# Patient Record
Sex: MALE | Race: WHITE | NOT HISPANIC OR LATINO | Employment: OTHER | URBAN - METROPOLITAN AREA
[De-identification: names, ages, dates, MRNs, and addresses within clinical notes are randomized per-mention and may not be internally consistent; named-entity substitution may affect disease eponyms.]

---

## 2019-01-01 ENCOUNTER — HOSPITAL ENCOUNTER (INPATIENT)
Facility: MEDICAL CENTER | Age: 79
LOS: 18 days | DRG: 640 | End: 2020-04-24
Attending: EMERGENCY MEDICINE | Admitting: HOSPITALIST
Payer: COMMERCIAL

## 2019-01-01 ENCOUNTER — APPOINTMENT (OUTPATIENT)
Dept: CARDIOLOGY | Facility: MEDICAL CENTER | Age: 79
DRG: 481 | End: 2019-01-01
Attending: NURSE PRACTITIONER
Payer: COMMERCIAL

## 2019-01-01 ENCOUNTER — APPOINTMENT (OUTPATIENT)
Dept: RADIOLOGY | Facility: MEDICAL CENTER | Age: 79
DRG: 481 | End: 2019-01-01
Attending: ORTHOPAEDIC SURGERY
Payer: COMMERCIAL

## 2019-01-01 ENCOUNTER — APPOINTMENT (OUTPATIENT)
Dept: RADIOLOGY | Facility: MEDICAL CENTER | Age: 79
DRG: 481 | End: 2019-01-01
Attending: STUDENT IN AN ORGANIZED HEALTH CARE EDUCATION/TRAINING PROGRAM
Payer: COMMERCIAL

## 2019-01-01 ENCOUNTER — HOSPITAL ENCOUNTER (OUTPATIENT)
Dept: RADIOLOGY | Facility: MEDICAL CENTER | Age: 79
End: 2019-09-28

## 2019-01-01 ENCOUNTER — HOSPITAL ENCOUNTER (INPATIENT)
Facility: MEDICAL CENTER | Age: 79
LOS: 6 days | DRG: 481 | End: 2019-10-04
Attending: EMERGENCY MEDICINE | Admitting: INTERNAL MEDICINE
Payer: COMMERCIAL

## 2019-01-01 ENCOUNTER — APPOINTMENT (OUTPATIENT)
Dept: RADIOLOGY | Facility: MEDICAL CENTER | Age: 79
DRG: 481 | End: 2019-01-01
Attending: INTERNAL MEDICINE
Payer: COMMERCIAL

## 2019-01-01 ENCOUNTER — ANESTHESIA EVENT (OUTPATIENT)
Dept: SURGERY | Facility: MEDICAL CENTER | Age: 79
DRG: 481 | End: 2019-01-01
Payer: COMMERCIAL

## 2019-01-01 ENCOUNTER — TELEPHONE (OUTPATIENT)
Dept: CARDIOLOGY | Facility: MEDICAL CENTER | Age: 79
End: 2019-01-01

## 2019-01-01 ENCOUNTER — ANESTHESIA (OUTPATIENT)
Dept: SURGERY | Facility: MEDICAL CENTER | Age: 79
DRG: 481 | End: 2019-01-01
Payer: COMMERCIAL

## 2019-01-01 VITALS
HEIGHT: 64 IN | OXYGEN SATURATION: 96 % | TEMPERATURE: 98.4 F | WEIGHT: 107.81 LBS | HEART RATE: 65 BPM | RESPIRATION RATE: 15 BRPM | DIASTOLIC BLOOD PRESSURE: 80 MMHG | BODY MASS INDEX: 18.4 KG/M2 | SYSTOLIC BLOOD PRESSURE: 149 MMHG

## 2019-01-01 DIAGNOSIS — Z91.89 REQUIRES SUPERVISION DUE TO DEFICIT IN SELF-CARE: ICD-10-CM

## 2019-01-01 DIAGNOSIS — I48.20 CHRONIC ATRIAL FIBRILLATION (HCC): ICD-10-CM

## 2019-01-01 DIAGNOSIS — R45.1 AGITATION: ICD-10-CM

## 2019-01-01 DIAGNOSIS — E43 SEVERE PROTEIN-CALORIE MALNUTRITION (HCC): ICD-10-CM

## 2019-01-01 DIAGNOSIS — S72.001A CLOSED FRACTURE OF RIGHT HIP, INITIAL ENCOUNTER (HCC): ICD-10-CM

## 2019-01-01 DIAGNOSIS — R62.7 FTT (FAILURE TO THRIVE) IN ADULT: ICD-10-CM

## 2019-01-01 DIAGNOSIS — S72.141A DISPLACED INTERTROCHANTERIC FRACTURE OF RIGHT FEMUR, INITIAL ENCOUNTER FOR CLOSED FRACTURE (HCC): Primary | ICD-10-CM

## 2019-01-01 LAB
25(OH)D3 SERPL-MCNC: 78 NG/ML (ref 30–100)
ALBUMIN SERPL BCP-MCNC: 3 G/DL (ref 3.2–4.9)
ALBUMIN/GLOB SERPL: 1.4 G/DL
ALP SERPL-CCNC: 58 U/L (ref 30–99)
ALT SERPL-CCNC: 6 U/L (ref 2–50)
ANION GAP SERPL CALC-SCNC: 14 MMOL/L (ref 0–11.9)
ANION GAP SERPL CALC-SCNC: 5 MMOL/L (ref 0–11.9)
ANION GAP SERPL CALC-SCNC: 7 MMOL/L (ref 0–11.9)
ANION GAP SERPL CALC-SCNC: 7 MMOL/L (ref 0–11.9)
ANION GAP SERPL CALC-SCNC: 8 MMOL/L (ref 0–11.9)
ANION GAP SERPL CALC-SCNC: 8 MMOL/L (ref 0–11.9)
APPEARANCE UR: ABNORMAL
APTT PPP: 29.7 SEC (ref 24.7–36)
APTT PPP: 40.4 SEC (ref 24.7–36)
AST SERPL-CCNC: 15 U/L (ref 12–45)
BACTERIA #/AREA URNS HPF: ABNORMAL /HPF
BASOPHILS # BLD AUTO: 0.1 % (ref 0–1.8)
BASOPHILS # BLD AUTO: 0.4 % (ref 0–1.8)
BASOPHILS # BLD AUTO: 0.6 % (ref 0–1.8)
BASOPHILS # BLD AUTO: 0.7 % (ref 0–1.8)
BASOPHILS # BLD: 0.01 K/UL (ref 0–0.12)
BASOPHILS # BLD: 0.04 K/UL (ref 0–0.12)
BASOPHILS # BLD: 0.04 K/UL (ref 0–0.12)
BASOPHILS # BLD: 0.05 K/UL (ref 0–0.12)
BILIRUB SERPL-MCNC: 0.6 MG/DL (ref 0.1–1.5)
BILIRUB UR QL STRIP.AUTO: NEGATIVE
BUN SERPL-MCNC: 38 MG/DL (ref 8–22)
BUN SERPL-MCNC: 42 MG/DL (ref 8–22)
BUN SERPL-MCNC: 54 MG/DL (ref 8–22)
BUN SERPL-MCNC: 55 MG/DL (ref 8–22)
BUN SERPL-MCNC: 55 MG/DL (ref 8–22)
BUN SERPL-MCNC: 62 MG/DL (ref 8–22)
CALCIUM SERPL-MCNC: 7.8 MG/DL (ref 8.5–10.5)
CALCIUM SERPL-MCNC: 7.9 MG/DL (ref 8.5–10.5)
CALCIUM SERPL-MCNC: 8 MG/DL (ref 8.5–10.5)
CALCIUM SERPL-MCNC: 8 MG/DL (ref 8.5–10.5)
CALCIUM SERPL-MCNC: 8.2 MG/DL (ref 8.4–10.2)
CALCIUM SERPL-MCNC: 8.2 MG/DL (ref 8.5–10.5)
CHLORIDE SERPL-SCNC: 107 MMOL/L (ref 96–112)
CHLORIDE SERPL-SCNC: 107 MMOL/L (ref 96–112)
CHLORIDE SERPL-SCNC: 108 MMOL/L (ref 96–112)
CHLORIDE SERPL-SCNC: 108 MMOL/L (ref 96–112)
CHLORIDE SERPL-SCNC: 109 MMOL/L (ref 96–112)
CHLORIDE SERPL-SCNC: 110 MMOL/L (ref 96–112)
CO2 SERPL-SCNC: 21 MMOL/L (ref 20–33)
CO2 SERPL-SCNC: 22 MMOL/L (ref 20–33)
CO2 SERPL-SCNC: 23 MMOL/L (ref 20–33)
CO2 SERPL-SCNC: 24 MMOL/L (ref 20–33)
CO2 SERPL-SCNC: 24 MMOL/L (ref 20–33)
CO2 SERPL-SCNC: 25 MMOL/L (ref 20–33)
COLOR UR: YELLOW
CREAT SERPL-MCNC: 1.87 MG/DL (ref 0.5–1.4)
CREAT SERPL-MCNC: 2.05 MG/DL (ref 0.5–1.4)
CREAT SERPL-MCNC: 2.35 MG/DL (ref 0.5–1.4)
CREAT SERPL-MCNC: 2.38 MG/DL (ref 0.5–1.4)
CREAT SERPL-MCNC: 2.47 MG/DL (ref 0.5–1.4)
CREAT SERPL-MCNC: 2.68 MG/DL (ref 0.5–1.4)
CREAT UR-MCNC: 94.3 MG/DL
EKG IMPRESSION: NORMAL
EOSINOPHIL # BLD AUTO: 0 K/UL (ref 0–0.51)
EOSINOPHIL # BLD AUTO: 0.04 K/UL (ref 0–0.51)
EOSINOPHIL # BLD AUTO: 0.05 K/UL (ref 0–0.51)
EOSINOPHIL # BLD AUTO: 0.12 K/UL (ref 0–0.51)
EOSINOPHIL NFR BLD: 0 % (ref 0–6.9)
EOSINOPHIL NFR BLD: 0.4 % (ref 0–6.9)
EOSINOPHIL NFR BLD: 0.7 % (ref 0–6.9)
EOSINOPHIL NFR BLD: 1.8 % (ref 0–6.9)
ERYTHROCYTE [DISTWIDTH] IN BLOOD BY AUTOMATED COUNT: 49.1 FL (ref 35.9–50)
ERYTHROCYTE [DISTWIDTH] IN BLOOD BY AUTOMATED COUNT: 49.6 FL (ref 35.9–50)
ERYTHROCYTE [DISTWIDTH] IN BLOOD BY AUTOMATED COUNT: 49.8 FL (ref 35.9–50)
ERYTHROCYTE [DISTWIDTH] IN BLOOD BY AUTOMATED COUNT: 49.8 FL (ref 35.9–50)
ERYTHROCYTE [DISTWIDTH] IN BLOOD BY AUTOMATED COUNT: 50.6 FL (ref 35.9–50)
GLOBULIN SER CALC-MCNC: 2.1 G/DL (ref 1.9–3.5)
GLUCOSE SERPL-MCNC: 105 MG/DL (ref 65–99)
GLUCOSE SERPL-MCNC: 115 MG/DL (ref 65–99)
GLUCOSE SERPL-MCNC: 119 MG/DL (ref 65–99)
GLUCOSE SERPL-MCNC: 159 MG/DL (ref 65–99)
GLUCOSE SERPL-MCNC: 87 MG/DL (ref 65–99)
GLUCOSE SERPL-MCNC: 92 MG/DL (ref 65–99)
GLUCOSE UR STRIP.AUTO-MCNC: NEGATIVE MG/DL
HCT VFR BLD AUTO: 26 % (ref 42–52)
HCT VFR BLD AUTO: 26.4 % (ref 42–52)
HCT VFR BLD AUTO: 26.9 % (ref 42–52)
HCT VFR BLD AUTO: 30.8 % (ref 42–52)
HCT VFR BLD AUTO: 36.7 % (ref 42–52)
HGB BLD-MCNC: 10.1 G/DL (ref 14–18)
HGB BLD-MCNC: 12.1 G/DL (ref 14–18)
HGB BLD-MCNC: 8.4 G/DL (ref 14–18)
HGB BLD-MCNC: 8.6 G/DL (ref 14–18)
HGB BLD-MCNC: 8.7 G/DL (ref 14–18)
IMM GRANULOCYTES # BLD AUTO: 0.02 K/UL (ref 0–0.11)
IMM GRANULOCYTES # BLD AUTO: 0.02 K/UL (ref 0–0.11)
IMM GRANULOCYTES # BLD AUTO: 0.03 K/UL (ref 0–0.11)
IMM GRANULOCYTES # BLD AUTO: 0.04 K/UL (ref 0–0.11)
IMM GRANULOCYTES NFR BLD AUTO: 0.3 % (ref 0–0.9)
IMM GRANULOCYTES NFR BLD AUTO: 0.3 % (ref 0–0.9)
IMM GRANULOCYTES NFR BLD AUTO: 0.4 % (ref 0–0.9)
IMM GRANULOCYTES NFR BLD AUTO: 0.4 % (ref 0–0.9)
INR PPP: 0.99 (ref 0.87–1.13)
INR PPP: 1 (ref 0.87–1.13)
KETONES UR STRIP.AUTO-MCNC: NEGATIVE MG/DL
LEUKOCYTE ESTERASE UR QL STRIP.AUTO: NEGATIVE
LYMPHOCYTES # BLD AUTO: 0.37 K/UL (ref 1–4.8)
LYMPHOCYTES # BLD AUTO: 1.07 K/UL (ref 1–4.8)
LYMPHOCYTES # BLD AUTO: 1.46 K/UL (ref 1–4.8)
LYMPHOCYTES # BLD AUTO: 1.62 K/UL (ref 1–4.8)
LYMPHOCYTES NFR BLD: 21.9 % (ref 22–41)
LYMPHOCYTES NFR BLD: 22.6 % (ref 22–41)
LYMPHOCYTES NFR BLD: 4.4 % (ref 22–41)
LYMPHOCYTES NFR BLD: 9.5 % (ref 22–41)
MAGNESIUM SERPL-MCNC: 1.5 MG/DL (ref 1.5–2.5)
MCH RBC QN AUTO: 33.1 PG (ref 27–33)
MCH RBC QN AUTO: 33.3 PG (ref 27–33)
MCH RBC QN AUTO: 33.6 PG (ref 27–33)
MCH RBC QN AUTO: 33.8 PG (ref 27–33)
MCH RBC QN AUTO: 33.8 PG (ref 27–33)
MCHC RBC AUTO-ENTMCNC: 31.8 G/DL (ref 33.7–35.3)
MCHC RBC AUTO-ENTMCNC: 32.3 G/DL (ref 33.7–35.3)
MCHC RBC AUTO-ENTMCNC: 32.8 G/DL (ref 33.7–35.3)
MCHC RBC AUTO-ENTMCNC: 33 G/DL (ref 33.7–35.3)
MCHC RBC AUTO-ENTMCNC: 33.1 G/DL (ref 33.7–35.3)
MCV RBC AUTO: 101.6 FL (ref 81.4–97.8)
MCV RBC AUTO: 102.5 FL (ref 81.4–97.8)
MCV RBC AUTO: 103 FL (ref 81.4–97.8)
MCV RBC AUTO: 103.1 FL (ref 81.4–97.8)
MCV RBC AUTO: 103.9 FL (ref 81.4–97.8)
MICRO URNS: ABNORMAL
MONOCYTES # BLD AUTO: 0.47 K/UL (ref 0–0.85)
MONOCYTES # BLD AUTO: 0.64 K/UL (ref 0–0.85)
MONOCYTES # BLD AUTO: 0.77 K/UL (ref 0–0.85)
MONOCYTES # BLD AUTO: 0.84 K/UL (ref 0–0.85)
MONOCYTES NFR BLD AUTO: 10.8 % (ref 0–13.4)
MONOCYTES NFR BLD AUTO: 5.5 % (ref 0–13.4)
MONOCYTES NFR BLD AUTO: 7.5 % (ref 0–13.4)
MONOCYTES NFR BLD AUTO: 9.6 % (ref 0–13.4)
MUCOUS THREADS #/AREA URNS HPF: ABNORMAL /HPF
NEUTROPHILS # BLD AUTO: 4.39 K/UL (ref 1.82–7.42)
NEUTROPHILS # BLD AUTO: 4.65 K/UL (ref 1.82–7.42)
NEUTROPHILS # BLD AUTO: 7.61 K/UL (ref 1.82–7.42)
NEUTROPHILS # BLD AUTO: 9.2 K/UL (ref 1.82–7.42)
NEUTROPHILS NFR BLD: 64.9 % (ref 44–72)
NEUTROPHILS NFR BLD: 65.8 % (ref 44–72)
NEUTROPHILS NFR BLD: 81.8 % (ref 44–72)
NEUTROPHILS NFR BLD: 89.6 % (ref 44–72)
NITRITE UR QL STRIP.AUTO: NEGATIVE
NRBC # BLD AUTO: 0 K/UL
NRBC BLD-RTO: 0 /100 WBC
PH UR STRIP.AUTO: 6 [PH] (ref 5–8)
PLATELET # BLD AUTO: 163 K/UL (ref 164–446)
PLATELET # BLD AUTO: 164 K/UL (ref 164–446)
PLATELET # BLD AUTO: 166 K/UL (ref 164–446)
PLATELET # BLD AUTO: 197 K/UL (ref 164–446)
PLATELET # BLD AUTO: 235 K/UL (ref 164–446)
PMV BLD AUTO: 10.3 FL (ref 9–12.9)
PMV BLD AUTO: 10.5 FL (ref 9–12.9)
PMV BLD AUTO: 10.5 FL (ref 9–12.9)
PMV BLD AUTO: 10.7 FL (ref 9–12.9)
PMV BLD AUTO: 11 FL (ref 9–12.9)
POTASSIUM SERPL-SCNC: 3.7 MMOL/L (ref 3.6–5.5)
POTASSIUM SERPL-SCNC: 3.7 MMOL/L (ref 3.6–5.5)
POTASSIUM SERPL-SCNC: 3.8 MMOL/L (ref 3.6–5.5)
POTASSIUM SERPL-SCNC: 4 MMOL/L (ref 3.6–5.5)
POTASSIUM SERPL-SCNC: 4.3 MMOL/L (ref 3.6–5.5)
POTASSIUM SERPL-SCNC: 4.5 MMOL/L (ref 3.6–5.5)
PROT SERPL-MCNC: 5.1 G/DL (ref 6–8.2)
PROT UR QL STRIP: 100 MG/DL
PROT UR-MCNC: 147.8 MG/DL (ref 0–15)
PROTHROMBIN TIME: 13.3 SEC (ref 12–14.6)
PROTHROMBIN TIME: 13.4 SEC (ref 12–14.6)
PTH-INTACT SERPL-MCNC: 139.6 PG/ML (ref 14–72)
RBC # BLD AUTO: 2.54 M/UL (ref 4.7–6.1)
RBC # BLD AUTO: 2.56 M/UL (ref 4.7–6.1)
RBC # BLD AUTO: 2.61 M/UL (ref 4.7–6.1)
RBC # BLD AUTO: 2.99 M/UL (ref 4.7–6.1)
RBC # BLD AUTO: 3.58 M/UL (ref 4.7–6.1)
RBC # URNS HPF: ABNORMAL /HPF
RBC UR QL AUTO: ABNORMAL
SODIUM SERPL-SCNC: 137 MMOL/L (ref 135–145)
SODIUM SERPL-SCNC: 138 MMOL/L (ref 135–145)
SODIUM SERPL-SCNC: 139 MMOL/L (ref 135–145)
SODIUM SERPL-SCNC: 140 MMOL/L (ref 135–145)
SODIUM SERPL-SCNC: 141 MMOL/L (ref 135–145)
SODIUM SERPL-SCNC: 142 MMOL/L (ref 135–145)
SODIUM UR-SCNC: 43 MMOL/L
SP GR UR STRIP.AUTO: 1.02
UNIDENT CRYS URNS QL MICRO: ABNORMAL /HPF
WBC # BLD AUTO: 11.2 K/UL (ref 4.8–10.8)
WBC # BLD AUTO: 6.5 K/UL (ref 4.8–10.8)
WBC # BLD AUTO: 6.7 K/UL (ref 4.8–10.8)
WBC # BLD AUTO: 7.2 K/UL (ref 4.8–10.8)
WBC # BLD AUTO: 8.5 K/UL (ref 4.8–10.8)
WBC #/AREA URNS HPF: ABNORMAL /HPF

## 2019-01-01 PROCEDURE — 700111 HCHG RX REV CODE 636 W/ 250 OVERRIDE (IP): Performed by: INTERNAL MEDICINE

## 2019-01-01 PROCEDURE — 700112 HCHG RX REV CODE 229: Performed by: ORTHOPAEDIC SURGERY

## 2019-01-01 PROCEDURE — 700102 HCHG RX REV CODE 250 W/ 637 OVERRIDE(OP): Performed by: INTERNAL MEDICINE

## 2019-01-01 PROCEDURE — 501445 HCHG STAPLER, SKIN DISP: Performed by: ORTHOPAEDIC SURGERY

## 2019-01-01 PROCEDURE — 85025 COMPLETE CBC W/AUTO DIFF WBC: CPT

## 2019-01-01 PROCEDURE — 80053 COMPREHEN METABOLIC PANEL: CPT

## 2019-01-01 PROCEDURE — 500891 HCHG PACK, ORTHO MAJOR: Performed by: ORTHOPAEDIC SURGERY

## 2019-01-01 PROCEDURE — 97165 OT EVAL LOW COMPLEX 30 MIN: CPT

## 2019-01-01 PROCEDURE — A9270 NON-COVERED ITEM OR SERVICE: HCPCS | Performed by: INTERNAL MEDICINE

## 2019-01-01 PROCEDURE — 160022 HCHG BLOCK: Performed by: ORTHOPAEDIC SURGERY

## 2019-01-01 PROCEDURE — A6402 STERILE GAUZE <= 16 SQ IN: HCPCS | Performed by: ORTHOPAEDIC SURGERY

## 2019-01-01 PROCEDURE — 33228 REMV&REPLC PM GEN DUAL LEAD: CPT | Performed by: INTERNAL MEDICINE

## 2019-01-01 PROCEDURE — 80048 BASIC METABOLIC PNL TOTAL CA: CPT

## 2019-01-01 PROCEDURE — 700102 HCHG RX REV CODE 250 W/ 637 OVERRIDE(OP): Performed by: HOSPITALIST

## 2019-01-01 PROCEDURE — 85027 COMPLETE CBC AUTOMATED: CPT

## 2019-01-01 PROCEDURE — A9270 NON-COVERED ITEM OR SERVICE: HCPCS | Performed by: ORTHOPAEDIC SURGERY

## 2019-01-01 PROCEDURE — 36415 COLL VENOUS BLD VENIPUNCTURE: CPT

## 2019-01-01 PROCEDURE — 90471 IMMUNIZATION ADMIN: CPT

## 2019-01-01 PROCEDURE — 97535 SELF CARE MNGMENT TRAINING: CPT

## 2019-01-01 PROCEDURE — 700105 HCHG RX REV CODE 258: Performed by: INTERNAL MEDICINE

## 2019-01-01 PROCEDURE — 700102 HCHG RX REV CODE 250 W/ 637 OVERRIDE(OP): Performed by: ORTHOPAEDIC SURGERY

## 2019-01-01 PROCEDURE — 82570 ASSAY OF URINE CREATININE: CPT

## 2019-01-01 PROCEDURE — 99024 POSTOP FOLLOW-UP VISIT: CPT | Performed by: NURSE PRACTITIONER

## 2019-01-01 PROCEDURE — 160009 HCHG ANES TIME/MIN: Performed by: ORTHOPAEDIC SURGERY

## 2019-01-01 PROCEDURE — 160035 HCHG PACU - 1ST 60 MINS PHASE I: Performed by: ORTHOPAEDIC SURGERY

## 2019-01-01 PROCEDURE — A9270 NON-COVERED ITEM OR SERVICE: HCPCS | Performed by: HOSPITALIST

## 2019-01-01 PROCEDURE — 99239 HOSP IP/OBS DSCHRG MGMT >30: CPT | Performed by: FAMILY MEDICINE

## 2019-01-01 PROCEDURE — 73552 X-RAY EXAM OF FEMUR 2/>: CPT | Mod: RT

## 2019-01-01 PROCEDURE — G0378 HOSPITAL OBSERVATION PER HR: HCPCS

## 2019-01-01 PROCEDURE — 700105 HCHG RX REV CODE 258: Performed by: ANESTHESIOLOGY

## 2019-01-01 PROCEDURE — 99221 1ST HOSP IP/OBS SF/LOW 40: CPT | Mod: GC | Performed by: INTERNAL MEDICINE

## 2019-01-01 PROCEDURE — 160002 HCHG RECOVERY MINUTES (STAT): Performed by: ORTHOPAEDIC SURGERY

## 2019-01-01 PROCEDURE — 99406 BEHAV CHNG SMOKING 3-10 MIN: CPT

## 2019-01-01 PROCEDURE — 84156 ASSAY OF PROTEIN URINE: CPT

## 2019-01-01 PROCEDURE — 93010 ELECTROCARDIOGRAM REPORT: CPT | Performed by: INTERNAL MEDICINE

## 2019-01-01 PROCEDURE — 51798 US URINE CAPACITY MEASURE: CPT

## 2019-01-01 PROCEDURE — 700101 HCHG RX REV CODE 250: Performed by: INTERNAL MEDICINE

## 2019-01-01 PROCEDURE — 770001 HCHG ROOM/CARE - MED/SURG/GYN PRIV*

## 2019-01-01 PROCEDURE — 99224 PR SUBSEQUENT OBSERVATION CARE,LEVEL I: CPT | Performed by: HOSPITALIST

## 2019-01-01 PROCEDURE — 501838 HCHG SUTURE GENERAL: Performed by: ORTHOPAEDIC SURGERY

## 2019-01-01 PROCEDURE — 700101 HCHG RX REV CODE 250: Performed by: ORTHOPAEDIC SURGERY

## 2019-01-01 PROCEDURE — 85610 PROTHROMBIN TIME: CPT

## 2019-01-01 PROCEDURE — 81001 URINALYSIS AUTO W/SCOPE: CPT

## 2019-01-01 PROCEDURE — 99218 PR INITIAL OBSERVATION CARE,LEVL I: CPT | Mod: 25 | Performed by: HOSPITALIST

## 2019-01-01 PROCEDURE — 160002 HCHG RECOVERY MINUTES (STAT)

## 2019-01-01 PROCEDURE — 93005 ELECTROCARDIOGRAM TRACING: CPT | Performed by: INTERNAL MEDICINE

## 2019-01-01 PROCEDURE — 3E02340 INTRODUCTION OF INFLUENZA VACCINE INTO MUSCLE, PERCUTANEOUS APPROACH: ICD-10-PCS | Performed by: INTERNAL MEDICINE

## 2019-01-01 PROCEDURE — 97161 PT EVAL LOW COMPLEX 20 MIN: CPT

## 2019-01-01 PROCEDURE — 700111 HCHG RX REV CODE 636 W/ 250 OVERRIDE (IP): Performed by: ANESTHESIOLOGY

## 2019-01-01 PROCEDURE — 99233 SBSQ HOSP IP/OBS HIGH 50: CPT | Performed by: INTERNAL MEDICINE

## 2019-01-01 PROCEDURE — 83735 ASSAY OF MAGNESIUM: CPT

## 2019-01-01 PROCEDURE — 97530 THERAPEUTIC ACTIVITIES: CPT

## 2019-01-01 PROCEDURE — 84300 ASSAY OF URINE SODIUM: CPT

## 2019-01-01 PROCEDURE — 99285 EMERGENCY DEPT VISIT HI MDM: CPT

## 2019-01-01 PROCEDURE — C1769 GUIDE WIRE: HCPCS | Performed by: ORTHOPAEDIC SURGERY

## 2019-01-01 PROCEDURE — 76775 US EXAM ABDO BACK WALL LIM: CPT

## 2019-01-01 PROCEDURE — 85730 THROMBOPLASTIN TIME PARTIAL: CPT

## 2019-01-01 PROCEDURE — 160041 HCHG SURGERY MINUTES - EA ADDL 1 MIN LEVEL 4: Performed by: ORTHOPAEDIC SURGERY

## 2019-01-01 PROCEDURE — 99231 SBSQ HOSP IP/OBS SF/LOW 25: CPT | Mod: 24,GC | Performed by: INTERNAL MEDICINE

## 2019-01-01 PROCEDURE — 90662 IIV NO PRSV INCREASED AG IM: CPT | Performed by: INTERNAL MEDICINE

## 2019-01-01 PROCEDURE — 700111 HCHG RX REV CODE 636 W/ 250 OVERRIDE (IP): Performed by: ORTHOPAEDIC SURGERY

## 2019-01-01 PROCEDURE — 99232 SBSQ HOSP IP/OBS MODERATE 35: CPT | Performed by: HOSPITALIST

## 2019-01-01 PROCEDURE — 160036 HCHG PACU - EA ADDL 30 MINS PHASE I: Performed by: ORTHOPAEDIC SURGERY

## 2019-01-01 PROCEDURE — 93005 ELECTROCARDIOGRAM TRACING: CPT | Performed by: HOSPITALIST

## 2019-01-01 PROCEDURE — 160048 HCHG OR STATISTICAL LEVEL 1-5: Performed by: ORTHOPAEDIC SURGERY

## 2019-01-01 PROCEDURE — 99497 ADVNCD CARE PLAN 30 MIN: CPT | Performed by: HOSPITALIST

## 2019-01-01 PROCEDURE — 99153 MOD SED SAME PHYS/QHP EA: CPT

## 2019-01-01 PROCEDURE — C1713 ANCHOR/SCREW BN/BN,TIS/BN: HCPCS | Performed by: ORTHOPAEDIC SURGERY

## 2019-01-01 PROCEDURE — 99223 1ST HOSP IP/OBS HIGH 75: CPT | Performed by: INTERNAL MEDICINE

## 2019-01-01 PROCEDURE — 700101 HCHG RX REV CODE 250

## 2019-01-01 PROCEDURE — 160029 HCHG SURGERY MINUTES - 1ST 30 MINS LEVEL 4: Performed by: ORTHOPAEDIC SURGERY

## 2019-01-01 PROCEDURE — 0QS636Z REPOSITION RIGHT UPPER FEMUR WITH INTRAMEDULLARY INTERNAL FIXATION DEVICE, PERCUTANEOUS APPROACH: ICD-10-PCS | Performed by: ORTHOPAEDIC SURGERY

## 2019-01-01 PROCEDURE — 97166 OT EVAL MOD COMPLEX 45 MIN: CPT

## 2019-01-01 PROCEDURE — 99225 PR SUBSEQUENT OBSERVATION CARE,LEVEL II: CPT | Performed by: HOSPITALIST

## 2019-01-01 PROCEDURE — A6222 GAUZE <=16 IN NO W/SAL W/O B: HCPCS | Performed by: ORTHOPAEDIC SURGERY

## 2019-01-01 PROCEDURE — 99225 PR SUBSEQUENT OBSERVATION CARE,LEVEL II: CPT | Performed by: INTERNAL MEDICINE

## 2019-01-01 PROCEDURE — 0JH606Z INSERTION OF PACEMAKER, DUAL CHAMBER INTO CHEST SUBCUTANEOUS TISSUE AND FASCIA, OPEN APPROACH: ICD-10-PCS | Performed by: INTERNAL MEDICINE

## 2019-01-01 PROCEDURE — 0JPT0PZ REMOVAL OF CARDIAC RHYTHM RELATED DEVICE FROM TRUNK SUBCUTANEOUS TISSUE AND FASCIA, OPEN APPROACH: ICD-10-PCS | Performed by: INTERNAL MEDICINE

## 2019-01-01 PROCEDURE — 93005 ELECTROCARDIOGRAM TRACING: CPT | Performed by: EMERGENCY MEDICINE

## 2019-01-01 PROCEDURE — 700101 HCHG RX REV CODE 250: Performed by: ANESTHESIOLOGY

## 2019-01-01 PROCEDURE — 700111 HCHG RX REV CODE 636 W/ 250 OVERRIDE (IP)

## 2019-01-01 PROCEDURE — 82306 VITAMIN D 25 HYDROXY: CPT

## 2019-01-01 PROCEDURE — 71045 X-RAY EXAM CHEST 1 VIEW: CPT

## 2019-01-01 PROCEDURE — 99152 MOD SED SAME PHYS/QHP 5/>YRS: CPT | Performed by: INTERNAL MEDICINE

## 2019-01-01 PROCEDURE — 83970 ASSAY OF PARATHORMONE: CPT

## 2019-01-01 PROCEDURE — 99232 SBSQ HOSP IP/OBS MODERATE 35: CPT | Performed by: INTERNAL MEDICINE

## 2019-01-01 DEVICE — IMPLANTABLE DEVICE: Type: IMPLANTABLE DEVICE | Site: HIP | Status: FUNCTIONAL

## 2019-01-01 DEVICE — SCREW TRIGEN LOW PROFILE 5.0MM X 30MM: Type: IMPLANTABLE DEVICE | Site: HIP | Status: FUNCTIONAL

## 2019-01-01 RX ORDER — LABETALOL 100 MG/1
150 TABLET, FILM COATED ORAL 2 TIMES DAILY
Status: ON HOLD | COMMUNITY
End: 2019-01-01

## 2019-01-01 RX ORDER — SODIUM CHLORIDE, SODIUM GLUCONATE, SODIUM ACETATE, POTASSIUM CHLORIDE AND MAGNESIUM CHLORIDE 526; 502; 368; 37; 30 MG/100ML; MG/100ML; MG/100ML; MG/100ML; MG/100ML
500 INJECTION, SOLUTION INTRAVENOUS CONTINUOUS
Status: DISCONTINUED | OUTPATIENT
Start: 2019-01-01 | End: 2019-01-01 | Stop reason: HOSPADM

## 2019-01-01 RX ORDER — MORPHINE SULFATE 0.5 MG/ML
INJECTION, SOLUTION EPIDURAL; INTRATHECAL; INTRAVENOUS PRN
Status: DISCONTINUED | OUTPATIENT
Start: 2019-01-01 | End: 2019-01-01 | Stop reason: SURG

## 2019-01-01 RX ORDER — ERGOCALCIFEROL 1.25 MG/1
50000 CAPSULE ORAL
Status: ON HOLD | COMMUNITY
End: 2020-01-01

## 2019-01-01 RX ORDER — ONDANSETRON 2 MG/ML
4 INJECTION INTRAMUSCULAR; INTRAVENOUS
Status: DISCONTINUED | OUTPATIENT
Start: 2019-01-01 | End: 2019-01-01 | Stop reason: HOSPADM

## 2019-01-01 RX ORDER — LOSARTAN POTASSIUM 100 MG/1
100 TABLET ORAL DAILY
COMMUNITY
End: 2019-01-01

## 2019-01-01 RX ORDER — TAMSULOSIN HYDROCHLORIDE 0.4 MG/1
0.4 CAPSULE ORAL EVERY EVENING
COMMUNITY
Start: 2020-01-01

## 2019-01-01 RX ORDER — HALOPERIDOL 5 MG/ML
1 INJECTION INTRAMUSCULAR
Status: DISCONTINUED | OUTPATIENT
Start: 2019-01-01 | End: 2019-01-01 | Stop reason: HOSPADM

## 2019-01-01 RX ORDER — ATORVASTATIN CALCIUM 10 MG/1
10 TABLET, FILM COATED ORAL EVERY MORNING
COMMUNITY
End: 2019-01-01

## 2019-01-01 RX ORDER — HYDROMORPHONE HYDROCHLORIDE 1 MG/ML
1 INJECTION, SOLUTION INTRAMUSCULAR; INTRAVENOUS; SUBCUTANEOUS
Status: DISCONTINUED | OUTPATIENT
Start: 2019-01-01 | End: 2019-01-01 | Stop reason: HOSPADM

## 2019-01-01 RX ORDER — OXYCODONE HYDROCHLORIDE 5 MG/1
5 TABLET ORAL EVERY 6 HOURS PRN
Qty: 12 TAB | Refills: 0 | Status: SHIPPED | OUTPATIENT
Start: 2019-01-01 | End: 2019-01-01

## 2019-01-01 RX ORDER — DEXAMETHASONE SODIUM PHOSPHATE 4 MG/ML
INJECTION, SOLUTION INTRA-ARTICULAR; INTRALESIONAL; INTRAMUSCULAR; INTRAVENOUS; SOFT TISSUE PRN
Status: DISCONTINUED | OUTPATIENT
Start: 2019-01-01 | End: 2019-01-01 | Stop reason: SURG

## 2019-01-01 RX ORDER — SCOLOPAMINE TRANSDERMAL SYSTEM 1 MG/1
1 PATCH, EXTENDED RELEASE TRANSDERMAL
Status: DISCONTINUED | OUTPATIENT
Start: 2019-01-01 | End: 2019-01-01 | Stop reason: HOSPADM

## 2019-01-01 RX ORDER — BUPIVACAINE HYDROCHLORIDE 2.5 MG/ML
INJECTION, SOLUTION EPIDURAL; INFILTRATION; INTRACAUDAL PRN
Status: DISCONTINUED | OUTPATIENT
Start: 2019-01-01 | End: 2019-01-01 | Stop reason: SURG

## 2019-01-01 RX ORDER — CLONIDINE HYDROCHLORIDE 0.1 MG/1
0.1 TABLET ORAL EVERY 6 HOURS PRN
COMMUNITY
End: 2020-01-01

## 2019-01-01 RX ORDER — AMOXICILLIN 250 MG
1 CAPSULE ORAL NIGHTLY
Status: DISCONTINUED | OUTPATIENT
Start: 2019-01-01 | End: 2019-01-01 | Stop reason: HOSPADM

## 2019-01-01 RX ORDER — DRONABINOL 2.5 MG/1
5 CAPSULE ORAL 2 TIMES DAILY
Status: DISCONTINUED | OUTPATIENT
Start: 2019-01-01 | End: 2020-01-01

## 2019-01-01 RX ORDER — HYDRALAZINE HYDROCHLORIDE 20 MG/ML
10 INJECTION INTRAMUSCULAR; INTRAVENOUS EVERY 4 HOURS PRN
Status: DISCONTINUED | OUTPATIENT
Start: 2019-01-01 | End: 2019-01-01 | Stop reason: HOSPADM

## 2019-01-01 RX ORDER — HEPARIN SODIUM 5000 [USP'U]/ML
5000 INJECTION, SOLUTION INTRAVENOUS; SUBCUTANEOUS EVERY 8 HOURS
Status: DISCONTINUED | OUTPATIENT
Start: 2019-01-01 | End: 2019-01-01

## 2019-01-01 RX ORDER — MEPERIDINE HYDROCHLORIDE 25 MG/ML
12.5 INJECTION INTRAMUSCULAR; INTRAVENOUS; SUBCUTANEOUS
Status: DISCONTINUED | OUTPATIENT
Start: 2019-01-01 | End: 2019-01-01 | Stop reason: HOSPADM

## 2019-01-01 RX ORDER — HEPARIN SODIUM 5000 [USP'U]/ML
5000 INJECTION, SOLUTION INTRAVENOUS; SUBCUTANEOUS EVERY 8 HOURS
Refills: 0
Start: 2019-01-01 | End: 2019-01-01

## 2019-01-01 RX ORDER — ONDANSETRON 4 MG/1
4 TABLET, ORALLY DISINTEGRATING ORAL EVERY 4 HOURS PRN
Status: DISCONTINUED | OUTPATIENT
Start: 2019-01-01 | End: 2019-01-01 | Stop reason: HOSPADM

## 2019-01-01 RX ORDER — ALENDRONATE SODIUM 70 MG/1
70 TABLET ORAL
COMMUNITY
End: 2019-01-01

## 2019-01-01 RX ORDER — IPRATROPIUM BROMIDE AND ALBUTEROL SULFATE 2.5; .5 MG/3ML; MG/3ML
3 SOLUTION RESPIRATORY (INHALATION)
Status: DISCONTINUED | OUTPATIENT
Start: 2019-01-01 | End: 2019-01-01 | Stop reason: HOSPADM

## 2019-01-01 RX ORDER — CEFAZOLIN SODIUM 1 G/3ML
INJECTION, POWDER, FOR SOLUTION INTRAMUSCULAR; INTRAVENOUS PRN
Status: DISCONTINUED | OUTPATIENT
Start: 2019-01-01 | End: 2019-01-01 | Stop reason: SURG

## 2019-01-01 RX ORDER — OXYCODONE HYDROCHLORIDE 5 MG/1
5 TABLET ORAL
Status: DISCONTINUED | OUTPATIENT
Start: 2019-01-01 | End: 2019-01-01 | Stop reason: HOSPADM

## 2019-01-01 RX ORDER — LOSARTAN POTASSIUM 25 MG/1
100 TABLET ORAL DAILY
Status: DISCONTINUED | OUTPATIENT
Start: 2019-01-01 | End: 2020-01-01

## 2019-01-01 RX ORDER — OXYCODONE HCL 5 MG/5 ML
5 SOLUTION, ORAL ORAL
Status: DISCONTINUED | OUTPATIENT
Start: 2019-01-01 | End: 2019-01-01 | Stop reason: HOSPADM

## 2019-01-01 RX ORDER — AMLODIPINE BESYLATE 5 MG/1
5 TABLET ORAL DAILY
Status: DISCONTINUED | OUTPATIENT
Start: 2019-01-01 | End: 2019-01-01

## 2019-01-01 RX ORDER — SCOLOPAMINE TRANSDERMAL SYSTEM 1 MG/1
1 PATCH, EXTENDED RELEASE TRANSDERMAL
Qty: 4 PATCH | Refills: 3 | Status: SHIPPED | OUTPATIENT
Start: 2019-01-01 | End: 2019-01-01

## 2019-01-01 RX ORDER — LIDOCAINE HYDROCHLORIDE 20 MG/ML
INJECTION, SOLUTION INFILTRATION; PERINEURAL
Status: COMPLETED
Start: 2019-01-01 | End: 2019-01-01

## 2019-01-01 RX ORDER — ERGOCALCIFEROL 1.25 MG/1
50000 CAPSULE ORAL
COMMUNITY
End: 2019-01-01

## 2019-01-01 RX ORDER — MIDAZOLAM HYDROCHLORIDE 1 MG/ML
1 INJECTION INTRAMUSCULAR; INTRAVENOUS
Status: DISCONTINUED | OUTPATIENT
Start: 2019-01-01 | End: 2019-01-01 | Stop reason: HOSPADM

## 2019-01-01 RX ORDER — AMOXICILLIN 250 MG
1 CAPSULE ORAL
Qty: 30 TAB | Refills: 0 | Status: SHIPPED | OUTPATIENT
Start: 2019-01-01 | End: 2019-01-01

## 2019-01-01 RX ORDER — ROCURONIUM BROMIDE 10 MG/ML
INJECTION, SOLUTION INTRAVENOUS PRN
Status: DISCONTINUED | OUTPATIENT
Start: 2019-01-01 | End: 2019-01-01 | Stop reason: SURG

## 2019-01-01 RX ORDER — AMOXICILLIN 250 MG
1 CAPSULE ORAL DAILY
Qty: 30 TAB | Refills: 0
Start: 2019-01-01 | End: 2019-01-01

## 2019-01-01 RX ORDER — LOSARTAN POTASSIUM 50 MG/1
100 TABLET ORAL DAILY
Status: DISCONTINUED | OUTPATIENT
Start: 2019-01-01 | End: 2019-01-01

## 2019-01-01 RX ORDER — DIPHENHYDRAMINE HYDROCHLORIDE 50 MG/ML
12.5 INJECTION INTRAMUSCULAR; INTRAVENOUS
Status: DISCONTINUED | OUTPATIENT
Start: 2019-01-01 | End: 2019-01-01 | Stop reason: HOSPADM

## 2019-01-01 RX ORDER — MAGNESIUM HYDROXIDE 1200 MG/15ML
LIQUID ORAL
Status: COMPLETED | OUTPATIENT
Start: 2019-01-01 | End: 2019-01-01

## 2019-01-01 RX ORDER — LOSARTAN POTASSIUM 100 MG/1
100 TABLET ORAL DAILY
Qty: 30 TAB | Refills: 0 | Status: SHIPPED | OUTPATIENT
Start: 2019-01-01 | End: 2019-01-01

## 2019-01-01 RX ORDER — AMOXICILLIN 250 MG
1 CAPSULE ORAL
Status: DISCONTINUED | OUTPATIENT
Start: 2019-01-01 | End: 2019-01-01 | Stop reason: HOSPADM

## 2019-01-01 RX ORDER — OXYCODONE HYDROCHLORIDE 5 MG/1
5 TABLET ORAL
Qty: 18 TAB | Refills: 0 | Status: SHIPPED | OUTPATIENT
Start: 2019-01-01 | End: 2019-01-01

## 2019-01-01 RX ORDER — OXYCODONE HYDROCHLORIDE 10 MG/1
10 TABLET ORAL
Status: DISCONTINUED | OUTPATIENT
Start: 2019-01-01 | End: 2019-01-01 | Stop reason: HOSPADM

## 2019-01-01 RX ORDER — HALOPERIDOL 5 MG/ML
1 INJECTION INTRAMUSCULAR EVERY 6 HOURS PRN
Status: DISCONTINUED | OUTPATIENT
Start: 2019-01-01 | End: 2019-01-01 | Stop reason: HOSPADM

## 2019-01-01 RX ORDER — ONDANSETRON 2 MG/ML
4 INJECTION INTRAMUSCULAR; INTRAVENOUS EVERY 4 HOURS PRN
Status: DISCONTINUED | OUTPATIENT
Start: 2019-01-01 | End: 2019-01-01 | Stop reason: HOSPADM

## 2019-01-01 RX ORDER — ENEMA 19; 7 G/133ML; G/133ML
1 ENEMA RECTAL
Status: DISCONTINUED | OUTPATIENT
Start: 2019-01-01 | End: 2019-01-01 | Stop reason: HOSPADM

## 2019-01-01 RX ORDER — ENEMA 19; 7 G/133ML; G/133ML
1 ENEMA RECTAL
Start: 2019-01-01 | End: 2019-01-01

## 2019-01-01 RX ORDER — MIDAZOLAM HYDROCHLORIDE 1 MG/ML
INJECTION INTRAMUSCULAR; INTRAVENOUS
Status: COMPLETED
Start: 2019-01-01 | End: 2019-01-01

## 2019-01-01 RX ORDER — TAMSULOSIN HYDROCHLORIDE 0.4 MG/1
0.4 CAPSULE ORAL EVERY EVENING
Status: DISCONTINUED | OUTPATIENT
Start: 2019-01-01 | End: 2020-01-01

## 2019-01-01 RX ORDER — POLYETHYLENE GLYCOL 3350 17 G/17G
17 POWDER, FOR SOLUTION ORAL 2 TIMES DAILY PRN
Refills: 3
Start: 2019-01-01 | End: 2019-01-01

## 2019-01-01 RX ORDER — ATORVASTATIN CALCIUM 10 MG/1
10 TABLET, FILM COATED ORAL NIGHTLY
COMMUNITY
End: 2019-01-01

## 2019-01-01 RX ORDER — AMLODIPINE BESYLATE 5 MG/1
5 TABLET ORAL DAILY
Status: ON HOLD | COMMUNITY
End: 2020-01-01

## 2019-01-01 RX ORDER — DOXYCYCLINE 100 MG/1
100 TABLET ORAL EVERY 12 HOURS
Status: DISCONTINUED | OUTPATIENT
Start: 2019-01-01 | End: 2019-01-01 | Stop reason: HOSPADM

## 2019-01-01 RX ORDER — ACETAMINOPHEN 325 MG/1
650 TABLET ORAL EVERY 6 HOURS PRN
Status: DISCONTINUED | OUTPATIENT
Start: 2019-01-01 | End: 2019-01-01 | Stop reason: HOSPADM

## 2019-01-01 RX ORDER — OXYCODONE HYDROCHLORIDE 5 MG/1
5 TABLET ORAL EVERY 4 HOURS PRN
Status: ON HOLD | COMMUNITY
End: 2020-01-01

## 2019-01-01 RX ORDER — LABETALOL 300 MG/1
150 TABLET, FILM COATED ORAL 2 TIMES DAILY
Status: DISCONTINUED | OUTPATIENT
Start: 2019-01-01 | End: 2019-01-01

## 2019-01-01 RX ORDER — LABETALOL 100 MG/1
75 TABLET, FILM COATED ORAL 2 TIMES DAILY
Status: DISCONTINUED | OUTPATIENT
Start: 2019-01-01 | End: 2019-01-01 | Stop reason: HOSPADM

## 2019-01-01 RX ORDER — HEPARIN SODIUM 5000 [USP'U]/ML
5000 INJECTION, SOLUTION INTRAVENOUS; SUBCUTANEOUS EVERY 12 HOURS
Status: DISCONTINUED | OUTPATIENT
Start: 2019-01-01 | End: 2019-01-01 | Stop reason: HOSPADM

## 2019-01-01 RX ORDER — OXYCODONE HYDROCHLORIDE 5 MG/1
5 TABLET ORAL EVERY 4 HOURS PRN
Status: DISCONTINUED | OUTPATIENT
Start: 2019-01-01 | End: 2020-01-01

## 2019-01-01 RX ORDER — ERGOCALCIFEROL 1.25 MG/1
50000 CAPSULE ORAL
Status: DISCONTINUED | OUTPATIENT
Start: 2019-01-01 | End: 2019-01-01 | Stop reason: HOSPADM

## 2019-01-01 RX ORDER — DOXYCYCLINE 100 MG/1
100 TABLET ORAL EVERY 12 HOURS
Qty: 7 TAB | Refills: 0 | Status: SHIPPED | OUTPATIENT
Start: 2019-01-01 | End: 2019-01-01

## 2019-01-01 RX ORDER — ERGOCALCIFEROL 1.25 MG/1
50000 CAPSULE ORAL
Status: DISCONTINUED | OUTPATIENT
Start: 2020-01-01 | End: 2020-01-01

## 2019-01-01 RX ORDER — OXYCODONE HCL 5 MG/5 ML
10 SOLUTION, ORAL ORAL
Status: DISCONTINUED | OUTPATIENT
Start: 2019-01-01 | End: 2019-01-01 | Stop reason: HOSPADM

## 2019-01-01 RX ORDER — OXYCODONE HYDROCHLORIDE 5 MG/1
5 TABLET ORAL
Status: DISCONTINUED | OUTPATIENT
Start: 2019-01-01 | End: 2019-01-01

## 2019-01-01 RX ORDER — ATORVASTATIN CALCIUM 10 MG/1
10 TABLET, FILM COATED ORAL NIGHTLY
Status: DISCONTINUED | OUTPATIENT
Start: 2019-01-01 | End: 2019-01-01 | Stop reason: HOSPADM

## 2019-01-01 RX ORDER — ALENDRONATE SODIUM 70 MG/1
70 TABLET ORAL
Status: ON HOLD | COMMUNITY
End: 2020-01-01

## 2019-01-01 RX ORDER — CEFAZOLIN SODIUM 1 G/3ML
INJECTION, POWDER, FOR SOLUTION INTRAMUSCULAR; INTRAVENOUS
Status: COMPLETED
Start: 2019-01-01 | End: 2019-01-01

## 2019-01-01 RX ORDER — ACETAMINOPHEN 325 MG/1
650 TABLET ORAL EVERY 6 HOURS PRN
Qty: 30 TAB | Refills: 0
Start: 2019-01-01 | End: 2019-01-01

## 2019-01-01 RX ORDER — OXYCODONE HYDROCHLORIDE 5 MG/1
2.5 TABLET ORAL
Status: DISCONTINUED | OUTPATIENT
Start: 2019-01-01 | End: 2019-01-01

## 2019-01-01 RX ORDER — ONDANSETRON 4 MG/1
4 TABLET, ORALLY DISINTEGRATING ORAL EVERY 4 HOURS PRN
Qty: 10 TAB | Refills: 0 | Status: SHIPPED | OUTPATIENT
Start: 2019-01-01 | End: 2019-01-01

## 2019-01-01 RX ORDER — TAMSULOSIN HYDROCHLORIDE 0.4 MG/1
0.4 CAPSULE ORAL EVERY EVENING
COMMUNITY
End: 2019-01-01

## 2019-01-01 RX ORDER — DIPHENHYDRAMINE HYDROCHLORIDE 50 MG/ML
25 INJECTION INTRAMUSCULAR; INTRAVENOUS EVERY 6 HOURS PRN
Status: DISCONTINUED | OUTPATIENT
Start: 2019-01-01 | End: 2019-01-01 | Stop reason: HOSPADM

## 2019-01-01 RX ORDER — SODIUM CHLORIDE, SODIUM LACTATE, POTASSIUM CHLORIDE, CALCIUM CHLORIDE 600; 310; 30; 20 MG/100ML; MG/100ML; MG/100ML; MG/100ML
INJECTION, SOLUTION INTRAVENOUS CONTINUOUS
Status: DISCONTINUED | OUTPATIENT
Start: 2019-01-01 | End: 2019-01-01 | Stop reason: HOSPADM

## 2019-01-01 RX ORDER — BISACODYL 10 MG
10 SUPPOSITORY, RECTAL RECTAL
Status: DISCONTINUED | OUTPATIENT
Start: 2019-01-01 | End: 2019-01-01

## 2019-01-01 RX ORDER — HYDROMORPHONE HYDROCHLORIDE 1 MG/ML
0.4 INJECTION, SOLUTION INTRAMUSCULAR; INTRAVENOUS; SUBCUTANEOUS
Status: DISCONTINUED | OUTPATIENT
Start: 2019-01-01 | End: 2019-01-01 | Stop reason: HOSPADM

## 2019-01-01 RX ORDER — ALENDRONATE SODIUM 70 MG/1
70 TABLET ORAL
Status: DISCONTINUED | OUTPATIENT
Start: 2019-01-01 | End: 2019-01-01 | Stop reason: HOSPADM

## 2019-01-01 RX ORDER — ONDANSETRON 2 MG/ML
4 INJECTION INTRAMUSCULAR; INTRAVENOUS EVERY 4 HOURS PRN
Status: DISCONTINUED | OUTPATIENT
Start: 2019-01-01 | End: 2019-01-01

## 2019-01-01 RX ORDER — LANOLIN ALCOHOL/MO/W.PET/CERES
1000 CREAM (GRAM) TOPICAL EVERY MORNING
Status: ON HOLD | COMMUNITY
End: 2019-01-01

## 2019-01-01 RX ORDER — PSEUDOEPHEDRINE HCL 30 MG
100 TABLET ORAL 2 TIMES DAILY
Qty: 60 CAP
Start: 2019-01-01 | End: 2019-01-01

## 2019-01-01 RX ORDER — DRONABINOL 5 MG/1
5 CAPSULE ORAL 2 TIMES DAILY
Status: ON HOLD | COMMUNITY
End: 2020-01-01

## 2019-01-01 RX ORDER — HYDROMORPHONE HYDROCHLORIDE 1 MG/ML
0.5 INJECTION, SOLUTION INTRAMUSCULAR; INTRAVENOUS; SUBCUTANEOUS
Status: DISCONTINUED | OUTPATIENT
Start: 2019-01-01 | End: 2019-01-01 | Stop reason: HOSPADM

## 2019-01-01 RX ORDER — DOCUSATE SODIUM 100 MG/1
100 CAPSULE, LIQUID FILLED ORAL 2 TIMES DAILY
Status: DISCONTINUED | OUTPATIENT
Start: 2019-01-01 | End: 2019-01-01 | Stop reason: HOSPADM

## 2019-01-01 RX ORDER — ALENDRONATE SODIUM 70 MG/1
70 TABLET ORAL
Qty: 4 TAB
Start: 2019-01-01 | End: 2019-01-01

## 2019-01-01 RX ORDER — BISACODYL 10 MG
10 SUPPOSITORY, RECTAL RECTAL
Refills: 0
Start: 2019-01-01 | End: 2019-01-01

## 2019-01-01 RX ORDER — BISACODYL 10 MG
10 SUPPOSITORY, RECTAL RECTAL
Status: DISCONTINUED | OUTPATIENT
Start: 2019-01-01 | End: 2019-01-01 | Stop reason: HOSPADM

## 2019-01-01 RX ORDER — KETOROLAC TROMETHAMINE 30 MG/ML
15 INJECTION, SOLUTION INTRAMUSCULAR; INTRAVENOUS EVERY 6 HOURS
Status: DISCONTINUED | OUTPATIENT
Start: 2019-01-01 | End: 2019-01-01

## 2019-01-01 RX ORDER — AMOXICILLIN 250 MG
2 CAPSULE ORAL 2 TIMES DAILY
Status: DISCONTINUED | OUTPATIENT
Start: 2019-01-01 | End: 2019-01-01

## 2019-01-01 RX ORDER — ATORVASTATIN CALCIUM 10 MG/1
10 TABLET, FILM COATED ORAL NIGHTLY
Status: DISCONTINUED | OUTPATIENT
Start: 2019-01-01 | End: 2020-01-01

## 2019-01-01 RX ORDER — LOSARTAN POTASSIUM 50 MG/1
100 TABLET ORAL
Status: DISCONTINUED | OUTPATIENT
Start: 2019-01-01 | End: 2019-01-01 | Stop reason: HOSPADM

## 2019-01-01 RX ORDER — LABETALOL HYDROCHLORIDE 5 MG/ML
10 INJECTION, SOLUTION INTRAVENOUS EVERY 4 HOURS PRN
Status: DISCONTINUED | OUTPATIENT
Start: 2019-01-01 | End: 2019-01-01 | Stop reason: HOSPADM

## 2019-01-01 RX ORDER — ENALAPRILAT 1.25 MG/ML
1.25 INJECTION INTRAVENOUS EVERY 6 HOURS PRN
Status: DISCONTINUED | OUTPATIENT
Start: 2019-01-01 | End: 2019-01-01 | Stop reason: HOSPADM

## 2019-01-01 RX ORDER — HYDROMORPHONE HYDROCHLORIDE 1 MG/ML
0.2 INJECTION, SOLUTION INTRAMUSCULAR; INTRAVENOUS; SUBCUTANEOUS
Status: DISCONTINUED | OUTPATIENT
Start: 2019-01-01 | End: 2019-01-01 | Stop reason: HOSPADM

## 2019-01-01 RX ORDER — DEXAMETHASONE SODIUM PHOSPHATE 4 MG/ML
4 INJECTION, SOLUTION INTRA-ARTICULAR; INTRALESIONAL; INTRAMUSCULAR; INTRAVENOUS; SOFT TISSUE
Status: DISCONTINUED | OUTPATIENT
Start: 2019-01-01 | End: 2019-01-01 | Stop reason: HOSPADM

## 2019-01-01 RX ORDER — CEFAZOLIN SODIUM 2 G/100ML
2 INJECTION, SOLUTION INTRAVENOUS EVERY 8 HOURS
Status: COMPLETED | OUTPATIENT
Start: 2019-01-01 | End: 2019-01-01

## 2019-01-01 RX ORDER — LIDOCAINE 50 MG/G
1 PATCH TOPICAL EVERY 24 HOURS
COMMUNITY
End: 2020-01-01

## 2019-01-01 RX ORDER — ATORVASTATIN CALCIUM 10 MG/1
10 TABLET, FILM COATED ORAL NIGHTLY
Status: ON HOLD | COMMUNITY
End: 2020-01-01

## 2019-01-01 RX ORDER — ASPIRIN 325 MG
325 TABLET ORAL EVERY 6 HOURS PRN
COMMUNITY
End: 2019-01-01

## 2019-01-01 RX ORDER — LOSARTAN POTASSIUM 100 MG/1
100 TABLET ORAL DAILY
COMMUNITY
Start: 2020-01-01

## 2019-01-01 RX ORDER — CHOLECALCIFEROL (VITAMIN D3) 125 MCG
1000 CAPSULE ORAL DAILY
Status: DISCONTINUED | OUTPATIENT
Start: 2019-01-01 | End: 2019-01-01 | Stop reason: HOSPADM

## 2019-01-01 RX ORDER — AMLODIPINE BESYLATE 5 MG/1
10 TABLET ORAL DAILY
Status: DISCONTINUED | OUTPATIENT
Start: 2020-01-01 | End: 2020-01-01

## 2019-01-01 RX ORDER — LIDOCAINE 50 MG/G
1 PATCH TOPICAL EVERY 24 HOURS
Status: DISCONTINUED | OUTPATIENT
Start: 2019-01-01 | End: 2020-01-01

## 2019-01-01 RX ORDER — LOSARTAN POTASSIUM 100 MG/1
100 TABLET ORAL EVERY MORNING
COMMUNITY
End: 2019-01-01

## 2019-01-01 RX ORDER — CLONIDINE HYDROCHLORIDE 0.1 MG/1
0.1 TABLET ORAL EVERY 6 HOURS PRN
Status: DISCONTINUED | OUTPATIENT
Start: 2019-01-01 | End: 2020-01-01

## 2019-01-01 RX ORDER — HETASTARCH 6 G/100ML
INJECTION, SOLUTION INTRAVENOUS PRN
Status: DISCONTINUED | OUTPATIENT
Start: 2019-01-01 | End: 2019-01-01 | Stop reason: SURG

## 2019-01-01 RX ORDER — POLYETHYLENE GLYCOL 3350 17 G/17G
1 POWDER, FOR SOLUTION ORAL
Status: DISCONTINUED | OUTPATIENT
Start: 2019-01-01 | End: 2019-01-01

## 2019-01-01 RX ORDER — ALENDRONATE SODIUM 70 MG/1
70 TABLET ORAL
Status: DISCONTINUED | OUTPATIENT
Start: 2019-01-01 | End: 2019-01-01

## 2019-01-01 RX ORDER — MORPHINE SULFATE 4 MG/ML
2 INJECTION, SOLUTION INTRAMUSCULAR; INTRAVENOUS
Status: DISCONTINUED | OUTPATIENT
Start: 2019-01-01 | End: 2019-01-01

## 2019-01-01 RX ORDER — POLYETHYLENE GLYCOL 3350 17 G/17G
1 POWDER, FOR SOLUTION ORAL 2 TIMES DAILY PRN
Status: DISCONTINUED | OUTPATIENT
Start: 2019-01-01 | End: 2019-01-01 | Stop reason: HOSPADM

## 2019-01-01 RX ORDER — LIDOCAINE HYDROCHLORIDE 20 MG/ML
INJECTION, SOLUTION EPIDURAL; INFILTRATION; INTRACAUDAL; PERINEURAL PRN
Status: DISCONTINUED | OUTPATIENT
Start: 2019-01-01 | End: 2019-01-01 | Stop reason: SURG

## 2019-01-01 RX ORDER — ACETAMINOPHEN 325 MG/1
650 TABLET ORAL EVERY 6 HOURS
Status: DISPENSED | OUTPATIENT
Start: 2019-01-01 | End: 2019-01-01

## 2019-01-01 RX ORDER — LABETALOL 100 MG/1
75 TABLET, FILM COATED ORAL 2 TIMES DAILY
Qty: 60 TAB
Start: 2019-01-01 | End: 2019-01-01

## 2019-01-01 RX ORDER — HALOPERIDOL 5 MG/1
5 TABLET ORAL EVERY 6 HOURS PRN
Status: DISCONTINUED | OUTPATIENT
Start: 2019-01-01 | End: 2020-01-01

## 2019-01-01 RX ORDER — HALOPERIDOL 5 MG/ML
2.5 INJECTION INTRAMUSCULAR EVERY 4 HOURS PRN
Status: DISCONTINUED | OUTPATIENT
Start: 2019-01-01 | End: 2019-01-01

## 2019-01-01 RX ADMIN — LABETALOL HYDROCHLORIDE 10 MG: 5 INJECTION INTRAVENOUS at 12:45

## 2019-01-01 RX ADMIN — TAMSULOSIN HYDROCHLORIDE 0.4 MG: 0.4 CAPSULE ORAL at 17:22

## 2019-01-01 RX ADMIN — HEPARIN SODIUM 5000 UNITS: 5000 INJECTION INTRAVENOUS; SUBCUTANEOUS at 17:57

## 2019-01-01 RX ADMIN — LABETALOL HYDROCHLORIDE 75 MG: 100 TABLET, FILM COATED ORAL at 05:39

## 2019-01-01 RX ADMIN — DRONABINOL 5 MG: 2.5 CAPSULE ORAL at 17:22

## 2019-01-01 RX ADMIN — ATORVASTATIN CALCIUM 10 MG: 10 TABLET, FILM COATED ORAL at 20:15

## 2019-01-01 RX ADMIN — ACETAMINOPHEN 650 MG: 325 TABLET, FILM COATED ORAL at 17:55

## 2019-01-01 RX ADMIN — HEPARIN SODIUM 5000 UNITS: 5000 INJECTION INTRAVENOUS; SUBCUTANEOUS at 13:47

## 2019-01-01 RX ADMIN — ATORVASTATIN CALCIUM 10 MG: 10 TABLET, FILM COATED ORAL at 21:52

## 2019-01-01 RX ADMIN — ATORVASTATIN CALCIUM 10 MG: 10 TABLET, FILM COATED ORAL at 20:14

## 2019-01-01 RX ADMIN — LABETALOL HYDROCHLORIDE 75 MG: 100 TABLET, FILM COATED ORAL at 06:15

## 2019-01-01 RX ADMIN — DOXYCYCLINE 100 MG: 100 TABLET, FILM COATED ORAL at 17:58

## 2019-01-01 RX ADMIN — LABETALOL HYDROCHLORIDE 75 MG: 100 TABLET, FILM COATED ORAL at 17:57

## 2019-01-01 RX ADMIN — CALCIUM CARBONATE-CHOLECALCIFEROL TAB 250 MG-125 UNIT 1 TABLET: 250-125 TAB at 05:29

## 2019-01-01 RX ADMIN — ATORVASTATIN CALCIUM 10 MG: 10 TABLET, FILM COATED ORAL at 20:04

## 2019-01-01 RX ADMIN — ACETAMINOPHEN 650 MG: 325 TABLET, FILM COATED ORAL at 05:41

## 2019-01-01 RX ADMIN — ATORVASTATIN CALCIUM 10 MG: 10 TABLET, FILM COATED ORAL at 20:48

## 2019-01-01 RX ADMIN — CYANOCOBALAMIN TAB 500 MCG 1000 MCG: 500 TAB at 05:20

## 2019-01-01 RX ADMIN — ACETAMINOPHEN 650 MG: 325 TABLET, FILM COATED ORAL at 12:37

## 2019-01-01 RX ADMIN — HEPARIN SODIUM 5000 UNITS: 5000 INJECTION INTRAVENOUS; SUBCUTANEOUS at 05:41

## 2019-01-01 RX ADMIN — ONDANSETRON 4 MG: 2 INJECTION INTRAMUSCULAR; INTRAVENOUS at 19:35

## 2019-01-01 RX ADMIN — HYDRALAZINE HYDROCHLORIDE 10 MG: 20 INJECTION INTRAMUSCULAR; INTRAVENOUS at 20:09

## 2019-01-01 RX ADMIN — CEFAZOLIN 2 G: 330 INJECTION, POWDER, FOR SOLUTION INTRAMUSCULAR; INTRAVENOUS at 18:35

## 2019-01-01 RX ADMIN — RIVAROXABAN 15 MG: 15 TABLET, FILM COATED ORAL at 17:22

## 2019-01-01 RX ADMIN — OXYCODONE HYDROCHLORIDE 5 MG: 5 TABLET ORAL at 20:44

## 2019-01-01 RX ADMIN — DOCUSATE SODIUM 100 MG: 100 CAPSULE, LIQUID FILLED ORAL at 05:42

## 2019-01-01 RX ADMIN — CLONIDINE HYDROCHLORIDE 0.1 MG: 0.1 TABLET ORAL at 20:04

## 2019-01-01 RX ADMIN — ACETAMINOPHEN 650 MG: 325 TABLET, FILM COATED ORAL at 12:19

## 2019-01-01 RX ADMIN — DEXAMETHASONE SODIUM PHOSPHATE 8 MG: 4 INJECTION, SOLUTION INTRA-ARTICULAR; INTRALESIONAL; INTRAMUSCULAR; INTRAVENOUS; SOFT TISSUE at 18:50

## 2019-01-01 RX ADMIN — DRONABINOL 5 MG: 2.5 CAPSULE ORAL at 18:39

## 2019-01-01 RX ADMIN — BUPIVACAINE HYDROCHLORIDE 2.5 ML: 2.5 INJECTION, SOLUTION EPIDURAL; INFILTRATION; INTRACAUDAL; PERINEURAL at 19:25

## 2019-01-01 RX ADMIN — ATORVASTATIN CALCIUM 10 MG: 10 TABLET, FILM COATED ORAL at 20:07

## 2019-01-01 RX ADMIN — HALOPERIDOL 5 MG: 5 TABLET ORAL at 14:49

## 2019-01-01 RX ADMIN — FENTANYL CITRATE 100 MCG: 50 INJECTION INTRAMUSCULAR; INTRAVENOUS at 12:35

## 2019-01-01 RX ADMIN — CYANOCOBALAMIN TAB 500 MCG 1000 MCG: 500 TAB at 05:29

## 2019-01-01 RX ADMIN — CYANOCOBALAMIN TAB 500 MCG 1000 MCG: 500 TAB at 06:16

## 2019-01-01 RX ADMIN — DRONABINOL 5 MG: 2.5 CAPSULE ORAL at 17:38

## 2019-01-01 RX ADMIN — HEPARIN SODIUM 5000 UNITS: 5000 INJECTION INTRAVENOUS; SUBCUTANEOUS at 05:22

## 2019-01-01 RX ADMIN — LIDOCAINE HYDROCHLORIDE: 20 INJECTION, SOLUTION INFILTRATION; PERINEURAL at 12:36

## 2019-01-01 RX ADMIN — CALCIUM CARBONATE-CHOLECALCIFEROL TAB 250 MG-125 UNIT 1 TABLET: 250-125 TAB at 05:42

## 2019-01-01 RX ADMIN — DOXYCYCLINE 100 MG: 100 TABLET, FILM COATED ORAL at 05:22

## 2019-01-01 RX ADMIN — ACETAMINOPHEN 650 MG: 325 TABLET, FILM COATED ORAL at 00:21

## 2019-01-01 RX ADMIN — LABETALOL HCL 150 MG: 300 TABLET, FILM COATED ORAL at 05:41

## 2019-01-01 RX ADMIN — HEPARIN SODIUM 5000 UNITS: 5000 INJECTION INTRAVENOUS; SUBCUTANEOUS at 20:46

## 2019-01-01 RX ADMIN — MIDAZOLAM HYDROCHLORIDE 2 MG: 1 INJECTION, SOLUTION INTRAMUSCULAR; INTRAVENOUS at 12:35

## 2019-01-01 RX ADMIN — LOSARTAN POTASSIUM 100 MG: 50 TABLET ORAL at 05:29

## 2019-01-01 RX ADMIN — MORPHINE SULFATE 0.15 MG: 0.5 INJECTION EPIDURAL; INTRATHECAL; INTRAVENOUS at 19:25

## 2019-01-01 RX ADMIN — RIVAROXABAN 15 MG: 15 TABLET, FILM COATED ORAL at 17:38

## 2019-01-01 RX ADMIN — DRONABINOL 5 MG: 2.5 CAPSULE ORAL at 05:30

## 2019-01-01 RX ADMIN — ACETAMINOPHEN 650 MG: 325 TABLET, FILM COATED ORAL at 17:30

## 2019-01-01 RX ADMIN — CYANOCOBALAMIN TAB 500 MCG 1000 MCG: 500 TAB at 05:42

## 2019-01-01 RX ADMIN — SODIUM CHLORIDE, POTASSIUM CHLORIDE, SODIUM LACTATE AND CALCIUM CHLORIDE: 600; 310; 30; 20 INJECTION, SOLUTION INTRAVENOUS at 00:21

## 2019-01-01 RX ADMIN — ACETAMINOPHEN 650 MG: 325 TABLET, FILM COATED ORAL at 13:46

## 2019-01-01 RX ADMIN — AMLODIPINE BESYLATE 5 MG: 5 TABLET ORAL at 05:12

## 2019-01-01 RX ADMIN — DRONABINOL 5 MG: 2.5 CAPSULE ORAL at 05:21

## 2019-01-01 RX ADMIN — LIDOCAINE HYDROCHLORIDE 50 MG: 20 INJECTION, SOLUTION EPIDURAL; INFILTRATION; INTRACAUDAL at 18:41

## 2019-01-01 RX ADMIN — MAGNESIUM HYDROXIDE 30 ML: 400 SUSPENSION ORAL at 08:00

## 2019-01-01 RX ADMIN — AMLODIPINE BESYLATE 5 MG: 5 TABLET ORAL at 05:08

## 2019-01-01 RX ADMIN — ROCURONIUM BROMIDE 50 MG: 10 INJECTION, SOLUTION INTRAVENOUS at 18:41

## 2019-01-01 RX ADMIN — HEPARIN SODIUM 5000 UNITS: 5000 INJECTION INTRAVENOUS; SUBCUTANEOUS at 21:52

## 2019-01-01 RX ADMIN — LOSARTAN POTASSIUM 100 MG: 25 TABLET ORAL at 05:30

## 2019-01-01 RX ADMIN — LOSARTAN POTASSIUM 100 MG: 50 TABLET ORAL at 05:22

## 2019-01-01 RX ADMIN — ACETAMINOPHEN 650 MG: 325 TABLET, FILM COATED ORAL at 06:14

## 2019-01-01 RX ADMIN — AMLODIPINE BESYLATE 5 MG: 5 TABLET ORAL at 05:30

## 2019-01-01 RX ADMIN — RIVAROXABAN 15 MG: 15 TABLET, FILM COATED ORAL at 17:32

## 2019-01-01 RX ADMIN — CYANOCOBALAMIN TAB 500 MCG 1000 MCG: 500 TAB at 05:22

## 2019-01-01 RX ADMIN — SODIUM CHLORIDE, SODIUM GLUCONATE, SODIUM ACETATE, POTASSIUM CHLORIDE AND MAGNESIUM CHLORIDE 500 ML: 526; 502; 368; 37; 30 INJECTION, SOLUTION INTRAVENOUS at 20:34

## 2019-01-01 RX ADMIN — HETASTARCH IN SODIUM CHLORIDE 500 ML: 6; .9 INJECTION, SOLUTION INTRAVENOUS at 19:10

## 2019-01-01 RX ADMIN — HEPARIN SODIUM 5000 UNITS: 5000 INJECTION INTRAVENOUS; SUBCUTANEOUS at 13:48

## 2019-01-01 RX ADMIN — DOCUSATE SODIUM 100 MG: 100 CAPSULE, LIQUID FILLED ORAL at 05:22

## 2019-01-01 RX ADMIN — AMLODIPINE BESYLATE 5 MG: 5 TABLET ORAL at 05:21

## 2019-01-01 RX ADMIN — LOSARTAN POTASSIUM 100 MG: 25 TABLET ORAL at 05:08

## 2019-01-01 RX ADMIN — HALOPERIDOL LACTATE 2.5 MG: 5 INJECTION, SOLUTION INTRAMUSCULAR at 14:39

## 2019-01-01 RX ADMIN — CALCIUM CARBONATE-CHOLECALCIFEROL TAB 250 MG-125 UNIT 1 TABLET: 250-125 TAB at 05:21

## 2019-01-01 RX ADMIN — LABETALOL HYDROCHLORIDE 75 MG: 100 TABLET, FILM COATED ORAL at 06:32

## 2019-01-01 RX ADMIN — TAMSULOSIN HYDROCHLORIDE 0.4 MG: 0.4 CAPSULE ORAL at 20:04

## 2019-01-01 RX ADMIN — LABETALOL HYDROCHLORIDE 75 MG: 100 TABLET, FILM COATED ORAL at 17:12

## 2019-01-01 RX ADMIN — HYDRALAZINE HYDROCHLORIDE 10 MG: 20 INJECTION INTRAMUSCULAR; INTRAVENOUS at 15:57

## 2019-01-01 RX ADMIN — LABETALOL HYDROCHLORIDE 75 MG: 100 TABLET, FILM COATED ORAL at 05:21

## 2019-01-01 RX ADMIN — HEPARIN SODIUM 5000 UNITS: 5000 INJECTION INTRAVENOUS; SUBCUTANEOUS at 21:53

## 2019-01-01 RX ADMIN — ACETAMINOPHEN 650 MG: 325 TABLET, FILM COATED ORAL at 05:42

## 2019-01-01 RX ADMIN — SENNOSIDES, DOCUSATE SODIUM 1 TABLET: 50; 8.6 TABLET, FILM COATED ORAL at 21:53

## 2019-01-01 RX ADMIN — ALENDRONATE SODIUM 70 MG: 70 TABLET ORAL at 05:27

## 2019-01-01 RX ADMIN — HEPARIN SODIUM 5000 UNITS: 5000 INJECTION INTRAVENOUS; SUBCUTANEOUS at 05:20

## 2019-01-01 RX ADMIN — RIVAROXABAN 20 MG: 20 TABLET, FILM COATED ORAL at 18:39

## 2019-01-01 RX ADMIN — LABETALOL HCL 150 MG: 300 TABLET, FILM COATED ORAL at 05:28

## 2019-01-01 RX ADMIN — ATORVASTATIN CALCIUM 10 MG: 10 TABLET, FILM COATED ORAL at 21:26

## 2019-01-01 RX ADMIN — LOSARTAN POTASSIUM 100 MG: 25 TABLET ORAL at 05:12

## 2019-01-01 RX ADMIN — HEPARIN SODIUM 5000 UNITS: 5000 INJECTION INTRAVENOUS; SUBCUTANEOUS at 14:10

## 2019-01-01 RX ADMIN — LABETALOL HYDROCHLORIDE 75 MG: 100 TABLET, FILM COATED ORAL at 17:31

## 2019-01-01 RX ADMIN — SODIUM CHLORIDE, POTASSIUM CHLORIDE, SODIUM LACTATE AND CALCIUM CHLORIDE: 600; 310; 30; 20 INJECTION, SOLUTION INTRAVENOUS at 13:51

## 2019-01-01 RX ADMIN — DRONABINOL 5 MG: 2.5 CAPSULE ORAL at 05:09

## 2019-01-01 RX ADMIN — ACETAMINOPHEN 650 MG: 325 TABLET, FILM COATED ORAL at 05:20

## 2019-01-01 RX ADMIN — CEFAZOLIN SODIUM 2 G: 2 INJECTION, SOLUTION INTRAVENOUS at 06:21

## 2019-01-01 RX ADMIN — CALCIUM CARBONATE-CHOLECALCIFEROL TAB 250 MG-125 UNIT 1 TABLET: 250-125 TAB at 05:22

## 2019-01-01 RX ADMIN — LOSARTAN POTASSIUM 100 MG: 25 TABLET ORAL at 05:21

## 2019-01-01 RX ADMIN — ACETAMINOPHEN 650 MG: 325 TABLET, FILM COATED ORAL at 13:47

## 2019-01-01 RX ADMIN — ATORVASTATIN CALCIUM 10 MG: 10 TABLET, FILM COATED ORAL at 21:18

## 2019-01-01 RX ADMIN — SODIUM CHLORIDE, POTASSIUM CHLORIDE, SODIUM LACTATE AND CALCIUM CHLORIDE: 600; 310; 30; 20 INJECTION, SOLUTION INTRAVENOUS at 12:45

## 2019-01-01 RX ADMIN — CYANOCOBALAMIN TAB 500 MCG 1000 MCG: 500 TAB at 05:41

## 2019-01-01 RX ADMIN — HEPARIN SODIUM 5000 UNITS: 5000 INJECTION INTRAVENOUS; SUBCUTANEOUS at 05:30

## 2019-01-01 RX ADMIN — LOSARTAN POTASSIUM 100 MG: 50 TABLET ORAL at 14:16

## 2019-01-01 RX ADMIN — DRONABINOL 5 MG: 2.5 CAPSULE ORAL at 05:12

## 2019-01-01 RX ADMIN — HYDRALAZINE HYDROCHLORIDE 10 MG: 20 INJECTION INTRAMUSCULAR; INTRAVENOUS at 01:24

## 2019-01-01 RX ADMIN — LABETALOL HYDROCHLORIDE 75 MG: 100 TABLET, FILM COATED ORAL at 17:56

## 2019-01-01 RX ADMIN — HEPARIN SODIUM 5000 UNITS: 5000 INJECTION INTRAVENOUS; SUBCUTANEOUS at 14:19

## 2019-01-01 RX ADMIN — LABETALOL HCL 150 MG: 300 TABLET, FILM COATED ORAL at 16:38

## 2019-01-01 RX ADMIN — DRONABINOL 5 MG: 2.5 CAPSULE ORAL at 20:52

## 2019-01-01 RX ADMIN — TAMSULOSIN HYDROCHLORIDE 0.4 MG: 0.4 CAPSULE ORAL at 17:38

## 2019-01-01 RX ADMIN — HEPARIN SODIUM 5000 UNITS: 5000 INJECTION INTRAVENOUS; SUBCUTANEOUS at 05:42

## 2019-01-01 RX ADMIN — CEFAZOLIN 1000 MG: 330 INJECTION, POWDER, FOR SOLUTION INTRAMUSCULAR; INTRAVENOUS at 12:31

## 2019-01-01 RX ADMIN — CALCIUM CARBONATE-CHOLECALCIFEROL TAB 250 MG-125 UNIT 1 TABLET: 250-125 TAB at 06:16

## 2019-01-01 RX ADMIN — DRONABINOL 5 MG: 2.5 CAPSULE ORAL at 17:32

## 2019-01-01 RX ADMIN — TAMSULOSIN HYDROCHLORIDE 0.4 MG: 0.4 CAPSULE ORAL at 18:39

## 2019-01-01 RX ADMIN — CEFAZOLIN SODIUM 2 G: 2 INJECTION, SOLUTION INTRAVENOUS at 13:47

## 2019-01-01 RX ADMIN — ATORVASTATIN CALCIUM 10 MG: 10 TABLET, FILM COATED ORAL at 21:14

## 2019-01-01 RX ADMIN — ENALAPRILAT 1.25 MG: 1.25 INJECTION INTRAVENOUS at 16:00

## 2019-01-01 RX ADMIN — PROPOFOL 150 MG: 10 INJECTION, EMULSION INTRAVENOUS at 18:41

## 2019-01-01 RX ADMIN — DOCUSATE SODIUM 100 MG: 100 CAPSULE, LIQUID FILLED ORAL at 17:31

## 2019-01-01 RX ADMIN — INFLUENZA A VIRUS A/MICHIGAN/45/2015 X-275 (H1N1) ANTIGEN (FORMALDEHYDE INACTIVATED), INFLUENZA A VIRUS A/SINGAPORE/INFIMH-16-0019/2016 IVR-186 (H3N2) ANTIGEN (FORMALDEHYDE INACTIVATED), AND INFLUENZA B VIRUS B/MARYLAND/15/2016 BX-69A (A B/COLORADO/6/2017-LIKE VIRUS) ANTIGEN (FORMALDEHYDE INACTIVATED) 0.5 ML: 60; 60; 60 INJECTION, SUSPENSION INTRAMUSCULAR at 08:00

## 2019-01-01 RX ADMIN — CLONIDINE HYDROCHLORIDE 0.1 MG: 0.1 TABLET ORAL at 20:35

## 2019-01-01 RX ADMIN — HEPARIN SODIUM 5000 UNITS: 5000 INJECTION INTRAVENOUS; SUBCUTANEOUS at 20:48

## 2019-01-01 RX ADMIN — TAMSULOSIN HYDROCHLORIDE 0.4 MG: 0.4 CAPSULE ORAL at 17:32

## 2019-01-01 RX ADMIN — ACETAMINOPHEN 650 MG: 325 TABLET, FILM COATED ORAL at 23:42

## 2019-01-01 RX ADMIN — ACETAMINOPHEN 650 MG: 325 TABLET, FILM COATED ORAL at 17:12

## 2019-01-01 SDOH — HEALTH STABILITY: MENTAL HEALTH: HOW OFTEN DO YOU HAVE A DRINK CONTAINING ALCOHOL?: 4 OR MORE TIMES A WEEK

## 2019-01-01 SDOH — HEALTH STABILITY: MENTAL HEALTH: HOW MANY STANDARD DRINKS CONTAINING ALCOHOL DO YOU HAVE ON A TYPICAL DAY?: 1 OR 2

## 2019-01-01 ASSESSMENT — ENCOUNTER SYMPTOMS
DIZZINESS: 0
EYE PAIN: 0
APPETITE CHANGE: 0
DOUBLE VISION: 0
ABDOMINAL PAIN: 0
FALLS: 0
CONSTITUTIONAL NEGATIVE: 1
DIZZINESS: 0
DIARRHEA: 0
MYALGIAS: 1
VOMITING: 0
SENSORY CHANGE: 0
WHEEZING: 0
NERVOUS/ANXIOUS: 0
MEMORY LOSS: 0
BLOOD IN STOOL: 0
WEIGHT LOSS: 0
DIAPHORESIS: 0
SORE THROAT: 0
HEADACHES: 0
SINUS PRESSURE: 0
BACK PAIN: 0
GASTROINTESTINAL NEGATIVE: 1
VOMITING: 0
PHOTOPHOBIA: 0
HEADACHES: 0
NEUROLOGICAL NEGATIVE: 1
GASTROINTESTINAL NEGATIVE: 1
SORE THROAT: 0
COUGH: 0
LOSS OF CONSCIOUSNESS: 0
SHORTNESS OF BREATH: 0
PALPITATIONS: 0
DEPRESSION: 0
SHORTNESS OF BREATH: 1
MYALGIAS: 0
FEVER: 0
WEIGHT LOSS: 0
MUSCULOSKELETAL NEGATIVE: 1
SHORTNESS OF BREATH: 0
EYE PAIN: 0
GASTROINTESTINAL NEGATIVE: 1
NAUSEA: 0
HEADACHES: 0
GASTROINTESTINAL NEGATIVE: 1
NAUSEA: 0
CHILLS: 0
ABDOMINAL PAIN: 0
PALPITATIONS: 0
FEVER: 0
CARDIOVASCULAR NEGATIVE: 1
WEAKNESS: 0
BLURRED VISION: 0
SHORTNESS OF BREATH: 0
SINUS PAIN: 0
DIZZINESS: 0
WEAKNESS: 0
ABDOMINAL PAIN: 0
LOSS OF CONSCIOUSNESS: 0
HEMOPTYSIS: 0
NERVOUS/ANXIOUS: 0
CLAUDICATION: 0
PHOTOPHOBIA: 0
DEPRESSION: 0
CHILLS: 0
WHEEZING: 0
CHILLS: 0
CARDIOVASCULAR NEGATIVE: 1
PSYCHIATRIC NEGATIVE: 1
MYALGIAS: 0
PALPITATIONS: 0
FOCAL WEAKNESS: 0
CHEST TIGHTNESS: 0
HEARTBURN: 0
WEAKNESS: 0
LIGHT-HEADEDNESS: 0
SPUTUM PRODUCTION: 0
COUGH: 0
ABDOMINAL PAIN: 0
PND: 0
NERVOUS/ANXIOUS: 0
EYES NEGATIVE: 1
TINGLING: 0
BRUISES/BLEEDS EASILY: 0
FLANK PAIN: 0
EYES NEGATIVE: 1
RESPIRATORY NEGATIVE: 1
COUGH: 0
SHORTNESS OF BREATH: 1
ARTHRALGIAS: 0
CONSTITUTIONAL NEGATIVE: 1
NAUSEA: 0
NEUROLOGICAL NEGATIVE: 1
CHILLS: 0
NEUROLOGICAL NEGATIVE: 1
MYALGIAS: 0
FLANK PAIN: 0
SINUS PAIN: 0
VOMITING: 0
WEIGHT LOSS: 0
CARDIOVASCULAR NEGATIVE: 1
AGITATION: 0
COUGH: 0
VOMITING: 0
CONSTIPATION: 0
MYALGIAS: 1
SHORTNESS OF BREATH: 0
SORE THROAT: 0
FALLS: 0
ABDOMINAL PAIN: 0
CARDIOVASCULAR NEGATIVE: 1
WEIGHT LOSS: 1
NAUSEA: 0
NAUSEA: 0
DIZZINESS: 0
FATIGUE: 0
CONSTITUTIONAL NEGATIVE: 1
ABDOMINAL PAIN: 0
CONSTITUTIONAL NEGATIVE: 1
SHORTNESS OF BREATH: 0
FEVER: 0
VOMITING: 0
HEARTBURN: 0
DIZZINESS: 0
DEPRESSION: 0
CHILLS: 0
PSYCHIATRIC NEGATIVE: 1
ABDOMINAL PAIN: 0
NERVOUS/ANXIOUS: 0
SPUTUM PRODUCTION: 0
CARDIOVASCULAR NEGATIVE: 1
CONSTIPATION: 0
BRUISES/BLEEDS EASILY: 0
FEVER: 0
NEUROLOGICAL NEGATIVE: 1
BACK PAIN: 0
FEVER: 0
TREMORS: 0
BLURRED VISION: 0
CHILLS: 0
HEADACHES: 0
FEVER: 0
PSYCHIATRIC NEGATIVE: 1
GASTROINTESTINAL NEGATIVE: 1
JOINT SWELLING: 1
SEIZURES: 0
FEVER: 0
VOMITING: 0
BLURRED VISION: 0
NAUSEA: 0
FLANK PAIN: 0
CARDIOVASCULAR NEGATIVE: 1
NECK PAIN: 0
DIARRHEA: 0
VOMITING: 0
WEIGHT LOSS: 1
CLAUDICATION: 0
PND: 0
INSOMNIA: 0
ACTIVITY CHANGE: 1
GASTROINTESTINAL NEGATIVE: 1
DEPRESSION: 0
DEPRESSION: 0
TINGLING: 0
PALPITATIONS: 0

## 2019-01-01 ASSESSMENT — COGNITIVE AND FUNCTIONAL STATUS - GENERAL
DAILY ACTIVITIY SCORE: 18
CLIMB 3 TO 5 STEPS WITH RAILING: TOTAL
DAILY ACTIVITIY SCORE: 19
DRESSING REGULAR LOWER BODY CLOTHING: A LITTLE
MOBILITY SCORE: 9
WALKING IN HOSPITAL ROOM: A LOT
CLIMB 3 TO 5 STEPS WITH RAILING: A LOT
DAILY ACTIVITIY SCORE: 14
HELP NEEDED FOR BATHING: A LITTLE
TOILETING: A LITTLE
TURNING FROM BACK TO SIDE WHILE IN FLAT BAD: A LOT
SUGGESTED CMS G CODE MODIFIER MOBILITY: CM
DRESSING REGULAR UPPER BODY CLOTHING: A LITTLE
DRESSING REGULAR LOWER BODY CLOTHING: A LOT
SUGGESTED CMS G CODE MODIFIER DAILY ACTIVITY: CK
MOVING TO AND FROM BED TO CHAIR: A LITTLE
TURNING FROM BACK TO SIDE WHILE IN FLAT BAD: UNABLE
MOVING FROM LYING ON BACK TO SITTING ON SIDE OF FLAT BED: A LOT
DRESSING REGULAR UPPER BODY CLOTHING: A LITTLE
SUGGESTED CMS G CODE MODIFIER MOBILITY: CM
STANDING UP FROM CHAIR USING ARMS: A LOT
PERSONAL GROOMING: A LITTLE
SUGGESTED CMS G CODE MODIFIER MOBILITY: CK
DRESSING REGULAR UPPER BODY CLOTHING: A LOT
HELP NEEDED FOR BATHING: A LOT
MOBILITY SCORE: 12
DAILY ACTIVITIY SCORE: 12
SUGGESTED CMS G CODE MODIFIER DAILY ACTIVITY: CK
MOBILITY SCORE: 9
CLIMB 3 TO 5 STEPS WITH RAILING: TOTAL
STANDING UP FROM CHAIR USING ARMS: A LITTLE
TOILETING: A LITTLE
TURNING FROM BACK TO SIDE WHILE IN FLAT BAD: A LOT
STANDING UP FROM CHAIR USING ARMS: A LITTLE
SUGGESTED CMS G CODE MODIFIER DAILY ACTIVITY: CL
EATING MEALS: A LITTLE
SUGGESTED CMS G CODE MODIFIER MOBILITY: CM
SUGGESTED CMS G CODE MODIFIER DAILY ACTIVITY: CK
MOVING FROM LYING ON BACK TO SITTING ON SIDE OF FLAT BED: UNABLE
TURNING FROM BACK TO SIDE WHILE IN FLAT BAD: UNABLE
DAILY ACTIVITIY SCORE: 19
PERSONAL GROOMING: A LOT
SUGGESTED CMS G CODE MODIFIER MOBILITY: CL
STANDING UP FROM CHAIR USING ARMS: TOTAL
SUGGESTED CMS G CODE MODIFIER MOBILITY: CJ
PERSONAL GROOMING: A LITTLE
WALKING IN HOSPITAL ROOM: A LOT
TURNING FROM BACK TO SIDE WHILE IN FLAT BAD: A LITTLE
CLIMB 3 TO 5 STEPS WITH RAILING: TOTAL
WALKING IN HOSPITAL ROOM: A LOT
TOILETING: A LITTLE
EATING MEALS: A LOT
TURNING FROM BACK TO SIDE WHILE IN FLAT BAD: A LOT
DRESSING REGULAR LOWER BODY CLOTHING: A LOT
CLIMB 3 TO 5 STEPS WITH RAILING: TOTAL
MOVING FROM LYING ON BACK TO SITTING ON SIDE OF FLAT BED: A LITTLE
WALKING IN HOSPITAL ROOM: TOTAL
HELP NEEDED FOR BATHING: A LITTLE
STANDING UP FROM CHAIR USING ARMS: A LOT
MOVING TO AND FROM BED TO CHAIR: A LOT
MOVING FROM LYING ON BACK TO SITTING ON SIDE OF FLAT BED: UNABLE
MOBILITY SCORE: 8
TOILETING: A LITTLE
WALKING IN HOSPITAL ROOM: TOTAL
SUGGESTED CMS G CODE MODIFIER DAILY ACTIVITY: CK
HELP NEEDED FOR BATHING: A LOT
MOBILITY SCORE: 17
PERSONAL GROOMING: A LITTLE
CLIMB 3 TO 5 STEPS WITH RAILING: A LITTLE
WALKING IN HOSPITAL ROOM: A LITTLE
MOVING TO AND FROM BED TO CHAIR: UNABLE
MOVING FROM LYING ON BACK TO SITTING ON SIDE OF FLAT BED: UNABLE
EATING MEALS: A LITTLE
DRESSING REGULAR LOWER BODY CLOTHING: A LOT
MOBILITY SCORE: 7
HELP NEEDED FOR BATHING: A LOT
DAILY ACTIVITIY SCORE: 16
DRESSING REGULAR LOWER BODY CLOTHING: A LOT
MOBILITY SCORE: 21
MOVING TO AND FROM BED TO CHAIR: UNABLE
DRESSING REGULAR UPPER BODY CLOTHING: A LITTLE
TOILETING: TOTAL
DRESSING REGULAR UPPER BODY CLOTHING: A LITTLE
MOVING FROM LYING ON BACK TO SITTING ON SIDE OF FLAT BED: A LOT
SUGGESTED CMS G CODE MODIFIER DAILY ACTIVITY: CK
WALKING IN HOSPITAL ROOM: A LITTLE
STANDING UP FROM CHAIR USING ARMS: A LOT
STANDING UP FROM CHAIR USING ARMS: A LOT
PERSONAL GROOMING: A LITTLE
MOVING TO AND FROM BED TO CHAIR: A LOT
DRESSING REGULAR LOWER BODY CLOTHING: A LOT
HELP NEEDED FOR BATHING: A LOT
TOILETING: A LOT
CLIMB 3 TO 5 STEPS WITH RAILING: A LOT
MOVING TO AND FROM BED TO CHAIR: UNABLE
SUGGESTED CMS G CODE MODIFIER MOBILITY: CM

## 2019-01-01 ASSESSMENT — PATIENT HEALTH QUESTIONNAIRE - PHQ9
1. LITTLE INTEREST OR PLEASURE IN DOING THINGS: NOT AT ALL
2. FEELING DOWN, DEPRESSED, IRRITABLE, OR HOPELESS: NOT AT ALL
SUM OF ALL RESPONSES TO PHQ9 QUESTIONS 1 AND 2: 0
1. LITTLE INTEREST OR PLEASURE IN DOING THINGS: NOT AT ALL
SUM OF ALL RESPONSES TO PHQ9 QUESTIONS 1 AND 2: 0
2. FEELING DOWN, DEPRESSED, IRRITABLE, OR HOPELESS: NOT AT ALL

## 2019-01-01 ASSESSMENT — CHA2DS2 SCORE
DIABETES: NO
PRIOR STROKE OR TIA OR THROMBOEMBOLISM: NO
VASCULAR DISEASE: NO
AGE 75 OR GREATER: YES
CHA2DS2 VASC SCORE: 3
AGE 65 TO 74: NO
SEX: MALE
CHF OR LEFT VENTRICULAR DYSFUNCTION: NO
HYPERTENSION: YES

## 2019-01-01 ASSESSMENT — LIFESTYLE VARIABLES
ON A TYPICAL DAY WHEN YOU DRINK ALCOHOL HOW MANY DRINKS DO YOU HAVE: 2
TOTAL SCORE: 0
EVER FELT BAD OR GUILTY ABOUT YOUR DRINKING: NO
EVER_SMOKED: YES
DOES PATIENT WANT TO STOP DRINKING: NO
HAVE PEOPLE ANNOYED YOU BY CRITICIZING YOUR DRINKING: NO
DO YOU DRINK ALCOHOL: YES
TOTAL SCORE: 0
HOW MANY TIMES IN THE PAST YEAR HAVE YOU HAD 5 OR MORE DRINKS IN A DAY: 0
TOTAL SCORE: 0
HAVE YOU EVER FELT YOU SHOULD CUT DOWN ON YOUR DRINKING: NO
EVER HAD A DRINK FIRST THING IN THE MORNING TO STEADY YOUR NERVES TO GET RID OF A HANGOVER: NO
SUBSTANCE_ABUSE: 0
HAVE PEOPLE ANNOYED YOU BY CRITICIZING YOUR DRINKING: NO
AVERAGE NUMBER OF DAYS PER WEEK YOU HAVE A DRINK CONTAINING ALCOHOL: 14
AVERAGE NUMBER OF DAYS PER WEEK YOU HAVE A DRINK CONTAINING ALCOHOL: 7
HAVE YOU EVER FELT YOU SHOULD CUT DOWN ON YOUR DRINKING: NO
CONSUMPTION TOTAL: POSITIVE
EVER HAD A DRINK FIRST THING IN THE MORNING TO STEADY YOUR NERVES TO GET RID OF A HANGOVER: NO
TOTAL SCORE: 0
CONSUMPTION TOTAL: NEGATIVE
ON A TYPICAL DAY WHEN YOU DRINK ALCOHOL HOW MANY DRINKS DO YOU HAVE: 2
DOES PATIENT WANT TO STOP DRINKING: NO
EVER FELT BAD OR GUILTY ABOUT YOUR DRINKING: NO
EVER_SMOKED: YES
HOW MANY TIMES IN THE PAST YEAR HAVE YOU HAD 5 OR MORE DRINKS IN A DAY: 5
TOTAL SCORE: 0
EVER_SMOKED: YES
TOTAL SCORE: 0
ALCOHOL_USE: YES

## 2019-01-01 ASSESSMENT — GAIT ASSESSMENTS
GAIT LEVEL OF ASSIST: UNABLE TO PARTICIPATE

## 2019-01-01 ASSESSMENT — COPD QUESTIONNAIRES
HAVE YOU SMOKED AT LEAST 100 CIGARETTES IN YOUR ENTIRE LIFE: YES
COPD SCREENING SCORE: 6
DO YOU EVER COUGH UP ANY MUCUS OR PHLEGM?: YES, A FEW DAYS A WEEK OR MONTH
DURING THE PAST 4 WEEKS HOW MUCH DID YOU FEEL SHORT OF BREATH: SOME OF THE TIME

## 2019-01-01 ASSESSMENT — ACTIVITIES OF DAILY LIVING (ADL)
TOILETING: UNABLE TO DETERMINE AT THIS TIME
TOILETING: INDEPENDENT

## 2019-01-01 ASSESSMENT — PAIN SCALES - GENERAL: PAIN_LEVEL: 0

## 2019-01-01 ASSESSMENT — PAIN SCALES - WONG BAKER: WONGBAKER_NUMERICALRESPONSE: HURTS A LITTLE MORE

## 2019-09-28 PROBLEM — N18.30 CKD (CHRONIC KIDNEY DISEASE) STAGE 3, GFR 30-59 ML/MIN: Status: ACTIVE | Noted: 2019-01-01

## 2019-09-28 PROBLEM — D72.829 LEUKOCYTOSIS: Status: ACTIVE | Noted: 2019-01-01

## 2019-09-28 PROBLEM — S72.001A CLOSED RIGHT HIP FRACTURE (HCC): Status: ACTIVE | Noted: 2019-01-01

## 2019-09-28 PROBLEM — J44.9 COPD (CHRONIC OBSTRUCTIVE PULMONARY DISEASE) (HCC): Status: ACTIVE | Noted: 2019-01-01

## 2019-09-28 PROBLEM — E78.5 HYPERLIPIDEMIA: Status: ACTIVE | Noted: 2019-01-01
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
## 2019-09-28 PROBLEM — I10 ESSENTIAL HYPERTENSION: Status: ACTIVE | Noted: 2019-01-01

## 2019-09-28 NOTE — CARE PLAN
Problem: Communication  Goal: The ability to communicate needs accurately and effectively will improve  Outcome: PROGRESSING AS EXPECTED  Intervention: Educate patient and significant other/support system about the plan of care, procedures, treatments, medications and allow for questions  Flowsheets (Taken 9/28/2019 1311)  Pt & Family Have Been Educated on Methods Available to Report Concerns Related to Care, Treatment, Services, and Patient Safety Issues: Yes  Note:   Educated the pt on plan for surgery today. Pt verbalized understanding     Problem: Safety  Goal: Will remain free from falls  Outcome: PROGRESSING AS EXPECTED  Intervention: Implement fall precautions  Flowsheets (Taken 9/28/2019 1230)  Environmental Precautions: Treaded Slipper Socks on Patient;Personal Belongings, Wastebasket, Call Bell etc. in Easy Reach;Transferred to Stronger Side;Report Given to Other Health Care Providers Regarding Fall Risk;Bed in Low Position;Communication Sign for Patients & Families;Mobility Assessed & Appropriate Sign Placed  Note:   Safety precautions in place. Call light within reach. Bed is low and in locked position. Hourly rounding in place.

## 2019-09-28 NOTE — ED PROVIDER NOTES
ED Provider Note    CHIEF COMPLAINT  Chief Complaint   Patient presents with   • T-5000 FALL   • T-5000 Extremity Pain   • Sent by MD RICH  Eulogio Jeronimo is a 79 y.o. male who presents to the emergency department by ambulance from [facility] for orthopedic evaluation.  Patient awoke this morning, reach for his walker to go to the bathroom when he had a mechanical trip and fall.  Right hip pain.  Denies head injury loss of consciousness.  Denies neck pain or back pain.    Also facility work-up noted right intertrochanteric femur fracture.  [facility]  Pain control prior to arrival.    REVIEW OF SYSTEMS  See HPI for further details. All other systems are negative.     PAST MEDICAL HISTORY   has a past medical history of Abdominal aortic aneurysm (AAA) (HCC), Alcohol dependence (HCC), Artificial lens present, Cardiac pacemaker, Complete atrioventricular block (HCC), Dermatochalasis, Erectile dysfunction, Hard of hearing, Hematuria, Hyperlipidemia, Hypertension, Myopia, Presbyopia, and Vitamin B12 deficiency.    SOCIAL HISTORY  Social History     Tobacco Use   • Smoking status: Current Every Day Smoker     Types: Cigarettes   Substance and Sexual Activity   • Alcohol use: Yes     Frequency: 4 or more times a week     Drinks per session: 1 or 2   • Drug use: Never   • Sexual activity: Not on file       SURGICAL HISTORY  patient denies any surgical history    CURRENT MEDICATIONS  Home Medications     Reviewed by Cristina Rashid R.N. (Registered Nurse) on 09/28/19 at 0935  Med List Status: Partial   Medication Last Dose Status   alendronate (FOSAMAX) 70 MG Tab  Active   Ascorbic Acid 500 MG Cap  Active   aspirin EC (ECOTRIN) 81 MG Tablet Delayed Response  Active   atorvastatin (LIPITOR) 10 MG Tab  Active   Cyanocobalamin 1000 MCG Cap  Active   ergocalciferol (DRISDOL) 16105 UNIT capsule  Active   LABETALOL HCL PO  Active   losartan (COZAAR) 100 MG Tab  Active     "            ALLERGIES  Allergies   Allergen Reactions   • Lisinopril        PHYSICAL EXAM  VITAL SIGNS: Pulse 65   Ht 1.626 m (5' 4\")   Wt 56.7 kg (125 lb)   SpO2 97%   BMI 21.46 kg/m²   Pulse ox interpretation: I interpret this pulse ox as normal.  Constitutional: Alert in no apparent distress.  Disheveled.  HENT: Normocephalic, atraumatic, no cephalohematoma.  Moist mucous membranes.    Eyes: Pupils are equal and reactive, Conjunctiva normal.   Neck: No midline tenderness palpation.  Full range of motion without pain or resistance.  Supple.  Lymphatic: No lymphadenopathy noted.   Cardiovascular: Regular rate and rhythm, no murmurs. Distal pulses intact.    Thorax & Lungs: Normal breath sounds.  No wheezing/rales/ronchi. No increased work of breathing  Abdomen: Soft, non-distended, non-tender to palpation. No palpable or pulsatile masses.  Skin: Warm, Dry  Musculoskeletal: Pelvis stable, no diastases.  Right lower extremity is shortened and externally rotated.  Tenderness palpation at the hip.  1+ DP.  Sensation intact light touch.  Moves toes without difficulty.  Neurologic: Alert and oriented x4.  Psychiatric: Affect normal, Judgment normal, Mood normal.       DIAGNOSTIC STUDIES / PROCEDURES    LABS  Results for orders placed or performed during the hospital encounter of 19   EKG (NOW)   Result Value Ref Range    Report       [facility] Emergency Dept.    Test Date:  2019  Pt Name:    DENNY ESTRADA               Department: ER  MRN:        [id number]                      Room:        02  Gender:     Male                         Technician: [id number]  :        [id number]                   Requested By:ADAM HAGAN  Order #:    [id number]                    Reading MD: ADAM HAGAN, DO    Measurements  Intervals                                Axis  Rate:       65                           P:          0  DE:         204                          QRS:        -83  QRSD:       " 142                          T:          99  QT:         476  QTc:        495    Interpretive Statements  VENTRICULAR-PACED RHYTHM  NO FURTHER ANALYSIS ATTEMPTED DUE TO PACED RHYTHM  Compared to ECG 11/24/2007 20:50:20  Atrial-sensed ventricular-paced complex(es) or rhythm no longer present    Electronically Signed On 9- 9:48:35 PDT by LILIANA HAGAN DO       RADIOLOGY  OUTSIDE IMAGES-DX LOWER EXTREMITY, RIGHT   Final Result      OUTSIDE IMAGES-DX CHEST   Final Result        COURSE & MEDICAL DECISION MAKING  Nursing notes and vital signs were reviewed. (See chart for details)  The patients records were reviewed, history was obtained from the patient and transferring records;    Evaluation with right minimally displaced intertrochanteric femur fracture after mechanical fall at home this morning.  CMS intact distally right lower extremity.  Patient denies other injury or complaints.  Neurologically intact and nonfocal.  Labs, EKG ordered per protocol.  X-ray, chest x-ray from outside hospital.  Patient remains n.p.o. from last night.  Meds reviewed, aspirin only, no other anticoagulation.    9:41 AM Dr. Hurd is where the patient agreeable to admission.  Orthopedics paged.    9:51 AM Dr. Oglesby is aware the patient agreeable to consultation.    FINAL IMPRESSION  (S72.141A) Displaced intertrochanteric fracture of right femur, initial encounter for closed fracture (HCC)  (primary encounter diagnosis)      Electronically signed by: Liliana Hagan, 9/28/2019 9:37 AM      This dictation was created using voice recognition software. The accuracy of the dictation is limited to the abilities of the software. I expect there may be some errors of grammar and possibly content. The nursing notes were reviewed and certain aspects of this information were incorporated into this note.

## 2019-09-28 NOTE — ASSESSMENT & PLAN NOTE
Postoperative day #5.  Pain is better controlled and improved ROM now  Continue pain management  Continue physical therapy occupational therapy  Refer to an extended care facility however will not transferred here until his pacemaker is addressed and his primary generator was replaced.

## 2019-09-28 NOTE — PROGRESS NOTES
Pt off the floor for surgery  Report given to preop nurse  Pt gold chain was put in a pink cup, put label on it, and placed in nurse

## 2019-09-28 NOTE — PROGRESS NOTES
Pt arrived to the unit with the transporter via gurney.   Pt is AO x 4  On RA  Surgery tonight  Strict NPO    2 RN skin check done with Rhina RN. Pt has bruising to bilateral upper and lower extremities. No other signs of skin breakdown noted.     Safety precautions in place. Call light within reach. Bed in low and locked position. Hourly rounding in place. Updated on plan of care. Questions answered.

## 2019-09-28 NOTE — ASSESSMENT & PLAN NOTE
Continue low-fat low-cholesterol diet.  Lab Results   Component Value Date/Time    CHOLSTRLTOT 170 01/12/2005 08:31 AM    LDL 88 01/12/2005 08:31 AM    HDL 53 01/12/2005 08:31 AM    TRIGLYCERIDE 147 01/12/2005 08:31 AM

## 2019-09-28 NOTE — PROGRESS NOTES
Dr. Hurd notified of pt high BP. Instructions received to give hydralazine before Vasotec. Will follow.   Pt is still on strict NPO diet

## 2019-09-28 NOTE — ASSESSMENT & PLAN NOTE
Optimize blood pressure management.  This morning he is high at 161/89 give PRN's.  May be related to pain.  Doing better overall today

## 2019-09-28 NOTE — H&P
Hospital Medicine History & Physical Note    Date of Service  9/28/2019    Primary Care Physician  Pcp Pt States None    Consultants  Orthopedics    Code Status  Full    Chief Complaint  Right hip pain following fall    History of Presenting Illness  79 y.o. male with hypertension, COPD, history of AV block status post pacemaker placement, and hyperlipidemia, who states he was doing well until this morning when he got up and tried to go to the bathroom, when he reached for his walker but unfortunately he slipped and fell, landing on the right hip.  He had immediate sharp pain from the right hip radiating to the right leg, causing him to unable to stand up and walk.  Otherwise he denies any other complaints such as chest pain, shortness of breath, nausea or vomiting, abdominal pain, cough, fevers or chills, or leg swelling.  [facility], with acute slight displaced intertrochanteric fracture of the right proximal femur with varus angulation at the apex.  Initial blood work showed WBC of 12,400, and creatinine of 2.0.  As there were no [facility], he was transferred here for further evaluation and management.      Review of Systems  ROS     Pertinent positives/negatives as mentioned above.     A complete review of systems was personally done by me. All other systems were negative.       Past Medical History   has a past medical history of Abdominal aortic aneurysm (AAA) (HCC), Alcohol dependence (HCC), Artificial lens present, Cardiac pacemaker, Complete atrioventricular block (HCC), Dermatochalasis, Erectile dysfunction, Hard of hearing, Hematuria, Hyperlipidemia, Hypertension, Myopia, Presbyopia, and Vitamin B12 deficiency.    Surgical History  Reviewed and not pertinent.       Family History  Reviewed and not pertinent.       Social History   reports that he has been smoking cigarettes. He does not have any smokeless tobacco history on file. He  reports that he drinks alcohol. He reports that he does not use drugs.    Allergies  Allergies   Allergen Reactions   • Lisinopril        Medications  Prior to Admission Medications   Prescriptions Last Dose Informant Patient Reported? Taking?   Ascorbic Acid 500 MG Cap   Yes Yes   Sig: Take  by mouth 2 Times a Day.   Cyanocobalamin 1000 MCG Cap   Yes Yes   Sig: Take  by mouth every day.   LABETALOL HCL PO   Yes Yes   Sig: Take 150 mg by mouth 2 Times a Day.   alendronate (FOSAMAX) 70 MG Tab   Yes Yes   Sig: Take 70 mg by mouth every 7 days.   aspirin EC (ECOTRIN) 81 MG Tablet Delayed Response   Yes Yes   Sig: Take 81 mg by mouth every day.   atorvastatin (LIPITOR) 10 MG Tab   Yes Yes   Sig: Take 10 mg by mouth every evening.   ergocalciferol (DRISDOL) 68262 UNIT capsule   Yes Yes   Sig: Take 50,000 Units by mouth every 7 days.   losartan (COZAAR) 100 MG Tab   Yes Yes   Sig: Take 100 mg by mouth every day.      Facility-Administered Medications: None       Physical Exam  Temp:  [36.9 °C (98.4 °F)] 36.9 °C (98.4 °F)  Pulse:  [65] 65  BP: (148)/(89) 148/89  SpO2:  [97 %-98 %] 98 %    Physical Exam   Constitutional: He is oriented to person, place, and time. He appears well-developed and well-nourished. No distress.   HENT:   Head: Normocephalic and atraumatic.   Mouth/Throat: Oropharynx is clear and moist. No oropharyngeal exudate.   Eyes: Pupils are equal, round, and reactive to light. EOM are normal. Right eye exhibits no discharge. Left eye exhibits no discharge. No scleral icterus.   Neck: Normal range of motion. Neck supple.   Cardiovascular: Normal rate and regular rhythm. Exam reveals no gallop and no friction rub.   No murmur heard.  Pulmonary/Chest: Effort normal. He has no wheezes. He has no rales. He exhibits no tenderness.   Diminished air entry B/L bases, otherwise clear to auscultation   Abdominal: Soft. Bowel sounds are normal. There is no tenderness. There is no rebound and no guarding.    Musculoskeletal: He exhibits tenderness ( right hip) and deformity. He exhibits no edema.   Right hip is internally rotated, with limited range of motion both active and passive due to pain.   Lymphadenopathy:     He has no cervical adenopathy.   Neurological: He is alert and oriented to person, place, and time. No cranial nerve deficit. Coordination normal.   Skin: Skin is warm and dry. No rash noted. He is not diaphoretic. No erythema.   Psychiatric: He has a normal mood and affect. His behavior is normal. Thought content normal.   Vitals reviewed.      Laboratory:  Recent Labs     09/28/19  1002   WBC 11.2*   RBC 3.58*   HEMOGLOBIN 12.1*   HEMATOCRIT 36.7*   .5*   MCH 33.8*   MCHC 33.0*   RDW 49.6   PLATELETCT 197   MPV 10.3         No results for input(s): ALTSGPT, ASTSGOT, ALKPHOSPHAT, TBILIRUBIN, DBILIRUBIN, GAMMAGT, AMYLASE, LIPASE, ALB, PREALBUMIN, GLUCOSE in the last 72 hours.      No results for input(s): NTPROBNP in the last 72 hours.      No results for input(s): TROPONINT in the last 72 hours.    Urinalysis:    No results found     Imaging:  OUTSIDE IMAGES-DX LOWER EXTREMITY, RIGHT   Final Result      OUTSIDE IMAGES-DX CHEST   Final Result            Assessment/Plan:  I anticipate this patient will require at least two midnights for appropriate medical management, necessitating inpatient admission.    * Closed right hip fracture (HCC)- (present on admission)  Assessment & Plan  -Due to mechanical fall.  -Will likely need surgical fixation.  Orthopedics has been consulted.  NPO for now.   - start calcium + vit D. Check Vit D level.  Given underlying osteopenia, start Fosamax.    - continue pain control with IV morphine, and oral oxycodone.  - Continue pharmacologic DVT prophylaxis while in the hospital. Patient will have high-risk above the knee procedure. Will need on-going DVT prophylaxis on discharge.  - PT/OT eval post-op      Mchanical fall from ground level- (present on  admission)  Assessment & Plan  -PT/OT postop.  Fall precautions.    Leukocytosis, stress-related- (present on admission)  Assessment & Plan  -Likely due to acute fracture.  Monitor and trend WBC count.  Hold off on antibiotics.  Watch for fevers.    Hyperlipidemia- (present on admission)  Assessment & Plan  -Resume home dose Lipitor therapy.    COPD (chronic obstructive pulmonary disease) ([facility])- (present on admission)  Assessment & Plan  -Not in acute exacerbation.  I will put him on RT protocol while in-house.    Possible CKD (chronic kidney disease) stage 3, GFR 30-59 ml/min ([facility])- (present on admission)  Assessment & Plan  -He has not had labs in our system in a while.  Possibly has CKD.  -I would gently hydrate him with LR at 50 cc/h.  Trend renal function closely. Avoid nephrotoxins, and continue to renally dose all medications.      Essential hypertension- (present on admission)  Assessment & Plan  -Resume home dose losartan, and labetalol.  Monitor blood pressure trend closely with as needed IV labetalol and Vasotec for significant hypertension.        VTE prophylaxis: heparin SQ

## 2019-09-28 NOTE — ED NOTES
Med rec completed per pt at [facility]  Allergies reviewed  No ABX in last 14 days

## 2019-09-29 NOTE — DIETARY
"Nutrition services: Day 1 of admit.  Eulogio Jeronimo is a 79 y.o. male with admitting DX of Closed right hip fracture.     Pt seen for 34+ wt loss over past six months per admit screen; malnutrition screening score of 4. Pt appeared confused at times, states UBW is ~ 120-160# but unsure when last time he weighed this or over how long he has had wt loss. Pt states he has terrible appetite since being admiited (~ 2 days), refused breakfast meal, and usually eats ~ one meal/day with some snacking throughout the day at home. Discussed importance of adequate intake and ways to help increase intake such as supplements and small, frequent meals, pt verbalized understanding and reports he dislikes boost but agreeable to Magic Cups as snacks.       Assessment:  Height: 162.6 cm (5' 4\")  Weight: 48.9 kg (107 lb 12.9 oz)- per bedscale on 9/28.   Body mass index is 18.5 kg/m²., BMI classification: Underweight.   Diet/Intake: Regular.     Evaluation:   1. Pertinent Labs: BUN 42, Creat 2.05.   2. Pertinent Meds: Dulcolax PRN, Decadron PRN, Benadryl, Colace twice daily (refused this am), Fleet enema PRN, Zofran PRN, Senokot PRN, Vit D.   3. GI: Last BM noted on 9/26 per pt.   4. Limited wt history, pt weighed 107# per previous admit in 11/24/2007; pt noted to have hx of ETOH abuse per previous admits.       Malnutrition Risk: Limited information due to pt being poor historian. Pt with suspected severe malnutrition as evidenced by minimal intake at home (~ 1 meal/day) as his usual intake per pt (unclear how accurate pt's history is as he seemed confused at time of interview) and pt noted to have severe muscle loss (squared shoulders/ prominent scapular bones) as well as severe fat loss (mostly skin only to upper arms and hollowness to orbital and temporal region). Unsure what pt's baseline for muscle/fat stores (natural aging vs malnutrition).    Recommendations/Plan:  1. Will add Magic Cups three times daily as snacks. "   2. Encourage PO and document all intake as % taken in ADL's to provide interdisciplinary communication across all shifts.   3. Monitor weight.  4. Nutrition rep will continue to see patient for ongoing meal and snack preferences.     RD following.

## 2019-09-29 NOTE — RESPIRATORY CARE
COPD EDUCATION by COPD CLINICAL EDUCATOR  9/29/2019 at 9:30 AM by Isatu Colmenares     Patient reviewed by COPD education team. Patient does not have a history or diagnosis of COPD. Patient is a smoker, pt refuses smoking cessation packet at this time.

## 2019-09-29 NOTE — PROGRESS NOTES
· 2 RN skin check complete with BRENTON Croft.  · Devices in place:  SCD LLE  · Skin assessed under devices: Yes.  · Confirmed pressure ulcers found on: N/A.  · Bilateral ears are intact. Bilateral heels are dry and flaky but otherwise intact. Bilateral elbows intact. 3 surgical dressings noted on RLE. Middle dressing saturated and reinforced with Tegaderm.    · The following interventions in place: pillows for support and positioning. Call light in place, call light usage reinforced.

## 2019-09-29 NOTE — ANESTHESIA PROCEDURE NOTES
Peripheral IV  Date/Time: 9/28/2019 7:28 PM  Performed by: Anselmo Hudson III, M.D.  Authorized by: Anselmo Hudson III, M.D.     Size:  18 G  Laterality:  Right (FA)  Local Anesthetic:  None  Site Prep:  Alcohol  Technique:  Direct puncture  Attempts:  1   Done under GA

## 2019-09-29 NOTE — ANESTHESIA PREPROCEDURE EVALUATION
Relevant Problems   PULMONARY   (+) COPD (chronic obstructive pulmonary disease) ([facility])      CARDIAC   (+) Essential hypertension         (+) Possible CKD (chronic kidney disease) stage 3, GFR 30-59 ml/min ([facility])       Physical Exam    Anesthesia Plan    ASA 3       Plan - general       Airway plan will be LMA  (Pacemaker, EtOH, COPD, PVD, s/p AAA repair, post op spinal under GA for post op pain control)      Induction: intravenous    Postoperative Plan: Postoperative administration of opioids is intended.    Pertinent diagnostic labs and testing reviewed    Informed Consent:    Anesthetic plan and risks discussed with patient.    Use of blood products discussed with: patient whom consented to blood products.

## 2019-09-29 NOTE — ANESTHESIA PROCEDURE NOTES
Spinal Block under GA for post op pain control  Date/Time: 9/28/2019 7:21 PM  Performed by: Anselmo Hudson III, M.D.  Authorized by: Anselmo Hudson III, M.D.     Patient Location:  OR  Start Time:  9/28/2019 7:21 PM  End Time:  9/28/2019 7:25 PM  Reason for Block: post-op pain management and primary anesthetic    patient identified, IV checked, site marked, risks and benefits discussed, surgical consent, monitors and equipment checked, pre-op evaluation and timeout performed    Patient Position:  Sitting  Prep: ChloraPrep, patient draped and sterile technique    Monitoring:  Blood pressure, continuous pulse oximetry and heart rate  Approach:  Midline  Location:  L3-4  Injection Technique:  Single-shot  Skin infiltration:  Lidocaine  Strength:  1%  Dose:  3ml  Needle Type:  Pencan  Needle Gauge:  25 G  CSF flowing pre/post injection:  Yes  Sensory Level:  T10   Done under GA, spinal for post op pain control

## 2019-09-29 NOTE — ANESTHESIA QCDR
2019 [facility] Clinical Data Registry (for Quality Improvement)     Postoperative nausea/vomiting risk protocol (Adult = 18 yrs and Pediatric 3-17 yrs)- (430 and 463)  General inhalation anesthetic (NOT TIVA) with PONV risk factors: Yes  Provision of anti-emetic therapy with at least 2 different classes of agents: Yes   Patient DID NOT receive anti-emetic therapy and reason is documented in Medical Record:  N/A    Multimodal Pain Management- (AQI59)  Patient undergoing Elective Surgery (i.e. Outpatient, or ASC, or Prescheduled Surgery prior to Hospital Admission): No  Use of Multimodal Pain Management, two or more drugs and/or interventions, NOT including systemic opioids: N/A  Exception: Documented allergy to multiple classes of analgesics: N/A    PACU assessment of acute postoperative pain prior to Anesthesia Care End- Applies to Patients Age = 18- (ABG7)  Initial PACU pain score is which of the following: < 7/10  Patient unable to report pain score: N/A    Post-anesthetic transfer of care checklist/protocol to PACU/ICU- (426 and 427)  Upon conclusion of case, patient transferred to which of the following locations: PACU/Non-ICU  Use of transfer checklist/protocol: Yes  Exclusion: Service Performed in Patient Hospital Room (and thus did not require transfer): N/A    PACU Reintubation- (AQI31)  General anesthesia requiring endotracheal intubation (ETT) along with subsequent extubation in OR or PACU: No  Required reintubation in the PACU: N/A  Extubation was a planned trial documented in the medical record prior to removal of the original airway device: N/A    Unplanned admission to ICU related to anesthesia service up through end of PACU care- (MD51)  Unplanned admission to ICU (not initially anticipated at anesthesia start time): No

## 2019-09-29 NOTE — OR NURSING
Pt is AOx4. No complaints of pain, only stomach ache which he states is resolving. Dressing on right hip/thigh is CDI; dressing has staples, xeroform, 4x4 gauze and tegaderm. Pt ha 1+ pulse on BL LE; no complaints of numbness tingling. Educated pt on POC and medications. Pt is on ERAS protocol. Report called to BRENTON Mariano. All questions answered, no other needs at this time.

## 2019-09-29 NOTE — ANESTHESIA POSTPROCEDURE EVALUATION
Patient: Eulogio Jeronimo    Procedure Summary     Date:  09/28/19 Room / Location:  [place] [address] / SURGERY [facility]    Anesthesia Start:  1835 Anesthesia Stop:  1935    Procedure:  INSERTION, INTRAMEDULLARY MEGGAN, FEMUR, PROXIMAL (Hip) Diagnosis:  (closed right hip fracture)    Surgeon:  Jaquan Evans M.D. Responsible Provider:  Anselmo Hudson III, M.D.    Anesthesia Type:  general ASA Status:  3          Final Anesthesia Type: general  Last vitals  BP   Blood Pressure : (!) 208/101    Temp   36.3 °C (97.3 °F)    Pulse   Pulse: 65   Resp   14    SpO2   100 %      Anesthesia Post Evaluation    Patient location during evaluation: PACU  Patient participation: complete - patient participated  Level of consciousness: awake and alert  Pain score: 0    Airway patency: patent  Anesthetic complications: no  Cardiovascular status: hemodynamically stable  Respiratory status: acceptable  Hydration status: euvolemic    PONV: none           Nurse Pain Score: 0 (NPRS)

## 2019-09-29 NOTE — ANESTHESIA PREPROCEDURE EVALUATION
Relevant Problems   PULMONARY   (+) COPD (chronic obstructive pulmonary disease) ([facility])      CARDIAC   (+) Essential hypertension         (+) Possible CKD (chronic kidney disease) stage 3, GFR 30-59 ml/min ([facility])       Physical Exam    Airway   Mallampati: II  TM distance: >3 FB  Neck ROM: limited       Cardiovascular - normal exam  Rhythm: regular  Rate: normal  (-) murmur     Dental - normal exam         Pulmonary - normal exam  Breath sounds clear to auscultation     Abdominal    Neurological - normal exam         Other findings: Cachexia, elderly, COPD, pacemaker, s/p AAA repair, Eyoh abuse            Anesthesia Plan

## 2019-09-29 NOTE — ANESTHESIA TIME REPORT
Anesthesia Start and Stop Event Times     Date Time Event    9/28/2019 1830 Ready for Procedure     1835 Anesthesia Start     1935 Anesthesia Stop        Responsible Staff  09/28/19    Name Role Begin End    Anselmo Hudson III, M.D. Anesth 1835 1935        Preop Diagnosis (Free Text):  Pre-op Diagnosis     closed right hip fracture        Preop Diagnosis (Codes):    Post op Diagnosis  Closed right hip fracture (HCC)  intractable right hip fracture pain, pacemaker, EtOH abuse, cachexia    Premium Reason  E. Weekend    Comments: emergency, post op spinal under GA for post op pain control

## 2019-09-29 NOTE — CARE PLAN
Problem: Safety  Goal: Will remain free from injury  Outcome: PROGRESSING AS EXPECTED  Bed locked and in lowest position.      Problem: Knowledge Deficit  Goal: Knowledge of disease process/condition, treatment plan, diagnostic tests, and medications will improve  Outcome: PROGRESSING AS EXPECTED   POC discussed with pt at bedside

## 2019-09-29 NOTE — CONSULTS
DATE OF SERVICE:  09/28/2019    CHIEF COMPLAINT:  Right hip injury.    HISTORY OF PRESENT ILLNESS:  The patient is an 79-year-old gentleman who was   transferred over from the [facility].  He woke up, was reaching for his   walker, tripped and fell, sustained a ground level fall and broke his right   hip.  He denies any other injuries.    REVIEW OF SYSTEMS:  Noncontributory.    PAST MEDICAL HISTORY:  Abdominal aortic aneurysm, alcohol dependence, he has a   pacemaker, hyperlipidemia, and hypertension.    SOCIAL HISTORY:  He smokes daily.  He drinks 4 or more times per week.  He   denies illicit drug use.    PAST SURGICAL HISTORY:  Denies any past surgical history.    CURRENT MEDICATIONS:  Please see the attached list.    ALLERGIES:  TO LISINOPRIL.    PHYSICAL EXAMINATION:  VITAL SIGNS:  On admission, pulse 65, SpO2 97%.  BMI is 21.5.  GENERAL:  He is an elderly appearing gentleman who appears slightly older than   his stated age.  HEART:  Regular heart rate.  LUNGS:  Regular respirations.  ABDOMEN:  Benign.  MUSCULOSKELETAL:  On examination of the hip shows the skin to be intact,   shortened and internally rotated right lower extremity.    IMAGING:  X-rays show an intertrochanteric hip fracture, which is mildly   displaced.    ASSESSMENT:  Right displaced intertrochanteric hip fracture.    PLAN:  The risks, benefits, alternatives, and limitations of intramedullary   nailing of the right intertrochanteric hip fracture were discussed in detail   with the patient.  He expressed understanding and desires to proceed.       ____________________________________     NOEL RODRIGUEZ MD RD / [place]    DD:  09/28/2019 16:55:55  DT:  09/28/2019 20:16:30    D#:  [id number]  Job#:  [id number]

## 2019-09-29 NOTE — THERAPY
"Occupational Therapy Evaluation completed.   Functional Status: Supine > EOB min A, LB dressing max A, pivot to bedside chair mod A using FWW (limited WB on RLE due to pain), supv seated grooming   Plan of Care: Will benefit from Occupational Therapy 4 times per week  Discharge Recommendations:  Equipment: Will Continue to Assess for Equipment Needs. Post-acute therapy: Recommend post-acute placement for additional occupational therapy services prior to discharge home. Patient can tolerate post-acute therapies at a 5x/week frequency.    See \"Rehab Therapy-Acute\" Patient Summary Report for complete documentation.    79 y.o. male s/p GLF with resultant R IT fx. Pt underwent IM nail, now WBAT. Seen for OT eval. Pt is limited by pain with WB to RLE & decreased BLE AROM negatively impacting weight shifts, functional transfers, standing activity and LB ADL. Pt appears motivated and is eager to return home. Pt would benefit from acute OT to train on compensatory ADL, progress safe transfers and standing activity, assist with DC planning and DME needs. Will follow with recommendation for post-acute transitional care.     "

## 2019-09-29 NOTE — THERAPY
"Physical Therapy Evaluation completed.   Bed Mobility:  Supine to Sit: Minimal Assist  Transfers: Sit to Stand: Minimal Assist  Gait: Level Of Assist: Unable to Participate      Plan of Care: Will benefit from Physical Therapy 4 times per week  Discharge Recommendations: Equipment: Will Continue to Assess for Equipment Needs. Recommend post-acute placement for continued physical therapy services prior to discharge home. Patient can tolerate post-acute therapies at a 5x/week frequency.       See \"Rehab Therapy-Acute\" Patient Summary Report for complete documentation.     Pt is an 78yo male s/p GLF sustaining R intertrochanteric femur fx with IMN on 9/29. RLE is WBAT. Pt presents to PT limited by pain impairing balance, gait, and functional mobility. Pt required min A for bed mobility and STS and mod A to perform SPT to chair with FWW. RLE slight knee buckling with standing due to pain, decreased weight shifting onto RLE, unable to attempt gait. Pt will benefit from continued acute PT and postacute placement to maximize safety and functional independence. Will continue to follow.   "

## 2019-09-29 NOTE — PROGRESS NOTES
[facility] Medicine Daily Progress Note    Date of Service  9/29/2019    Chief Complaint  79 y.o. male admitted 9/28/2019 with right hip pain    Hospital Course    see below      Interval Problem Update  Right hip pain-s/p IM nail to R hip. Pain is well controlled but a bit more severe this PM. Patient noted to be quite thin and RD requested supplements which I approved.    Consultants/Specialty  ortho    Code Status  fcfc    Disposition  ORTHO    Review of Systems  Review of Systems   Constitutional: Positive for malaise/fatigue and weight loss. Negative for chills and fever.   Gastrointestinal: Negative for abdominal pain, nausea and vomiting.   Musculoskeletal: Positive for joint pain and myalgias.   All other systems reviewed and are negative.       Physical Exam  Temp:  [35.9 °C (96.6 °F)-37.1 °C (98.7 °F)] 36.4 °C (97.6 °F)  Pulse:  [62-67] 63  Resp:  [14-20] 16  BP: (104-208)/() 122/68  SpO2:  [95 %-100 %] 95 %    Physical Exam   Constitutional: He is oriented to person, place, and time. He appears well-developed and well-nourished. No distress.   Thin and pleasant   HENT:   Head: Normocephalic and atraumatic.   Mouth/Throat: No oropharyngeal exudate.   Eyes: Pupils are equal, round, and reactive to light. No scleral icterus.   Neck: Normal range of motion. Neck supple. No thyromegaly present.   Cardiovascular: Normal rate, regular rhythm, normal heart sounds and intact distal pulses.   No murmur heard.  Pulmonary/Chest: Effort normal and breath sounds normal. No respiratory distress. He has no wheezes.   Abdominal: Soft. Bowel sounds are normal. He exhibits no distension. There is no tenderness.   Musculoskeletal: Normal range of motion. He exhibits no edema or tenderness.   Warm peripherally  Surgical incision site on the proximal RLE appears C/D/I   Neurological: He is alert and oriented to person, place, and time. No cranial nerve deficit.   Skin: Skin is warm and dry. No rash noted.   Psychiatric: He  has a normal mood and affect.   Nursing note and vitals reviewed.      Fluids    Intake/Output Summary (Last 24 hours) at 9/29/2019 1400  Last data filed at 9/28/2019 1954  Gross per 24 hour   Intake 1000 ml   Output 25 ml   Net 975 ml       Laboratory  Recent Labs     09/28/19  1002 09/29/19  0301   WBC 11.2* 8.5   RBC 3.58* 2.99*   HEMOGLOBIN 12.1* 10.1*   HEMATOCRIT 36.7* 30.8*   .5* 103.0*   MCH 33.8* 33.8*   MCHC 33.0* 32.8*   RDW 49.6 49.8   PLATELETCT 197 166   MPV 10.3 11.0     Recent Labs     09/28/19  1002 09/29/19  0301   SODIUM 141 139   POTASSIUM 3.7 4.5   CHLORIDE 110 109   CO2 24 22   GLUCOSE 92 115*   BUN 38* 42*   CREATININE 1.87* 2.05*   CALCIUM 8.2* 7.9*     Recent Labs     09/28/19  1002   APTT 29.7   INR 1.00               Imaging  DX-FEMUR-2+ RIGHT   Final Result      Portable fluoroscopic images obtained in the OR during internal fixation right femur fracture.      OUTSIDE IMAGES-DX LOWER EXTREMITY, RIGHT   Final Result      OUTSIDE IMAGES-DX CHEST   Final Result      DX-PORTABLE FLUORO > 1 HOUR    (Results Pending)   US-RENAL    (Results Pending)        Assessment/Plan  * Closed right hip fracture (HCC)- (present on admission)  Assessment & Plan  -Due to mechanical fall.  -s.p IM nailing POD#1, doing ok  - start calcium + vit D. Check Vit D level.  Given underlying osteopenia, start Fosamax.    - continue pain control with IV morphine, and oral oxycodone.  - Continue pharmacologic DVT prophylaxis while in the hospital but change to heparin given impaired kidney function  - PT/OT eval post-op      Mchanical fall from ground level- (present on admission)  Assessment & Plan  -PT/OT postop.  Fall precautions.    Leukocytosis, stress-related- (present on admission)  Assessment & Plan  -Likely due to acute fracture.  Monitor and trend WBC count.  Hold off on antibiotics.  Watch for fevers.    Hyperlipidemia- (present on admission)  Assessment & Plan  -Resume home dose Lipitor  therapy.    COPD (chronic obstructive pulmonary disease) ([facility])- (present on admission)  Assessment & Plan  -Not in acute exacerbation.  I will put him on RT protocol while in-house.    Possible CKD (chronic kidney disease) stage 3, GFR 30-59 ml/min ([facility])- (present on admission)  Assessment & Plan  -Cr is elevated at 2, slightly higher from yesterday, unclear if this is his new baseline or mild MARGE on CKD?  -check urine lytes, check renal US, check PSA, and bladder scan  -continue low dose LR, monitor volume status closely  -check daily Cr      Essential hypertension- (present on admission)  Assessment & Plan  -stop losartan given elevated Cr and recent surgery  -continue PO labetolol 150 mg BID       VTE prophylaxis: heparin

## 2019-09-29 NOTE — OP REPORT
DATE OF SERVICE:  09/28/2019    PREOPERATIVE DIAGNOSIS:  Right intertrochanteric femur fracture.    POSTOPERATIVE DIAGNOSIS:  Right intertrochanteric femur fracture.    PROCEDURE:  Intramedullary nail, right hip.    SURGEON:  Jaquan Elizabeth MD    ASSISTANT:  None.    ESTIMATED BLOOD LOSS:  None.    INDICATIONS:  This is a gentleman status post fall during which he sustained a   right displaced intertrochanteric femur fracture.  Risks and benefits of   intramedullary nailing were discussed, which include but not limited to   bleeding, infection, neurovascular damage, pain, stiffness, malunion,   nonunion, DVT, PE, MI, stroke, and death.  They understand all these risks and   wished to proceed.    DESCRIPTION OF PROCEDURE:  Patient was sedated with LMA anesthesia and   administered preoperative antibiotics.  He was placed on the fracture table   and the fracture was reduced with slight traction and internal rotation.    Right hip and lower extremity were prepped and draped in usual sterile fashion   and a guide pin for a Smith and Nephew hip nail was inserted at the tip of   the greater trochanter.  Entry reamer was utilized.  A [id number] Smith and   Nephew nail was inserted at the appropriate depth.  A guide pin was placed in   center-center position in the femoral head.  A 95 mm lag screw was placed.    Compression was obtained.  Set screw was tightened.  A single distal cross   lock screw was placed.  Wounds were irrigated, closed with 2-0 Vicryl suture   and staples.  Sterile dressings were applied.  Patient tolerated the procedure   well.    POSTOPERATIVE PLAN:  The patient to be weightbearing as tolerated, admitted   for perioperative antibiotics, DVT prophylaxis, and pain control.       ____________________________________     JAQUAN ELIZABETH MD    STEPHEN / NTS    DD:  09/28/2019 19:08:26  DT:  09/28/2019 19:30:31    D#:  [id number]  Job#:  [id number]

## 2019-09-29 NOTE — OR NURSING
Post op: patient moved to bed safely, positioned right side up for spinal block per anesthesiologist.

## 2019-09-29 NOTE — ANESTHESIA PROCEDURE NOTES
Airway  Date/Time: 9/28/2019 6:41 PM  Performed by: Anselmo Hudson III, M.D.  Authorized by: Anselmo Hudson III, M.D.     Location:  OR  Urgency:  Elective  Difficult Airway: No    Indications for Airway Management:  Anesthesia  Spontaneous Ventilation: absent    Sedation Level:  Deep  Preoxygenated: Yes    Final Airway Type:  Supraglottic airway  Final Supraglottic Airway:  Standard LMA  SGA Size:  4  Number of Attempts at Approach:  1

## 2019-09-29 NOTE — CARE PLAN
Problem: Safety  Goal: Will remain free from injury  Outcome: PROGRESSING AS EXPECTED  Falls education and call light usage was reinforced with patient upon return to room from surgery.     Problem: Pain Management  Goal: Pain level will decrease to patient's comfort goal  Outcome: PROGRESSING AS EXPECTED  Pt denied pain and appeared to be resting comfortably.

## 2019-09-30 NOTE — DISCHARGE PLANNING
Received Choice form at 1610  Agency/Facility Name: [facility] [place]  Referral sent per Choice form at 1621

## 2019-09-30 NOTE — CARE PLAN
Problem: Safety  Goal: Will remain free from injury  Outcome: PROGRESSING AS EXPECTED  Bed locked and in lowest position. Call light within reach      Problem: Venous Thromboembolism (VTW)/Deep Vein Thrombosis (DVT) Prevention:  Goal: Patient will participate in Venous Thrombosis (VTE)/Deep Vein Thrombosis (DVT)Prevention Measures  Outcome: PROGRESSING AS EXPECTED   Pt able to ambulate to chair today

## 2019-09-30 NOTE — DISCHARGE PLANNING
"LSW met w/ pt at bedside.  Pt verified the demographic information from the Facesheet.  Pt lives in a Motor Home that has 6 steps with his roommate who is a longtime friend.  Pt claims that the roommate has had a few strokes, pt states that \"he is worse off than I am\".  Pt reports that he is independent with ADL's but he uses a walker (that he was given by his roommate) and does not drive due to losing his 's license. Pt's emergency contact is his brother who lives in [place].  Pt says that he prefers to be independent and has no other outside support.  Pt says that he has no PCP, goes to the [facility] but does not remember the name of the MD. [facility]    LSW discussed a SNF placement and the SNF CHOICE Form.  LSW provided Medicare Nursing Home Rating info to aid in choice.  Pt selected: [facility], [facility]. [facility], 3. [place].      Care Transition Team Assessment    Information Source  Orientation : Oriented x 4  Information Given By: Patient  Informant's Name: Eulogio  Who is responsible for making decisions for patient? : Patient    Readmission Evaluation  Is this a readmission?: No    Elopement Risk  Legal Hold: No  Ambulatory or Self Mobile in Wheelchair: No-Not an Elopement Risk  Elopement Risk: Not at Risk for Elopement    Interdisciplinary Discharge Planning  Lives with - Patient's Self Care Capacity: Unrelated Adult  Patient or legal guardian wants to designate a caregiver (see row info): No  Housing / Facility: Motor Home  Prior Services: None    Discharge Preparedness  What is your plan after discharge?: Skilled nursing facility  What are your discharge supports?: Sibling, Other (comment)(Roommate)  Prior Functional Level: Uses Walker, Independent with Activities of Daily Living  Difficulity with ADLs: Walking  Difficulity with IADLs: Driving    Functional Assesment  Prior Functional Level: Uses Walker, Independent with Activities of Daily " Living    Finances  Financial Barriers to Discharge: No  Prescription Coverage: Yes    Vision / Hearing Impairment  Vision Impairment : Yes  Right Eye Vision: Impaired, Wears Glasses  Left Eye Vision: Impaired, Wears Glasses  Hearing Impairment: Both Ears              Domestic Abuse  Have you ever been the victim of abuse or violence?: No  Physical Abuse or Sexual Abuse: No  Verbal Abuse or Emotional Abuse: No  Possible Abuse Reported to:: Not Applicable    Psychological Assessment  History of Substance Abuse: None  History of Psychiatric Problems: No  Non-compliant with Treatment: No    Discharge Risks or Barriers  Discharge risks or barriers?: Lives alone, no community support  Patient risk factors: Lack of outside supports    Anticipated Discharge Information  Anticipated discharge disposition: SNF

## 2019-09-30 NOTE — PROGRESS NOTES
[facility] Medicine Daily Progress Note    Date of Service  9/30/2019    Chief Complaint  79 y.o. male admitted 9/28/2019 with right hip pain    Hospital Course    see below      Interval Problem Update  Right hip pain-s/p IM nail to R hip POD#2, pain quite severe, but slept all of last night. Appetite remains poor.     MARGE-Cr worse today. Bladder scan ok, Renal US negative for obstruction.     Consultants/Specialty  ortho    Code Status  fcfc    Disposition  ORTHO    Review of Systems  Review of Systems   Constitutional: Positive for malaise/fatigue (slow improvement) and weight loss. Negative for chills and fever.   Cardiovascular: Negative for chest pain and palpitations.   Gastrointestinal: Negative for diarrhea.   Genitourinary: Negative for dysuria and urgency.   Musculoskeletal: Positive for joint pain and myalgias.        10/10 pain of the leg today   All other systems reviewed and are negative.       Physical Exam  Temp:  [36.3 °C (97.4 °F)-36.9 °C (98.4 °F)] 36.4 °C (97.6 °F)  Pulse:  [65-66] 66  Resp:  [17-18] 17  BP: ()/(63-71) 94/63  SpO2:  [96 %-97 %] 96 %    Physical Exam   Constitutional: He is oriented to person, place, and time. He appears well-developed and well-nourished.   Thin and pleasant   HENT:   Head: Normocephalic and atraumatic.   Mouth/Throat: No oropharyngeal exudate.   Eyes: Pupils are equal, round, and reactive to light. No scleral icterus.   Neck: Normal range of motion. Neck supple.   Cardiovascular: Normal rate, regular rhythm, normal heart sounds and intact distal pulses.   No murmur heard.  Pulmonary/Chest: Effort normal and breath sounds normal. No stridor. No respiratory distress.   Abdominal: Soft. Bowel sounds are normal. There is no tenderness.   Musculoskeletal: Normal range of motion. He exhibits tenderness. He exhibits no edema.   Warm peripherally  Surgical incision site on the proximal RLE appears C/D/I  Unchanged today   Neurological: He is alert and oriented to  person, place, and time. No cranial nerve deficit.   Skin: Skin is warm and dry. No rash noted.   Psychiatric: He has a normal mood and affect.   Nursing note and vitals reviewed.      Fluids    Intake/Output Summary (Last 24 hours) at 9/30/2019 1241  Last data filed at 9/30/2019 0600  Gross per 24 hour   Intake --   Output 200 ml   Net -200 ml       Laboratory  Recent Labs     09/28/19  1002 09/29/19  0301 09/30/19  0436   WBC 11.2* 8.5 7.2   RBC 3.58* 2.99* 2.56*   HEMOGLOBIN 12.1* 10.1* 8.6*   HEMATOCRIT 36.7* 30.8* 26.0*   .5* 103.0* 101.6*   MCH 33.8* 33.8* 33.6*   MCHC 33.0* 32.8* 33.1*   RDW 49.6 49.8 49.8   PLATELETCT 197 166 164   MPV 10.3 11.0 10.5     Recent Labs     09/28/19  1002 09/29/19  0301 09/30/19  0436   SODIUM 141 139 140   POTASSIUM 3.7 4.5 3.8   CHLORIDE 110 109 107   CO2 24 22 25   GLUCOSE 92 115* 105*   BUN 38* 42* 55*   CREATININE 1.87* 2.05* 2.47*   CALCIUM 8.2* 7.9* 8.0*     Recent Labs     09/28/19  1002   APTT 29.7   INR 1.00               Imaging  US-RENAL   Final Result      1.  Negative renal ultrasound.      2.  No hydronephrosis or solid renal mass.      DX-FEMUR-2+ RIGHT   Final Result      Portable fluoroscopic images obtained in the OR during internal fixation right femur fracture.      DX-PORTABLE FLUORO > 1 HOUR   Final Result      Portable fluoroscopy utilized for 18 seconds.         INTERPRETING LOCATION: [address] [place] [address], [id number]      OUTSIDE IMAGES-DX LOWER EXTREMITY, RIGHT   Final Result      OUTSIDE IMAGES-DX CHEST   Final Result           Assessment/Plan  * Closed right hip fracture (HCC)- (present on admission)  Assessment & Plan  -Due to mechanical fall.  -s.p IM nailing POD#2, doing ok, patient claims he is in a lot pain, but only required one time tablet of oxy since the surgery  - start calcium + vit D. Check Vit D level which is ok at 78.  Given underlying osteopenia, start Fosamax.    - continue pain control with IV morphine, and oral oxycodone.  -  Continue pharmacologic DVT prophylaxis while in the hospital but change to heparin given impaired kidney function  - PT/OT eval post-op      Mchanical fall from ground level- (present on admission)  Assessment & Plan  -PT/OT postop.  Fall precautions.    Leukocytosis, stress-related- (present on admission)  Assessment & Plan  -Likely due to acute fracture.  Monitor and trend WBC count.  Hold off on antibiotics.  Watch for fevers.    Hyperlipidemia- (present on admission)  Assessment & Plan  -Resume home dose Lipitor therapy.    COPD (chronic obstructive pulmonary disease) ([facility])- (present on admission)  Assessment & Plan  -Not in acute exacerbation.  I will put him on RT protocol while in-house.    CKD (chronic kidney disease) stage 3, GFR 30-59 ml/min ([facility])- (present on admission)  Assessment & Plan  -Cr is now 2.5, unclear baseline, but worsening  -check urine lytes-pending, blood pressure is on the low side right now  -ddx includes acute on CKD III with mild ATN  -RENAL US negative obstruction, bladder scan normal  -increase LR to 100 mL/hour, monitor volume status closely  -check daily Cr      Essential hypertension- (present on admission)  Assessment & Plan  -stop losartan given elevated Cr and recent surgery  -decrease labetolol to  75 mg BID       VTE prophylaxis: heparin

## 2019-09-30 NOTE — PROGRESS NOTES
Bladder scan result above 400ml. Pt refused straight cath at this time. Pt stated he wanted to try to void

## 2019-09-30 NOTE — PROGRESS NOTES
Received report from BRENTON Salazar. Pt denied pain/other concerns at this time. Discussed pt's lack of voiding and potential interventions. Pt declined straight cath. Pt stated he would attempt to use urinal. Will monitor and follow up.

## 2019-09-30 NOTE — THERAPY
"Physical Therapy Treatment completed.   Bed Mobility:  Supine to Sit: (NT, up in chair)  Transfers: Sit to Stand: Maximal Assist  Gait: Level Of Assist: Unable to Participate   Plan of Care: Will benefit from Physical Therapy 4 times per week  Discharge Recommendations: Equipment: Will Continue to Assess for Equipment Needs. Recommend post-acute placement for continued physical therapy services prior to discharge home. Patient can tolerate post-acute therapies at a 5x/week frequency.       See \"Rehab Therapy-Acute\" Patient Summary Report for complete documentation.     Pt progressing slowly with therapy. Significantly limited by RLE pain and pt unable to tolerate weightbearing through RLE. Pt with multiple unsuccessful stands, then able to complete STS with max A. Required VCs and demo for sequencing. Pt required mod-max A to maintain standing balance. Attempted lateral weight shifting and stepping, but pt unable to move LLE at all due to RLE pain. Pt needing to sit back on therapist lap multiple times during stand due to fatigue. Therapist assisted pt back to bed with max A for SPT and sit > supine. Pt will require postacute placement prior to return home, will continue to follow.   "

## 2019-09-30 NOTE — PROGRESS NOTES
"   Orthopaedic Progress Note    Interval changes:  Patient doing well  Dressings to be changed by nursing  Cleared for DC to SNF by ortho pending medicine clearance    ROS - Patient denies any new issues.  Pain well controlled.    BP (!) 94/63   Pulse 66   Temp 36.4 °C (97.6 °F) (Temporal)   Resp 17   Ht 1.626 m (5' 4\")   Wt 48.9 kg (107 lb 12.9 oz)   SpO2 96%       Patient seen and examined  No acute distress  Breathing non labored  RRR  RLE surgical dressings intact with min serosanguinous exudate. Patient clearly fires tibialis anterior, EHL, and gastrocnemius/soleus. Sensation is intact to light touch throughout superficial peroneal, deep peroneal, tibial, saphenous, and sural nerve distributions. Strong and palpable 2+ dorsalis pedis and posterior tibial pulses with capillary refill less than 2 seconds. No lower leg tenderness or discomfort.       Recent Labs     09/28/19  1002 09/29/19  0301 09/30/19  0436   WBC 11.2* 8.5 7.2   RBC 3.58* 2.99* 2.56*   HEMOGLOBIN 12.1* 10.1* 8.6*   HEMATOCRIT 36.7* 30.8* 26.0*   .5* 103.0* 101.6*   MCH 33.8* 33.8* 33.6*   MCHC 33.0* 32.8* 33.1*   RDW 49.6 49.8 49.8   PLATELETCT 197 166 164   MPV 10.3 11.0 10.5       Active Hospital Problems    Diagnosis   • Closed right hip fracture ([facility]) [S72.001A]     Priority: High   • Mchanical fall from ground level [W18.30XA]     Priority: High   • Leukocytosis, stress-related [D72.829]     Priority: Medium   • Essential hypertension [I10]     Priority: Low   • CKD (chronic kidney disease) stage 3, GFR 30-59 ml/min ([facility]) [N18.3]     Priority: Low   • COPD (chronic obstructive pulmonary disease) ([facility]) [J44.9]     Priority: Low   • Hyperlipidemia [E78.5]     Priority: Low       Assessment/Plan:  Cleared for DC to SNF by ortho pending medicine clearance  POD#2 S/P Intramedullary nail, right hip  Wt bearing status - WBAT  Wound care/Drains - dressing changed every other day by nursing  Future Procedures - none planned "   Sutures/Staples out- 10-14 days post operatively  PT/OT-initiated  Antibiotics: completed  DVT Prophylaxis- TEDS/SCDs/Foot pumps/heparin  Lacy-none  Case Coordination for Discharge Planning - Disposition SNF

## 2019-09-30 NOTE — CARE PLAN
Problem: Infection  Goal: Will remain free from infection  Outcome: PROGRESSING AS EXPECTED  Pt has been receiving antibiotic treatment.     Problem: Pain Management  Goal: Pain level will decrease to patient's comfort goal  Outcome: PROGRESSING AS EXPECTED  Pt communicates pain management needs.

## 2019-10-01 NOTE — PROGRESS NOTES
[facility] Medicine Daily Progress Note    Date of Service  10/1/2019    Chief Complaint  79 y.o. male admitted 9/28/2019 with ground-level fall    Hospital Course    Patient had a ground-level fall and afterwards suffered a right hip fracture.  Patient is now postoperative day after her surgical repair.  Patient at this point is medically cleared for discharge from extended care facility.  So far we do not have a bed.      Interval Problem Update  Continue physical therapy occupational therapy  Continue pain management  Continue ambulation  Optimize blood pressure management  Monitor electrolytes and renal functions.    Consultants/Specialty  Orthopedics    Code Status  Full code    Disposition  Ready for discharge to extended care facility once we have a bed.    Review of Systems  Review of Systems   Constitutional: Positive for malaise/fatigue. Negative for chills, diaphoresis and fever.   HENT: Negative.    Eyes: Negative.  Negative for double vision.   Respiratory: Positive for shortness of breath. Negative for cough, hemoptysis and wheezing.    Cardiovascular: Negative.  Negative for chest pain, palpitations and leg swelling.   Gastrointestinal: Negative.  Negative for abdominal pain, blood in stool, constipation, diarrhea, heartburn, nausea and vomiting.   Genitourinary: Negative.  Negative for frequency, hematuria and urgency.   Musculoskeletal: Positive for joint pain.   Skin: Negative.  Negative for itching and rash.   Neurological: Negative.  Negative for dizziness, focal weakness, seizures, loss of consciousness and headaches.   Endo/Heme/Allergies: Negative.  Does not bruise/bleed easily.   Psychiatric/Behavioral: Negative.  Negative for suicidal ideas. The patient is not nervous/anxious.    All other systems reviewed and are negative.       Physical Exam  Temp:  [36.4 °C (97.5 °F)-36.7 °C (98 °F)] 36.4 °C (97.5 °F)  Pulse:  [64-67] 67  Resp:  [15-16] 16  BP: (118-144)/() 144/89  SpO2:  [92 %-95 %]  93 %    Physical Exam   Constitutional: He is oriented to person, place, and time. He appears well-developed and well-nourished.   HENT:   Head: Normocephalic and atraumatic.   Right Ear: External ear normal.   Left Ear: External ear normal.   Nose: Nose normal.   Mouth/Throat: Oropharynx is clear and moist.   Eyes: Pupils are equal, round, and reactive to light. Conjunctivae and EOM are normal.   Neck: Normal range of motion. Neck supple. No JVD present. No thyromegaly present.   Cardiovascular: Normal rate and regular rhythm.   No murmur heard.  Pulmonary/Chest: He has decreased breath sounds in the right lower field and the left lower field. He has no wheezes. He has no rales. He exhibits no tenderness.   Abdominal: He exhibits no distension and no mass. There is no tenderness. There is no rebound and no guarding.   Musculoskeletal: He exhibits no edema.        Right hip: He exhibits decreased range of motion and tenderness.   Lymphadenopathy:     He has no cervical adenopathy.   Neurological: He is alert and oriented to person, place, and time. He has normal reflexes. No cranial nerve deficit.   Skin: Skin is warm and dry. No rash noted. No erythema.   Psychiatric: He has a normal mood and affect.   Nursing note and vitals reviewed.      Fluids    Intake/Output Summary (Last 24 hours) at 10/1/2019 1345  Last data filed at 10/1/2019 0830  Gross per 24 hour   Intake 480 ml   Output 550 ml   Net -70 ml       Laboratory  Recent Labs     09/29/19  0301 09/30/19  0436 10/01/19  0450   WBC 8.5 7.2 6.7   RBC 2.99* 2.56* 2.61*   HEMOGLOBIN 10.1* 8.6* 8.7*   HEMATOCRIT 30.8* 26.0* 26.9*   .0* 101.6* 103.1*   MCH 33.8* 33.6* 33.3*   MCHC 32.8* 33.1* 32.3*   RDW 49.8 49.8 49.1   PLATELETCT 166 164 163*   MPV 11.0 10.5 10.7     Recent Labs     09/29/19  0301 09/30/19  0436 10/01/19  0450   SODIUM 139 140 137   POTASSIUM 4.5 3.8 4.0   CHLORIDE 109 107 108   CO2 22 25 24   GLUCOSE 115* 105* 87   BUN 42* 55* 54*    CREATININE 2.05* 2.47* 2.38*   CALCIUM 7.9* 8.0* 7.8*                   Imaging  US-RENAL   Final Result      1.  Negative renal ultrasound.      2.  No hydronephrosis or solid renal mass.      DX-FEMUR-2+ RIGHT   Final Result      Portable fluoroscopic images obtained in the OR during internal fixation right femur fracture.      DX-PORTABLE FLUORO > 1 HOUR   Final Result      Portable fluoroscopy utilized for 18 seconds.         INTERPRETING LOCATION: [address] [place] [address], [place], [address]      OUTSIDE IMAGES-DX LOWER EXTREMITY, RIGHT   Final Result      OUTSIDE IMAGES-DX CHEST   Final Result           Assessment/Plan  * Closed right hip fracture (HCC)- (present on admission)  Assessment & Plan  Postop day 3 after hip repair.  Patient at this point is ready for discharge.  Patient is medically cleared.  Continue pain management.  PT OT      Mchanical fall from ground level- (present on admission)  Assessment & Plan  That is post surgical repair.  Therapy    Leukocytosis, stress-related- (present on admission)  Assessment & Plan  Related to his ground-level fall with leukocytosis most likely reactionary.    Hyperlipidemia- (present on admission)  Assessment & Plan  Low-fat low-cholesterol diet.    COPD (chronic obstructive pulmonary disease) ([facility])- (present on admission)  Assessment & Plan  Currently does not have an acute exacerbation.  Continue at this point with RT protocol    CKD (chronic kidney disease) stage 3, GFR 30-59 ml/min ([facility])- (present on admission)  Assessment & Plan  Renal functions are monitored.  Avoid nephrotoxic medications.  Currently at baseline.  Current GFR is 26    Essential hypertension- (present on admission)  Assessment & Plan  Blood pressure management optimized.  Most recent blood pressure 144/89.       VTE prophylaxis: Heparin

## 2019-10-01 NOTE — DISCHARGE PLANNING
Anticipated Discharge Disposition: SNF via [facility]    Action: LSW met w/ pt at bedside and let him know that he was accepted at [facility] which was his first choice.  Pt confirmed the transfer and signed the COBRA.  He declined any family or friends to be notified.  LSW informed pt that he will be updated once transfer time is scheduled.      Transfer forms were faxed to [facility].    Barriers to Discharge: None.    Plan: Scheduling of transfer to [facility].

## 2019-10-01 NOTE — CARE PLAN
Pt uses call light as necessary to contact medical staff.Bed locked in lowest position with top two siderails up. Call light within reach. Treaded socks on. Educated patient to call for help before getting up. Patient in room closest to nurses station. Bed alarm and chair alarm in use

## 2019-10-01 NOTE — DISCHARGE SUMMARY
Discharge Summary    CHIEF COMPLAINT ON ADMISSION  Chief Complaint   Patient presents with   • T-5000 FALL   • T-5000 Extremity Pain   • Sent by MD       Reason for Admission  [facility]     CODE STATUS  Full Code    HPI & HOSPITAL COURSE  This is a 79 y.o. male here with a ground-level fall when he was trying to reach for his walker while going to the bathroom.  The patient immediately had right hip pain radiating to the leg.  He was unable to stand up and otherwise was at his baseline.  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  Pacemaker at this point is functioning appropriately.  The patient underwent surgical repair.  He is now postoperative day #3.  Patient's pain at this point is relatively well controlled.  Physical therapy and occupational therapy evaluate the patient.  At this point the patient is ready to go to extended care facility for ongoing physical therapy and Occupational Therapy.  Patient is at this point can be discharged.       Therefore, he is discharged in good and stable condition to skilled nursing facility.    The patient met 2-midnight criteria for an inpatient stay at the time of discharge.      FOLLOW UP ITEMS POST DISCHARGE  Follow-up with the primary care physician in 7 to 10 days after discharge  Follow-up with orthopedics.    DISCHARGE DIAGNOSES  Principal Problem:    Closed right hip fracture (HCC) POA: Yes  Active Problems:    Mchanical fall from ground level POA: Yes    Leukocytosis, stress-related POA: Yes    Essential hypertension POA: Yes    CKD (chronic kidney disease) stage 3, GFR 30-59 ml/min (HCC) POA: Yes    COPD (chronic obstructive pulmonary disease) ([facility]) POA: Yes    Hyperlipidemia POA: Yes  Resolved Problems:    * No resolved hospital problems. *      FOLLOW UP  No future appointments.  No follow-up provider specified.    MEDICATIONS ON DISCHARGE     Medication List      START taking these medications      Instructions    acetaminophen 325 MG Tabs  Commonly known as:  TYLENOL   Take 2 Tabs by mouth every 6 hours as needed (Mild Pain; (Pain scale 1-3); Temp greater than 100.5 F).  Dose:  650 mg     alendronate 70 MG Tabs  Start taking on:  10/6/2019  Commonly known as:  FOSAMAX   Take 1 Tab by mouth every 7 days.  Dose:  70 mg     bisacodyl 10 MG Supp  Commonly known as:  DULCOLAX   Insert 1 Suppository in rectum every 24 hours as needed (if sennosides, docusate, polyethylene glycol 3350, and/or magnesium hydroxide ineffective or not ordered).  Dose:  10 mg     cyanocobalamin 1000 MCG Tabs  Start taking on:  10/2/2019  Commonly known as:  VITAMIN B12  Replaces:  Cyanocobalamin 1000 MCG Caps   Take 1 Tab by mouth every day.  Dose:  1,000 mcg     docusate sodium 100 MG Caps   Take 100 mg by mouth 2 Times a Day.  Dose:  100 mg     fleet 7-19 GM/118ML Enem   Insert 133 mL in rectum Once PRN (if sennosides, docusate, polyethylene glycol 3350, magnesium hydroxide, and/or bisacodyl ineffective or not ordered).  Dose:  1 Each     heparin 5000 UNIT/ML Soln   Inject 1 mL as instructed every 8 hours.  Dose:  5,000 Units     magnesium hydroxide 400 MG/5ML Susp  Commonly known as:  MILK OF MAGNESIA   Take 30 mL by mouth 1 time daily as needed (if sennosides, docusate, and/or polyethylene glycol 3350 ineffective or not ordered).  Dose:  30 mL     ondansetron 4 MG Tbdp  Commonly known as:  ZOFRAN ODT   Take 1 Tab by mouth every four hours as needed (give PO if IV route is unavailable. May give per feeding tube.).  Dose:  4 mg     oxyCODONE immediate-release 5 MG Tabs  Commonly known as:  ROXICODONE   Take 1 Tab by mouth every 3 hours as needed for up to 3 days.  Dose:  5 mg     oyster shell calcium/vitamin D 250-125 MG-UNIT Tabs tablet  Start taking on:  10/2/2019   Take 1 Tab by mouth every day.  Dose:  1 Tab     polyethylene glycol/lytes Pack  Commonly known as:  MIRALAX   Take 1 Packet by mouth 2 times a day as needed (if sennosides and/or  docusate ineffective or not ordered).  Dose:  17 g     scopolamine 1 mg/72hr Pt72  Commonly known as:  TRANSDERM-SCOP   Apply 1 Patch to skin as directed every 72 hours as needed (Nausea/Vomiting if ondansetron, dexamethasone, diphenhydramine, and/or haloperidol ineffective or not ordered).  Dose:  1 Patch     * senna-docusate 8.6-50 MG Tabs  Commonly known as:  PERICOLACE or SENOKOT S   Take 1 Tab by mouth every day.  Dose:  1 Tab     * senna-docusate 8.6-50 MG Tabs  Commonly known as:  PERICOLACE or SENOKOT S   Take 1 Tab by mouth every 24 hours as needed for Constipation.  Dose:  1 Tab         * This list has 2 medication(s) that are the same as other medications prescribed for you. Read the directions carefully, and ask your doctor or other care provider to review them with you.            CHANGE how you take these medications      Instructions   labetalol 100 MG Tabs  What changed:    · medication strength  · how much to take  · Another medication with the same name was removed. Continue taking this medication, and follow the directions you see here.  Commonly known as:  NORMODYNE   Take 0.75 Tabs by mouth 2 Times a Day.  Dose:  75 mg        CONTINUE taking these medications      Instructions   aspirin 325 MG Tabs  Commonly known as:  ASA   Take 325 mg by mouth every 6 hours as needed for Mild Pain (headache).  Dose:  325 mg     atorvastatin 10 MG Tabs  Commonly known as:  LIPITOR   Take 10 mg by mouth every morning.  Dose:  10 mg     losartan 100 MG Tabs  Commonly known as:  COZAAR   Take 100 mg by mouth every morning.  Dose:  100 mg     tamsulosin 0.4 MG capsule  Commonly known as:  FLOMAX   Take 0.4 mg by mouth every evening.  Dose:  0.4 mg     vitamin D (Ergocalciferol) 42232 units Caps capsule  Commonly known as:  DRISDOL   Take 50,000 Units by mouth every Friday.  Dose:  50,000 Units        STOP taking these medications    Cyanocobalamin 1000 MCG Caps  Replaced by:  cyanocobalamin 1000 MCG Tabs      vitamin B-12 1000 MCG Tabs            Allergies  Allergies   Allergen Reactions   • Lisinopril Unspecified     Lethargy        DIET  Orders Placed This Encounter   Procedures   • Diet Order Regular     Standing Status:   Standing     Number of Occurrences:   1     Order Specific Question:   Diet:     Answer:   Regular [1]       ACTIVITY  As tolerated.  Weight bearing as tolerated    LINES, DRAINS, AND WOUNDS  This is an automated list. Peripheral IVs will be removed prior to discharge.  Peripheral IV 09/28/19 20 G Left Antecubital (Active)   Site Assessment Dry;Intact 9/30/2019 11:46 PM   Dressing Type Transparent 9/30/2019 11:46 PM   Line Status Scrubbed the hub prior to access;Flushed;Saline locked 9/30/2019 11:46 PM   Dressing Status Clean;Dry;Intact 9/30/2019 11:46 PM   Dressing Intervention N/A 9/30/2019  9:00 AM   Date Primary Tubing Changed 09/28/19 9/28/2019  1:00 PM   NEXT Primary Tubing Change  10/01/19 9/28/2019  1:00 PM   Infiltration Grading ([facility], [facility]) 0 9/30/2019 11:46 PM   Phlebitis Scale ([facility] Only) 0 9/30/2019 11:46 PM       Peripheral IV 09/28/19 18 G Right Forearm (Active)   Site Assessment Intact;Dry 9/30/2019 11:46 PM   Dressing Type Transparent 9/30/2019 11:46 PM   Line Status Scrubbed the hub prior to access;Flushed;Saline locked 9/30/2019 11:46 PM   Dressing Status Dry;Intact 9/30/2019 11:46 PM   Dressing Intervention N/A 9/30/2019  9:00 AM   Date Primary Tubing Changed 09/28/19 9/28/2019  9:00 PM   Infiltration Grading ([facility], [facility]) 0 9/30/2019 11:46 PM   Phlebitis Scale ([facility] Only) 0 9/30/2019 11:46 PM          Peripheral IV 09/28/19 20 G Left Antecubital (Active)   Site Assessment Dry;Intact 9/30/2019 11:46 PM   Dressing Type Transparent 9/30/2019 11:46 PM   Line Status Scrubbed the hub prior to access;Flushed;Saline locked 9/30/2019 11:46 PM   Dressing Status Clean;Dry;Intact 9/30/2019 11:46 PM   Dressing Intervention N/A 9/30/2019  9:00 AM   Date Primary Tubing Changed  09/28/19 9/28/2019  1:00 PM   NEXT Primary Tubing Change  10/01/19 9/28/2019  1:00 PM   Infiltration Grading ([facility], [facility]) 0 9/30/2019 11:46 PM   Phlebitis Scale ([facility] Only) 0 9/30/2019 11:46 PM       Peripheral IV 09/28/19 18 G Right Forearm (Active)   Site Assessment Intact;Dry 9/30/2019 11:46 PM   Dressing Type Transparent 9/30/2019 11:46 PM   Line Status Scrubbed the hub prior to access;Flushed;Saline locked 9/30/2019 11:46 PM   Dressing Status Dry;Intact 9/30/2019 11:46 PM   Dressing Intervention N/A 9/30/2019  9:00 AM   Date Primary Tubing Changed 09/28/19 9/28/2019  9:00 PM   Infiltration Grading ([facility], [facility]) 0 9/30/2019 11:46 PM   Phlebitis Scale ([facility] Only) 0 9/30/2019 11:46 PM               MENTAL STATUS ON TRANSFER  Level of Consciousness: Alert  Orientation : Disoriented to Place  Speech: Speech Clear    CONSULTATIONS  Orthopedics    PROCEDURES  Intramedullary nailing of the right hip done on 9/28/2019    LABORATORY  Lab Results   Component Value Date    SODIUM 137 10/01/2019    POTASSIUM 4.0 10/01/2019    CHLORIDE 108 10/01/2019    CO2 24 10/01/2019    GLUCOSE 87 10/01/2019    BUN 54 (H) 10/01/2019    CREATININE 2.38 (H) 10/01/2019    CREATININE 1.1 11/24/2007        Lab Results   Component Value Date    WBC 6.7 10/01/2019    HEMOGLOBIN 8.7 (L) 10/01/2019    HEMATOCRIT 26.9 (L) 10/01/2019    PLATELETCT 163 (L) 10/01/2019        Total time of the discharge process exceeds 35 minutes.

## 2019-10-01 NOTE — DISCHARGE PLANNING
Agency/Facility Name: [facility]  Spoke To: Regine  Outcome: Patient accepted, pending bed availability.     Agency[facility]Facility Name[facility]  Spoke To: Meli  Outcome: Attempted to follow up, however no answer. Voicemail was left.     Agency/Facility Name: [facility]  Spoke To: Mireya   Outcome: Referral pending, awaiting return call to this [facility].

## 2019-10-01 NOTE — DISCHARGE PLANNING
[facility] [address]. They have been trying to reach him to get his [employer] pacemaker checked. It was inserted in 2009 and was noted in November that the battery was low. The patient hasn’t responded to their phone calls or letters and they were hoping we could get the battery checked while he was here. D/W Dr. Stafford and notified [facility] that MD will plan to check PPM while at [facility].

## 2019-10-01 NOTE — DISCHARGE PLANNING
Agency/Facility Name: [facility]  Spoke To: Meli  Outcome: Patient accepted pending bed availability.

## 2019-10-01 NOTE — THERAPY
"Occupational Therapy Treatment completed with focus on ADLs, ADL transfers and patient education.  Functional Status: Pt seen today for OT treatment. Pleasant and cooperative. ModA bed mobility. MaxA LB dressing. ModA sit<>Stand. ModA stand pivot transfer to [facility], did better on the way back to bed but still requiring max cueing and assist with weight shifting. Pam toileting after BM. Setup grooming seated. He is making progress, remains limited by weakness, fatigue, impaired balance, and pain which impacts independence in self care and functional mobility.  Plan of Care: Will benefit from Occupational Therapy 4 times per week  Discharge Recommendations:  Equipment Will Continue to Assess for Equipment Needs. Recommend post-acute placement for additional occupational therapy services prior to discharge home. Patient can tolerate post-acute therapies at a 5x/week frequency.      See \"Rehab Therapy-Acute\" Patient Summary Report for complete documentation.   "

## 2019-10-02 PROBLEM — Z95.0 CARDIAC PACEMAKER IN SITU: Status: ACTIVE | Noted: 2019-01-01

## 2019-10-02 NOTE — PROGRESS NOTES
"Chart reviewed for AMI and HF quality measures. Neither diagnosis applicable at this time, patient is being followed by cardiology for generator change in PPM.     Please place a \"Consult Nurse Navigator\" order (can be found in the HF order set) should AMI or HF become an active diagnosis.    Yoly FINLEY RN, [facility], [place] ext. [phone] M-F        "

## 2019-10-02 NOTE — CARE PLAN
Problem: Safety  Goal: Will remain free from falls  Outcome: PROGRESSING AS EXPECTED     Fall precautions in place. Bed alarm in place with appropriate settings verified. Bed in lowest position and bed brakes applied. Patient encouraged to use call light to alert staff of needs and before getting out of bed. Patient verbalized understanding. Call light and personal belongings within reach. Hourly rounding in place.     Problem: Pain Management  Goal: Pain level will decrease to patient's comfort goal  Outcome: PROGRESSING AS EXPECTED   Discussed interventions available to manage pain. Patient verbalized understanding and rates pain using 0-10 pain rating scale.

## 2019-10-02 NOTE — PROGRESS NOTES
[facility] Medicine Daily Progress Note    Date of Service  10/2/2019    Chief Complaint  79 y.o. male admitted 9/28/2019 with ground-level fall    Hospital Course    10/1/2019-patient had a ground-level fall and afterwards suffered a right hip fracture.  Patient is now postoperative day 3 after her surgical repair.  Patient at this point is medically cleared for discharge from extended care facility.  So far we do not have a bed.  10/2/2019- patient is postoperative day #4 after right hip repair.  It turns out that the patient does have a pacemaker.  He has at this point had the pacemaker interrogated my request yesterday.  The patient's pacemaker at this point shows that the primary generator needs to be replaced urgently.  The patient is 100% paced thus I have consulted cardiology.  We are pending their decision on pacemaker generator replacement.  This morning I will obtain labs and EKG.  We are holding off on discharging the patient until the pacemaker generator is replaced.      Interval Problem Update  Obtain labs  Obtain EKG  Obtain cardiology consultation  Continue pain management  Mobilization as tolerated he is full weightbearing  Will need pacemaker generator replacement.    Consultants/Specialty  Orthopedics    Code Status  Full code    Disposition  We will hold off on discharge until the pacemaker generator is replaced.  After that patient can go to an extended care facility.    Review of Systems  Review of Systems   Constitutional: Positive for malaise/fatigue. Negative for weight loss.   HENT: Negative.  Negative for sinus pain and sore throat.    Eyes: Negative.  Negative for blurred vision and photophobia.   Respiratory: Positive for shortness of breath. Negative for sputum production.    Cardiovascular: Negative.  Negative for chest pain, claudication and PND.   Gastrointestinal: Negative.  Negative for abdominal pain, heartburn and vomiting.   Genitourinary: Negative.  Negative for dysuria and  flank pain.   Musculoskeletal: Positive for joint pain. Negative for falls and myalgias.   Skin: Negative.    Neurological: Negative.  Negative for tingling, tremors, sensory change and weakness.   Endo/Heme/Allergies: Negative.    Psychiatric/Behavioral: Negative.  Negative for depression, memory loss, substance abuse and suicidal ideas.        Physical Exam  Temp:  [36.4 °C (97.6 °F)-36.8 °C (98.3 °F)] 36.7 °C (98 °F)  Pulse:  [61-71] 65  Resp:  [17] 17  BP: (131-161)/(63-89) 161/89  SpO2:  [93 %-97 %] 97 %    Physical Exam   Constitutional: He is oriented to person, place, and time. He appears well-developed and well-nourished.   HENT:   Head: Normocephalic and atraumatic.   Right Ear: External ear normal.   Left Ear: External ear normal.   Eyes: Pupils are equal, round, and reactive to light. Conjunctivae and EOM are normal. Right eye exhibits no discharge. Left eye exhibits no discharge.   Neck: Normal range of motion. Neck supple. No thyromegaly present.   Cardiovascular: Normal rate and regular rhythm.   No murmur heard.  Pulmonary/Chest: Effort normal. He has decreased breath sounds in the right lower field and the left lower field. He has no wheezes. He has no rales. He exhibits no tenderness.       Abdominal: Soft. Bowel sounds are normal. He exhibits no distension. There is no tenderness.   Musculoskeletal: He exhibits no edema.        Right shoulder: He exhibits decreased range of motion, tenderness (9/10) and swelling.        Right hip: He exhibits decreased range of motion and tenderness.   Neurological: He is alert and oriented to person, place, and time. He has normal reflexes. No cranial nerve deficit. Coordination abnormal. GCS eye subscore is 4. GCS verbal subscore is 5. GCS motor subscore is 6.   Skin: Skin is warm and dry. No rash noted. No erythema.   Psychiatric: He has a normal mood and affect. His behavior is normal. Judgment and thought content normal.   Nursing note and vitals  reviewed.      Fluids    Intake/Output Summary (Last 24 hours) at 10/2/2019 0803  Last data filed at 10/2/2019 0245  Gross per 24 hour   Intake 720 ml   Output 760 ml   Net -40 ml       Laboratory  Recent Labs     09/30/19  0436 10/01/19  0450   WBC 7.2 6.7   RBC 2.56* 2.61*   HEMOGLOBIN 8.6* 8.7*   HEMATOCRIT 26.0* 26.9*   .6* 103.1*   MCH 33.6* 33.3*   MCHC 33.1* 32.3*   RDW 49.8 49.1   PLATELETCT 164 163*   MPV 10.5 10.7     Recent Labs     09/30/19  0436 10/01/19  0450   SODIUM 140 137   POTASSIUM 3.8 4.0   CHLORIDE 107 108   CO2 25 24   GLUCOSE 105* 87   BUN 55* 54*   CREATININE 2.47* 2.38*   CALCIUM 8.0* 7.8*                   Imaging  US-RENAL   Final Result      1.  Negative renal ultrasound.      2.  No hydronephrosis or solid renal mass.      DX-FEMUR-2+ RIGHT   Final Result      Portable fluoroscopic images obtained in the OR during internal fixation right femur fracture.      DX-PORTABLE FLUORO > 1 HOUR   Final Result      Portable fluoroscopy utilized for 18 seconds.         INTERPRETING LOCATION: [address] [place] [address], [id number]      OUTSIDE IMAGES-DX LOWER EXTREMITY, RIGHT   Final Result      OUTSIDE IMAGES-DX CHEST   Final Result           Assessment/Plan  * Closed right hip fracture (HCC)- (present on admission)  Assessment & Plan  Postoperative day #4.  The patient's right hip at this point is  and swollen.  Continue pain management  Continue physical therapy occupational therapy  Refer to an extended care facility however will not transferred here until his pacemaker is addressed and his primary generator was replaced.      Mchanical fall from ground level- (present on admission)  Assessment & Plan  Unknown why the patient actually fell.    Leukocytosis, stress-related- (present on admission)  Assessment & Plan  Follow white blood cell count.  Most likely stress related will repeat levels.    Cardiac pacemaker in situ  Assessment & Plan  [facility] and [facility] the [facility] has not followed up with them and multiple months.  The pacemaker interrogation shows that the patient's primary generator will need to be replaced urgently.  I have contacted cardiology.  We are pending their consult and evaluation.  The patient should have the pacemaker replaced urgently.  Patient will have coags done an EKG done and we will see what cardiology recommends.  Patient is 100% paced.  He has previous history of sick sinus syndrome.    Hyperlipidemia- (present on admission)  Assessment & Plan  Continue low-fat low-cholesterol diet.  Lab Results   Component Value Date/Time    CHOLSTRLTOT 170 01/12/2005 08:31 AM    LDL 88 01/12/2005 08:31 AM    HDL 53 01/12/2005 08:31 AM    TRIGLYCERIDE 147 01/12/2005 08:31 AM             COPD (chronic obstructive pulmonary disease) ([facility])- (present on admission)  Assessment & Plan  Patient COPD currently is under control.  Continue with RT protocol no current exacerbation noted    CKD (chronic kidney disease) stage 3, GFR 30-59 ml/min ([facility])- (present on admission)  Assessment & Plan  Patient's renal functions are close to his baseline.  Avoid nephrotoxic medications    Essential hypertension- (present on admission)  Assessment & Plan  Optimize blood pressure management.  This morning he is high at 161/89 give PRN's.  May be related to pain.       VTE prophylaxis: Heparin

## 2019-10-02 NOTE — ASSESSMENT & PLAN NOTE
Pacemaker was evaluated after [facility] and stated that the patient has not followed up with them and multiple months.  The pacemaker interrogation shows that the patient's primary generator will need to be replaced urgently.  I have contacted cardiology.  We are pending their consult and evaluation.  The patient should have the pacemaker replaced urgently.  Patient will have coags done an EKG done and we will see what cardiology recommends.  Patient is 100% paced.  He has previous history of sick sinus syndrome.  -going for PPM exchange today

## 2019-10-02 NOTE — CONSULTS
Cardiology Initial Consultation    Date of Service  10/2/2019    Referring Physician  Tamiko Stafford M.D.    Reason for Consultation  Pacemaker battery low    History of Presenting Illness  Eulogio Jeronimo is a 79 y.o. male with a past medical history of HTN, HLD, COPD and AV block s/p pacemaker placemcent who presented 9/28/2019 with ground level fall resulting in Rt hip fracture. Patient now s/p fixation found to have a pacemaker with low battery. Cardiology consulted for battery replacement     On evaluation, patient resting comfortably, recovering well from surgery. Denies chest pain, palpitations, dizziness or syncope. Pacemaker interrogated, he is is 100% paced and has low battery and is in need of a generator replacement, this will take place tomorrow.     Review of Systems  Review of Systems   Constitutional: Positive for activity change. Negative for appetite change and fatigue.   HENT: Negative for congestion and sinus pressure.    Eyes: Negative for pain and visual disturbance.   Respiratory: Negative for chest tightness, shortness of breath and wheezing.    Cardiovascular: Negative for chest pain, palpitations and leg swelling.   Gastrointestinal: Negative for abdominal pain and constipation.   Genitourinary: Negative for difficulty urinating and dysuria.   Musculoskeletal: Positive for joint swelling. Negative for arthralgias.        Right hip pain   Neurological: Negative for dizziness, syncope, light-headedness and headaches.   Psychiatric/Behavioral: Negative for agitation. The patient is not nervous/anxious.        Past Medical History   has a past medical history of Abdominal aortic aneurysm (AAA) (HCC), Alcohol dependence (HCC), Artificial lens present, Cardiac pacemaker, Complete atrioventricular block (HCC), Dermatochalasis, Erectile dysfunction, Hard of hearing, Hematuria, Hyperlipidemia, Hypertension, Myopia, Presbyopia, and Vitamin B12 deficiency.    Surgical History   has a past  surgical history that includes pr open fix inter/subtroch fx,implnt (9/28/2019).    Family History  family history is not on file.    Social History   reports that he has been smoking cigarettes. He does not have any smokeless tobacco history on file. He reports that he drinks alcohol. He reports that he does not use drugs.    Medications  Prior to Admission Medications   Prescriptions Last Dose Informant Patient Reported? Taking?   aspirin (ASA) 325 MG Tab 9/25/2019 at unknown Patient Yes Yes   Sig: Take 325 mg by mouth every 6 hours as needed for Mild Pain (headache).   atorvastatin (LIPITOR) 10 MG Tab 9/26/2019 at am Patient Yes Yes   Sig: Take 10 mg by mouth every morning.   losartan (COZAAR) 100 MG Tab 9/26/2019 at am Patient Yes Yes   Sig: Take 100 mg by mouth every morning.   tamsulosin (FLOMAX) 0.4 MG capsule 9/26/2019 at pm Patient Yes Yes   Sig: Take 0.4 mg by mouth every evening.   vitamin D, Ergocalciferol, (DRISDOL) 62806 units Cap capsule 9/20/2019 at am Patient Yes Yes   Sig: Take 50,000 Units by mouth every Friday.      Facility-Administered Medications: None       Allergies  Allergies   Allergen Reactions   • Lisinopril Unspecified     Lethargy        Vital signs in last 24 hours  Temp:  [36.4 °C (97.6 °F)-36.8 °C (98.3 °F)] 36.6 °C (97.9 °F)  Pulse:  [61-71] 64  Resp:  [17-18] 18  BP: (131-161)/(63-89) 143/74  SpO2:  [93 %-99 %] 99 %    Physical Exam  Physical Exam   Constitutional: He is oriented to person, place, and time.   Thin, frail appearing elderly male, resting comfortably in NAD   HENT:   Head: Normocephalic and atraumatic.   Mouth/Throat: Oropharynx is clear and moist.   Eyes: Pupils are equal, round, and reactive to light. Conjunctivae are normal. No scleral icterus.   Neck: Neck supple. No JVD present.   Cardiovascular: Normal rate, regular rhythm and normal heart sounds.   No murmur heard.  Pacemaker in Left chest wall   Pulmonary/Chest: Effort normal and breath sounds normal. He has  no wheezes. He has no rales.   Abdominal: Soft. Bowel sounds are normal. He exhibits no distension. There is no tenderness.   Musculoskeletal: He exhibits edema.   Limited ROM of right hip s/p surgery with minimal swelling/bruising    Neurological: He is alert and oriented to person, place, and time. No cranial nerve deficit.   Skin: Skin is warm and dry. No erythema.   Psychiatric: He has a normal mood and affect. His behavior is normal. Thought content normal.       Lab Review  Lab Results   Component Value Date/Time    WBC 6.5 10/02/2019 07:43 AM    RBC 2.54 (L) 10/02/2019 07:43 AM    HEMOGLOBIN 8.4 (L) 10/02/2019 07:43 AM    HEMATOCRIT 26.4 (L) 10/02/2019 07:43 AM    .9 (H) 10/02/2019 07:43 AM    MCH 33.1 (H) 10/02/2019 07:43 AM    MCHC 31.8 (L) 10/02/2019 07:43 AM    MPV 10.5 10/02/2019 07:43 AM      Lab Results   Component Value Date/Time    SODIUM 137 10/01/2019 04:50 AM    POTASSIUM 4.0 10/01/2019 04:50 AM    CHLORIDE 108 10/01/2019 04:50 AM    CO2 24 10/01/2019 04:50 AM    GLUCOSE 87 10/01/2019 04:50 AM    BUN 54 (H) 10/01/2019 04:50 AM    CREATININE 2.38 (H) 10/01/2019 04:50 AM    CREATININE 1.1 11/24/2007 09:02 PM      Lab Results   Component Value Date/Time    ASTSGOT 15 09/28/2019 10:02 AM    ALTSGPT 6 09/28/2019 10:02 AM     Lab Results   Component Value Date/Time    CHOLSTRLTOT 170 01/12/2005 08:31 AM    LDL 88 01/12/2005 08:31 AM    HDL 53 01/12/2005 08:31 AM    TRIGLYCERIDE 147 01/12/2005 08:31 AM       No results for input(s): NTPROBNP in the last 72 hours.    Cardiac Imaging and Procedures Review  EKG:  My personal interpretation of the EKG dated 10/2 is a ventricular paced rhythm with 100% capture    Echocardiogram:  NA    Cardiac Catheterization:  NA    Imaging  Chest X-Ray:  No xray done this admission     Stress Test:  NA    Assessment/Plan  * Cardiac pacemaker in situ  Assessment & Plan  Patient with cardiac pacemaker secondary to AV block, [facility], per chart is appears the  battery has been low for some time however patient wasn't responding to calls for evaluation   - interrogation completed, patient is 100% paced, is in need of new batter   - NPO at midnight, plan for generator replacement tomorrow     Closed right hip fracture (HCC)- (present on admission)  Assessment & Plan  S/p repair, progressing well   - PT/OT following   - management per primary/surgical teams     COPD (chronic obstructive pulmonary disease) (HCC)- (present on admission)  Assessment & Plan  At baseline, no acute respiratory issues   - continue home medications     Essential hypertension- (present on admission)  Assessment & Plan  Stable, continue home medications       Thank you for allowing me to participate in the care of this patient.    I will continue to follow this patient    Please contact me with any questions.    Heather Reynaga M.D.   PGY 3 [employer] [facility]   Cardiology

## 2019-10-02 NOTE — ASSESSMENT & PLAN NOTE
Patient with cardiac pacemaker secondary to AV block, is [facility], per chart is appears the battery has been low for some time however patient wasn't responding to calls for evaluation   - interrogation completed, patient is 100%, s/p generator replacement   - follow up in CXR pending   - will need device check prior to D/c, this should occur today

## 2019-10-02 NOTE — CARE PLAN
Problem: Nutritional:  Goal: Achieve adequate nutritional intake  Description  Patient will consume >50% of meals   Outcome: PROGRESSING AS EXPECTED  PO avg 50% of meals.  Supplements in place to optimize nutrition.

## 2019-10-02 NOTE — CARE PLAN
Problem: Infection  Goal: Will remain free from infection  Outcome: PROGRESSING AS EXPECTED   Pain well controlled with oral medications    Problem: Venous Thromboembolism (VTW)/Deep Vein Thrombosis (DVT) Prevention:  Goal: Patient will participate in Venous Thrombosis (VTE)/Deep Vein Thrombosis (DVT)Prevention Measures  Outcome: PROGRESSING AS EXPECTED  SCDs in use, heparin for dvt prophylaxis     Problem: Bowel/Gastric:  Goal: Normal bowel function is maintained or improved  Outcome: PROGRESSING AS EXPECTED   Patient with several large BMs today.

## 2019-10-03 NOTE — PROGRESS NOTES
HPI: Patient present to hospital with fall and right hip fracture.  He is S/P right hip fixiation by Dr. Bautista.    Past medical history significant for HTN, HLD, COPD and AV block s/p pacemaker placemcent     PPM interrogation this admission revealed that PPM generator has reached BRAYAN back in Sept.  Scheduled for generator change with Dr. Barrios today.  Consulted by [facility] cardiology service.   [facility] but states that he has missed his last few appts.     Patient has been NPO.  Labs have been reviewed; specifically WBC 6.5, Plt 235, INR 0.99.  VS stable, afebrile.   Anticoagulation: Prophylactic heparin has been held this AM.    I have reviewed pacemaker generator procedure with the patient to include risks/benefits and he verbalizes understanding and remains agreeable to proceed.

## 2019-10-03 NOTE — DISCHARGE PLANNING
Received Transport Form at 0945  Spoke to Guerda at [place]    Transport is scheduled for 10/3 at 1400  going to [facility].    AUGUST Ott notified.

## 2019-10-03 NOTE — PROGRESS NOTES
Patient admitted with right hip fracture secondary to fall.  Complete heart block status post permanent pacemaker Dr Fung 2005.  Pacemaker at EOL.  No constitutional symptoms.  Heparin held.  Denies chest pain or shortness of  The risks, benefits, and alternatives to permanent pacemaker placement were discussed in great detail.  Specific risks mentioned to the patient including bleeding, cardiac perforation with possible tamponade possibly requiring pericardiocentesis or open heart surgery.  In addition the possibility of lead dislodgment (2-3%), pneumothorax (3%), hemothorax, infection were discussed.  Also, mentioned were the risk of death, stroke, and myocardial infarction.  The patient verbalized understanding of the potential complications and wishes to proceed with the procedure.

## 2019-10-03 NOTE — PROGRESS NOTES
2 RN Skin Check    2 RN skin check complete with Heather FARRIS.  Devices in place: SCDs.  Skin assessed under devices: yes.  Confirmed pressure ulcers found on: None.  New potential pressure ulcers noted on None. Wound consult placed N/A.  The following interventions in place Pillows, turning Q2hrs, heels floated, out of bed as tolerated, mepilex to upper back for kyphosis. Heels red but blanching, mild blanchable redness to sacrum, bruising to left hip. Surgical incision to left hip.

## 2019-10-03 NOTE — OR NURSING
1314 Pt arrived to PPU with Cath lab RN. AAOx4. VSS. Denies pain and nausea. POC update given. Cath lab RN called Ortho RN for procedure update.   1330 Pt hypertensive, 's. Dr. Barrios called, orders put in for Losartan.   1400 Report called to BRENTON Buckner on Ortho. Pt will be transported back to [facility].  1416 Losartan delivered from pharmacy and given.    1426 Pt transported to room with [facility] RN and chart. Bedside updates given to BRENTON Buckner. Visualized dressing with RN.

## 2019-10-03 NOTE — PROGRESS NOTES
Cardiology Progress Note               Author: Valerie Donnelly Date & Time created: 10/3/2019  2:04 PM     Chief Complaint:  Pacemaker battery low    Eulogio Jeronimo is a 79 y.o. male with a past medical history of HTN, HLD, COPD and AV block s/p pacemaker placemcent who presented 9/28/2019 with ground level fall resulting in Rt hip fracture. Patient now s/p fixation found to have a pacemaker with low battery. Cardiology consulted for battery replacement      On evaluation, patient resting comfortably, recovering well from surgery. Denies chest pain, palpitations, dizziness or syncope. Pacemaker interrogated, he is is 100% paced and has low battery and is in need of a generator replacement, and for this reason cardiology was consulted.      Interval History:  - RN reports no events overnight, patient resting comfortably in no acute distress, denies chest pain/discomfort, or shortness of breath symptoms  - HR 60s-70s, -160s/60-90s  - NPO since midnight for pacemaker battery/genaerator replacement today  - Right closed hip fracture- continued management by surgery team  - Continue home medications for essential hypertension      ROS  Review of Systems   Constitutional: Positive for activity change. Negative for appetite change and fatigue.   HENT: Negative for congestion and sinus pressure.    Eyes: Negative for pain and visual disturbance.   Respiratory: Negative for chest tightness, shortness of breath and wheezing.    Cardiovascular: Negative for chest pain, palpitations and leg swelling.   Gastrointestinal: Negative for abdominal pain and constipation.   Genitourinary: Negative for difficulty urinating and dysuria.   Musculoskeletal: Positive for joint swelling. Negative for arthralgias.        Right hip pain   Neurological: Negative for dizziness, syncope, light-headedness and headaches.   Psychiatric/Behavioral: Negative for agitation. The patient is not nervous/anxious.        Physical  Exam  Physical Exam   Constitutional: He is oriented to person, place, and time.   Thin, frail appearing elderly male, resting comfortably in NAD   HENT:   Head: Normocephalic and atraumatic.   Mouth/Throat: Oropharynx is clear and moist.   Eyes: Pupils are equal, round, and reactive to light. Conjunctivae are normal. No scleral icterus.   Neck: Neck supple. No JVD present.   Cardiovascular: Normal rate, regular rhythm and normal heart sounds.   No murmur heard.  Pacemaker in Left chest wall   Pulmonary/Chest: Effort normal and breath sounds normal. He has no wheezes. He has no rales.   Abdominal: Soft. Bowel sounds are normal. He exhibits no distension. There is no tenderness.   Musculoskeletal: He exhibits edema.   Limited ROM of right hip s/p surgery with minimal swelling/bruising    Neurological: He is alert and oriented to person, place, and time. No cranial nerve deficit.   Skin: Skin is warm and dry. No erythema.   Psychiatric: He has a normal mood and affect. His behavior is normal. Thought content normal.     Hemodynamics:  Temp (24hrs), Av.6 °C (97.9 °F), Min:36.4 °C (97.6 °F), Max:36.8 °C (98.2 °F)  Temperature: 36.6 °C (97.8 °F)  Pulse  Av.4  Min: 61  Max: 82   Blood Pressure : (!) 180/93     Respiratory:    Respiration: 15, Pulse Oximetry: 94 %        RUL Breath Sounds: Clear, RML Breath Sounds: Clear, RLL Breath Sounds: Diminished, NITA Breath Sounds: Clear, LLL Breath Sounds: Diminished  Fluids:       GI/Nutrition:  Orders Placed This Encounter   Procedures   • Diet Order Cardiac     Standing Status:   Standing     Number of Occurrences:   1     Order Specific Question:   Diet:     Answer:   Cardiac [6]     Lab Results:  Recent Labs     10/01/19  0450 10/02/19  0743   WBC 6.7 6.5   RBC 2.61* 2.54*   HEMOGLOBIN 8.7* 8.4*   HEMATOCRIT 26.9* 26.4*   .1* 103.9*   MCH 33.3* 33.1*   MCHC 32.3* 31.8*   RDW 49.1 50.6*   PLATELETCT 163* 235   MPV 10.7 10.5     Recent Labs     10/01/19  0450  10/03/19  0040   SODIUM 137 138   POTASSIUM 4.0 4.3   CHLORIDE 108 108   CO2 24 23   GLUCOSE 87 119*   BUN 54* 62*   CREATININE 2.38* 2.68*   CALCIUM 7.8* 8.0*     Recent Labs     10/02/19  0743   APTT 40.4*   INR 0.99                     Plan:  * Cardiac pacemaker in situ  Assessment & Plan  Patient with cardiac pacemaker secondary to AV block, [facility], per chart is appears the battery has been low for some time however patient wasn't responding to calls for evaluation   - interrogation completed, patient is 100% paced, is in need of new batter   - NPO since midnight for pacemaker battery/genaerator replacement today      Closed right hip fracture (HCC)- (present on admission)  Assessment & Plan  S/p repair, progressing well   - PT/OT following    - management per primary/surgical teams     COPD (chronic obstructive pulmonary disease) (HCC)- (present on admission)  Assessment & Plan  At baseline, no acute respiratory issues   - continue home medications      Essential hypertension- (present on admission)  Assessment & Plan  Stable, continue home medications       Valerie Donnelly MD  PGY 2 [employer] [facility]  Cardiology

## 2019-10-03 NOTE — THERAPY
"Physical Therapy Treatment completed.   Bed Mobility:  Supine to Sit: (NT, up in chair)  Transfers: Sit to Stand: Moderate Assist  Gait: Level Of Assist: Unable to Participate   Plan of Care: Will benefit from Physical Therapy 4 times per week  Discharge Recommendations: Equipment: Will Continue to Assess for Equipment Needs. Recommend post-acute placement for continued physical therapy services prior to discharge home. Patient can tolerate post-acute therapies at a 5x/week frequency.       See \"Rehab Therapy-Acute\" Patient Summary Report for complete documentation.     Pt continues to be motivated to participate in therapy but limited by RLE pain. Pt performed STS x3 to FWW with mod A with VCs for hand placement and fwd lean. Pt was able to tolerate WB'ing throughout RLE this session and was able to step fwd with L foot twice to perform pivot from chair to bed. Pt attempting to sit prematurely, required assist for safe landing on bed. Pt fatigued and declining further activity. Pt will benefit from continued acute PT and OOB mobility with nursing. Continue to recommend postacute placement, will continue to follow.   "

## 2019-10-03 NOTE — PROGRESS NOTES
[facility] Medicine Daily Progress Note    Date of Service  10/3/2019    Chief Complaint  79 y.o. male admitted 9/28/2019 with ground-level fall    Hospital Course      Interval Problem Update  Patient doing well Going for PPM exchange today. Leg feels better with improved ROM and minimal pain.    Consultants/Specialty  Orthopedics    Code Status  Full code    Disposition  We will hold off on discharge until the pacemaker generator is replaced.  After that patient can go to an extended care facility.    Review of Systems  Review of Systems   Constitutional: Positive for malaise/fatigue. Negative for fever and weight loss.        Not much improvement   HENT: Negative for sinus pain and sore throat.    Eyes: Negative for blurred vision and photophobia.   Respiratory: Negative for sputum production and shortness of breath.    Cardiovascular: Negative for chest pain, claudication and PND.   Gastrointestinal: Negative for abdominal pain, nausea and vomiting.   Genitourinary: Negative for dysuria and flank pain.   Musculoskeletal: Positive for joint pain. Negative for falls and myalgias.        Pain improving   Neurological: Negative.    Endo/Heme/Allergies: Negative.    Psychiatric/Behavioral: Negative for depression.   All other systems reviewed and are negative.       Physical Exam  Temp:  [36.4 °C (97.6 °F)-37 °C (98.6 °F)] 36.6 °C (97.8 °F)  Pulse:  [63-65] 65  Resp:  [15-20] 15  BP: (123-166)/(78-93) 152/89  SpO2:  [90 %-98 %] 90 %    Physical Exam   Constitutional: He is oriented to person, place, and time. He appears well-developed and well-nourished.   Thin   HENT:   Head: Normocephalic and atraumatic.   Right Ear: External ear normal.   Left Ear: External ear normal.   Eyes: Pupils are equal, round, and reactive to light. Conjunctivae and EOM are normal. No scleral icterus.   Neck: Normal range of motion. Neck supple.   Cardiovascular: Normal rate and regular rhythm.   No murmur heard.  Pulmonary/Chest: Effort  normal. No stridor. He has decreased breath sounds in the right lower field and the left lower field.       Abdominal: Soft. Bowel sounds are normal. There is no tenderness.   Musculoskeletal: He exhibits no edema.        Right shoulder: He exhibits decreased range of motion, tenderness (9/10) and swelling.        Right hip: He exhibits decreased range of motion and tenderness.   Surgical incision site appears C/D/I   Neurological: He is alert and oriented to person, place, and time. He has normal reflexes. No cranial nerve deficit. Coordination abnormal. GCS eye subscore is 4. GCS verbal subscore is 5. GCS motor subscore is 6.   Skin: Skin is warm and dry. No rash noted. No erythema.   Psychiatric: He has a normal mood and affect. His behavior is normal. Judgment and thought content normal.   Nursing note and vitals reviewed.      Fluids    Intake/Output Summary (Last 24 hours) at 10/3/2019 1232  Last data filed at 10/3/2019 0530  Gross per 24 hour   Intake 120 ml   Output 450 ml   Net -330 ml       Laboratory  Recent Labs     10/01/19  0450 10/02/19  0743   WBC 6.7 6.5   RBC 2.61* 2.54*   HEMOGLOBIN 8.7* 8.4*   HEMATOCRIT 26.9* 26.4*   .1* 103.9*   MCH 33.3* 33.1*   MCHC 32.3* 31.8*   RDW 49.1 50.6*   PLATELETCT 163* 235   MPV 10.7 10.5     Recent Labs     10/01/19  0450 10/03/19  0040   SODIUM 137 138   POTASSIUM 4.0 4.3   CHLORIDE 108 108   CO2 24 23   GLUCOSE 87 119*   BUN 54* 62*   CREATININE 2.38* 2.68*   CALCIUM 7.8* 8.0*     Recent Labs     10/02/19  0743   APTT 40.4*   INR 0.99               Imaging  US-RENAL   Final Result      1.  Negative renal ultrasound.      2.  No hydronephrosis or solid renal mass.      DX-FEMUR-2+ RIGHT   Final Result      Portable fluoroscopic images obtained in the OR during internal fixation right femur fracture.      DX-PORTABLE FLUORO > 1 HOUR   Final Result      Portable fluoroscopy utilized for 18 seconds.         INTERPRETING LOCATION: [address] [place] [address], [facility]       OUTSIDE IMAGES-DX LOWER EXTREMITY, RIGHT   Final Result      OUTSIDE IMAGES-DX CHEST   Final Result      CL-PERMANENT PACEMAKER GEN CHANGE    (Results Pending)        Assessment/Plan  * Cardiac pacemaker in situ  Assessment & Plan  [facility] and [facility] that the [facility] has not followed up with them and multiple months.  The pacemaker interrogation shows that the patient's primary generator will need to be replaced urgently.  I have contacted cardiology.  We are pending their consult and evaluation.  The patient should have the pacemaker replaced urgently.  Patient will have coags done an EKG done and we will see what cardiology recommends.  Patient is 100% paced.  He has previous history of sick sinus syndrome.  -going for PPM exchange today    Mchanical fall from ground level- (present on admission)  Assessment & Plan  Unknown why the patient actually fell.    Closed right hip fracture (HCC)- (present on admission)  Assessment & Plan  Postoperative day #5.  Pain is better controlled and improved ROM now  Continue pain management  Continue physical therapy occupational therapy  Refer to an extended care facility however will not transferred here until his pacemaker is addressed and his primary generator was replaced.      Leukocytosis, stress-related- (present on admission)  Assessment & Plan  Follow white blood cell count.  Most likely stress related will repeat levels.    Hyperlipidemia- (present on admission)  Assessment & Plan  Continue low-fat low-cholesterol diet.  Lab Results   Component Value Date/Time    CHOLSTRLTOT 170 01/12/2005 08:31 AM    LDL 88 01/12/2005 08:31 AM    HDL 53 01/12/2005 08:31 AM    TRIGLYCERIDE 147 01/12/2005 08:31 AM             COPD (chronic obstructive pulmonary disease) ([facility])- (present on admission)  Assessment & Plan  Patient COPD currently is under control.  Continue with RT protocol no current exacerbation noted    CKD (chronic kidney  disease) stage 3, GFR 30-59 ml/min (HCC)- (present on admission)  Assessment & Plan  Patient's renal functions are close to his baseline.  Avoid nephrotoxic medications    Essential hypertension- (present on admission)  Assessment & Plan  Optimize blood pressure management.  This morning he is high at 161/89 give PRN's.  May be related to pain.  Doing better overall today       VTE prophylaxis: Heparin

## 2019-10-03 NOTE — CARE PLAN
Problem: Safety  Goal: Will remain free from falls  Outcome: PROGRESSING AS EXPECTED, BED ALARM IN USE     Problem: Venous Thromboembolism (VTW)/Deep Vein Thrombosis (DVT) Prevention:  Goal: Patient will participate in Venous Thrombosis (VTE)/Deep Vein Thrombosis (DVT)Prevention Measures  Outcome: PROGRESSING AS EXPECTED, SCDS in use.

## 2019-10-03 NOTE — CARE PLAN
Problem: Safety  Goal: Will remain free from falls  Outcome: PROGRESSING AS EXPECTED   Fall precautions in place. Bed alarm in place. Bed in lowest position and bed brakes applied. Patient encouraged to use call light to alert staff of needs and before getting out of bed. Patient verbalized understanding. Call light and personal belongings within reach. Hourly rounding in place.     Problem: Skin Integrity  Goal: Risk for impaired skin integrity will decrease  Outcome: PROGRESSING AS EXPECTED   Pillows in use for support and positioning with frequent checks for incontinence in place.

## 2019-10-03 NOTE — THERAPY
Occupational Therapy Contact Note    Attempted OT treatment, pt down for PPM battery replacement. Will attempt later as able.    Denisha Marie, OTR/L

## 2019-10-03 NOTE — OP REPORT
Electrophysiology Procedure Note  [facility]    PROCEDURE: Dual-chamber pacemaker generator change,   moderate sedation administered by RN and supervised by physician    : Ernesto Barrios M.D.    ANESTHESIA: Moderate sedation,  start time 1220, stop time 1240  the moderate sedation document has been reviewed, signed and scanned into media     ESTIMATED BLOOD LOSS: 20 cc.    SPECIMENS: None.    COMPLICATIONS: None    INDICATION: Pacemaker at [place]RAYAN    PRE-PROCEDURE ECG: Right ventricular pacing    POST-PROCEDURE ECG: Dual-chamber pacing    DESCRIPTION OF PROCEDURE: After informed written consent, the patient was brought to the electrophysiology lab in the fasting, unsedated state. The patient was prepped and draped in the usual sterile fashion. The procedure was performed under moderate sedation with local anesthetic. An incision was made with a scalpel along the old scar. Access to the device pocket was made using a combination of blunt dissection and electrocautery. The old generator and leads were freed from adhesions and the generator disconnected from the leads. Leads tested fine.  The pocket was irrigated with antibiotic solution, and the new generator was connected to the leads and inserted back in the pocket.  The header was sewn to the muscle to prevent slippage.  The wound was closed with three layers of absorbable sutures and covered with Steri-Strips.   I personally supervised the administration of moderate sedation by the RN and observed the level of consciousness and physiologic status throughout the procedure.  Following recovery from sedation, the patient was transferred to a monitored bed in good condition.    IMPLANTED DEVICE INFORMATION:    Pulse generator is a Medtronic model W3DR01  Serial number [id number]     LEAD INFORMATION:  1. Right atrial lead is a Medtronic model 5076-45, serial number [id number], P wave 0.6 millivolts, threshold 0.5 volts, pacing impedance 266  ohms, implant date 1/13/2005  2. Right ventricular lead is a Medtronic model 5076-52, serial number [id number], R wave paced millivolts, threshold 0.75 volts, pacing impedance 380 ohms, implant date 1/13/2005      DEVICE PROGRAMMING:    As previous programmed     IMPRESSIONS:  1. Successful dual-chamber pacemaker generator change.    RECOMMENDATIONS:  1. Transfer to PPU.  2. Follow-up in device clinic for wound check and device interrogation.

## 2019-10-04 NOTE — CARE PLAN
Problem: Infection  Goal: Will remain free from infection  Outcome: PROGRESSING AS EXPECTED  Afebrile, aware of signs of symptoms of infection.     Problem: Pain Management  Goal: Pain level will decrease to patient's comfort goal  Outcome: PROGRESSING AS EXPECTED   Pain relief with scheduled medication.

## 2019-10-04 NOTE — DISCHARGE PLANNING
Agency/Facility Name: [facility]  Spoke To: Meli  Outcome: Pending bed availability.     Agency/Facility Name: [facility]  Spoke To: Correspondence  Outcome: Patient declined due to open work comp cases.

## 2019-10-04 NOTE — PROGRESS NOTES
Cardiology Progress Note               Author: Heather Reynaga   Date & Time created: 10/4/2019  11:14 AM     Chief Complaint:  Pacemaker battery low    HPI:  Eulogio Jeronimo is a 79 y.o. male with a past medical history of HTN, HLD, COPD and AV block s/p pacemaker placemcent who presented 9/28/2019 with ground level fall resulting in Rt hip fracture. Patient now s/p fixation found to have a pacemaker with low battery. Cardiology consulted for battery replacement      Interval History:  - s/p pacemaker generator replacement, mild pain at surgical site   - no palpitations, no SOB  - CXR pending   - HR 60s-70s, elevated post procedure, now improved   - will need device check prior to d/c  - cardiology to sign off, please call for further questions or concerns       Review of Systems   Constitutional: Positive for malaise/fatigue. Negative for chills and fever.   Respiratory: Negative for cough and shortness of breath.    Cardiovascular: Positive for chest pain. Negative for leg swelling.        Chest pain around surgery site   Gastrointestinal: Negative for abdominal pain, nausea and vomiting.   Genitourinary: Negative for dysuria and urgency.   Musculoskeletal: Positive for joint pain.        Right hip pain   Neurological: Negative for dizziness, weakness and headaches.   Psychiatric/Behavioral: Negative for depression. The patient is not nervous/anxious.        Physical Exam  Constitutional: He is oriented to person, place, and time.   Thin, frail appearing elderly male, resting comfortably in NAD   HENT:   Head: Normocephalic and atraumatic.   Mouth/Throat: Oropharynx is clear and moist.   Eyes: Pupils are equal, round, and reactive to light. Conjunctivae are normal. No scleral icterus.   Neck: Neck supple. No JVD present.   Cardiovascular: Normal rate, regular rhythm and normal heart sounds.   No murmur heard.  Pacemaker in Left chest wall with surgical dressing, no hematoma   Pulmonary/Chest: Effort  normal and breath sounds normal. He has no wheezes. He has no rales.   Abdominal: Soft. Bowel sounds are normal. He exhibits no distension. There is no tenderness.   Musculoskeletal: He exhibits edema.   Limited ROM of right hip s/p surgery with minimal swelling/bruising    Neurological: He is alert and oriented to person, place, and time. No cranial nerve deficit.   Skin: Skin is warm and dry. No erythema.   Psychiatric: He has a normal mood and affect. His behavior is normal. Thought content normal.     Hemodynamics:  Temp (24hrs), Av.7 °C (98 °F), Min:36.4 °C (97.6 °F), Max:36.9 °C (98.4 °F)  Temperature: 36.5 °C (97.7 °F)  Pulse  Av.3  Min: 60  Max: 82   Blood Pressure : 141/78     Respiratory:    Respiration: 17, Pulse Oximetry: 95 %     Work Of Breathing / Effort: (P) Mild  RUL Breath Sounds: (P) Clear, RML Breath Sounds: (P) Clear, RLL Breath Sounds: (P) Diminished, NITA Breath Sounds: (P) Clear, LLL Breath Sounds: (P) Diminished  Fluids:       GI/Nutrition:  Orders Placed This Encounter   Procedures   • Diet Order Cardiac     Standing Status:   Standing     Number of Occurrences:   1     Order Specific Question:   Diet:     Answer:   Cardiac [6]     Lab Results:  Recent Labs     10/02/19  0743   WBC 6.5   RBC 2.54*   HEMOGLOBIN 8.4*   HEMATOCRIT 26.4*   .9*   MCH 33.1*   MCHC 31.8*   RDW 50.6*   PLATELETCT 235   MPV 10.5     Recent Labs     10/03/19  0040   SODIUM 138   POTASSIUM 4.3   CHLORIDE 108   CO2 23   GLUCOSE 119*   BUN 62*   CREATININE 2.68*   CALCIUM 8.0*     Recent Labs     10/02/19  0743   APTT 40.4*   INR 0.99       Plan:  * Cardiac pacemaker in situ  Assessment & Plan  Patient with cardiac pacemaker secondary to AV block, [facility], per chart is appears the battery has been low for some time however patient wasn't responding to calls for evaluation   - interrogation completed, patient is 100%, s/p generator replacement   - follow up in CXR pending   - will need device check  prior to D/c, this should occur today    Closed right hip fracture (HCC)- (present on admission)  Assessment & Plan  S/p repair, progressing well   - PT/OT following    - management per primary/surgical teams    COPD (chronic obstructive pulmonary disease) (HCC)- (present on admission)  Assessment & Plan  At baseline, no acute respiratory issues   - continue home medications    Essential hypertension- (present on admission)  Assessment & Plan  Stable, continue home medications     Heather Reynaga MD  PGY 3 [facility]  Cardiology

## 2019-10-04 NOTE — PROGRESS NOTES
Seen in EP rounds for wound check post MDT PPM generator change.   Left chest site is clean and dry.  No erythema or hematoma is appreciated.  No significant ecchymosis or edema.     Please keep site covered and dry for next 7 days.  Monitor for increasing erythema, edema, discolored drainage.     Continue Doxycycline 100 mg BID for total of 8 doses.     [facility] care/[facility]ments.     Patient is potentially discharging [facility] later today per RN.     EP will sign off.  Please call with any questions.

## 2019-10-04 NOTE — DISCHARGE PLANNING
Agency/Facility Name: [facility]  Spoke To: Lee Ann  Outcome: Pending bed availability.     Agency[facility] Name[facility]  Spoke To: Meli   Outcome: Attempted to follow up, however no answer. Voicemail was left.

## 2019-10-04 NOTE — DISCHARGE SUMMARY
Discharge Summary    CHIEF COMPLAINT ON ADMISSION  Chief Complaint   Patient presents with   • T-5000 FALL   • T-5000 Extremity Pain   • Sent by MD       Reason for Admission  EMS R-2 TRANSFER [facility]     Admission Date  9/28/2019    CODE STATUS  Full Code    HPI & HOSPITAL COURSE  This is a 79 y.o. male here with a ground-level fall when he was trying to reach for his walker while going to the bathroom.  The patient immediately had right hip pain radiating to the leg.  He was unable to stand up and otherwise was at his baseline.  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  Pacemaker at this point is functioning appropriately.  The patient underwent surgical repair.  He is now postoperative day #3.  Patient's pain at this point is relatively well controlled.  Physical therapy and occupational therapy evaluate the patient.  At this point the patient is ready to go to extended care facility for ongoing physical therapy and Occupational Therapy.  Patient is at this point can be discharged.     Therefore, he is discharged in good and stable condition to skilled nursing facility once he is cleared by cardiology and ortho.     The patient met 2-midnight criteria for an inpatient stay at the time of discharge.       Therefore, he is discharged in good and stable condition to skilled nursing facility.    The patient met 2-midnight criteria for an inpatient stay at the time of discharge.    Discharge Date  10/4/19    FOLLOW UP ITEMS POST DISCHARGE  Cardiology clinic  ortho    DISCHARGE DIAGNOSES  Principal Problem:    Cardiac pacemaker in situ POA: Unknown  Active Problems:    Closed right hip fracture (HCC) POA: Yes    Mchanical fall from ground level POA: Yes    Leukocytosis, stress-related POA: Yes    Essential hypertension POA: Yes    CKD (chronic kidney disease) stage 3, GFR 30-59 ml/min ([facility]) POA: Yes    COPD (chronic obstructive pulmonary disease) ([facility]) POA: Yes    Hyperlipidemia POA:  Yes  Resolved Problems:    * No resolved hospital problems. *      FOLLOW UP  No future appointments.  No follow-up provider specified.    MEDICATIONS ON DISCHARGE     Medication List      START taking these medications      Instructions   acetaminophen 325 MG Tabs  Commonly known as:  TYLENOL   Take 2 Tabs by mouth every 6 hours as needed (Mild Pain; (Pain scale 1-3); Temp greater than 100.5 F).  Dose:  650 mg     alendronate 70 MG Tabs  Start taking on:  10/6/2019  Commonly known as:  FOSAMAX   Take 1 Tab by mouth every 7 days.  Dose:  70 mg     bisacodyl 10 MG Supp  Commonly known as:  DULCOLAX   Insert 1 Suppository in rectum every 24 hours as needed (if sennosides, docusate, polyethylene glycol 3350, and/or magnesium hydroxide ineffective or not ordered).  Dose:  10 mg     cyanocobalamin 1000 MCG Tabs  Commonly known as:  VITAMIN B12  Replaces:  Cyanocobalamin 1000 MCG Caps   Take 1 Tab by mouth every day.  Dose:  1,000 mcg     docusate sodium 100 MG Caps   Take 100 mg by mouth 2 Times a Day.  Dose:  100 mg     doxycycline monohydrate 100 MG tablet  Commonly known as:  ADOXA   Take 1 Tab by mouth every 12 hours.  Dose:  100 mg     fleet 7-19 GM/118ML Enem   Insert 133 mL in rectum Once PRN (if sennosides, docusate, polyethylene glycol 3350, magnesium hydroxide, and/or bisacodyl ineffective or not ordered).  Dose:  1 Each     heparin 5000 UNIT/ML Soln   Inject 1 mL as instructed every 8 hours.  Dose:  5,000 Units     magnesium hydroxide 400 MG/5ML Susp  Commonly known as:  MILK OF MAGNESIA   Take 30 mL by mouth 1 time daily as needed (if sennosides, docusate, and/or polyethylene glycol 3350 ineffective or not ordered).  Dose:  30 mL     ondansetron 4 MG Tbdp  Commonly known as:  ZOFRAN ODT   Take 1 Tab by mouth every four hours as needed (give PO if IV route is unavailable. May give per feeding tube.).  Dose:  4 mg     * oxyCODONE immediate-release 5 MG Tabs  Commonly known as:  ROXICODONE   Take 1 Tab by mouth  every 3 hours as needed for up to 3 days.  Dose:  5 mg     * oxyCODONE immediate-release 5 MG Tabs  Commonly known as:  ROXICODONE   Take 1 Tab by mouth every 6 hours as needed for Severe Pain for up to 3 days.  Dose:  5 mg     oyster shell calcium/vitamin D 250-125 MG-UNIT Tabs tablet   Take 1 Tab by mouth every day.  Dose:  1 Tab     polyethylene glycol/lytes Pack  Commonly known as:  MIRALAX   Take 1 Packet by mouth 2 times a day as needed (if sennosides and/or docusate ineffective or not ordered).  Dose:  17 g     scopolamine 1 mg/72hr Pt72  Commonly known as:  TRANSDERM-SCOP   Apply 1 Patch to skin as directed every 72 hours as needed (Nausea/Vomiting if ondansetron, dexamethasone, diphenhydramine, and/or haloperidol ineffective or not ordered).  Dose:  1 Patch     * senna-docusate 8.6-50 MG Tabs  Commonly known as:  PERICOLACE or SENOKOT S   Take 1 Tab by mouth every day.  Dose:  1 Tab     * senna-docusate 8.6-50 MG Tabs  Commonly known as:  PERICOLACE or SENOKOT S   Take 1 Tab by mouth every 24 hours as needed for Constipation.  Dose:  1 Tab         * This list has 4 medication(s) that are the same as other medications prescribed for you. Read the directions carefully, and ask your doctor or other care provider to review them with you.            CHANGE how you take these medications      Instructions   labetalol 100 MG Tabs  What changed:    · medication strength  · how much to take  · Another medication with the same name was removed. Continue taking this medication, and follow the directions you see here.  Commonly known as:  NORMODYNE   Take 0.75 Tabs by mouth 2 Times a Day.  Dose:  75 mg     * losartan 100 MG Tabs  What changed:  Another medication with the same name was added. Make sure you understand how and when to take each.  Commonly known as:  COZAAR   Take 100 mg by mouth every morning.  Dose:  100 mg     * losartan 100 MG Tabs  Start taking on:  10/5/2019  What changed:  You were already taking a  medication with the same name, and this prescription was added. Make sure you understand how and when to take each.  Commonly known as:  COZAAR   Take 1 Tab by mouth every day.  Dose:  100 mg         * This list has 2 medication(s) that are the same as other medications prescribed for you. Read the directions carefully, and ask your doctor or other care provider to review them with you.            CONTINUE taking these medications      Instructions   aspirin 325 MG Tabs  Commonly known as:  ASA   Take 325 mg by mouth every 6 hours as needed for Mild Pain (headache).  Dose:  325 mg     atorvastatin 10 MG Tabs  Commonly known as:  LIPITOR   Take 10 mg by mouth every morning.  Dose:  10 mg     tamsulosin 0.4 MG capsule  Commonly known as:  FLOMAX   Take 0.4 mg by mouth every evening.  Dose:  0.4 mg     vitamin D (Ergocalciferol) 07552 units Caps capsule  Commonly known as:  DRISDOL   Take 50,000 Units by mouth every Friday.  Dose:  50,000 Units        STOP taking these medications    Cyanocobalamin 1000 MCG Caps  Replaced by:  cyanocobalamin 1000 MCG Tabs     vitamin B-12 1000 MCG Tabs            Allergies  Allergies   Allergen Reactions   • Lisinopril Unspecified     Lethargy        DIET  Orders Placed This Encounter   Procedures   • Diet Order Cardiac     Standing Status:   Standing     Number of Occurrences:   1     Order Specific Question:   Diet:     Answer:   Cardiac [6]       ACTIVIy      CONSULTATIONS  Ortho and cardiology    PROCEDURES   dual-chamber pacemaker generator change  Right hip fixation    LABORATORY  Lab Results   Component Value Date    SODIUM 138 10/03/2019    POTASSIUM 4.3 10/03/2019    CHLORIDE 108 10/03/2019    CO2 23 10/03/2019    GLUCOSE 119 (H) 10/03/2019    BUN 62 (H) 10/03/2019    CREATININE 2.68 (H) 10/03/2019    CREATININE 1.1 11/24/2007        Lab Results   Component Value Date    WBC 6.5 10/02/2019    HEMOGLOBIN 8.4 (L) 10/02/2019    HEMATOCRIT 26.4 (L) 10/02/2019    PLATELETCT 235  10/02/2019        Total time of the discharge process exceeds 35 minutes.

## 2019-10-04 NOTE — PROGRESS NOTES
"/80   Pulse 65   Temp 36.9 °C (98.4 °F) (Temporal)   Resp 15   Ht 1.626 m (5' 4\")   Wt 48.9 kg (107 lb 12.9 oz)   SpO2 96%   Transported with [facility].  PIV removed, flu vaccine given prior.  Discharge paperwork as well as informed consent for controlled substance reviewed and signed.  Rx with chart copy, in envelope with face sheet and discharge instructions.  Report was given prior.  Further care as per life care.   "

## 2019-10-04 NOTE — PROGRESS NOTES
Second call placed to [facility].    Spoke with Heather Reynaga [facility] aware EPS said [facility].    Patient can follow up as an outpatient in a month with cardiology.   Will notify Dr. Young for discharge planning.   Patient will need CXR prior to discharge as well.

## 2019-10-04 NOTE — PROGRESS NOTES
Attempted report at [facility] [phone], request call back.   CXR was completed XR results on chart.

## 2019-10-04 NOTE — PROGRESS NOTES
"    /78   Pulse 69   Temp 36.5 °C (97.7 °F) (Temporal)   Resp 17   Ht 1.626 m (5' 4\")   Wt 48.9 kg (107 lb 12.9 oz)   SpO2 95%     Recent Labs     10/02/19  0743   WBC 6.5   RBC 2.54*   HEMOGLOBIN 8.4*   HEMATOCRIT 26.4*   .9*   MCH 33.1*   MCHC 31.8*   RDW 50.6*   PLATELETCT 235   MPV 10.5           POD# 6     Plan:  DVT Prophylaxis- TEDS/SCDs  Weight Bearing Status- WBAT  PT/OT  Antibiotics: perioperative complete  Case Coordination          "

## 2019-10-04 NOTE — CARE PLAN
Problem: Safety  Goal: Will remain free from falls  Outcome: PROGRESSING AS EXPECTED   Fall precautions in place. Bed alarm in use. Call light and personal belongings within reach. Hourly rounding in place.     Problem: Venous Thromboembolism (VTW)/Deep Vein Thrombosis (DVT) Prevention:  Goal: Patient will participate in Venous Thrombosis (VTE)/Deep Vein Thrombosis (DVT)Prevention Measures  Outcome: PROGRESSING AS EXPECTED   Bilateral SCDs in use while patient is resting in bed.

## 2019-10-04 NOTE — PROGRESS NOTES
XR notified for study chest XR 1 view aware anticipate discharge today.   Call placed to cardiology for discharge instructions on next follow up.  Will d/w hospitalist when determined.   Spoke with EPS APN at bedside no need for further device check today.

## 2019-10-04 NOTE — DISCHARGE INSTRUCTIONS
Discharge Instructions    Discharged to [facility] by ambulance with escort. Discharged via ambulance, hospital escort: Yes.  Special equipment needed: Walker    Be sure to schedule a follow-up appointment with your primary care doctor or any specialists as instructed.     Discharge Plan:  [facility] for continued therapy.  [facility].  PPM to remain covered with a dry dressing for 1 week.  Please keep pacemaker site covered and dry for next 7 days.  Monitor for increasing erythema, edema, discolored drainage.   Continue Doxycycline 100 mg BID for total of 8 doses.   [facility] for appropriate follow up care/appointments in regards to PPM.     Diet Plan: Regular  Activity Level: Weight bearing as tolerated.   Smoking Cessation Offered: Patient Refused  Confirmed Follow up Appointment: Patient to Call and Schedule Appointment  Confirmed Symptoms Management: Discussed  Medication Reconciliation Updated: Yes  Influenza Vaccine Indication: Indicated: 65 years and older  Influenza Vaccine Given - only chart on this line when given: Influenza Vaccine Given (See MAR)    I understand that a diet low in cholesterol, fat, and sodium is recommended for good health. Unless I have been given specific instructions below for another diet, I accept this instruction as my diet prescription.   Other diet: Regular    Special Instructions: Discharge instructions for the Orthopedic Patient    Follow up with Primary Care Physician within 2 weeks of discharge to home, regarding:  Review of medications and diagnostic testing.  Surveillance for medical complications.  Workup and treatment of osteoporosis, if appropriate.     -Is this a Joint Replacement patient? No    -Is this patient being discharged with medication to prevent blood clots?  Yes, Heparin Heparin injection  What is this medicine?  HEPARIN (HEP a rin) is an anticoagulant. It is used to treat or prevent clots in the  veins, arteries, lungs, or heart. It stops clots from forming or getting bigger. This medicine prevents clotting during open-heart surgery, dialysis, or in patients who are confined to bed.  This medicine may be used for other purposes; ask your health care provider or pharmacist if you have questions.  COMMON BRAND NAME(S): Hep-Lock, Hep-Lock U/P, Hepflush-10, Monoject Prefill Advanced Heparin Lock Flush  What should I tell my health care provider before I take this medicine?  They need to know if you have any of these conditions:  -bleeding disorders, such as hemophilia or low blood platelets  -bowel disease or diverticulitis  -endocarditis  -high blood pressure  -liver disease  -recent surgery or delivery of a baby  -stomach ulcers  -an unusual or allergic reaction to heparin, benzyl alcohol, sulfites, other medicines, foods, dyes, or preservatives  -pregnant or trying to get pregnant  -breast-feeding  How should I use this medicine?  This medicine is given by injection or infusion into a vein. It can also be given by injection of small amounts under the skin. It is usually given by a health care professional in a hospital or clinic setting.  If you get this medicine at home, you will be taught how to prepare and give this medicine. Use exactly as directed. Take your medicine at regular intervals. Do not take it more often than directed. Do not stop taking except on your doctor's advice. Stopping this medicine may increase your risk of a blot clot. Be sure to refill your prescription before you run out of medicine.  It is important that you put your used needles and syringes in a special sharps container. Do not put them in a trash can. If you do not have a sharps container, call your pharmacist or healthcare provider to get one.  Talk to your pediatrician regarding the use of this medicine in children. While this medicine may be prescribed for children for selected conditions, precautions do apply.  Overdosage:  If you think you have taken too much of this medicine contact a poison control center or emergency room at once.  NOTE: This medicine is only for you. Do not share this medicine with others.  What if I miss a dose?  If you miss a dose, take it as soon as you can. If it is almost time for your next dose, take only that dose. Do not take double or extra doses.  What may interact with this medicine?  Do not take this medicine with any of the following medications:  -aspirin and aspirin-like drugs  -mifepristone  -medicines that treat or prevent blood clots like warfarin, enoxaparin, and dalteparin  -palifermin  -protamine  This medicine may also interact with the following medications:  -dextran  -digoxin  -hydroxychloroquine  -medicines for treating colds or allergies  -nicotine  -NSAIDs, medicines for pain and inflammation, like ibuprofen or naproxen  -phenylbutazone  -tetracycline antibiotics  This list may not describe all possible interactions. Give your health care provider a list of all the medicines, herbs, non-prescription drugs, or dietary supplements you use. Also tell them if you smoke, drink alcohol, or use illegal drugs. Some items may interact with your medicine.  What should I watch for while using this medicine?  While you are taking this medicine, carry an identification card with your name, the name and dose of medicine(s) being used, and the name and phone number of your doctor or health care professional or person to contact in an emergency.  Notify your doctor or health care professional and seek emergency treatment if you develop breathing problems; changes in vision; chest pain; severe, sudden headache; pain, swelling, warmth in the leg; trouble speaking; sudden numbness or weakness of the face, arm, or leg. These can be signs that your condition has gotten worse.  Notify your doctor or health care professional at once if you have cold, blue hands or feet.  If you are going to have surgery or  dental work, tell your doctor or health care professional that you have received this medicine. Be careful brushing and flossing your teeth or using a toothpick while receiving this medicine because you may bleed more easily.  Avoid sports and activities that might cause injury while you are using this medicine. Severe falls or injuries can cause unseen bleeding. Be careful when using sharp tools or knives. Consider using an electric razor.  What side effects may I notice from receiving this medicine?  Side effects that you should report to your doctor or health care professional as soon as possible:  -allergic reactions like skin rash, itching or hives, swelling of the face, lips, or tongue  -back pain  -burning or itching on the bottoms of the feet  -cold, blue, or painful hands and feet  -feeling faint or lightheaded, falls  -fever, chills  -nausea, vomiting  -signs and symptoms of bleeding such as bloody or black, tarry stools; red or dark-brown urine; spitting up blood or brown material that looks like coffee grounds; red spots on the skin; unusual bruising or bleeding from the eye, gums, or nose  -stomach pain  -unusually low blood pressure  Side effects that usually do not require medical attention (report to your doctor or health care professional if they continue or are bothersome):  -pain, redness, or irritation at site where injected  This list may not describe all possible side effects. Call your doctor for medical advice about side effects. You may report side effects to FDA at 2-660-FDA-0977.  Where should I keep my medicine?  Keep out of the reach of children.  Store unopened vials at room temperature between 15 and 30 degrees C (59 and 86 degrees F). Do not freeze. Do not use if solution is discolored or particulate matter is present. Throw away any unused medicine after the expiration date.  NOTE: This sheet is a summary. It may not cover all possible information. If you have questions about this  medicine, talk to your doctor, pharmacist, or health care provider.  © 2018 Elsevier/Gold Standard (2014-04-15 16:19:24)      · Is patient discharged on Warfarin / Coumadin?   No     Depression / Suicide Risk    As you are discharged from this [facility] facility, it is important to learn how to keep safe from harming yourself.    Recognize the warning signs:  · Abrupt changes in personality, positive or negative- including increase in energy   · Giving away possessions  · Change in eating patterns- significant weight changes-  positive or negative  · Change in sleeping patterns- unable to sleep or sleeping all the time   · Unwillingness or inability to communicate  · Depression  · Unusual sadness, discouragement and loneliness  · Talk of wanting to die  · Neglect of personal appearance   · Rebelliousness- reckless behavior  · Withdrawal from people/activities they love  · Confusion- inability to concentrate     If you or a loved one observes any of these behaviors or has concerns about self-harm, here's what you can do:  · Talk about it- your feelings and reasons for harming yourself  · Remove any means that you might use to hurt yourself (examples: pills, rope, extension cords, firearm)  · Get professional help from the community (Mental Health, Substance Abuse, psychological counseling)  · Do not be alone:Call your Safe Contact- someone whom you trust who will be there for you.  · Call your local CRISIS HOTLINE 170-0275 or 346-089-4665  · Call your local Children's Mobile Crisis Response Team [place] (041) 102-2810 or www.[employer]  · Call the toll free National Suicide Prevention Hotlines   · National Suicide Prevention Lifeline 731-645-AZIH (4451)  · National Hope Line Network 800-SUICIDE (521-7547)       Pacemaker Implantation, Adult  Pacemaker implantation is a procedure to place a pacemaker inside your chest. A pacemaker is a small computer that sends electrical signals to the heart and helps  your heart beat normally. A pacemaker also stores information about your heart rhythms. You may need pacemaker implantation if you:  · Have a slow heartbeat (bradycardia).  · Faint (syncope).  · Have shortness of breath (dyspnea) due to heart problems.  The pacemaker attaches to your heart through a wire, called a lead. Sometimes just one lead is needed. Other times, there will be two leads. There are two types of pacemakers:  · Transvenous pacemaker. This type is placed under the skin or muscle of your chest. The lead goes through a vein in the chest area to reach the inside of the heart.  · Epicardial pacemaker. This type is placed under the skin or muscle of your chest or belly. The lead goes through your chest to the outside of the heart.  Tell a health care provider about:  · Any allergies you have.  · All medicines you are taking, including vitamins, herbs, eye drops, creams, and over-the-counter medicines.  · Any problems you or family members have had with anesthetic medicines.  · Any blood or bone disorders you have.  · Any surgeries you have had.  · Any medical conditions you have.  · Whether you are pregnant or may be pregnant.  What are the risks?  Generally, this is a safe procedure. However, problems may occur, including:  · Infection.  · Bleeding.  · Failure of the pacemaker or the lead.  · Collapse of a lung or bleeding into a lung.  · Blood clot inside a blood vessel with a lead.  · Damage to the heart.  · Infection inside the heart (endocarditis).  · Allergic reactions to medicines.  What happens before the procedure?  Staying hydrated   Follow instructions from your health care provider about hydration, which may include:  · Up to 2 hours before the procedure - you may continue to drink clear liquids, such as water, clear fruit juice, black coffee, and plain tea.  Eating and drinking restrictions   Follow instructions from your health care provider about eating and drinking, which may  include:  · 8 hours before the procedure - stop eating heavy meals or foods such as meat, fried foods, or fatty foods.  · 6 hours before the procedure - stop eating light meals or foods, such as toast or cereal.  · 6 hours before the procedure - stop drinking milk or drinks that contain milk.  · 2 hours before the procedure - stop drinking clear liquids.  Medicines  · Ask your health care provider about:  ¨ Changing or stopping your regular medicines. This is especially important if you are taking diabetes medicines or blood thinners.  ¨ Taking medicines such as aspirin and ibuprofen. These medicines can thin your blood. Do not take these medicines before your procedure if your health care provider instructs you not to.  · You may be given antibiotic medicine to help prevent infection.  General instructions  · You will have a heart evaluation. This may include an electrocardiogram (ECG), chest X-ray, and heart imaging (echocardiogram,  or echo) tests.  · You will have blood tests.  · Do not use any products that contain nicotine or tobacco, such as cigarettes and e-cigarettes. If you need help quitting, ask your health care provider.  · Plan to have someone take you home from the hospital or clinic.  · If you will be going home right after the procedure, plan to have someone with you for 24 hours.  · Ask your health care provider how your surgical site will be marked or identified.  What happens during the procedure?  · To reduce your risk of infection:  ¨ Your health care team will wash or sanitize their hands.  ¨ Your skin will be washed with soap.  ¨ Hair may be removed from the surgical area.  · An IV tube will be inserted into one of your veins.  · You will be given one or more of the following:  ¨ A medicine to help you relax (sedative).  ¨ A medicine to numb the area (local anesthetic).  ¨ A medicine to make you fall asleep (general anesthetic).  · If you are getting a transvenous pacemaker:  ¨ An incision  will be made in your upper chest.  ¨ A pocket will be made for the pacemaker. It may be placed under the skin or between layers of muscle.  ¨ The lead will be inserted into a blood vessel that returns to the heart.  ¨ While X-rays are taken by an imaging machine (fluoroscopy), the lead will be advanced through the vein to the inside of your heart.  ¨ The other end of the lead will be tunneled under the skin and attached to the pacemaker.  · If you are getting an epicardial pacemaker:  ¨ An incision will be made near your ribs or breastbone (sternum) for the lead.  ¨ The lead will be attached to the outside of your heart.  ¨ Another incision will be made in your chest or upper belly to create a pocket for the pacemaker.  ¨ The free end of the lead will be tunneled under the skin and attached to the pacemaker.  · The transvenous or epicardial pacemaker will be tested. Imaging studies may be done to check the lead position.  · The incisions will be closed with stitches (sutures), adhesive strips, or skin glue.  · Bandages (dressing) will be placed over the incisions.  The procedure may vary among health care providers and hospitals.  What happens after the procedure?  · Your blood pressure, heart rate, breathing rate, and blood oxygen level will be monitored until the medicines you were given have worn off.  · You will be given antibiotics and pain medicine.  · ECG and chest x-rays will be done.  · You will wear a continuous type of ECG (Holter monitor) to check your heart rhythm.  · Your health care provider will program the pacemaker.  · Do not drive for 24 hours if you received a sedative.  This information is not intended to replace advice given to you by your health care provider. Make sure you discuss any questions you have with your health care provider.  Document Released: 12/08/2003 Document Revised: 07/07/2017 Document Reviewed: 05/31/2017  Elsevier Interactive Patient Education © 2017 Elsevier  Inc.    Doxycycline delayed-release capsules  What is this medicine?  DOXYCYCLINE (dox jim wong) is a tetracycline antibiotic. It is used to treat certain kinds of bacterial infections, Lyme disease, and malaria. It will not work for colds, flu, or other viral infections.  This medicine may be used for other purposes; ask your health care provider or pharmacist if you have questions.  COMMON BRAND NAME(S): Oracea  What should I tell my health care provider before I take this medicine?  They need to know if you have any of these conditions:  -bowel disease like colitis  -liver disease  -long exposure to sunlight like working outdoors  -an unusual or allergic reaction to doxycycline, tetracycline antibiotics, other medicines, foods, dyes, or preservatives  -pregnant or trying to get pregnant  -breast-feeding  How should I use this medicine?  Take this medicine by mouth with a full glass of water. Follow the directions on the prescription label. Do not crush or chew. The capsules may be opened and the pellets sprinkled on applesauce. Swallow the pellets whole without chewing. Follow with an 8 ounce glass of water to help you swallow all the pellets. Do not prepare a dose and store for later use. The applesauce mixture should be taken immediately after you prepare it. It is best to take this medicine without other food, but if it upsets your stomach take it with food. Take your medicine at regular intervals. Do not take your medicine more often than directed. Take all of your medicine as directed even if you think your are better. Do not skip doses or stop your medicine early.  Talk to your pediatrician regarding the use of this medicine in children. While this drug may be prescribed for selected conditions, precautions do apply.  Overdosage: If you think you have taken too much of this medicine contact a poison control center or emergency room at once.  NOTE: This medicine is only for you. Do not share this medicine  with others.  What if I miss a dose?  If you miss a dose, take it as soon as you can. If it is almost time for your next dose, take only that dose. Do not take double or extra doses.  What may interact with this medicine?  -antacids  -barbiturates  -birth control pills  -bismuth subsalicylate  -carbamazepine  -methoxyflurane  -other antibiotics  -phenytoin  -vitamins that contain iron  -warfarin  This list may not describe all possible interactions. Give your health care provider a list of all the medicines, herbs, non-prescription drugs, or dietary supplements you use. Also tell them if you smoke, drink alcohol, or use illegal drugs. Some items may interact with your medicine.  What should I watch for while using this medicine?  Tell your doctor or health care professional if your symptoms do not improve.  Do not treat diarrhea with over the counter products. Contact your doctor if you have diarrhea that lasts more than 2 days or if it is severe and watery.  Do not take this medicine just before going to bed. It may not dissolve properly when you lay down and can cause pain in your throat. Drink plenty of fluids while taking this medicine to also help reduce irritation in your throat.  This medicine can make you more sensitive to the sun. Keep out of the sun. If you cannot avoid being in the sun, wear protective clothing and use sunscreen. Do not use sun lamps or tanning beds/booths.  If you are being treated for a sexually transmitted infection, avoid sexual contact until you have finished your treatment. Your sexual partner may also need treatment.  Avoid antacids, aluminum, calcium, magnesium, and iron products for 4 hours before and 2 hours after taking a dose of this medicine.  Birth control pills may not work properly while you are taking this medicine. Talk to your doctor about using an extra method of birth control.  If you are using this medicine to prevent malaria, you should still protect yourself from  contact with mosquitos. Stay in screened-in areas, use mosquito nets, keep your body covered, and use an insect repellent.  What side effects may I notice from receiving this medicine?  Side effects that you should report to your doctor or health care professional as soon as possible:  -allergic reactions like skin rash, itching or hives, swelling of the face, lips, or tongue  -difficulty breathing  -fever  -itching in the rectal or genital area  -pain on swallowing  -redness, blistering, peeling or loosening of the skin, including inside the mouth  -severe stomach pain or cramps  -unusual bleeding or bruising  -unusually weak or tired  -yellowing of the eyes or skin  Side effects that usually do not require medical attention (report to your doctor or health care professional if they continue or are bothersome):  -diarrhea  -loss of appetite  -nausea, vomiting  This list may not describe all possible side effects. Call your doctor for medical advice about side effects. You may report side effects to FDA at 2-929-FDA-8329.  Where should I keep my medicine?  Keep out of the reach of children.  Store at room temperature, below 25 degrees C (77 degrees F). Protect from light. Keep container tightly closed. Throw away any unused medicine after the expiration date. Taking this medicine after the expiration date can make you seriously ill.  NOTE: This sheet is a summary. It may not cover all possible information. If you have questions about this medicine, talk to your doctor, pharmacist, or health care provider.  © 2018 Elsevier/Gold Standard (2017-01-19 10:41:39)      Oxycodone tablets or capsules  What is this medicine?  OXYCODONE (ox i KOE done) is a pain reliever. It is used to treat moderate to severe pain.  This medicine may be used for other purposes; ask your health care provider or pharmacist if you have questions.  COMMON BRAND NAME(S): Dazidox, Endocodone, Oxaydo, OXECTA, OxyIR, Percolone, Roxicodone,  SURENDRA  What should I tell my health care provider before I take this medicine?  They need to know if you have any of these conditions:  -Morales's disease  -brain tumor  -head injury  -heart disease  -history of drug or alcohol abuse problem  -if you often drink alcohol  -kidney disease  -liver disease  -lung or breathing disease, like asthma  -mental illness  -pancreatic disease  -seizures  -thyroid disease  -an unusual or allergic reaction to oxycodone, codeine, hydrocodone, morphine, other medicines, foods, dyes, or preservatives  -pregnant or trying to get pregnant  -breast-feeding  How should I use this medicine?  Take this medicine by mouth with a glass of water. Follow the directions on the prescription label. You can take it with or without food. If it upsets your stomach, take it with food. Take your medicine at regular intervals. Do not take it more often than directed. Do not stop taking except on your doctor's advice.  Some brands of this medicine, like Oxecta, have special instructions. Ask your doctor or pharmacist if these directions are for you: Do not cut, crush or chew this medicine. Swallow only one tablet at a time. Do not wet, soak, or lick the tablet before you take it.  A special MedGuide will be given to you by the pharmacist with each prescription and refill. Be sure to read this information carefully each time.  Talk to your pediatrician regarding the use of this medicine in children. Special care may be needed.  Overdosage: If you think you have taken too much of this medicine contact a poison control center or emergency room at once.  NOTE: This medicine is only for you. Do not share this medicine with others.  What if I miss a dose?  If you miss a dose, take it as soon as you can. If it is almost time for your next dose, take only that dose. Do not take double or extra doses.  What may interact with this medicine?  This medicine may interact with the following  medications:  -alcohol  -antihistamines for allergy, cough and cold  -antiviral medicines for HIV or AIDS  -atropine  -certain antibiotics like clarithromycin, erythromycin, linezolid, rifampin  -certain medicines for anxiety or sleep  -certain medicines for bladder problems like oxybutynin, tolterodine  -certain medicines for depression like amitriptyline, fluoxetine, sertraline  -certain medicines for fungal infections like ketoconazole, itraconazole, voriconazole  -certain medicines for migraine headache like almotriptan, eletriptan, frovatriptan, naratriptan, rizatriptan, sumatriptan, zolmitriptan  -certain medicines for nausea or vomiting like dolasetron, ondansetron, palonosetron  -certain medicines for Parkinson's disease like benztropine, trihexyphenidyl  -certain medicines for seizures like phenobarbital, phenytoin, primidone  -certain medicines for stomach problems like dicyclomine, hyoscyamine  -certain medicines for travel sickness like scopolamine  -diuretics  -general anesthetics like halothane, isoflurane, methoxyflurane, propofol  -ipratropium  -local anesthetics like lidocaine, pramoxine, tetracaine  -MAOIs like Carbex, Eldepryl, Marplan, Nardil, and Parnate  -medicines that relax muscles for surgery  -methylene blue  -nilotinib  -other narcotic medicines for pain or cough  -phenothiazines like chlorpromazine, mesoridazine, prochlorperazine, thioridazine  This list may not describe all possible interactions. Give your health care provider a list of all the medicines, herbs, non-prescription drugs, or dietary supplements you use. Also tell them if you smoke, drink alcohol, or use illegal drugs. Some items may interact with your medicine.  What should I watch for while using this medicine?  Tell your doctor or health care professional if your pain does not go away, if it gets worse, or if you have new or a different type of pain. You may develop tolerance to the medicine. Tolerance means that you will  need a higher dose of the medicine for pain relief. Tolerance is normal and is expected if you take this medicine for a long time.  Do not suddenly stop taking your medicine because you may develop a severe reaction. Your body becomes used to the medicine. This does NOT mean you are addicted. Addiction is a behavior related to getting and using a drug for a non-medical reason. If you have pain, you have a medical reason to take pain medicine. Your doctor will tell you how much medicine to take. If your doctor wants you to stop the medicine, the dose will be slowly lowered over time to avoid any side effects.  There are different types of narcotic medicines (opiates). If you take more than one type at the same time or if you are taking another medicine that also causes drowsiness, you may have more side effects. Give your health care provider a list of all medicines you use. Your doctor will tell you how much medicine to take. Do not take more medicine than directed. Call emergency for help if you have problems breathing or unusual sleepiness.  You may get drowsy or dizzy. Do not drive, use machinery, or do anything that needs mental alertness until you know how the medicine affects you. Do not stand or sit up quickly, especially if you are an older patient. This reduces the risk of dizzy or fainting spells. Alcohol may interfere with the effect of this medicine. Avoid alcoholic drinks.  This medicine will cause constipation. Try to have a bowel movement at least every 2 to 3 days. If you do not have a bowel movement for 3 days, call your doctor or health care professional.  Your mouth may get dry. Chewing sugarless gum or sucking hard candy, and drinking plenty of water may help. Contact your doctor if the problem does not go away or is severe.  What side effects may I notice from receiving this medicine?  Side effects that you should report to your doctor or health care professional as soon as possible:  -allergic  reactions like skin rash, itching or hives, swelling of the face, lips, or tongue  -breathing problems  -confusion  -signs and symptoms of low blood pressure like dizziness; feeling faint or lightheaded, falls; unusually weak or tired  -trouble passing urine or change in the amount of urine  -trouble swallowing  Side effects that usually do not require medical attention (report to your doctor or health care professional if they continue or are bothersome):  -constipation  -dry mouth  -nausea, vomiting  -tiredness  This list may not describe all possible side effects. Call your doctor for medical advice about side effects. You may report side effects to FDA at 9-289-FDA-4281.  Where should I keep my medicine?  Keep out of the reach of children. This medicine can be abused. Keep your medicine in a safe place to protect it from theft. Do not share this medicine with anyone. Selling or giving away this medicine is dangerous and against the law.  Store at room temperature between 15 and 30 degrees C (59 and 86 degrees F). Protect from light. Keep container tightly closed.  This medicine may cause accidental overdose and death if it is taken by other adults, children, or pets. Flush any unused medicine down the toilet to reduce the chance of harm. Do not use the medicine after the expiration date.  NOTE: This sheet is a summary. It may not cover all possible information. If you have questions about this medicine, talk to your doctor, pharmacist, or health care provider.  © 2018 [employer]/Gold Standard (2016-11-01 16:55:57)

## 2019-10-04 NOTE — TELEPHONE ENCOUNTER
JESUS ALBERTO on [facility] - informing her of the gen change. [facility] for [facility]. [facility]

## 2019-10-04 NOTE — PROGRESS NOTES
Anticipated discharge for 1430 as per  Gabriela.    XR called for CXR.   D/w Dr. Young agrees and will complete discharge med rec.

## 2019-12-27 PROBLEM — Z91.89 REQUIRES SUPERVISION DUE TO DEFICIT IN SELF-CARE: Status: ACTIVE | Noted: 2019-01-01

## 2019-12-27 PROBLEM — E43 SEVERE PROTEIN-CALORIE MALNUTRITION (HCC): Status: ACTIVE | Noted: 2019-01-01

## 2019-12-27 PROBLEM — I48.20 CHRONIC ATRIAL FIBRILLATION (HCC): Status: ACTIVE | Noted: 2019-01-01

## 2019-12-28 NOTE — H&P
Hospital Medicine History & Physical Note    Date of Service  2019    Primary Care Physician  Pcp Pt States None    Consultants  none    Code Status  full    Chief Complaint  none    History of Presenting Illness  79 y.o. male who presented 2019 with the inability to care for himself. He had a full time caregiver at home who unfortunately  in his home today. The home health RN referred him to the ED when she noted he was unable to get up on his own. He has no complaints and has not been ill recently.       I discussed him with the ED physician.     Review of Systems  Review of Systems   Constitutional: Negative for chills and fever.   HENT: Negative for sore throat.    Eyes: Negative for blurred vision and pain.   Respiratory: Negative for cough and shortness of breath.    Cardiovascular: Negative for chest pain and palpitations.   Gastrointestinal: Negative for abdominal pain, nausea and vomiting.   Genitourinary: Negative for dysuria and urgency.   Musculoskeletal: Negative for back pain and neck pain.   Skin: Negative for itching and rash.   Neurological: Negative for dizziness, tingling and headaches.   Psychiatric/Behavioral: Negative for depression. The patient does not have insomnia.    All other systems reviewed and are negative.      Past Medical History   has a past medical history of Abdominal aortic aneurysm (AAA) (HCC), Alcohol dependence (HCC), Artificial lens present, Cardiac pacemaker, Complete atrioventricular block (HCC), Dermatochalasis, Erectile dysfunction, Hard of hearing, Hematuria, Hyperlipidemia, Hypertension, Myopia, Presbyopia, and Vitamin B12 deficiency.    Surgical History   has a past surgical history that includes pr open fix inter/subtroch fx,implnt (2019).     Family History  family history is not on file.     Social History   reports that he has been smoking cigarettes. He has been smoking about 0.00 packs per day. He has never used smokeless tobacco. He reports  current alcohol use. He reports that he does not use drugs.    Allergies  Allergies   Allergen Reactions   • Lisinopril Unspecified     Lethargy        Medications  Prior to Admission Medications   Prescriptions Last Dose Informant Patient Reported? Taking?   alendronate (FOSAMAX) 70 MG Tab UNK at [facility] Yes Yes   Sig: Take 70 mg by mouth every 7 days. FRIDAY   amLODIPine (NORVASC) 5 MG Tab UNK at [facility] Yes Yes   Sig: Take 5 mg by mouth every day.   atorvastatin (LIPITOR) 10 MG Tab UNK at [facility] Yes Yes   Sig: Take 10 mg by mouth every evening.   cloNIDine (CATAPRES) 0.1 MG Tab UNK at [facility] Yes Yes   Sig: Take 0.1 mg by mouth every 6 hours as needed.   dronabinol (MARINOL) 5 MG Cap UNK at [facility] Yes Yes   Sig: Take 5 mg by mouth 2 times a day.   lidocaine (LIDODERM) 5 % Patch UNK at [facility] Yes Yes   Sig: Apply 1 Patch to skin as directed every 24 hours.   losartan (COZAAR) 100 MG Tab UNK at [facility] Yes Yes   Sig: Take 100 mg by mouth every day.   oxyCODONE immediate-release (ROXICODONE) 5 MG Tab UNK at [facility] Yes Yes   Sig: Take 5 mg by mouth every four hours as needed for Severe Pain.   rivaroxaban (XARELTO) 20 MG Tab tablet UNK at [facility] Yes Yes   Sig: Take 20 mg by mouth with dinner.   tamsulosin (FLOMAX) 0.4 MG capsule UNK at [facility] Yes Yes   Sig: Take 0.4 mg by mouth every evening.   vitamin D, Ergocalciferol, (DRISDOL) 78921 units Cap capsule UNK at [facility] Yes No   Sig: Take 50,000 Units by mouth every Friday.      Facility-Administered Medications: None       Physical Exam  Temp:  [36.9 °C (98.5 °F)] 36.9 °C (98.5 °F)  Pulse:  [66] 66  Resp:  [16] 16  BP: (191)/(111) 191/111  SpO2:  [99 %] 99 %    Physical Exam  Vitals signs and nursing note reviewed.   Constitutional:       General: He is not in acute distress.     Appearance: He is well-developed. He is not diaphoretic.       Comments: Patient seen and examined at the bedside. Plan discussed at bedside with the RN.    HENT:      Right Ear: External ear normal.      Left Ear: External ear normal.      Nose: Nose normal.   Eyes:      General: No scleral icterus.        Right eye: No discharge.         Left eye: No discharge.   Neck:      Vascular: No JVD.      Trachea: No tracheal deviation.   Cardiovascular:      Rate and Rhythm: Normal rate.      Heart sounds: Normal heart sounds. No murmur.   Pulmonary:      Effort: Pulmonary effort is normal. No respiratory distress.      Breath sounds: Normal breath sounds. No wheezing or rales.   Abdominal:      General: Bowel sounds are normal. There is no distension.      Palpations: Abdomen is soft.      Tenderness: There is no tenderness. There is no guarding.   Musculoskeletal:         General: No tenderness.   Skin:     General: Skin is warm and dry.      Findings: No erythema.   Neurological:      Mental Status: He is alert and oriented to person, place, and time.   Psychiatric:         Behavior: Behavior normal.         Laboratory:          No results for input(s): ALTSGPT, ASTSGOT, ALKPHOSPHAT, TBILIRUBIN, DBILIRUBIN, GAMMAGT, AMYLASE, LIPASE, ALB, PREALBUMIN, GLUCOSE in the last 72 hours.      No results for input(s): NTPROBNP in the last 72 hours.      No results for input(s): TROPONINT in the last 72 hours.    Urinalysis:    No results found     Imaging:  No orders to display         Assessment/Plan:  I anticipate this patient is appropriate for observation status at this time.    * Requires supervision due to deficit in self-care  Assessment & Plan  He will need a  consultation for a new caregiver. He is not safe to dc home. Pt/ot consultations while here.     Chronic atrial fibrillation  Assessment & Plan  Rate controlled. Continue meds including anticoagulation.     Severe protein-calorie malnutrition (HCC)  Assessment & Plan  Continue meds. Encourage oral  intake    Hyperlipidemia- (present on admission)  Assessment & Plan  Continue meds    COPD (chronic obstructive pulmonary disease) ([facility])- (present on admission)  Assessment & Plan  No flare. Prn rt protocol.     CKD (chronic kidney disease) stage 3, GFR 30-59 ml/min ([facility])- (present on admission)  Assessment & Plan  Stable     Essential hypertension- (present on admission)  Assessment & Plan  Continue meds.       VTE prophylaxis: scd

## 2019-12-28 NOTE — CARE PLAN
Problem: Safety  Goal: Will remain free from injury  Outcome: PROGRESSING AS EXPECTED     Problem: Discharge Barriers/Planning  Goal: Patient's continuum of care needs will be met  Outcome: PROGRESSING SLOWER THAN EXPECTED

## 2019-12-28 NOTE — ACP (ADVANCE CARE PLANNING)
Advance Care Planning Note    Discussion date:  12/27/2019  Discussion participants:  patient    The patient wishes to discuss Advanced Care Planning today and the following is a brief summary of our discussion.    Patient has capacity to make their own medical decisions.  Health Care Agent/Surrogate Decision Maker documented in chart.    Documents of Advance Directives:  POLST    Communication of relevant diagnoses: y    Communication of prognosis: fair    Communication of treatment goals/options: change of code status    Treatment decisions:  dnr    Code status:  do not attempt resuscitation (DNAR)    The patient would like:   Life-sustaining antibiotics: antibiotics by IV as necessary   Artificially administered fluids: long-term IV fluids   Artificially administered nutrition: defined trial period of feeding tube, 4 weeks days   Other limitations of medical interventions: none     Time statement:  I discussed advance care planning with the patient for at least 18 minutes, including diagnosis, prognosis, plan of care, risks and benefits of any therapies that could be offered, as well as alternatives including palliation and hospice, as appropriate, exclusive of evaluation and management or other separately billable procedures.    Ernesto Harvey M.D.

## 2019-12-28 NOTE — DIETARY
"Nutrition services: Day 0 of admit.  Eulogio Jeronimo is a 79 y.o. male with admitting DX of Requires supervision due to deficit in self-care  Consult received for MST 4 (weight loss 34+ lb over 6 months)    Visit with pt at bedside. Pt eating lunch, reports normal appetite PTA and since admission. Pt unsure about UBW, does not have a scale at home. Pt feels his clothes may be looser than 6 months ago. Pt agreeable to Boost Plus BID.    Spoke with CNA requesting new measured weight.    Assessment:  Height: 167.6 cm (5' 5.98\")  Weight: 64.9 kg (143 lb)  Body mass index is 23.09 kg/m²., BMI classification: Normal  Diet/Intake: Regular, % x2 meals    Evaluation:   1. Per chart review, (9/23/19) 107 lb previous admission - weight appears to be trending up. Question accuracy of current weight.   2. Pt is a poor historian per OT note  3. PMH: CKD 3, COPD, HLD, HTN  4. Severe protein-calorie malnutrition on Hospital problems  5. Per Nutrition Based Physical Exam: Thin limbs, severe muscle loss (squared shoulders/ prominent scapular bones) as well as severe fat loss (mostly skin only to upper arms and hollowness to orbital and temporal region).  6. Labs: No Chem panel  7. Meds: Lipitor, Marinol, Vit D    Malnutrition Risk: Pt with severe malnutrition related to multiple comorbidities evident by severe fat and severe muscle loss noted on nutrition based physical exam.    Recommendations/Plan:  1. Obtain new measured weight  2. Boost Plus BID    3. Encourage intake of meals  4. Document intake of all meals as % taken in ADL's to provide interdisciplinary communication across all shifts.   5. Monitor weight.  6. Nutrition rep will continue to see patient for ongoing meal and snack preferences.     RD following weekly        "

## 2019-12-28 NOTE — PROGRESS NOTES
[facility] Medicine Daily Progress Note    Date of Service  2019    Chief Complaint  79 y.o. male admitted 2019 with inability to care for self.    Hospital Course    History of Presenting Illness per Dr. Ernesto Harvey's H&P:  79 y.o. male who presented 2019 with the inability to care for himself. He had a full time caregiver at home who unfortunately  in his home today. The home health RN referred him to the ED when she noted he was unable to get up on his own. He has no complaints and has not been ill recently.       Interval Problem Update  : He is awake and alert, he is not eating much.  Patient does not seem to know why he was brought to the hospital and could not remember that his home health nurse had him brought in because he could not ambulate on his own or get out of his chair. Staff who know him from prior admissions stated that his caregiver was not much healthier than the patient.    Consultants/Specialty  NONE    Code Status  DNAR/DNI    Disposition  TBD. Most likely needs placement.    Review of Systems  Review of Systems   Constitutional: Negative.  Negative for chills, fever, malaise/fatigue and weight loss.   HENT: Negative.    Respiratory: Negative.  Negative for cough and shortness of breath.    Cardiovascular: Negative.  Negative for chest pain and leg swelling.   Gastrointestinal: Negative.  Negative for abdominal pain, nausea and vomiting.   Genitourinary: Negative.  Negative for dysuria and flank pain.   Musculoskeletal: Negative.  Negative for back pain and myalgias.   Neurological: Negative.  Negative for dizziness, loss of consciousness and weakness.   Endo/Heme/Allergies: Negative.  Does not bruise/bleed easily.   Psychiatric/Behavioral: Negative.  Negative for depression. The patient is not nervous/anxious.    All other systems reviewed and are negative.       Physical Exam  Temp:  [36.2 °C (97.2 °F)-36.9 °C (98.5 °F)] 36.8 °C (98.3 °F)  Pulse:  []  70  Resp:  [16-20] 18  BP: (103-206)/() 103/63  SpO2:  [90 %-99 %] 97 %    Physical Exam  Vitals signs and nursing note reviewed.   Constitutional:       General: He is not in acute distress.     Appearance: He is well-developed and underweight. He is not diaphoretic.   HENT:      Right Ear: External ear normal.      Left Ear: External ear normal.      Nose: Nose normal.      Mouth/Throat:      Pharynx: No oropharyngeal exudate.   Eyes:      General: No scleral icterus.        Right eye: No discharge.         Left eye: No discharge.      Conjunctiva/sclera: Conjunctivae normal.   Neck:      Vascular: No JVD.      Trachea: No tracheal deviation.   Cardiovascular:      Rate and Rhythm: Normal rate and regular rhythm.      Heart sounds: Normal heart sounds.   Pulmonary:      Effort: Pulmonary effort is normal. No respiratory distress.      Breath sounds: Normal breath sounds. No stridor. No wheezing or rales.   Chest:      Chest wall: No tenderness.   Abdominal:      General: Bowel sounds are normal. There is no distension.      Palpations: Abdomen is soft.      Tenderness: There is no tenderness.   Musculoskeletal:         General: No tenderness.   Skin:     General: Skin is warm and dry.      Coloration: Skin is pale.   Neurological:      General: No focal deficit present.      Mental Status: He is alert and oriented to person, place, and time.      Cranial Nerves: No cranial nerve deficit.      Motor: No abnormal muscle tone.   Psychiatric:         Mood and Affect: Mood normal.         Behavior: Behavior normal. Behavior is cooperative.         Thought Content: Thought content normal.         Judgment: Judgment normal.         Fluids    Intake/Output Summary (Last 24 hours) at 12/28/2019 1516  Last data filed at 12/28/2019 1200  Gross per 24 hour   Intake 340 ml   Output 100 ml   Net 240 ml       Laboratory                        Imaging  No orders to display        Assessment/Plan  * Requires supervision due  "to deficit in self-care  Assessment & Plan  Unclear what his social situation is, apparently his \"caregiver\" was more of a roommate who was also ill.  PT and OT will assess.  RN and  will assess further what his living situation is.    Chronic atrial fibrillation  Assessment & Plan  Rate controlled. Continue meds including anticoagulation.     Severe protein-calorie malnutrition (HCC)  Assessment & Plan  Continue meds. Encourage oral intake    Hyperlipidemia- (present on admission)  Assessment & Plan  Continue meds    COPD (chronic obstructive pulmonary disease) ([facility])- (present on admission)  Assessment & Plan  No flare. Prn rt protocol.     CKD (chronic kidney disease) stage 3, GFR 30-59 ml/min ([facility])- (present on admission)  Assessment & Plan  Stable     Essential hypertension- (present on admission)  Assessment & Plan  Continue meds.          VTE prophylaxis: SCD      "

## 2019-12-28 NOTE — PROGRESS NOTES
"Admit profile complete. Pt denies pain. When asked about his caregiver that  he states \"I was caring for him, we both cared for each other\". BP on arrival to unit was elevated 191/92. PRN medication given. Re-checked and BP is now 172/94. Will continue to monitor BP. Unable to complete med rec at this time. Pt does not know when he last took most of his home meds. All other vital signs stable. Snack provided per pt request. Passed dysphagia screen but liquids were thickened as a precaution. Safety measures in place and bed alarm is on. Will continue to monitor.   "

## 2019-12-28 NOTE — ASSESSMENT & PLAN NOTE
Rate controlled with no need for rate control medication  Holding xarelto due to gi bleed and risk outweighs the benefits

## 2019-12-28 NOTE — ASSESSMENT & PLAN NOTE
Nutrition following  Patient is eating magic cup supplements but has variable intake  Despite this he continues to lose weight. He has lost 60# since admission- high concern for an occult malignancy. Workup not likely needed given his functional status and FTT. Hospice consult.

## 2019-12-28 NOTE — ED PROVIDER NOTES
ED Provider Note    ER Provider Note         CHIEF COMPLAINT  Chief Complaint   Patient presents with   • Failure to Thrive     pt had home caregiver who lived with him full-time. home health RN came and states he is unable to care for himself after he found his caregiver  in the home today.       HPI  Eulogio Jeronimo is a 79 y.o. male who presents to the Emergency Department who is unable to care for self.  The patient's caregiver diet today.  The patient tried to get up and help himself and he fell.  The home health RN felt that he was unsafe at home.  The patient not heard himself the fall.  Did not hit his head.  He denies any fevers or chills.  He says he is weak  and needs help at home and now does not have any help.    REVIEW OF SYSTEMS  See HPI for further details. All other systems are negative.     PAST MEDICAL HISTORY   has a past medical history of Abdominal aortic aneurysm (AAA) (HCC), Alcohol dependence (HCC), Artificial lens present, Cardiac pacemaker, Complete atrioventricular block (HCC), Dermatochalasis, Erectile dysfunction, Hard of hearing, Hematuria, Hyperlipidemia, Hypertension, Myopia, Presbyopia, and Vitamin B12 deficiency.    SURGICAL HISTORY   has a past surgical history that includes open fix inter/subtroch fx,implnt (2019).    SOCIAL HISTORY  Social History     Tobacco Use   • Smoking status: Current Every Day Smoker     Packs/day: 0.00     Types: Cigarettes   • Smokeless tobacco: Never Used   Substance Use Topics   • Alcohol use: Yes     Frequency: 4 or more times a week     Drinks per session: 1 or 2     Comment: 2-3 cocktails of vodka/daily   • Drug use: Never      Social History     Substance and Sexual Activity   Drug Use Never       FAMILY HISTORY  No family history on file.    CURRENT MEDICATIONS  Home Medications     Reviewed by Maritza Hendrickson (Pharmacy Tech) on 19 at 1705  Med List Status: Complete   Medication Last Dose Status   alendronate  "(FOSAMAX) 70 MG Tab UNK Active   amLODIPine (NORVASC) 5 MG Tab UNK Active   atorvastatin (LIPITOR) 10 MG Tab UNK Active   cloNIDine (CATAPRES) 0.1 MG Tab UNK Active   dronabinol (MARINOL) 5 MG Cap UNK Active   lidocaine (LIDODERM) 5 % Patch UNK Active   losartan (COZAAR) 100 MG Tab UNK Active   oxyCODONE immediate-release (ROXICODONE) 5 MG Tab UNK Active   rivaroxaban (XARELTO) 20 MG Tab tablet UNK Active   tamsulosin (FLOMAX) 0.4 MG capsule UNK Active   vitamin D, Ergocalciferol, (DRISDOL) 90747 units Cap capsule UNK Active                ALLERGIES  Allergies   Allergen Reactions   • Lisinopril Unspecified     Lethargy        PHYSICAL EXAM  VITAL SIGNS: BP (!) 191/111   Pulse 66   Temp 36.9 °C (98.5 °F)   Resp 16   Ht 1.676 m (5' 6\")   Wt 65 kg (143 lb 4.8 oz)   SpO2 99%   BMI 23.13 kg/m²      Constitutional: Alert in no apparent distress.  HENT: No signs of trauma, Bilateral external ears normal, Nose normal.   Eyes: Pupils are equal and reactive, Conjunctiva normal, Non-icteric.   Neck: Normal range of motion, No tenderness, Supple, No stridor.   Lymphatic: No lymphadenopathy noted.   Cardiovascular: Regular rate and rhythm, no murmurs.   Thorax & Lungs: Normal breath sounds, No respiratory distress, No wheezing, No chest tenderness.   Abdomen: Bowel sounds normal, Soft, No tenderness, No masses, No pulsatile masses. No peritoneal signs.  Skin: Warm, Dry, No erythema, No rash.   Back: No bony tenderness, No CVA tenderness.   Extremities: Intact distal pulses, No edema, No tenderness, No cyanosis.  Musculoskeletal: Good range of motion in all major joints. No tenderness to palpation or major deformities noted.   Neurologic: Alert , able to move all extremities sensation intact throughout extremities.  Psychiatric: Affect normal, Judgment normal, Mood normal.     DIAGNOSTIC STUDIES / PROCEDURES    COURSE & MEDICAL DECISION MAKING  Pertinent Labs & Imaging studies reviewed. (See chart for details)    This is " a 79 y.o. male that presents with inability to care for self at home.  He took a fall there is no evidence of trauma.  At this time his home health RN feels that he is unsafe at home.  He is not altered.  He is hypertensive however he has no other vital sign perturbations.  At this time we will admit him for failure to thrive.    5:42 PM - Patient seen and examined at bedside.         FINAL IMPRESSION  1. FTT (failure to thrive) in adult              Electronically signed by: Ernesto Canchola, 12/27/2019

## 2019-12-28 NOTE — THERAPY
"Pt is a 79 y.o male who was admitted via home home care due to death of patient's friend/caregiver. Pt presents with confusion, significant debility, and poor safety awareness.  Pt required Max A for all mobility tasks . Pt is unsafe to return to his current living environment.  Physical Therapy Evaluation completed.   Bed Mobility:  Supine to Sit: Max A  Transfers: Sit to Stand: Max Assist  Gait: Level Of Assist: Unable to Participate. Stood next to EOB only, demonstrates retropulsion.    Plan of Care: Will benefit from Physical Therapy 3 times per week  Discharge Recommendations: Equipment: Will Continue to Assess for Equipment Needs. Post-acute therapy Discharge to a transitional care facility for continued skilled therapy services.    See \"Rehab Therapy-Acute\" Patient Summary Report for complete documentation.     "

## 2019-12-28 NOTE — ASSESSMENT & PLAN NOTE
continue losartan   Systolic pressure is consistently in 150 to 160 range now  Will increase norvasc to 10mg daily and monitor

## 2019-12-28 NOTE — ED NOTES
Pt has no medical complaints. Pt unable to administer his own medications per ems and did not take his own BP meds this am.

## 2019-12-28 NOTE — PROGRESS NOTES
Late entry 12/27 pt to floor:    2 RN skin check.    Slight redness left hip on bone, bruise to right lower forearm. Dry feet and long toe nails.

## 2019-12-28 NOTE — THERAPY
"Occupational Therapy Evaluation completed.   Functional Status:  Pt is an 80 y/o male, admit via home health NSG after 24 hr caregiver passed away in his home. Pt with ID from a SNF -? recent resident of [facility]. Pt was a poor historian and was unable to provide much background history and PLOF- except to say he lives in a mobile home and that his caregiver and him were \"taking care of each other\". Pt presents with confusion, has poor activity tolerance and generalized weakness, poor safety awareness, decreased I with ADLS and functional mobility. Pt is unsafe to return home, will need 24 hr care. Will benefit from Acute OT services while in house.   Plan of Care: Will benefit from Occupational Therapy 3 times per week  Discharge Recommendations:  Equipment: Will Continue to Assess for Equipment Needs. Post-acute therapy Discharge to a transitional care facility for continued skilled therapy services.    See \"Rehab Therapy-Acute\" Patient Summary Report for complete documentation.    "

## 2019-12-29 NOTE — PROGRESS NOTES
Received patient, alert and oriented but confused at times.   Patient had multiple bowel movements today. No other complaints at this time.  Pt resting comfortably. Safety precautions in place, will continue to monitor

## 2019-12-29 NOTE — PROGRESS NOTES
"[facility] Medicine Daily Progress Note    Date of Service  2019    Chief Complaint  79 y.o. male admitted 2019 with inability to care for self.    Hospital Course    History of Presenting Illness per Dr. Ernesto Harvey's H&P:  79 y.o. male who presented 2019 with the inability to care for himself. He had a full time caregiver at home who unfortunately  in his home today. The home health RN referred him to the ED when she noted he was unable to get up on his own. He has no complaints and has not been ill recently.       Interval Problem Update  : He is awake and alert, he is not eating much.  Patient does not seem to know why he was brought to the hospital and could not remember that his home health nurse had him brought in because he could not ambulate on his own or get out of his chair. Staff who know him from prior admissions stated that his caregiver was not much healthier than the patient.  : stable, weak can't get out of bed on his own per RN. Won't answer orientation questions: \"why does it matter!\"    Consultants/Specialty  NONE    Code Status  DNAR/DNI    Disposition  TBD. Most likely needs placement.    Review of Systems  Review of Systems   Constitutional: Negative.    Cardiovascular: Negative.    Gastrointestinal: Negative.         Physical Exam  Temp:  [36.4 °C (97.5 °F)-36.8 °C (98.2 °F)] 36.4 °C (97.6 °F)  Pulse:  [60-65] 64  Resp:  [18] 18  BP: (142-156)/(73-97) 142/97  SpO2:  [94 %-98 %] 98 %    Physical Exam  Vitals signs and nursing note reviewed.   Constitutional:       General: He is not in acute distress.     Appearance: Normal appearance.   HENT:      Head: Normocephalic and atraumatic.      Right Ear: External ear normal.      Left Ear: External ear normal.      Nose: Nose normal.   Eyes:      General:         Right eye: No discharge.         Left eye: No discharge.      Conjunctiva/sclera: Conjunctivae normal.   Cardiovascular:      Rate and Rhythm: Normal rate " "and regular rhythm.   Pulmonary:      Effort: Pulmonary effort is normal. No respiratory distress.   Abdominal:      General: Abdomen is flat. There is no distension.   Musculoskeletal:      Right lower leg: No edema.      Left lower leg: No edema.   Skin:     General: Skin is warm and dry.      Coloration: Skin is not jaundiced or pale.   Neurological:      General: No focal deficit present.      Mental Status: He is alert.      Motor: Weakness (general) present.      Comments: Unable to assess orientation   Psychiatric:         Mood and Affect: Mood is anxious. Affect is angry.         Behavior: Behavior normal.         Thought Content: Thought content normal.         Judgment: Judgment normal.         Fluids    Intake/Output Summary (Last 24 hours) at 12/29/2019 1429  Last data filed at 12/29/2019 1300  Gross per 24 hour   Intake 240 ml   Output 50 ml   Net 190 ml       Laboratory      Recent Labs     12/28/19  1503   SODIUM 142   POTASSIUM 3.7   CHLORIDE 107   CO2 21   GLUCOSE 159*   BUN 55*   CREATININE 2.35*   CALCIUM 8.2*                   Imaging  No orders to display        Assessment/Plan  * Requires supervision due to deficit in self-care  Assessment & Plan  Unclear what his social situation is, apparently his \"caregiver\" was more of a roommate who was also ill.  PT and OT will assess.  RN and  will assess further what his living situation is.    Chronic atrial fibrillation  Assessment & Plan  Rate controlled. Continue meds including anticoagulation.     Severe protein-calorie malnutrition (HCC)  Assessment & Plan  Continue meds. Encourage oral intake    Hyperlipidemia- (present on admission)  Assessment & Plan  Continue meds    COPD (chronic obstructive pulmonary disease) ([facility])- (present on admission)  Assessment & Plan  No flare. Prn rt protocol.     CKD (chronic kidney disease) stage 3, GFR 30-59 ml/min ([facility])- (present on admission)  Assessment & Plan  Stable     Essential hypertension- " (present on admission)  Assessment & Plan  Continue meds.        VTE prophylaxis: SCD

## 2019-12-29 NOTE — CARE PLAN
Problem: Safety  Goal: Will remain free from injury  Outcome: PROGRESSING AS EXPECTED     Problem: Knowledge Deficit  Goal: Knowledge of disease process/condition, treatment plan, diagnostic tests, and medications will improve  Outcome: PROGRESSING SLOWER THAN EXPECTED  Goal: Knowledge of the prescribed therapeutic regimen will improve  Outcome: PROGRESSING SLOWER THAN EXPECTED     Problem: Discharge Barriers/Planning  Goal: Patient's continuum of care needs will be met  Outcome: PROGRESSING AS EXPECTED   Patient is confused at times and refusing to get OOB even to the chair for meals.  Home situation not appropriate for him to return to so alternative places are being considered.  He does not always remember what was told him about his hospital stay and home circumstances.

## 2019-12-29 NOTE — CARE PLAN
Problem: Communication  Goal: The ability to communicate needs accurately and effectively will improve  Outcome: PROGRESSING AS EXPECTED     Problem: Safety  Goal: Will remain free from injury  Outcome: PROGRESSING AS EXPECTED     Problem: Knowledge Deficit  Goal: Knowledge of disease process/condition, treatment plan, diagnostic tests, and medications will improve  Outcome: PROGRESSING AS EXPECTED     Problem: Skin Integrity  Goal: Risk for impaired skin integrity will decrease  Outcome: PROGRESSING AS EXPECTED

## 2019-12-30 NOTE — DISCHARGE PLANNING
"Hospital Care Management Discharge Planning       Anticipated Discharge Disposition:   · TBD     Action:   · Met with patient at bedside to discuss prior living arrangements. When asked where patient was at, patients replied with, \"WHAT DOES IT MATTER?!\". Patient then asked if he knew who his home health nurse was or what company she worked for. Patient is unable to answer questions but states she used to \"bring him chili with cheese.\" Patient is unable to provide any other information on prior living situation.   · Called patients brother, Memo. Memo is currently living in [place] and states he has not seen or really talked to his brother much over the last 4 years. Memo states there are other living siblings but no one keeps in contact with him. Memo describes patient as an \"odd character\" and states the only person that was close with him was his roommate Derrick, who is now .   · Called local home health agencies to see who patient was on service with. Patient was on service with [facility]. Home Health nurse states she doesn't really know too much about the patient. She is able to tell me that he was living with his friend Derrick at the [facility] and that he has a caretaker, Estefania, that works with [facility].  nurse states patient is unable to live by himself and will need placement.      Barriers to Discharge:   · Placement     Plan:    · Continue to provide support services and assistance with discharge planning as needed.          "

## 2019-12-30 NOTE — CARE PLAN
Problem: Communication  Goal: The ability to communicate needs accurately and effectively will improve  Outcome: PROGRESSING AS EXPECTED   Pt was update for the care for the day. White board updated, All question answered. Pt has call light within reach,  bed is in the lowest position. Pt has no other needs at this time.       Problem: Safety  Goal: Will remain free from injury  Outcome: PROGRESSING AS EXPECTED  Fall precautions in place: treaded slipper socks on, mobility signs posted, hourly rounding, bed in lowest position, belongings and call light are within reach, bed alarm on, and near nurses station.

## 2019-12-30 NOTE — PROGRESS NOTES
Pt set off bed alarm attempting to stand to use urinal.  This RN helped pt stand at edge of bed to urinate and then pt sat in a chair while his bedding was changed due to urinary incontinence.  Pt was able to stand up from the chair by himself but was very unsteady ambulating back to bed and required hand-held assist.  Bed locked and low with alarm set.

## 2019-12-30 NOTE — DISCHARGE PLANNING
Care Transition Team Assessment    Information Source  Orientation : Disoriented to Event, Disoriented to Place, Disoriented to Time  Information Given By: Relative, Other (Comments)( RN)    Readmission Evaluation  Is this a readmission?: No    Elopement Risk  Legal Hold: No  Ambulatory or Self Mobile in Wheelchair: No-Not an Elopement Risk  Elopement Risk: Not at Risk for Elopement    Interdisciplinary Discharge Planning  Does Admitting Nurse Feel This Could be a Complex Discharge?: Yes  Lives with - Patient's Self Care Capacity: Unrelated Adult, Attendant / Paid Care Giver  Patient or legal guardian wants to designate a caregiver (see row info): No  Support Systems: Home Care Staff  Housing / Facility: Mobile Home  Do You Take your Prescribed Medications Regularly: No  Reasons Why Not Taking Medications : Memory Issues  Able to Return to Previous ADL's: Future Time w/Therapy  Mobility Issues: Yes  Prior Services: Continuous (24 Hour) Care Giving Per Service, Skilled Home Health Services  Patient Expects to be Discharged to:: TBD  Assistance Needed: Yes  Durable Medical Equipment: Unknown    Vision / Hearing Impairment  Vision Impairment : Yes  Right Eye Vision: Impaired, Wears Glasses  Left Eye Vision: Impaired, Wears Glasses  Hearing Impairment : No    Domestic Abuse  Have you ever been the victim of abuse or violence?: No  Physical Abuse or Sexual Abuse: No  Verbal Abuse or Emotional Abuse: No  Possible Abuse Reported to:: Not Applicable    Discharge Risks or Barriers  Discharge risks or barriers?: Lives alone, no community support, Complex medical needs, Other (comment)(Unable to care for self)  Patient risk factors: Cognitive / sensory / physical deficit, Lack of outside supports, Lives alone and no community support, Recent loss, Vulnerable adult    Anticipated Discharge Information  Anticipated discharge disposition: Other (comment)(TBD)

## 2019-12-30 NOTE — PROGRESS NOTES
No change from morning assessment except has refused to get OOB to chair despite encouragement from staff and offers for assistance.  Will attempt to collect urine specimen as patient has been able to use the urinal earlier.

## 2019-12-30 NOTE — CARE PLAN
Problem: Safety  Goal: Will remain free from falls  Outcome: PROGRESSING AS EXPECTED  Intervention: Implement fall precautions  Flowsheets  Taken 12/29/2019 2232  Bed Alarm: Yes - Alarm On  Taken 12/29/2019 2000  Environmental Precautions: Treaded Slipper Socks on Patient;Personal Belongings, Wastebasket, Call Bell etc. in Easy Reach;Transferred to Stronger Side;Report Given to Other Health Care Providers Regarding Fall Risk;Bed in Low Position;Communication Sign for Patients & Families;Mobility Assessed & Appropriate Sign Placed     Problem: Infection  Goal: Will remain free from infection  Outcome: PROGRESSING AS EXPECTED  Note:   Standard precautions used in pt care.  Discussed hand and oral hygiene and their roll in infection prevention.

## 2019-12-30 NOTE — PROGRESS NOTES
Report received from BRENTON Beckman. Plan of care discussed. Patient resting comfortably in bed, declines any further needs at this time. Safety precautions in place.

## 2019-12-30 NOTE — RESPIRATORY CARE
COPD EDUCATION by COPD CLINICAL EDUCATOR  12/30/2019 at 10:45 AM by Luz Collado     Patient interviewed by COPD education team. Patient refused COPD/Smoking cessation program at this time.

## 2019-12-30 NOTE — PROGRESS NOTES
"[facility] Medicine Daily Progress Note    Date of Service  2019    Chief Complaint  79 y.o. male admitted 2019 with inability to care for self.    Hospital Course    History of Presenting Illness per Dr. Ernesto Harvey's H&P:  79 y.o. male who presented 2019 with the inability to care for himself. He had a full time caregiver at home who unfortunately  in his home today. The home health RN referred him to the ED when she noted he was unable to get up on his own. He has no complaints and has not been ill recently.       Interval Problem Update  : He is awake and alert, he is not eating much.  Patient does not seem to know why he was brought to the hospital and could not remember that his home health nurse had him brought in because he could not ambulate on his own or get out of his chair. Staff who know him from prior admissions stated that his caregiver was not much healthier than the patient.  : stable, weak can't get out of bed on his own per RN. Won't answer orientation questions: \"why does it matter!\"  : still won't answer orientation questions. Urinalysis is normal.    Consultants/Specialty  NONE    Code Status  DNAR/DNI    Disposition  TBD. Most likely needs placement.    Review of Systems  Review of Systems   Constitutional: Negative.    Cardiovascular: Negative.    Gastrointestinal: Negative.         Physical Exam  Temp:  [36.4 °C (97.6 °F)-36.7 °C (98 °F)] 36.4 °C (97.6 °F)  Pulse:  [62-68] 68  Resp:  [18] 18  BP: (115-151)/(66-84) 115/66  SpO2:  [93 %-97 %] 95 %    Physical Exam  Vitals signs and nursing note reviewed.   Constitutional:       General: He is not in acute distress.     Appearance: Normal appearance.   HENT:      Head: Normocephalic and atraumatic.      Right Ear: External ear normal.      Left Ear: External ear normal.      Nose: Nose normal.   Eyes:      General:         Right eye: No discharge.         Left eye: No discharge.      Conjunctiva/sclera: " "Conjunctivae normal.   Cardiovascular:      Rate and Rhythm: Normal rate and regular rhythm.   Pulmonary:      Effort: Pulmonary effort is normal. No respiratory distress.   Abdominal:      General: Abdomen is flat. There is no distension.   Musculoskeletal:      Right lower leg: No edema.      Left lower leg: No edema.   Skin:     General: Skin is warm and dry.      Coloration: Skin is not jaundiced or pale.   Neurological:      General: No focal deficit present.      Mental Status: He is alert.      Motor: Weakness (general) present.      Comments: Unable to assess orientation   Psychiatric:         Mood and Affect: Mood is anxious. Affect is angry.         Behavior: Behavior normal.         Thought Content: Thought content normal.         Judgment: Judgment normal.         Fluids    Intake/Output Summary (Last 24 hours) at 12/30/2019 1553  Last data filed at 12/30/2019 1300  Gross per 24 hour   Intake 570 ml   Output --   Net 570 ml       Laboratory      Recent Labs     12/28/19  1503   SODIUM 142   POTASSIUM 3.7   CHLORIDE 107   CO2 21   GLUCOSE 159*   BUN 55*   CREATININE 2.35*   CALCIUM 8.2*                   Imaging  No orders to display        Assessment/Plan  * Requires supervision due to deficit in self-care  Assessment & Plan  Unclear what his social situation is, apparently his \"caregiver\" was more of a roommate who was also ill.  Discussed with , difficult placement anticipated    Chronic atrial fibrillation  Assessment & Plan  Rate controlled. Continue meds including anticoagulation.     Severe protein-calorie malnutrition (HCC)  Assessment & Plan  Continue meds. Encourage oral intake    Hyperlipidemia- (present on admission)  Assessment & Plan  Continue meds    COPD (chronic obstructive pulmonary disease) ([facility])- (present on admission)  Assessment & Plan  No flare. Prn rt protocol.     CKD (chronic kidney disease) stage 3, GFR 30-59 ml/min ([facility])- (present on admission)  Assessment & " Plan  Stable     Essential hypertension- (present on admission)  Assessment & Plan  Continue meds.        VTE prophylaxis: SCD

## 2019-12-31 PROBLEM — R45.1 AGITATION: Status: ACTIVE | Noted: 2019-01-01

## 2019-12-31 NOTE — THERAPY
"Physical Therapy treatment completed.   Bed Mobility:  Supine to Sit: Minimal Assist  Transfers: Sit to Stand: Minimal Assist  Gait: Level Of Assist: Unable to Participate     Plan of Care: Will benefit from Physical Therapy 3 times per week  Discharge Recommendations: Equipment: Will Continue to Assess for Equipment Needs. Post-acute therapy Discharge to a transitional care facility for continued skilled therapy services.    Pt with overall debility and weakness as well as confusion. Pt will need placement ultimately but SNF is approp for further therapy and strengthening, cannot DC home alone.     See \"Rehab Therapy-Acute\" Patient Summary Report for complete documentation.     "

## 2019-12-31 NOTE — PROGRESS NOTES
Report received from night shift RN. Assume care. Pt. AAOx4 pt is bed,  Assessment completed. VSS. Pt was update for the care for the day. White board updated, All question answered. Pt has call light within reach,  bed is in the lowest position. Pt has no other needs at this time. Pt went to back to asleep.    1420 Pt became agitated and states wants to leave hospital, trying to get OOB frequently. Bed alarm on. Dr Rutledge aware and order Haldol PRN- Haldol PO given.  1736 Pt more calmer and sitting at edged of bed eating dinner. Compliant with schedule  med

## 2019-12-31 NOTE — PROGRESS NOTES
"[facility] Medicine Daily Progress Note    Date of Service  2019    Chief Complaint  79 y.o. male admitted 2019 with inability to care for self.    Hospital Course    History of Presenting Illness per Dr. Ernesto Harvey's H&P:  79 y.o. male who presented 2019 with the inability to care for himself. He had a full time caregiver at home who unfortunately  in his home today. The home health RN referred him to the ED when she noted he was unable to get up on his own. He has no complaints and has not been ill recently.       Interval Problem Update  : He is awake and alert, he is not eating much.  Patient does not seem to know why he was brought to the hospital and could not remember that his home health nurse had him brought in because he could not ambulate on his own or get out of his chair. Staff who know him from prior admissions stated that his caregiver was not much healthier than the patient.  : stable, weak can't get out of bed on his own per RN. Won't answer orientation questions: \"why does it matter!\"  : still won't answer orientation questions. Urinalysis is normal.  : Agitated, trying to get out of bed and leave.  Haldol added    Consultants/Specialty  NONE    Code Status  DNAR/DNI    Disposition  TBD-needs placement, possible group home as he is in obvious status difficult    Review of Systems  Review of Systems   Constitutional: Negative.    Cardiovascular: Negative.    Gastrointestinal: Negative.         Physical Exam  Temp:  [36.4 °C (97.5 °F)-36.7 °C (98 °F)] 36.7 °C (98 °F)  Pulse:  [67-71] 67  Resp:  [18] 18  BP: (154-171)/(73-91) 158/77  SpO2:  [96 %-98 %] 97 %    Physical Exam  Vitals signs and nursing note reviewed.   Constitutional:       General: He is not in acute distress.     Appearance: Normal appearance.      Comments: Thin, elderly   HENT:      Head: Normocephalic and atraumatic.      Right Ear: External ear normal.      Left Ear: External ear normal. " "     Nose: Nose normal.   Eyes:      General:         Right eye: No discharge.         Left eye: No discharge.      Conjunctiva/sclera: Conjunctivae normal.   Cardiovascular:      Rate and Rhythm: Normal rate and regular rhythm.   Pulmonary:      Effort: Pulmonary effort is normal. No respiratory distress.   Abdominal:      General: Abdomen is flat. There is no distension.   Musculoskeletal:      Right lower leg: No edema.      Left lower leg: No edema.   Skin:     General: Skin is warm and dry.      Coloration: Skin is not jaundiced or pale.   Neurological:      General: No focal deficit present.      Mental Status: He is alert.      Motor: Weakness (general) present.      Comments: Unable to assess orientation   Psychiatric:         Mood and Affect: Mood is anxious. Affect is angry.         Behavior: Behavior is agitated.         Cognition and Memory: Cognition is impaired. Memory is impaired.         Judgment: Judgment is impulsive and inappropriate.         Fluids    Intake/Output Summary (Last 24 hours) at 12/31/2019 1447  Last data filed at 12/30/2019 1800  Gross per 24 hour   Intake 120 ml   Output --   Net 120 ml       Laboratory      Recent Labs     12/28/19  1503   SODIUM 142   POTASSIUM 3.7   CHLORIDE 107   CO2 21   GLUCOSE 159*   BUN 55*   CREATININE 2.35*   CALCIUM 8.2*                   Imaging  No orders to display        Assessment/Plan  * Requires supervision due to deficit in self-care  Assessment & Plan  Unclear what his social situation is, apparently his \"caregiver\" was more of a roommate who was also ill.  Discussed with , difficult placement anticipated    Agitation  Assessment & Plan  Intermittent, he is not safe to care for himself at home  Check EKG to eval QT  Haldol as needed    Chronic atrial fibrillation  Assessment & Plan  Rate controlled. Continue meds including anticoagulation.     Severe protein-calorie malnutrition (HCC)  Assessment & Plan  Continue meds. Encourage " oral intake    Hyperlipidemia- (present on admission)  Assessment & Plan  Continue meds    COPD (chronic obstructive pulmonary disease) ([facility])- (present on admission)  Assessment & Plan  No flare. Prn rt protocol.     CKD (chronic kidney disease) stage 3, GFR 30-59 ml/min ([facility])- (present on admission)  Assessment & Plan  Stable     Essential hypertension- (present on admission)  Assessment & Plan  Continue meds.        VTE prophylaxis: SCD

## 2020-01-01 ENCOUNTER — HOME CARE VISIT (OUTPATIENT)
Dept: HOSPICE | Facility: HOSPICE | Age: 80
End: 2020-01-01
Payer: MEDICARE

## 2020-01-01 ENCOUNTER — APPOINTMENT (OUTPATIENT)
Dept: RADIOLOGY | Facility: MEDICAL CENTER | Age: 80
DRG: 640 | End: 2020-01-01
Attending: INTERNAL MEDICINE
Payer: COMMERCIAL

## 2020-01-01 ENCOUNTER — PATIENT OUTREACH (OUTPATIENT)
Dept: HEALTH INFORMATION MANAGEMENT | Facility: OTHER | Age: 80
End: 2020-01-01

## 2020-01-01 ENCOUNTER — HOSPICE ADMISSION (OUTPATIENT)
Dept: HOSPICE | Facility: HOSPICE | Age: 80
End: 2020-01-01
Payer: MEDICARE

## 2020-01-01 VITALS
HEART RATE: 70 BPM | SYSTOLIC BLOOD PRESSURE: 134 MMHG | RESPIRATION RATE: 20 BRPM | TEMPERATURE: 98.4 F | DIASTOLIC BLOOD PRESSURE: 71 MMHG | OXYGEN SATURATION: 94 %

## 2020-01-01 VITALS
SYSTOLIC BLOOD PRESSURE: 127 MMHG | OXYGEN SATURATION: 94 % | DIASTOLIC BLOOD PRESSURE: 99 MMHG | RESPIRATION RATE: 18 BRPM | HEART RATE: 84 BPM | TEMPERATURE: 97.7 F

## 2020-01-01 VITALS
SYSTOLIC BLOOD PRESSURE: 150 MMHG | OXYGEN SATURATION: 96 % | RESPIRATION RATE: 18 BRPM | HEART RATE: 74 BPM | TEMPERATURE: 97.2 F | DIASTOLIC BLOOD PRESSURE: 76 MMHG

## 2020-01-01 VITALS
OXYGEN SATURATION: 91 % | HEART RATE: 80 BPM | TEMPERATURE: 97.3 F | SYSTOLIC BLOOD PRESSURE: 120 MMHG | DIASTOLIC BLOOD PRESSURE: 70 MMHG | RESPIRATION RATE: 18 BRPM

## 2020-01-01 VITALS
RESPIRATION RATE: 18 BRPM | OXYGEN SATURATION: 98 % | DIASTOLIC BLOOD PRESSURE: 83 MMHG | SYSTOLIC BLOOD PRESSURE: 155 MMHG | HEART RATE: 66 BPM | TEMPERATURE: 97.7 F

## 2020-01-01 VITALS
RESPIRATION RATE: 18 BRPM | OXYGEN SATURATION: 97 % | HEART RATE: 67 BPM | TEMPERATURE: 96.1 F | SYSTOLIC BLOOD PRESSURE: 166 MMHG | DIASTOLIC BLOOD PRESSURE: 92 MMHG

## 2020-01-01 VITALS
RESPIRATION RATE: 18 BRPM | HEART RATE: 74 BPM | DIASTOLIC BLOOD PRESSURE: 78 MMHG | OXYGEN SATURATION: 96 % | TEMPERATURE: 97.6 F | SYSTOLIC BLOOD PRESSURE: 155 MMHG

## 2020-01-01 VITALS
SYSTOLIC BLOOD PRESSURE: 107 MMHG | HEART RATE: 76 BPM | TEMPERATURE: 97.3 F | RESPIRATION RATE: 18 BRPM | DIASTOLIC BLOOD PRESSURE: 75 MMHG | OXYGEN SATURATION: 95 %

## 2020-01-01 VITALS
DIASTOLIC BLOOD PRESSURE: 76 MMHG | SYSTOLIC BLOOD PRESSURE: 154 MMHG | TEMPERATURE: 97.7 F | OXYGEN SATURATION: 93 % | HEART RATE: 76 BPM | RESPIRATION RATE: 16 BRPM

## 2020-01-01 VITALS
OXYGEN SATURATION: 95 % | DIASTOLIC BLOOD PRESSURE: 76 MMHG | TEMPERATURE: 97.5 F | SYSTOLIC BLOOD PRESSURE: 132 MMHG | HEART RATE: 67 BPM | RESPIRATION RATE: 18 BRPM

## 2020-01-01 VITALS
HEART RATE: 80 BPM | TEMPERATURE: 97.3 F | SYSTOLIC BLOOD PRESSURE: 136 MMHG | RESPIRATION RATE: 18 BRPM | OXYGEN SATURATION: 95 % | DIASTOLIC BLOOD PRESSURE: 80 MMHG

## 2020-01-01 VITALS
TEMPERATURE: 97.4 F | OXYGEN SATURATION: 96 % | RESPIRATION RATE: 20 BRPM | DIASTOLIC BLOOD PRESSURE: 73 MMHG | HEART RATE: 67 BPM | SYSTOLIC BLOOD PRESSURE: 138 MMHG

## 2020-01-01 VITALS
HEART RATE: 88 BPM | TEMPERATURE: 97.1 F | SYSTOLIC BLOOD PRESSURE: 168 MMHG | DIASTOLIC BLOOD PRESSURE: 80 MMHG | OXYGEN SATURATION: 97 % | RESPIRATION RATE: 18 BRPM

## 2020-01-01 VITALS
HEART RATE: 80 BPM | OXYGEN SATURATION: 92 % | RESPIRATION RATE: 20 BRPM | SYSTOLIC BLOOD PRESSURE: 140 MMHG | DIASTOLIC BLOOD PRESSURE: 80 MMHG | TEMPERATURE: 97.3 F

## 2020-01-01 VITALS
SYSTOLIC BLOOD PRESSURE: 143 MMHG | TEMPERATURE: 97.2 F | HEART RATE: 78 BPM | OXYGEN SATURATION: 94 % | RESPIRATION RATE: 18 BRPM | DIASTOLIC BLOOD PRESSURE: 72 MMHG

## 2020-01-01 VITALS
DIASTOLIC BLOOD PRESSURE: 80 MMHG | HEART RATE: 72 BPM | DIASTOLIC BLOOD PRESSURE: 80 MMHG | SYSTOLIC BLOOD PRESSURE: 130 MMHG | OXYGEN SATURATION: 95 % | TEMPERATURE: 97.3 F | SYSTOLIC BLOOD PRESSURE: 136 MMHG | RESPIRATION RATE: 16 BRPM | HEART RATE: 78 BPM | TEMPERATURE: 97.2 F | RESPIRATION RATE: 18 BRPM

## 2020-01-01 VITALS
SYSTOLIC BLOOD PRESSURE: 138 MMHG | HEART RATE: 73 BPM | OXYGEN SATURATION: 96 % | DIASTOLIC BLOOD PRESSURE: 76 MMHG | HEIGHT: 66 IN | BODY MASS INDEX: 14.56 KG/M2 | TEMPERATURE: 98.2 F | RESPIRATION RATE: 18 BRPM | WEIGHT: 90.61 LBS

## 2020-01-01 VITALS
DIASTOLIC BLOOD PRESSURE: 70 MMHG | OXYGEN SATURATION: 95 % | TEMPERATURE: 97.3 F | SYSTOLIC BLOOD PRESSURE: 136 MMHG | RESPIRATION RATE: 18 BRPM | HEART RATE: 80 BPM

## 2020-01-01 VITALS
HEART RATE: 78 BPM | SYSTOLIC BLOOD PRESSURE: 134 MMHG | DIASTOLIC BLOOD PRESSURE: 80 MMHG | OXYGEN SATURATION: 96 % | TEMPERATURE: 97.2 F | RESPIRATION RATE: 18 BRPM

## 2020-01-01 VITALS
DIASTOLIC BLOOD PRESSURE: 78 MMHG | RESPIRATION RATE: 18 BRPM | SYSTOLIC BLOOD PRESSURE: 135 MMHG | OXYGEN SATURATION: 93 % | TEMPERATURE: 97.6 F | HEART RATE: 72 BPM

## 2020-01-01 VITALS
SYSTOLIC BLOOD PRESSURE: 120 MMHG | RESPIRATION RATE: 18 BRPM | HEART RATE: 72 BPM | TEMPERATURE: 98.2 F | DIASTOLIC BLOOD PRESSURE: 60 MMHG | OXYGEN SATURATION: 93 %

## 2020-01-01 VITALS
HEART RATE: 78 BPM | TEMPERATURE: 97.2 F | SYSTOLIC BLOOD PRESSURE: 120 MMHG | DIASTOLIC BLOOD PRESSURE: 60 MMHG | RESPIRATION RATE: 18 BRPM

## 2020-01-01 LAB
ABO GROUP BLD: NORMAL
ALBUMIN SERPL BCP-MCNC: 2.8 G/DL (ref 3.2–4.9)
ANION GAP SERPL CALC-SCNC: 11 MMOL/L (ref 0–11.9)
ANION GAP SERPL CALC-SCNC: 12 MMOL/L (ref 0–11.9)
ANION GAP SERPL CALC-SCNC: 12 MMOL/L (ref 7–16)
ANION GAP SERPL CALC-SCNC: 13 MMOL/L (ref 7–16)
ANION GAP SERPL CALC-SCNC: 14 MMOL/L (ref 7–16)
ANION GAP SERPL CALC-SCNC: 19 MMOL/L (ref 7–16)
APPEARANCE UR: CLEAR
BACTERIA #/AREA URNS HPF: ABNORMAL /HPF
BARCODED ABORH UBTYP: 600
BARCODED PRD CODE UBPRD: NORMAL
BARCODED UNIT NUM UBUNT: NORMAL
BASOPHILS # BLD AUTO: 0.7 % (ref 0–1.8)
BASOPHILS # BLD AUTO: 0.7 % (ref 0–1.8)
BASOPHILS # BLD AUTO: 0.9 % (ref 0–1.8)
BASOPHILS # BLD: 0.05 K/UL (ref 0–0.12)
BASOPHILS # BLD: 0.05 K/UL (ref 0–0.12)
BASOPHILS # BLD: 0.07 K/UL (ref 0–0.12)
BILIRUB UR QL STRIP.AUTO: NEGATIVE
BLD GP AB SCN SERPL QL: NORMAL
BUN SERPL-MCNC: 52 MG/DL (ref 8–22)
BUN SERPL-MCNC: 55 MG/DL (ref 8–22)
BUN SERPL-MCNC: 58 MG/DL (ref 8–22)
BUN SERPL-MCNC: 63 MG/DL (ref 8–22)
BUN SERPL-MCNC: 65 MG/DL (ref 8–22)
BUN SERPL-MCNC: 66 MG/DL (ref 8–22)
BUN SERPL-MCNC: 73 MG/DL (ref 8–22)
BUN SERPL-MCNC: 84 MG/DL (ref 8–22)
BUN SERPL-MCNC: 87 MG/DL (ref 8–22)
CALCIUM SERPL-MCNC: 10.1 MG/DL (ref 8.4–10.2)
CALCIUM SERPL-MCNC: 11.2 MG/DL (ref 8.4–10.2)
CALCIUM SERPL-MCNC: 8.8 MG/DL (ref 8.4–10.2)
CALCIUM SERPL-MCNC: 8.8 MG/DL (ref 8.4–10.2)
CALCIUM SERPL-MCNC: 8.9 MG/DL (ref 8.4–10.2)
CALCIUM SERPL-MCNC: 9 MG/DL (ref 8.4–10.2)
CALCIUM SERPL-MCNC: 9.1 MG/DL (ref 8.4–10.2)
CALCIUM SERPL-MCNC: 9.3 MG/DL (ref 8.4–10.2)
CALCIUM SERPL-MCNC: 9.7 MG/DL (ref 8.4–10.2)
CHLORIDE SERPL-SCNC: 105 MMOL/L (ref 96–112)
CHLORIDE SERPL-SCNC: 106 MMOL/L (ref 96–112)
CHLORIDE SERPL-SCNC: 106 MMOL/L (ref 96–112)
CHLORIDE SERPL-SCNC: 107 MMOL/L (ref 96–112)
CHLORIDE SERPL-SCNC: 109 MMOL/L (ref 96–112)
CO2 SERPL-SCNC: 16 MMOL/L (ref 20–33)
CO2 SERPL-SCNC: 17 MMOL/L (ref 20–33)
CO2 SERPL-SCNC: 18 MMOL/L (ref 20–33)
CO2 SERPL-SCNC: 19 MMOL/L (ref 20–33)
CO2 SERPL-SCNC: 21 MMOL/L (ref 20–33)
CO2 SERPL-SCNC: 22 MMOL/L (ref 20–33)
CO2 SERPL-SCNC: 22 MMOL/L (ref 20–33)
COLOR UR: YELLOW
COMPONENT R 8504R: NORMAL
CREAT SERPL-MCNC: 1.88 MG/DL (ref 0.5–1.4)
CREAT SERPL-MCNC: 1.92 MG/DL (ref 0.5–1.4)
CREAT SERPL-MCNC: 2.11 MG/DL (ref 0.5–1.4)
CREAT SERPL-MCNC: 2.17 MG/DL (ref 0.5–1.4)
CREAT SERPL-MCNC: 2.2 MG/DL (ref 0.5–1.4)
CREAT SERPL-MCNC: 2.26 MG/DL (ref 0.5–1.4)
CREAT SERPL-MCNC: 2.38 MG/DL (ref 0.5–1.4)
CREAT SERPL-MCNC: 2.4 MG/DL (ref 0.5–1.4)
CREAT SERPL-MCNC: 2.94 MG/DL (ref 0.5–1.4)
EOSINOPHIL # BLD AUTO: 0.16 K/UL (ref 0–0.51)
EOSINOPHIL # BLD AUTO: 0.21 K/UL (ref 0–0.51)
EOSINOPHIL # BLD AUTO: 0.36 K/UL (ref 0–0.51)
EOSINOPHIL NFR BLD: 2.2 % (ref 0–6.9)
EOSINOPHIL NFR BLD: 2.7 % (ref 0–6.9)
EOSINOPHIL NFR BLD: 5 % (ref 0–6.9)
EPI CELLS #/AREA URNS HPF: ABNORMAL /HPF
ERYTHROCYTE [DISTWIDTH] IN BLOOD BY AUTOMATED COUNT: 43.8 FL (ref 35.9–50)
ERYTHROCYTE [DISTWIDTH] IN BLOOD BY AUTOMATED COUNT: 45.7 FL (ref 35.9–50)
ERYTHROCYTE [DISTWIDTH] IN BLOOD BY AUTOMATED COUNT: 45.9 FL (ref 35.9–50)
ERYTHROCYTE [DISTWIDTH] IN BLOOD BY AUTOMATED COUNT: 46 FL (ref 35.9–50)
ERYTHROCYTE [DISTWIDTH] IN BLOOD BY AUTOMATED COUNT: 50 FL (ref 35.9–50)
ERYTHROCYTE [DISTWIDTH] IN BLOOD BY AUTOMATED COUNT: 50.5 FL (ref 35.9–50)
ERYTHROCYTE [DISTWIDTH] IN BLOOD BY AUTOMATED COUNT: 50.8 FL (ref 35.9–50)
ERYTHROCYTE [DISTWIDTH] IN BLOOD BY AUTOMATED COUNT: 51 FL (ref 35.9–50)
ERYTHROCYTE [DISTWIDTH] IN BLOOD BY AUTOMATED COUNT: 51.1 FL (ref 35.9–50)
ERYTHROCYTE [DISTWIDTH] IN BLOOD BY AUTOMATED COUNT: 51.8 FL (ref 35.9–50)
ERYTHROCYTE [DISTWIDTH] IN BLOOD BY AUTOMATED COUNT: 52 FL (ref 35.9–50)
GLUCOSE SERPL-MCNC: 104 MG/DL (ref 65–99)
GLUCOSE SERPL-MCNC: 57 MG/DL (ref 65–99)
GLUCOSE SERPL-MCNC: 81 MG/DL (ref 65–99)
GLUCOSE SERPL-MCNC: 82 MG/DL (ref 65–99)
GLUCOSE SERPL-MCNC: 83 MG/DL (ref 65–99)
GLUCOSE SERPL-MCNC: 88 MG/DL (ref 65–99)
GLUCOSE SERPL-MCNC: 89 MG/DL (ref 65–99)
GLUCOSE SERPL-MCNC: 89 MG/DL (ref 65–99)
GLUCOSE SERPL-MCNC: 93 MG/DL (ref 65–99)
GLUCOSE UR STRIP.AUTO-MCNC: NEGATIVE MG/DL
HCT VFR BLD AUTO: 23.2 % (ref 42–52)
HCT VFR BLD AUTO: 23.6 % (ref 42–52)
HCT VFR BLD AUTO: 24.6 % (ref 42–52)
HCT VFR BLD AUTO: 25.9 % (ref 42–52)
HCT VFR BLD AUTO: 29.1 % (ref 42–52)
HCT VFR BLD AUTO: 29.3 % (ref 42–52)
HCT VFR BLD AUTO: 29.5 % (ref 42–52)
HCT VFR BLD AUTO: 30.7 % (ref 42–52)
HCT VFR BLD AUTO: 31 % (ref 42–52)
HCT VFR BLD AUTO: 32.4 % (ref 42–52)
HCT VFR BLD AUTO: 32.7 % (ref 42–52)
HEMOCCULT STL QL: POSITIVE
HGB BLD-MCNC: 10.2 G/DL (ref 14–18)
HGB BLD-MCNC: 10.4 G/DL (ref 14–18)
HGB BLD-MCNC: 7.1 G/DL (ref 14–18)
HGB BLD-MCNC: 7.3 G/DL (ref 14–18)
HGB BLD-MCNC: 7.6 G/DL (ref 14–18)
HGB BLD-MCNC: 8.1 G/DL (ref 14–18)
HGB BLD-MCNC: 9.2 G/DL (ref 14–18)
HGB BLD-MCNC: 9.3 G/DL (ref 14–18)
HGB BLD-MCNC: 9.4 G/DL (ref 14–18)
HGB BLD-MCNC: 9.8 G/DL (ref 14–18)
HGB BLD-MCNC: 9.8 G/DL (ref 14–18)
IMM GRANULOCYTES # BLD AUTO: 0.02 K/UL (ref 0–0.11)
IMM GRANULOCYTES # BLD AUTO: 0.02 K/UL (ref 0–0.11)
IMM GRANULOCYTES # BLD AUTO: 0.03 K/UL (ref 0–0.11)
IMM GRANULOCYTES NFR BLD AUTO: 0.3 % (ref 0–0.9)
IMM GRANULOCYTES NFR BLD AUTO: 0.3 % (ref 0–0.9)
IMM GRANULOCYTES NFR BLD AUTO: 0.4 % (ref 0–0.9)
IRON SATN MFR SERPL: 44 % (ref 15–55)
IRON SERPL-MCNC: 75 UG/DL (ref 50–180)
KETONES UR STRIP.AUTO-MCNC: NEGATIVE MG/DL
LEUKOCYTE ESTERASE UR QL STRIP.AUTO: NEGATIVE
LYMPHOCYTES # BLD AUTO: 1.34 K/UL (ref 1–4.8)
LYMPHOCYTES # BLD AUTO: 1.64 K/UL (ref 1–4.8)
LYMPHOCYTES # BLD AUTO: 1.82 K/UL (ref 1–4.8)
LYMPHOCYTES NFR BLD: 18.6 % (ref 22–41)
LYMPHOCYTES NFR BLD: 22.4 % (ref 22–41)
LYMPHOCYTES NFR BLD: 23.7 % (ref 22–41)
MCH RBC QN AUTO: 28.9 PG (ref 27–33)
MCH RBC QN AUTO: 29.1 PG (ref 27–33)
MCH RBC QN AUTO: 29.2 PG (ref 27–33)
MCH RBC QN AUTO: 29.4 PG (ref 27–33)
MCH RBC QN AUTO: 29.5 PG (ref 27–33)
MCH RBC QN AUTO: 29.7 PG (ref 27–33)
MCH RBC QN AUTO: 30.1 PG (ref 27–33)
MCH RBC QN AUTO: 30.8 PG (ref 27–33)
MCH RBC QN AUTO: 31.2 PG (ref 27–33)
MCHC RBC AUTO-ENTMCNC: 30.6 G/DL (ref 33.7–35.3)
MCHC RBC AUTO-ENTMCNC: 30.9 G/DL (ref 33.7–35.3)
MCHC RBC AUTO-ENTMCNC: 30.9 G/DL (ref 33.7–35.3)
MCHC RBC AUTO-ENTMCNC: 31.2 G/DL (ref 33.7–35.3)
MCHC RBC AUTO-ENTMCNC: 31.3 G/DL (ref 33.7–35.3)
MCHC RBC AUTO-ENTMCNC: 31.4 G/DL (ref 33.7–35.3)
MCHC RBC AUTO-ENTMCNC: 31.6 G/DL (ref 33.7–35.3)
MCHC RBC AUTO-ENTMCNC: 31.9 G/DL (ref 33.7–35.3)
MCHC RBC AUTO-ENTMCNC: 31.9 G/DL (ref 33.7–35.3)
MCHC RBC AUTO-ENTMCNC: 32 G/DL (ref 33.7–35.3)
MCHC RBC AUTO-ENTMCNC: 32.1 G/DL (ref 33.7–35.3)
MCV RBC AUTO: 91.8 FL (ref 81.4–97.8)
MCV RBC AUTO: 92.5 FL (ref 81.4–97.8)
MCV RBC AUTO: 93 FL (ref 81.4–97.8)
MCV RBC AUTO: 93 FL (ref 81.4–97.8)
MCV RBC AUTO: 93.4 FL (ref 81.4–97.8)
MCV RBC AUTO: 94.2 FL (ref 81.4–97.8)
MCV RBC AUTO: 94.3 FL (ref 81.4–97.8)
MCV RBC AUTO: 95.1 FL (ref 81.4–97.8)
MCV RBC AUTO: 95.2 FL (ref 81.4–97.8)
MCV RBC AUTO: 97.5 FL (ref 81.4–97.8)
MCV RBC AUTO: 98 FL (ref 81.4–97.8)
MICRO URNS: ABNORMAL
MONOCYTES # BLD AUTO: 0.58 K/UL (ref 0–0.85)
MONOCYTES # BLD AUTO: 0.62 K/UL (ref 0–0.85)
MONOCYTES # BLD AUTO: 0.81 K/UL (ref 0–0.85)
MONOCYTES NFR BLD AUTO: 11.2 % (ref 0–13.4)
MONOCYTES NFR BLD AUTO: 7.9 % (ref 0–13.4)
MONOCYTES NFR BLD AUTO: 8.1 % (ref 0–13.4)
NEUTROPHILS # BLD AUTO: 4.64 K/UL (ref 1.82–7.42)
NEUTROPHILS # BLD AUTO: 4.86 K/UL (ref 1.82–7.42)
NEUTROPHILS # BLD AUTO: 4.95 K/UL (ref 1.82–7.42)
NEUTROPHILS NFR BLD: 64.2 % (ref 44–72)
NEUTROPHILS NFR BLD: 64.3 % (ref 44–72)
NEUTROPHILS NFR BLD: 66.4 % (ref 44–72)
NITRITE UR QL STRIP.AUTO: NEGATIVE
NRBC # BLD AUTO: 0 K/UL
NRBC BLD-RTO: 0 /100 WBC
PH UR STRIP.AUTO: 6 [PH] (ref 5–8)
PHOSPHATE SERPL-MCNC: 4.3 MG/DL (ref 2.5–4.5)
PLATELET # BLD AUTO: 255 K/UL (ref 164–446)
PLATELET # BLD AUTO: 293 K/UL (ref 164–446)
PLATELET # BLD AUTO: 299 K/UL (ref 164–446)
PLATELET # BLD AUTO: 312 K/UL (ref 164–446)
PLATELET # BLD AUTO: 313 K/UL (ref 164–446)
PLATELET # BLD AUTO: 315 K/UL (ref 164–446)
PLATELET # BLD AUTO: 321 K/UL (ref 164–446)
PLATELET # BLD AUTO: 330 K/UL (ref 164–446)
PLATELET # BLD AUTO: 331 K/UL (ref 164–446)
PLATELET # BLD AUTO: 342 K/UL (ref 164–446)
PLATELET # BLD AUTO: 444 K/UL (ref 164–446)
PMV BLD AUTO: 10.2 FL (ref 9–12.9)
PMV BLD AUTO: 8.4 FL (ref 9–12.9)
PMV BLD AUTO: 8.6 FL (ref 9–12.9)
PMV BLD AUTO: 9 FL (ref 9–12.9)
PMV BLD AUTO: 9 FL (ref 9–12.9)
PMV BLD AUTO: 9.1 FL (ref 9–12.9)
PMV BLD AUTO: 9.1 FL (ref 9–12.9)
PMV BLD AUTO: 9.2 FL (ref 9–12.9)
PMV BLD AUTO: 9.5 FL (ref 9–12.9)
PMV BLD AUTO: 9.5 FL (ref 9–12.9)
PMV BLD AUTO: 9.7 FL (ref 9–12.9)
POTASSIUM SERPL-SCNC: 4.4 MMOL/L (ref 3.6–5.5)
POTASSIUM SERPL-SCNC: 4.5 MMOL/L (ref 3.6–5.5)
POTASSIUM SERPL-SCNC: 4.6 MMOL/L (ref 3.6–5.5)
POTASSIUM SERPL-SCNC: 4.9 MMOL/L (ref 3.6–5.5)
POTASSIUM SERPL-SCNC: 4.9 MMOL/L (ref 3.6–5.5)
POTASSIUM SERPL-SCNC: 5 MMOL/L (ref 3.6–5.5)
POTASSIUM SERPL-SCNC: 5 MMOL/L (ref 3.6–5.5)
POTASSIUM SERPL-SCNC: 5.3 MMOL/L (ref 3.6–5.5)
POTASSIUM SERPL-SCNC: 5.4 MMOL/L (ref 3.6–5.5)
PRODUCT TYPE UPROD: NORMAL
PROT UR QL STRIP: 100 MG/DL
RBC # BLD AUTO: 2.44 M/UL (ref 4.7–6.1)
RBC # BLD AUTO: 2.48 M/UL (ref 4.7–6.1)
RBC # BLD AUTO: 2.61 M/UL (ref 4.7–6.1)
RBC # BLD AUTO: 2.8 M/UL (ref 4.7–6.1)
RBC # BLD AUTO: 3.01 M/UL (ref 4.7–6.1)
RBC # BLD AUTO: 3.09 M/UL (ref 4.7–6.1)
RBC # BLD AUTO: 3.15 M/UL (ref 4.7–6.1)
RBC # BLD AUTO: 3.18 M/UL (ref 4.7–6.1)
RBC # BLD AUTO: 3.3 M/UL (ref 4.7–6.1)
RBC # BLD AUTO: 3.5 M/UL (ref 4.7–6.1)
RBC # BLD AUTO: 3.53 M/UL (ref 4.7–6.1)
RBC # URNS HPF: ABNORMAL /HPF
RBC UR QL AUTO: ABNORMAL
RH BLD: NORMAL
SODIUM SERPL-SCNC: 136 MMOL/L (ref 135–145)
SODIUM SERPL-SCNC: 137 MMOL/L (ref 135–145)
SODIUM SERPL-SCNC: 138 MMOL/L (ref 135–145)
SODIUM SERPL-SCNC: 138 MMOL/L (ref 135–145)
SODIUM SERPL-SCNC: 139 MMOL/L (ref 135–145)
SODIUM SERPL-SCNC: 140 MMOL/L (ref 135–145)
SODIUM SERPL-SCNC: 141 MMOL/L (ref 135–145)
SP GR UR STRIP.AUTO: 1.01
TIBC SERPL-MCNC: 172 UG/DL (ref 250–450)
UIBC SERPL-MCNC: 97 UG/DL (ref 110–370)
UNIT STATUS USTAT: NORMAL
WBC # BLD AUTO: 7.2 K/UL (ref 4.8–10.8)
WBC # BLD AUTO: 7.3 K/UL (ref 4.8–10.8)
WBC # BLD AUTO: 7.4 K/UL (ref 4.8–10.8)
WBC # BLD AUTO: 7.6 K/UL (ref 4.8–10.8)
WBC # BLD AUTO: 7.7 K/UL (ref 4.8–10.8)
WBC # BLD AUTO: 7.8 K/UL (ref 4.8–10.8)
WBC # BLD AUTO: 8 K/UL (ref 4.8–10.8)
WBC # BLD AUTO: 8.1 K/UL (ref 4.8–10.8)
WBC # BLD AUTO: 8.3 K/UL (ref 4.8–10.8)
WBC # BLD AUTO: 8.6 K/UL (ref 4.8–10.8)
WBC # BLD AUTO: 8.9 K/UL (ref 4.8–10.8)
WBC #/AREA URNS HPF: ABNORMAL /HPF

## 2020-01-01 PROCEDURE — A9270 NON-COVERED ITEM OR SERVICE: HCPCS | Performed by: INTERNAL MEDICINE

## 2020-01-01 PROCEDURE — 700102 HCHG RX REV CODE 250 W/ 637 OVERRIDE(OP): Performed by: HOSPITALIST

## 2020-01-01 PROCEDURE — 700102 HCHG RX REV CODE 250 W/ 637 OVERRIDE(OP): Performed by: FAMILY MEDICINE

## 2020-01-01 PROCEDURE — 700102 HCHG RX REV CODE 250 W/ 637 OVERRIDE(OP): Performed by: INTERNAL MEDICINE

## 2020-01-01 PROCEDURE — G0378 HOSPITAL OBSERVATION PER HR: HCPCS

## 2020-01-01 PROCEDURE — 99224 PR SUBSEQUENT OBSERVATION CARE,LEVEL I: CPT | Performed by: HOSPITALIST

## 2020-01-01 PROCEDURE — 85027 COMPLETE CBC AUTOMATED: CPT

## 2020-01-01 PROCEDURE — S9126 HOSPICE CARE, IN THE HOME, P: HCPCS

## 2020-01-01 PROCEDURE — 97535 SELF CARE MNGMENT TRAINING: CPT | Mod: XE

## 2020-01-01 PROCEDURE — 99225 PR SUBSEQUENT OBSERVATION CARE,LEVEL II: CPT | Performed by: INTERNAL MEDICINE

## 2020-01-01 PROCEDURE — A9270 NON-COVERED ITEM OR SERVICE: HCPCS | Performed by: HOSPITALIST

## 2020-01-01 PROCEDURE — 6650990 HC HOSPICE AND HOME CARE RX REV CODE 0250

## 2020-01-01 PROCEDURE — 770006 HCHG ROOM/CARE - MED/SURG/GYN SEMI*

## 2020-01-01 PROCEDURE — 97530 THERAPEUTIC ACTIVITIES: CPT

## 2020-01-01 PROCEDURE — 74230 X-RAY XM SWLNG FUNCJ C+: CPT

## 2020-01-01 PROCEDURE — A9270 NON-COVERED ITEM OR SERVICE: HCPCS | Performed by: FAMILY MEDICINE

## 2020-01-01 PROCEDURE — 99224 PR SUBSEQUENT OBSERVATION CARE,LEVEL I: CPT | Performed by: INTERNAL MEDICINE

## 2020-01-01 PROCEDURE — 99232 SBSQ HOSP IP/OBS MODERATE 35: CPT | Performed by: HOSPITALIST

## 2020-01-01 PROCEDURE — 99232 SBSQ HOSP IP/OBS MODERATE 35: CPT | Performed by: INTERNAL MEDICINE

## 2020-01-01 PROCEDURE — 99225 PR SUBSEQUENT OBSERVATION CARE,LEVEL II: CPT | Performed by: HOSPITALIST

## 2020-01-01 PROCEDURE — G0299 HHS/HOSPICE OF RN EA 15 MIN: HCPCS

## 2020-01-01 PROCEDURE — 700101 HCHG RX REV CODE 250: Performed by: INTERNAL MEDICINE

## 2020-01-01 PROCEDURE — 36415 COLL VENOUS BLD VENIPUNCTURE: CPT

## 2020-01-01 PROCEDURE — 99233 SBSQ HOSP IP/OBS HIGH 50: CPT | Performed by: INTERNAL MEDICINE

## 2020-01-01 PROCEDURE — 85025 COMPLETE CBC W/AUTO DIFF WBC: CPT

## 2020-01-01 PROCEDURE — 97164 PT RE-EVAL EST PLAN CARE: CPT

## 2020-01-01 PROCEDURE — 11721 DEBRIDE NAIL 6 OR MORE: CPT

## 2020-01-01 PROCEDURE — 92523 SPEECH SOUND LANG COMPREHEN: CPT

## 2020-01-01 PROCEDURE — 97168 OT RE-EVAL EST PLAN CARE: CPT

## 2020-01-01 PROCEDURE — 86900 BLOOD TYPING SEROLOGIC ABO: CPT

## 2020-01-01 PROCEDURE — 92526 ORAL FUNCTION THERAPY: CPT

## 2020-01-01 PROCEDURE — 80048 BASIC METABOLIC PNL TOTAL CA: CPT

## 2020-01-01 PROCEDURE — P9016 RBC LEUKOCYTES REDUCED: HCPCS

## 2020-01-01 PROCEDURE — 700102 HCHG RX REV CODE 250 W/ 637 OVERRIDE(OP)

## 2020-01-01 PROCEDURE — 83540 ASSAY OF IRON: CPT

## 2020-01-01 PROCEDURE — 665036 HSPC NOTICE OF ELECTION NOE

## 2020-01-01 PROCEDURE — 36430 TRANSFUSION BLD/BLD COMPNT: CPT

## 2020-01-01 PROCEDURE — 82272 OCCULT BLD FECES 1-3 TESTS: CPT

## 2020-01-01 PROCEDURE — 360976 HOYER LARGE SLING UP TO 300LBS: Performed by: INTERNAL MEDICINE

## 2020-01-01 PROCEDURE — 86923 COMPATIBILITY TEST ELECTRIC: CPT

## 2020-01-01 PROCEDURE — 90792 PSYCH DIAG EVAL W/MED SRVCS: CPT | Performed by: PSYCHIATRY & NEUROLOGY

## 2020-01-01 PROCEDURE — 83550 IRON BINDING TEST: CPT

## 2020-01-01 PROCEDURE — 99239 HOSP IP/OBS DSCHRG MGMT >30: CPT | Performed by: HOSPITALIST

## 2020-01-01 PROCEDURE — 97535 SELF CARE MNGMENT TRAINING: CPT

## 2020-01-01 PROCEDURE — 97116 GAIT TRAINING THERAPY: CPT | Mod: XU

## 2020-01-01 PROCEDURE — 306397 CUSHION, WAFFLE ADULT 19X19: Performed by: INTERNAL MEDICINE

## 2020-01-01 PROCEDURE — 92610 EVALUATE SWALLOWING FUNCTION: CPT

## 2020-01-01 PROCEDURE — 80069 RENAL FUNCTION PANEL: CPT

## 2020-01-01 PROCEDURE — 86901 BLOOD TYPING SEROLOGIC RH(D): CPT

## 2020-01-01 PROCEDURE — 99226 PR SUBSEQUENT OBSERVATION CARE,LEVEL III: CPT | Performed by: INTERNAL MEDICINE

## 2020-01-01 PROCEDURE — 700101 HCHG RX REV CODE 250: Performed by: HOSPITALIST

## 2020-01-01 PROCEDURE — 97535 SELF CARE MNGMENT TRAINING: CPT | Mod: XU

## 2020-01-01 PROCEDURE — 86850 RBC ANTIBODY SCREEN: CPT

## 2020-01-01 PROCEDURE — 81001 URINALYSIS AUTO W/SCOPE: CPT

## 2020-01-01 PROCEDURE — 700105 HCHG RX REV CODE 258: Performed by: INTERNAL MEDICINE

## 2020-01-01 PROCEDURE — 92611 MOTION FLUOROSCOPY/SWALLOW: CPT

## 2020-01-01 RX ORDER — AMOXICILLIN 250 MG
2 CAPSULE ORAL 2 TIMES DAILY
Status: DISCONTINUED | OUTPATIENT
Start: 2020-01-01 | End: 2020-01-01

## 2020-01-01 RX ORDER — SUCRALFATE ORAL 1 G/10ML
1 SUSPENSION ORAL
Status: DISCONTINUED | OUTPATIENT
Start: 2020-01-01 | End: 2020-01-01

## 2020-01-01 RX ORDER — CLONIDINE HYDROCHLORIDE 0.1 MG/1
0.1 TABLET ORAL EVERY 6 HOURS PRN
Status: DISCONTINUED | OUTPATIENT
Start: 2020-01-01 | End: 2020-01-01

## 2020-01-01 RX ORDER — POLYETHYLENE GLYCOL 3350 17 G/17G
1 POWDER, FOR SOLUTION ORAL
Status: DISCONTINUED | OUTPATIENT
Start: 2020-01-01 | End: 2020-01-01 | Stop reason: HOSPADM

## 2020-01-01 RX ORDER — DRONABINOL 2.5 MG/1
5 CAPSULE ORAL 2 TIMES DAILY
Status: DISCONTINUED | OUTPATIENT
Start: 2020-01-01 | End: 2020-01-01

## 2020-01-01 RX ORDER — HALOPERIDOL 5 MG/1
5 TABLET ORAL EVERY 6 HOURS PRN
Status: DISCONTINUED | OUTPATIENT
Start: 2020-01-01 | End: 2020-01-01

## 2020-01-01 RX ORDER — ERGOCALCIFEROL 1.25 MG/1
50000 CAPSULE ORAL
Status: DISCONTINUED | OUTPATIENT
Start: 2020-01-01 | End: 2020-01-01 | Stop reason: HOSPADM

## 2020-01-01 RX ORDER — TAMSULOSIN HYDROCHLORIDE 0.4 MG/1
0.4 CAPSULE ORAL EVERY EVENING
Status: DISCONTINUED | OUTPATIENT
Start: 2020-01-01 | End: 2020-01-01

## 2020-01-01 RX ORDER — OXYCODONE HYDROCHLORIDE 5 MG/1
5 TABLET ORAL EVERY 4 HOURS PRN
Status: DISCONTINUED | OUTPATIENT
Start: 2020-01-01 | End: 2020-01-01

## 2020-01-01 RX ORDER — BISACODYL 10 MG
10 SUPPOSITORY, RECTAL RECTAL
Status: DISCONTINUED | OUTPATIENT
Start: 2020-01-01 | End: 2020-01-01

## 2020-01-01 RX ORDER — LIDOCAINE 50 MG/G
1 PATCH TOPICAL EVERY 24 HOURS
Status: DISCONTINUED | OUTPATIENT
Start: 2020-01-01 | End: 2020-01-01

## 2020-01-01 RX ORDER — QUETIAPINE FUMARATE 25 MG/1
TABLET, FILM COATED ORAL
Status: ACTIVE
Start: 2020-01-01 | End: 2020-01-01

## 2020-01-01 RX ORDER — AMLODIPINE BESYLATE 10 MG/1
10 TABLET ORAL DAILY
Qty: 30 TAB | Refills: 1 | Status: SHIPPED | OUTPATIENT
Start: 2020-01-01

## 2020-01-01 RX ORDER — M-VIT,TX,IRON,MINS/CALC/FOLIC 27MG-0.4MG
1 TABLET ORAL DAILY
Status: DISCONTINUED | OUTPATIENT
Start: 2020-01-01 | End: 2020-01-01 | Stop reason: HOSPADM

## 2020-01-01 RX ORDER — OMEPRAZOLE 20 MG/1
20 CAPSULE, DELAYED RELEASE ORAL DAILY
Qty: 30 CAP | Refills: 1 | Status: SHIPPED | OUTPATIENT
Start: 2020-01-01

## 2020-01-01 RX ORDER — POLYETHYLENE GLYCOL 3350 17 G/17G
1 POWDER, FOR SOLUTION ORAL
Status: DISCONTINUED | OUTPATIENT
Start: 2020-01-01 | End: 2020-01-01

## 2020-01-01 RX ORDER — AMLODIPINE BESYLATE 10 MG/1
10 TABLET ORAL DAILY
Qty: 30 TAB
Start: 2020-01-01 | End: 2020-01-01 | Stop reason: SDUPTHER

## 2020-01-01 RX ORDER — QUETIAPINE FUMARATE 50 MG/1
50 TABLET, FILM COATED ORAL EVERY EVENING
Qty: 60 TAB | Refills: 3 | Status: SHIPPED | OUTPATIENT
Start: 2020-01-01 | End: 2020-01-01

## 2020-01-01 RX ORDER — ACETAMINOPHEN 500 MG
500 TABLET ORAL EVERY 6 HOURS PRN
Status: DISCONTINUED | OUTPATIENT
Start: 2020-01-01 | End: 2020-01-01 | Stop reason: HOSPADM

## 2020-01-01 RX ORDER — LOSARTAN POTASSIUM 25 MG/1
50 TABLET ORAL DAILY
Status: DISCONTINUED | OUTPATIENT
Start: 2020-01-01 | End: 2020-01-01 | Stop reason: HOSPADM

## 2020-01-01 RX ORDER — FINASTERIDE 5 MG/1
5 TABLET, FILM COATED ORAL DAILY
Qty: 30 TAB
Start: 2020-01-01 | End: 2020-01-01 | Stop reason: SDUPTHER

## 2020-01-01 RX ORDER — ATORVASTATIN CALCIUM 10 MG/1
10 TABLET, FILM COATED ORAL NIGHTLY
Status: DISCONTINUED | OUTPATIENT
Start: 2020-01-01 | End: 2020-01-01

## 2020-01-01 RX ORDER — M-VIT,TX,IRON,MINS/CALC/FOLIC 27MG-0.4MG
1 TABLET ORAL DAILY
Qty: 30 TAB | Refills: 11
Start: 2020-01-01 | End: 2020-01-01

## 2020-01-01 RX ORDER — AMLODIPINE BESYLATE 5 MG/1
10 TABLET ORAL DAILY
Status: DISCONTINUED | OUTPATIENT
Start: 2020-01-01 | End: 2020-01-01 | Stop reason: HOSPADM

## 2020-01-01 RX ORDER — AMLODIPINE BESYLATE 5 MG/1
10 TABLET ORAL DAILY
Status: DISCONTINUED | OUTPATIENT
Start: 2020-01-01 | End: 2020-01-01

## 2020-01-01 RX ORDER — QUETIAPINE FUMARATE 25 MG/1
50 TABLET, FILM COATED ORAL EVERY EVENING
Status: DISCONTINUED | OUTPATIENT
Start: 2020-01-01 | End: 2020-01-01

## 2020-01-01 RX ORDER — SUCRALFATE 1 G/1
1 TABLET ORAL 4 TIMES DAILY
Status: DISCONTINUED | OUTPATIENT
Start: 2020-01-01 | End: 2020-01-01

## 2020-01-01 RX ORDER — OMEPRAZOLE 20 MG/1
20 CAPSULE, DELAYED RELEASE ORAL 2 TIMES DAILY
Status: DISCONTINUED | OUTPATIENT
Start: 2020-01-01 | End: 2020-01-01

## 2020-01-01 RX ORDER — ATORVASTATIN CALCIUM 10 MG/1
10 TABLET, FILM COATED ORAL NIGHTLY
Status: DISCONTINUED | OUTPATIENT
Start: 2020-01-01 | End: 2020-01-01 | Stop reason: HOSPADM

## 2020-01-01 RX ORDER — QUETIAPINE FUMARATE 25 MG/1
50 TABLET, FILM COATED ORAL EVERY EVENING
Status: DISCONTINUED | OUTPATIENT
Start: 2020-01-01 | End: 2020-01-01 | Stop reason: HOSPADM

## 2020-01-01 RX ORDER — QUETIAPINE FUMARATE 50 MG/1
50 TABLET, FILM COATED ORAL EVERY EVENING
Qty: 60 TAB | Refills: 3
Start: 2020-01-01 | End: 2020-01-01 | Stop reason: SDUPTHER

## 2020-01-01 RX ORDER — DRONABINOL 2.5 MG/1
CAPSULE ORAL
Status: COMPLETED
Start: 2020-01-01 | End: 2020-01-01

## 2020-01-01 RX ORDER — FINASTERIDE 5 MG/1
5 TABLET, FILM COATED ORAL DAILY
Qty: 30 TAB | Refills: 1 | Status: SHIPPED | OUTPATIENT
Start: 2020-01-01

## 2020-01-01 RX ORDER — BISACODYL 10 MG
10 SUPPOSITORY, RECTAL RECTAL DAILY
Status: DISCONTINUED | OUTPATIENT
Start: 2020-01-01 | End: 2020-01-01

## 2020-01-01 RX ORDER — LOSARTAN POTASSIUM 25 MG/1
100 TABLET ORAL DAILY
Status: DISCONTINUED | OUTPATIENT
Start: 2020-01-01 | End: 2020-01-01

## 2020-01-01 RX ORDER — ACETAMINOPHEN 500 MG
500 TABLET ORAL EVERY 6 HOURS PRN
Qty: 30 TAB | Refills: 0 | Status: SHIPPED | OUTPATIENT
Start: 2020-01-01 | End: 2020-01-01

## 2020-01-01 RX ORDER — MIRTAZAPINE 15 MG/1
15 TABLET, ORALLY DISINTEGRATING ORAL
Status: DISCONTINUED | OUTPATIENT
Start: 2020-01-01 | End: 2020-01-01

## 2020-01-01 RX ORDER — AMLODIPINE BESYLATE 5 MG/1
5 TABLET ORAL DAILY
Status: DISCONTINUED | OUTPATIENT
Start: 2020-01-01 | End: 2020-01-01

## 2020-01-01 RX ORDER — OMEPRAZOLE 20 MG/1
20 CAPSULE, DELAYED RELEASE ORAL DAILY
Status: DISCONTINUED | OUTPATIENT
Start: 2020-01-01 | End: 2020-01-01 | Stop reason: HOSPADM

## 2020-01-01 RX ORDER — FINASTERIDE 5 MG/1
5 TABLET, FILM COATED ORAL DAILY
Status: DISCONTINUED | OUTPATIENT
Start: 2020-01-01 | End: 2020-01-01 | Stop reason: HOSPADM

## 2020-01-01 RX ORDER — BISACODYL 10 MG
10 SUPPOSITORY, RECTAL RECTAL
Status: DISCONTINUED | OUTPATIENT
Start: 2020-01-01 | End: 2020-01-01 | Stop reason: HOSPADM

## 2020-01-01 RX ORDER — TAMSULOSIN HYDROCHLORIDE 0.4 MG/1
0.4 CAPSULE ORAL EVERY EVENING
Status: DISCONTINUED | OUTPATIENT
Start: 2020-01-01 | End: 2020-01-01 | Stop reason: HOSPADM

## 2020-01-01 RX ORDER — OMEPRAZOLE 20 MG/1
20 CAPSULE, DELAYED RELEASE ORAL DAILY
Qty: 30 CAP
Start: 2020-01-01 | End: 2020-01-01 | Stop reason: SDUPTHER

## 2020-01-01 RX ORDER — SODIUM CHLORIDE 9 MG/ML
1000 INJECTION, SOLUTION INTRAVENOUS CONTINUOUS
Status: DISCONTINUED | OUTPATIENT
Start: 2020-01-01 | End: 2020-01-01

## 2020-01-01 RX ADMIN — QUETIAPINE FUMARATE 50 MG: 25 TABLET ORAL at 16:55

## 2020-01-01 RX ADMIN — MULTIPLE VITAMINS W/ MINERALS TAB 1 TABLET: TAB at 05:42

## 2020-01-01 RX ADMIN — AMLODIPINE BESYLATE 10 MG: 5 TABLET ORAL at 07:33

## 2020-01-01 RX ADMIN — LOSARTAN POTASSIUM 50 MG: 25 TABLET ORAL at 05:43

## 2020-01-01 RX ADMIN — QUETIAPINE FUMARATE 50 MG: 25 TABLET ORAL at 16:29

## 2020-01-01 RX ADMIN — LOSARTAN POTASSIUM 100 MG: 25 TABLET ORAL at 05:09

## 2020-01-01 RX ADMIN — SUCRALFATE 1 G: 1 TABLET ORAL at 20:57

## 2020-01-01 RX ADMIN — OMEPRAZOLE 20 MG: 20 CAPSULE, DELAYED RELEASE ORAL at 17:34

## 2020-01-01 RX ADMIN — ATORVASTATIN CALCIUM 10 MG: 10 TABLET, FILM COATED ORAL at 21:09

## 2020-01-01 RX ADMIN — DRONABINOL 5 MG: 2.5 CAPSULE ORAL at 07:09

## 2020-01-01 RX ADMIN — DRONABINOL 5 MG: 2.5 CAPSULE ORAL at 05:28

## 2020-01-01 RX ADMIN — LOSARTAN POTASSIUM 50 MG: 25 TABLET ORAL at 05:05

## 2020-01-01 RX ADMIN — DRONABINOL 5 MG: 2.5 CAPSULE ORAL at 06:17

## 2020-01-01 RX ADMIN — RIVAROXABAN 15 MG: 15 TABLET, FILM COATED ORAL at 17:18

## 2020-01-01 RX ADMIN — OMEPRAZOLE 20 MG: 20 CAPSULE, DELAYED RELEASE ORAL at 05:55

## 2020-01-01 RX ADMIN — QUETIAPINE FUMARATE 50 MG: 25 TABLET ORAL at 18:40

## 2020-01-01 RX ADMIN — DRONABINOL 5 MG: 2.5 CAPSULE ORAL at 17:04

## 2020-01-01 RX ADMIN — TAMSULOSIN HYDROCHLORIDE 0.4 MG: 0.4 CAPSULE ORAL at 17:42

## 2020-01-01 RX ADMIN — DRONABINOL 5 MG: 2.5 CAPSULE ORAL at 16:41

## 2020-01-01 RX ADMIN — ACETAMINOPHEN 500 MG: 500 TABLET, FILM COATED ORAL at 17:23

## 2020-01-01 RX ADMIN — QUETIAPINE FUMARATE 50 MG: 25 TABLET ORAL at 17:15

## 2020-01-01 RX ADMIN — ATORVASTATIN CALCIUM 10 MG: 10 TABLET, FILM COATED ORAL at 21:22

## 2020-01-01 RX ADMIN — DRONABINOL 5 MG: 2.5 CAPSULE ORAL at 04:58

## 2020-01-01 RX ADMIN — TAMSULOSIN HYDROCHLORIDE 0.4 MG: 0.4 CAPSULE ORAL at 17:44

## 2020-01-01 RX ADMIN — LOSARTAN POTASSIUM 100 MG: 25 TABLET ORAL at 04:58

## 2020-01-01 RX ADMIN — DRONABINOL 5 MG: 2.5 CAPSULE ORAL at 06:07

## 2020-01-01 RX ADMIN — ATORVASTATIN CALCIUM 10 MG: 10 TABLET, FILM COATED ORAL at 21:54

## 2020-01-01 RX ADMIN — HALOPERIDOL 5 MG: 5 TABLET ORAL at 15:17

## 2020-01-01 RX ADMIN — AMLODIPINE BESYLATE 10 MG: 5 TABLET ORAL at 06:03

## 2020-01-01 RX ADMIN — DRONABINOL 5 MG: 2.5 CAPSULE ORAL at 18:08

## 2020-01-01 RX ADMIN — RIVAROXABAN 15 MG: 15 TABLET, FILM COATED ORAL at 18:05

## 2020-01-01 RX ADMIN — TAMSULOSIN HYDROCHLORIDE 0.4 MG: 0.4 CAPSULE ORAL at 17:04

## 2020-01-01 RX ADMIN — RIVAROXABAN 15 MG: 15 TABLET, FILM COATED ORAL at 17:36

## 2020-01-01 RX ADMIN — LOSARTAN POTASSIUM 100 MG: 25 TABLET ORAL at 05:54

## 2020-01-01 RX ADMIN — RIVAROXABAN 15 MG: 15 TABLET, FILM COATED ORAL at 17:44

## 2020-01-01 RX ADMIN — LOSARTAN POTASSIUM 100 MG: 25 TABLET ORAL at 05:44

## 2020-01-01 RX ADMIN — LOSARTAN POTASSIUM 100 MG: 25 TABLET ORAL at 06:13

## 2020-01-01 RX ADMIN — MIRTAZAPINE 15 MG: 15 TABLET, ORALLY DISINTEGRATING ORAL at 20:34

## 2020-01-01 RX ADMIN — ATORVASTATIN CALCIUM 10 MG: 10 TABLET, FILM COATED ORAL at 22:33

## 2020-01-01 RX ADMIN — RIVAROXABAN 15 MG: 15 TABLET, FILM COATED ORAL at 18:47

## 2020-01-01 RX ADMIN — DRONABINOL 5 MG: 2.5 CAPSULE ORAL at 17:11

## 2020-01-01 RX ADMIN — LOSARTAN POTASSIUM 100 MG: 25 TABLET ORAL at 05:48

## 2020-01-01 RX ADMIN — LOSARTAN POTASSIUM 50 MG: 25 TABLET ORAL at 05:40

## 2020-01-01 RX ADMIN — MINERAL OIL AND PETROLATUM 1 APPLICATION: 150; 830 OINTMENT OPHTHALMIC at 06:00

## 2020-01-01 RX ADMIN — DRONABINOL 5 MG: 2.5 CAPSULE ORAL at 05:37

## 2020-01-01 RX ADMIN — DRONABINOL 5 MG: 2.5 CAPSULE ORAL at 17:22

## 2020-01-01 RX ADMIN — SUCRALFATE 1 G: 1 TABLET ORAL at 13:02

## 2020-01-01 RX ADMIN — OMEPRAZOLE 20 MG: 20 CAPSULE, DELAYED RELEASE ORAL at 06:01

## 2020-01-01 RX ADMIN — MIRTAZAPINE 15 MG: 15 TABLET, ORALLY DISINTEGRATING ORAL at 20:16

## 2020-01-01 RX ADMIN — LOSARTAN POTASSIUM 50 MG: 25 TABLET ORAL at 05:17

## 2020-01-01 RX ADMIN — ERGOCALCIFEROL 50000 UNITS: 1.25 CAPSULE ORAL at 10:34

## 2020-01-01 RX ADMIN — MULTIPLE VITAMINS W/ MINERALS TAB 1 TABLET: TAB at 06:03

## 2020-01-01 RX ADMIN — QUETIAPINE FUMARATE 50 MG: 25 TABLET ORAL at 17:04

## 2020-01-01 RX ADMIN — LOSARTAN POTASSIUM 100 MG: 25 TABLET ORAL at 05:31

## 2020-01-01 RX ADMIN — DRONABINOL 5 MG: 2.5 CAPSULE ORAL at 05:58

## 2020-01-01 RX ADMIN — MULTIPLE VITAMINS W/ MINERALS TAB 1 TABLET: TAB at 06:04

## 2020-01-01 RX ADMIN — TAMSULOSIN HYDROCHLORIDE 0.4 MG: 0.4 CAPSULE ORAL at 17:48

## 2020-01-01 RX ADMIN — LOSARTAN POTASSIUM 100 MG: 25 TABLET ORAL at 05:46

## 2020-01-01 RX ADMIN — TAMSULOSIN HYDROCHLORIDE 0.4 MG: 0.4 CAPSULE ORAL at 17:35

## 2020-01-01 RX ADMIN — MINERAL OIL AND PETROLATUM 1 APPLICATION: 150; 830 OINTMENT OPHTHALMIC at 22:00

## 2020-01-01 RX ADMIN — DRONABINOL 5 MG: 2.5 CAPSULE ORAL at 17:31

## 2020-01-01 RX ADMIN — QUETIAPINE FUMARATE 50 MG: 25 TABLET ORAL at 17:20

## 2020-01-01 RX ADMIN — DRONABINOL 5 MG: 2.5 CAPSULE ORAL at 17:20

## 2020-01-01 RX ADMIN — QUETIAPINE FUMARATE 50 MG: 25 TABLET ORAL at 19:20

## 2020-01-01 RX ADMIN — LOSARTAN POTASSIUM 100 MG: 25 TABLET ORAL at 08:55

## 2020-01-01 RX ADMIN — ERGOCALCIFEROL 50000 UNITS: 1.25 CAPSULE ORAL at 09:33

## 2020-01-01 RX ADMIN — MULTIPLE VITAMINS W/ MINERALS TAB 1 TABLET: TAB at 05:08

## 2020-01-01 RX ADMIN — QUETIAPINE FUMARATE 50 MG: 25 TABLET ORAL at 17:10

## 2020-01-01 RX ADMIN — TAMSULOSIN HYDROCHLORIDE 0.4 MG: 0.4 CAPSULE ORAL at 17:21

## 2020-01-01 RX ADMIN — ATORVASTATIN CALCIUM 10 MG: 10 TABLET, FILM COATED ORAL at 20:41

## 2020-01-01 RX ADMIN — RIVAROXABAN 15 MG: 15 TABLET, FILM COATED ORAL at 17:34

## 2020-01-01 RX ADMIN — OMEPRAZOLE 20 MG: 20 CAPSULE, DELAYED RELEASE ORAL at 05:40

## 2020-01-01 RX ADMIN — DRONABINOL 5 MG: 2.5 CAPSULE ORAL at 17:05

## 2020-01-01 RX ADMIN — ATORVASTATIN CALCIUM 10 MG: 10 TABLET, FILM COATED ORAL at 20:12

## 2020-01-01 RX ADMIN — AMLODIPINE BESYLATE 10 MG: 5 TABLET ORAL at 06:34

## 2020-01-01 RX ADMIN — AMLODIPINE BESYLATE 10 MG: 5 TABLET ORAL at 06:43

## 2020-01-01 RX ADMIN — SUCRALFATE 1 G: 1 TABLET ORAL at 17:34

## 2020-01-01 RX ADMIN — QUETIAPINE FUMARATE 50 MG: 25 TABLET ORAL at 17:26

## 2020-01-01 RX ADMIN — ATORVASTATIN CALCIUM 10 MG: 10 TABLET, FILM COATED ORAL at 22:50

## 2020-01-01 RX ADMIN — ATORVASTATIN CALCIUM 10 MG: 10 TABLET, FILM COATED ORAL at 20:25

## 2020-01-01 RX ADMIN — DRONABINOL 5 MG: 2.5 CAPSULE ORAL at 06:05

## 2020-01-01 RX ADMIN — AMLODIPINE BESYLATE 10 MG: 5 TABLET ORAL at 06:16

## 2020-01-01 RX ADMIN — TAMSULOSIN HYDROCHLORIDE 0.4 MG: 0.4 CAPSULE ORAL at 17:27

## 2020-01-01 RX ADMIN — LOSARTAN POTASSIUM 50 MG: 25 TABLET ORAL at 06:07

## 2020-01-01 RX ADMIN — QUETIAPINE FUMARATE 50 MG: 25 TABLET ORAL at 18:21

## 2020-01-01 RX ADMIN — DRONABINOL 5 MG: 2.5 CAPSULE ORAL at 05:40

## 2020-01-01 RX ADMIN — AMLODIPINE BESYLATE 10 MG: 5 TABLET ORAL at 05:11

## 2020-01-01 RX ADMIN — LOSARTAN POTASSIUM 50 MG: 25 TABLET ORAL at 07:38

## 2020-01-01 RX ADMIN — ACETAMINOPHEN 500 MG: 500 TABLET, FILM COATED ORAL at 23:21

## 2020-01-01 RX ADMIN — QUETIAPINE FUMARATE 50 MG: 25 TABLET ORAL at 17:54

## 2020-01-01 RX ADMIN — ATORVASTATIN CALCIUM 10 MG: 10 TABLET, FILM COATED ORAL at 21:18

## 2020-01-01 RX ADMIN — DRONABINOL 5 MG: 2.5 CAPSULE ORAL at 05:48

## 2020-01-01 RX ADMIN — AMLODIPINE BESYLATE 10 MG: 5 TABLET ORAL at 05:43

## 2020-01-01 RX ADMIN — TAMSULOSIN HYDROCHLORIDE 0.4 MG: 0.4 CAPSULE ORAL at 17:45

## 2020-01-01 RX ADMIN — AMLODIPINE BESYLATE 10 MG: 5 TABLET ORAL at 05:48

## 2020-01-01 RX ADMIN — AMLODIPINE BESYLATE 10 MG: 5 TABLET ORAL at 05:41

## 2020-01-01 RX ADMIN — AMLODIPINE BESYLATE 10 MG: 5 TABLET ORAL at 05:20

## 2020-01-01 RX ADMIN — SUCRALFATE 1 G: 1 SUSPENSION ORAL at 13:34

## 2020-01-01 RX ADMIN — RIVAROXABAN 15 MG: 15 TABLET, FILM COATED ORAL at 17:37

## 2020-01-01 RX ADMIN — TAMSULOSIN HYDROCHLORIDE 0.4 MG: 0.4 CAPSULE ORAL at 16:55

## 2020-01-01 RX ADMIN — SUCRALFATE 1 G: 1 TABLET ORAL at 21:08

## 2020-01-01 RX ADMIN — MIRTAZAPINE 15 MG: 15 TABLET, ORALLY DISINTEGRATING ORAL at 19:20

## 2020-01-01 RX ADMIN — TAMSULOSIN HYDROCHLORIDE 0.4 MG: 0.4 CAPSULE ORAL at 17:36

## 2020-01-01 RX ADMIN — RIVAROXABAN 15 MG: 15 TABLET, FILM COATED ORAL at 17:43

## 2020-01-01 RX ADMIN — TAMSULOSIN HYDROCHLORIDE 0.4 MG: 0.4 CAPSULE ORAL at 17:29

## 2020-01-01 RX ADMIN — ERGOCALCIFEROL 50000 UNITS: 1.25 CAPSULE ORAL at 12:00

## 2020-01-01 RX ADMIN — RIVAROXABAN 15 MG: 15 TABLET, FILM COATED ORAL at 17:52

## 2020-01-01 RX ADMIN — AMLODIPINE BESYLATE 10 MG: 5 TABLET ORAL at 06:18

## 2020-01-01 RX ADMIN — QUETIAPINE FUMARATE 50 MG: 25 TABLET ORAL at 17:08

## 2020-01-01 RX ADMIN — AMLODIPINE BESYLATE 10 MG: 5 TABLET ORAL at 05:58

## 2020-01-01 RX ADMIN — DRONABINOL 5 MG: 2.5 CAPSULE ORAL at 17:27

## 2020-01-01 RX ADMIN — QUETIAPINE FUMARATE 50 MG: 25 TABLET ORAL at 17:37

## 2020-01-01 RX ADMIN — QUETIAPINE FUMARATE 50 MG: 25 TABLET ORAL at 17:39

## 2020-01-01 RX ADMIN — DRONABINOL 5 MG: 2.5 CAPSULE ORAL at 18:24

## 2020-01-01 RX ADMIN — TAMSULOSIN HYDROCHLORIDE 0.4 MG: 0.4 CAPSULE ORAL at 18:03

## 2020-01-01 RX ADMIN — AMLODIPINE BESYLATE 10 MG: 5 TABLET ORAL at 06:05

## 2020-01-01 RX ADMIN — MULTIPLE VITAMINS W/ MINERALS TAB 1 TABLET: TAB at 05:37

## 2020-01-01 RX ADMIN — ATORVASTATIN CALCIUM 10 MG: 10 TABLET, FILM COATED ORAL at 21:45

## 2020-01-01 RX ADMIN — AMLODIPINE BESYLATE 10 MG: 5 TABLET ORAL at 07:23

## 2020-01-01 RX ADMIN — TAMSULOSIN HYDROCHLORIDE 0.4 MG: 0.4 CAPSULE ORAL at 17:09

## 2020-01-01 RX ADMIN — AMLODIPINE BESYLATE 10 MG: 5 TABLET ORAL at 05:52

## 2020-01-01 RX ADMIN — OMEPRAZOLE 20 MG: 20 CAPSULE, DELAYED RELEASE ORAL at 05:49

## 2020-01-01 RX ADMIN — MULTIPLE VITAMINS W/ MINERALS TAB 1 TABLET: TAB at 05:15

## 2020-01-01 RX ADMIN — RIVAROXABAN 15 MG: 15 TABLET, FILM COATED ORAL at 17:45

## 2020-01-01 RX ADMIN — RIVAROXABAN 15 MG: 15 TABLET, FILM COATED ORAL at 19:20

## 2020-01-01 RX ADMIN — TAMSULOSIN HYDROCHLORIDE 0.4 MG: 0.4 CAPSULE ORAL at 18:22

## 2020-01-01 RX ADMIN — DRONABINOL 5 MG: 2.5 CAPSULE ORAL at 06:43

## 2020-01-01 RX ADMIN — ATORVASTATIN CALCIUM 10 MG: 10 TABLET, FILM COATED ORAL at 19:14

## 2020-01-01 RX ADMIN — RIVAROXABAN 15 MG: 15 TABLET, FILM COATED ORAL at 17:31

## 2020-01-01 RX ADMIN — TAMSULOSIN HYDROCHLORIDE 0.4 MG: 0.4 CAPSULE ORAL at 17:49

## 2020-01-01 RX ADMIN — RIVAROXABAN 15 MG: 15 TABLET, FILM COATED ORAL at 20:17

## 2020-01-01 RX ADMIN — LOSARTAN POTASSIUM 100 MG: 25 TABLET ORAL at 06:07

## 2020-01-01 RX ADMIN — AMLODIPINE BESYLATE 10 MG: 5 TABLET ORAL at 06:06

## 2020-01-01 RX ADMIN — DRONABINOL 5 MG: 2.5 CAPSULE ORAL at 18:47

## 2020-01-01 RX ADMIN — QUETIAPINE FUMARATE 50 MG: 25 TABLET ORAL at 17:22

## 2020-01-01 RX ADMIN — LOSARTAN POTASSIUM 100 MG: 25 TABLET ORAL at 06:06

## 2020-01-01 RX ADMIN — POLYETHYLENE GLYCOL 3350 1 PACKET: 17 POWDER, FOR SOLUTION ORAL at 17:52

## 2020-01-01 RX ADMIN — MULTIPLE VITAMINS W/ MINERALS TAB 1 TABLET: TAB at 06:07

## 2020-01-01 RX ADMIN — TAMSULOSIN HYDROCHLORIDE 0.4 MG: 0.4 CAPSULE ORAL at 17:23

## 2020-01-01 RX ADMIN — QUETIAPINE FUMARATE 50 MG: 25 TABLET ORAL at 18:07

## 2020-01-01 RX ADMIN — RIVAROXABAN 15 MG: 15 TABLET, FILM COATED ORAL at 17:09

## 2020-01-01 RX ADMIN — RIVAROXABAN 15 MG: 15 TABLET, FILM COATED ORAL at 17:22

## 2020-01-01 RX ADMIN — LOSARTAN POTASSIUM 100 MG: 25 TABLET ORAL at 05:19

## 2020-01-01 RX ADMIN — ATORVASTATIN CALCIUM 10 MG: 10 TABLET, FILM COATED ORAL at 21:28

## 2020-01-01 RX ADMIN — SENNOSIDES AND DOCUSATE SODIUM 2 TABLET: 8.6; 5 TABLET ORAL at 17:11

## 2020-01-01 RX ADMIN — SENNOSIDES AND DOCUSATE SODIUM 2 TABLET: 8.6; 5 TABLET ORAL at 17:55

## 2020-01-01 RX ADMIN — SUCRALFATE 1 G: 1 TABLET ORAL at 21:54

## 2020-01-01 RX ADMIN — RIVAROXABAN 15 MG: 15 TABLET, FILM COATED ORAL at 18:15

## 2020-01-01 RX ADMIN — RIVAROXABAN 15 MG: 15 TABLET, FILM COATED ORAL at 16:52

## 2020-01-01 RX ADMIN — ERGOCALCIFEROL 50000 UNITS: 1.25 CAPSULE ORAL at 08:45

## 2020-01-01 RX ADMIN — QUETIAPINE FUMARATE 50 MG: 25 TABLET ORAL at 18:02

## 2020-01-01 RX ADMIN — QUETIAPINE FUMARATE 50 MG: 25 TABLET ORAL at 17:43

## 2020-01-01 RX ADMIN — ATORVASTATIN CALCIUM 10 MG: 10 TABLET, FILM COATED ORAL at 20:43

## 2020-01-01 RX ADMIN — DRONABINOL 5 MG: 2.5 CAPSULE ORAL at 05:45

## 2020-01-01 RX ADMIN — LOSARTAN POTASSIUM 100 MG: 25 TABLET ORAL at 06:28

## 2020-01-01 RX ADMIN — LOSARTAN POTASSIUM 100 MG: 25 TABLET ORAL at 06:44

## 2020-01-01 RX ADMIN — TAMSULOSIN HYDROCHLORIDE 0.4 MG: 0.4 CAPSULE ORAL at 17:55

## 2020-01-01 RX ADMIN — SUCRALFATE 1 G: 1 TABLET ORAL at 14:36

## 2020-01-01 RX ADMIN — RIVAROXABAN 15 MG: 15 TABLET, FILM COATED ORAL at 17:00

## 2020-01-01 RX ADMIN — RIVAROXABAN 15 MG: 15 TABLET, FILM COATED ORAL at 17:20

## 2020-01-01 RX ADMIN — RIVAROXABAN 15 MG: 15 TABLET, FILM COATED ORAL at 17:15

## 2020-01-01 RX ADMIN — FINASTERIDE 5 MG: 5 TABLET, FILM COATED ORAL at 05:44

## 2020-01-01 RX ADMIN — MIRTAZAPINE 15 MG: 15 TABLET, ORALLY DISINTEGRATING ORAL at 20:26

## 2020-01-01 RX ADMIN — DRONABINOL 5 MG: 2.5 CAPSULE ORAL at 17:52

## 2020-01-01 RX ADMIN — ATORVASTATIN CALCIUM 10 MG: 10 TABLET, FILM COATED ORAL at 21:17

## 2020-01-01 RX ADMIN — AMLODIPINE BESYLATE 10 MG: 5 TABLET ORAL at 05:53

## 2020-01-01 RX ADMIN — ERGOCALCIFEROL 50000 UNITS: 1.25 CAPSULE ORAL at 09:58

## 2020-01-01 RX ADMIN — MULTIPLE VITAMINS W/ MINERALS TAB 1 TABLET: TAB at 05:17

## 2020-01-01 RX ADMIN — QUETIAPINE FUMARATE 50 MG: 25 TABLET ORAL at 17:05

## 2020-01-01 RX ADMIN — OMEPRAZOLE 20 MG: 20 CAPSULE, DELAYED RELEASE ORAL at 17:09

## 2020-01-01 RX ADMIN — TAMSULOSIN HYDROCHLORIDE 0.4 MG: 0.4 CAPSULE ORAL at 18:08

## 2020-01-01 RX ADMIN — SUCRALFATE 1 G: 1 TABLET ORAL at 17:42

## 2020-01-01 RX ADMIN — LOSARTAN POTASSIUM 50 MG: 25 TABLET ORAL at 06:06

## 2020-01-01 RX ADMIN — OMEPRAZOLE 20 MG: 20 CAPSULE, DELAYED RELEASE ORAL at 06:08

## 2020-01-01 RX ADMIN — TAMSULOSIN HYDROCHLORIDE 0.4 MG: 0.4 CAPSULE ORAL at 16:53

## 2020-01-01 RX ADMIN — ATORVASTATIN CALCIUM 10 MG: 10 TABLET, FILM COATED ORAL at 23:44

## 2020-01-01 RX ADMIN — LOSARTAN POTASSIUM 100 MG: 25 TABLET ORAL at 05:50

## 2020-01-01 RX ADMIN — TAMSULOSIN HYDROCHLORIDE 0.4 MG: 0.4 CAPSULE ORAL at 17:15

## 2020-01-01 RX ADMIN — DRONABINOL 5 MG: 2.5 CAPSULE ORAL at 05:29

## 2020-01-01 RX ADMIN — RIVAROXABAN 15 MG: 15 TABLET, FILM COATED ORAL at 17:21

## 2020-01-01 RX ADMIN — MIRTAZAPINE 15 MG: 15 TABLET, ORALLY DISINTEGRATING ORAL at 22:43

## 2020-01-01 RX ADMIN — QUETIAPINE FUMARATE 50 MG: 25 TABLET ORAL at 17:00

## 2020-01-01 RX ADMIN — LOSARTAN POTASSIUM 100 MG: 25 TABLET ORAL at 06:46

## 2020-01-01 RX ADMIN — ATORVASTATIN CALCIUM 10 MG: 10 TABLET, FILM COATED ORAL at 20:38

## 2020-01-01 RX ADMIN — TAMSULOSIN HYDROCHLORIDE 0.4 MG: 0.4 CAPSULE ORAL at 17:22

## 2020-01-01 RX ADMIN — ATORVASTATIN CALCIUM 10 MG: 10 TABLET, FILM COATED ORAL at 23:24

## 2020-01-01 RX ADMIN — QUETIAPINE FUMARATE 50 MG: 25 TABLET ORAL at 17:18

## 2020-01-01 RX ADMIN — DRONABINOL 5 MG: 2.5 CAPSULE ORAL at 17:34

## 2020-01-01 RX ADMIN — TAMSULOSIN HYDROCHLORIDE 0.4 MG: 0.4 CAPSULE ORAL at 17:10

## 2020-01-01 RX ADMIN — DRONABINOL 5 MG: 2.5 CAPSULE ORAL at 17:21

## 2020-01-01 RX ADMIN — LOSARTAN POTASSIUM 50 MG: 25 TABLET ORAL at 05:42

## 2020-01-01 RX ADMIN — QUETIAPINE FUMARATE 50 MG: 25 TABLET ORAL at 18:05

## 2020-01-01 RX ADMIN — LOSARTAN POTASSIUM 100 MG: 25 TABLET ORAL at 06:03

## 2020-01-01 RX ADMIN — LOSARTAN POTASSIUM 100 MG: 25 TABLET ORAL at 05:40

## 2020-01-01 RX ADMIN — ATORVASTATIN CALCIUM 10 MG: 10 TABLET, FILM COATED ORAL at 23:21

## 2020-01-01 RX ADMIN — TAMSULOSIN HYDROCHLORIDE 0.4 MG: 0.4 CAPSULE ORAL at 18:13

## 2020-01-01 RX ADMIN — AMLODIPINE BESYLATE 10 MG: 5 TABLET ORAL at 05:19

## 2020-01-01 RX ADMIN — SENNOSIDES AND DOCUSATE SODIUM 2 TABLET: 8.6; 5 TABLET ORAL at 05:10

## 2020-01-01 RX ADMIN — QUETIAPINE FUMARATE 50 MG: 25 TABLET ORAL at 17:36

## 2020-01-01 RX ADMIN — AMLODIPINE BESYLATE 10 MG: 5 TABLET ORAL at 08:56

## 2020-01-01 RX ADMIN — QUETIAPINE FUMARATE 50 MG: 25 TABLET ORAL at 17:34

## 2020-01-01 RX ADMIN — SUCRALFATE 1 G: 1 TABLET ORAL at 09:58

## 2020-01-01 RX ADMIN — ERGOCALCIFEROL 50000 UNITS: 1.25 CAPSULE ORAL at 09:17

## 2020-01-01 RX ADMIN — AMLODIPINE BESYLATE 10 MG: 5 TABLET ORAL at 06:38

## 2020-01-01 RX ADMIN — QUETIAPINE FUMARATE 50 MG: 25 TABLET ORAL at 18:55

## 2020-01-01 RX ADMIN — OMEPRAZOLE 20 MG: 20 CAPSULE, DELAYED RELEASE ORAL at 05:52

## 2020-01-01 RX ADMIN — DRONABINOL 5 MG: 2.5 CAPSULE ORAL at 17:02

## 2020-01-01 RX ADMIN — DRONABINOL 5 MG: 2.5 CAPSULE ORAL at 05:44

## 2020-01-01 RX ADMIN — DRONABINOL 5 MG: 2.5 CAPSULE ORAL at 06:32

## 2020-01-01 RX ADMIN — RIVAROXABAN 15 MG: 15 TABLET, FILM COATED ORAL at 17:55

## 2020-01-01 RX ADMIN — DRONABINOL 5 MG: 2.5 CAPSULE ORAL at 17:18

## 2020-01-01 RX ADMIN — SUCRALFATE 1 G: 1 TABLET ORAL at 08:31

## 2020-01-01 RX ADMIN — AMLODIPINE BESYLATE 10 MG: 5 TABLET ORAL at 05:21

## 2020-01-01 RX ADMIN — FINASTERIDE 5 MG: 5 TABLET, FILM COATED ORAL at 07:38

## 2020-01-01 RX ADMIN — SENNOSIDES AND DOCUSATE SODIUM 2 TABLET: 8.6; 5 TABLET ORAL at 08:12

## 2020-01-01 RX ADMIN — QUETIAPINE FUMARATE 50 MG: 25 TABLET ORAL at 18:37

## 2020-01-01 RX ADMIN — QUETIAPINE FUMARATE 50 MG: 25 TABLET ORAL at 17:31

## 2020-01-01 RX ADMIN — TAMSULOSIN HYDROCHLORIDE 0.4 MG: 0.4 CAPSULE ORAL at 18:15

## 2020-01-01 RX ADMIN — QUETIAPINE FUMARATE 50 MG: 25 TABLET ORAL at 17:24

## 2020-01-01 RX ADMIN — MINERAL OIL AND PETROLATUM 1 APPLICATION: 150; 830 OINTMENT OPHTHALMIC at 13:07

## 2020-01-01 RX ADMIN — SENNOSIDES AND DOCUSATE SODIUM 2 TABLET: 8.6; 5 TABLET ORAL at 05:47

## 2020-01-01 RX ADMIN — FINASTERIDE 5 MG: 5 TABLET, FILM COATED ORAL at 07:23

## 2020-01-01 RX ADMIN — SUCRALFATE 1 G: 1 TABLET ORAL at 18:04

## 2020-01-01 RX ADMIN — QUETIAPINE FUMARATE 50 MG: 25 TABLET ORAL at 17:28

## 2020-01-01 RX ADMIN — MINERAL OIL AND PETROLATUM 1 APPLICATION: 150; 830 OINTMENT OPHTHALMIC at 15:03

## 2020-01-01 RX ADMIN — ATORVASTATIN CALCIUM 10 MG: 10 TABLET, FILM COATED ORAL at 20:51

## 2020-01-01 RX ADMIN — QUETIAPINE FUMARATE 50 MG: 25 TABLET ORAL at 18:47

## 2020-01-01 RX ADMIN — TAMSULOSIN HYDROCHLORIDE 0.4 MG: 0.4 CAPSULE ORAL at 18:55

## 2020-01-01 RX ADMIN — DRONABINOL 5 MG: 2.5 CAPSULE ORAL at 08:56

## 2020-01-01 RX ADMIN — OXYCODONE HYDROCHLORIDE 5 MG: 5 TABLET ORAL at 05:12

## 2020-01-01 RX ADMIN — OMEPRAZOLE 20 MG: 20 CAPSULE, DELAYED RELEASE ORAL at 18:24

## 2020-01-01 RX ADMIN — SUCRALFATE 1 G: 1 TABLET ORAL at 20:43

## 2020-01-01 RX ADMIN — MULTIPLE VITAMINS W/ MINERALS TAB 1 TABLET: TAB at 07:38

## 2020-01-01 RX ADMIN — TAMSULOSIN HYDROCHLORIDE 0.4 MG: 0.4 CAPSULE ORAL at 18:39

## 2020-01-01 RX ADMIN — DRONABINOL 5 MG: 2.5 CAPSULE ORAL at 07:14

## 2020-01-01 RX ADMIN — OMEPRAZOLE 20 MG: 20 CAPSULE, DELAYED RELEASE ORAL at 05:15

## 2020-01-01 RX ADMIN — LOSARTAN POTASSIUM 100 MG: 25 TABLET ORAL at 07:41

## 2020-01-01 RX ADMIN — QUETIAPINE FUMARATE 50 MG: 25 TABLET ORAL at 17:44

## 2020-01-01 RX ADMIN — MULTIPLE VITAMINS W/ MINERALS TAB 1 TABLET: TAB at 05:46

## 2020-01-01 RX ADMIN — AMLODIPINE BESYLATE 5 MG: 5 TABLET ORAL at 06:02

## 2020-01-01 RX ADMIN — QUETIAPINE FUMARATE 50 MG: 25 TABLET ORAL at 18:10

## 2020-01-01 RX ADMIN — AMLODIPINE BESYLATE 10 MG: 5 TABLET ORAL at 05:39

## 2020-01-01 RX ADMIN — RIVAROXABAN 15 MG: 15 TABLET, FILM COATED ORAL at 18:23

## 2020-01-01 RX ADMIN — MINERAL OIL AND PETROLATUM 1 APPLICATION: 150; 830 OINTMENT OPHTHALMIC at 19:42

## 2020-01-01 RX ADMIN — ATORVASTATIN CALCIUM 10 MG: 10 TABLET, FILM COATED ORAL at 22:11

## 2020-01-01 RX ADMIN — LOSARTAN POTASSIUM 100 MG: 25 TABLET ORAL at 06:01

## 2020-01-01 RX ADMIN — LOSARTAN POTASSIUM 100 MG: 25 TABLET ORAL at 08:11

## 2020-01-01 RX ADMIN — AMLODIPINE BESYLATE 10 MG: 5 TABLET ORAL at 05:40

## 2020-01-01 RX ADMIN — LOSARTAN POTASSIUM 50 MG: 25 TABLET ORAL at 06:03

## 2020-01-01 RX ADMIN — DRONABINOL 5 MG: 2.5 CAPSULE ORAL at 05:09

## 2020-01-01 RX ADMIN — QUETIAPINE FUMARATE 50 MG: 25 TABLET ORAL at 18:24

## 2020-01-01 RX ADMIN — QUETIAPINE FUMARATE 50 MG: 25 TABLET ORAL at 17:23

## 2020-01-01 RX ADMIN — DRONABINOL 5 MG: 2.5 CAPSULE ORAL at 06:01

## 2020-01-01 RX ADMIN — OMEPRAZOLE 20 MG: 20 CAPSULE, DELAYED RELEASE ORAL at 05:42

## 2020-01-01 RX ADMIN — ATORVASTATIN CALCIUM 10 MG: 10 TABLET, FILM COATED ORAL at 19:28

## 2020-01-01 RX ADMIN — AMLODIPINE BESYLATE 10 MG: 5 TABLET ORAL at 05:44

## 2020-01-01 RX ADMIN — MIRTAZAPINE 15 MG: 15 TABLET, ORALLY DISINTEGRATING ORAL at 19:45

## 2020-01-01 RX ADMIN — QUETIAPINE FUMARATE 50 MG: 25 TABLET ORAL at 16:51

## 2020-01-01 RX ADMIN — RIVAROXABAN 15 MG: 15 TABLET, FILM COATED ORAL at 17:26

## 2020-01-01 RX ADMIN — SENNOSIDES AND DOCUSATE SODIUM 2 TABLET: 8.6; 5 TABLET ORAL at 06:34

## 2020-01-01 RX ADMIN — AMLODIPINE BESYLATE 5 MG: 5 TABLET ORAL at 05:40

## 2020-01-01 RX ADMIN — DRONABINOL 5 MG: 2.5 CAPSULE ORAL at 17:33

## 2020-01-01 RX ADMIN — SUCRALFATE 1 G: 1 TABLET ORAL at 13:36

## 2020-01-01 RX ADMIN — DRONABINOL 5 MG: 2.5 CAPSULE ORAL at 05:10

## 2020-01-01 RX ADMIN — OMEPRAZOLE 20 MG: 20 CAPSULE, DELAYED RELEASE ORAL at 17:10

## 2020-01-01 RX ADMIN — RIVAROXABAN 15 MG: 15 TABLET, FILM COATED ORAL at 17:28

## 2020-01-01 RX ADMIN — RIVAROXABAN 15 MG: 15 TABLET, FILM COATED ORAL at 20:41

## 2020-01-01 RX ADMIN — QUETIAPINE FUMARATE 50 MG: 25 TABLET ORAL at 17:21

## 2020-01-01 RX ADMIN — QUETIAPINE FUMARATE 50 MG: 25 TABLET ORAL at 17:12

## 2020-01-01 RX ADMIN — LOSARTAN POTASSIUM 50 MG: 25 TABLET ORAL at 05:51

## 2020-01-01 RX ADMIN — QUETIAPINE FUMARATE 50 MG: 25 TABLET ORAL at 17:25

## 2020-01-01 RX ADMIN — AMLODIPINE BESYLATE 5 MG: 5 TABLET ORAL at 05:46

## 2020-01-01 RX ADMIN — MIRTAZAPINE 15 MG: 15 TABLET, ORALLY DISINTEGRATING ORAL at 22:33

## 2020-01-01 RX ADMIN — RIVAROXABAN 15 MG: 15 TABLET, FILM COATED ORAL at 17:23

## 2020-01-01 RX ADMIN — ACETAMINOPHEN 500 MG: 500 TABLET, FILM COATED ORAL at 18:07

## 2020-01-01 RX ADMIN — ERGOCALCIFEROL 50000 UNITS: 1.25 CAPSULE ORAL at 09:22

## 2020-01-01 RX ADMIN — AMLODIPINE BESYLATE 10 MG: 5 TABLET ORAL at 06:07

## 2020-01-01 RX ADMIN — DRONABINOL 5 MG: 2.5 CAPSULE ORAL at 17:15

## 2020-01-01 RX ADMIN — MULTIPLE VITAMINS W/ MINERALS TAB 1 TABLET: TAB at 05:57

## 2020-01-01 RX ADMIN — LOSARTAN POTASSIUM 100 MG: 25 TABLET ORAL at 06:02

## 2020-01-01 RX ADMIN — DRONABINOL 5 MG: 2.5 CAPSULE ORAL at 18:06

## 2020-01-01 RX ADMIN — QUETIAPINE FUMARATE 50 MG: 25 TABLET ORAL at 17:02

## 2020-01-01 RX ADMIN — SENNOSIDES AND DOCUSATE SODIUM 2 TABLET: 8.6; 5 TABLET ORAL at 19:21

## 2020-01-01 RX ADMIN — LOSARTAN POTASSIUM 100 MG: 25 TABLET ORAL at 05:53

## 2020-01-01 RX ADMIN — DRONABINOL 5 MG: 2.5 CAPSULE ORAL at 18:35

## 2020-01-01 RX ADMIN — AMLODIPINE BESYLATE 10 MG: 5 TABLET ORAL at 05:47

## 2020-01-01 RX ADMIN — DRONABINOL 5 MG: 2.5 CAPSULE ORAL at 17:26

## 2020-01-01 RX ADMIN — AMLODIPINE BESYLATE 10 MG: 5 TABLET ORAL at 08:34

## 2020-01-01 RX ADMIN — ATORVASTATIN CALCIUM 10 MG: 10 TABLET, FILM COATED ORAL at 20:34

## 2020-01-01 RX ADMIN — OXYCODONE HYDROCHLORIDE 5 MG: 5 TABLET ORAL at 02:00

## 2020-01-01 RX ADMIN — DRONABINOL 5 MG: 2.5 CAPSULE ORAL at 06:12

## 2020-01-01 RX ADMIN — SENNOSIDES AND DOCUSATE SODIUM 2 TABLET: 8.6; 5 TABLET ORAL at 17:02

## 2020-01-01 RX ADMIN — RIVAROXABAN 15 MG: 15 TABLET, FILM COATED ORAL at 18:39

## 2020-01-01 RX ADMIN — ACETAMINOPHEN 500 MG: 500 TABLET, FILM COATED ORAL at 17:35

## 2020-01-01 RX ADMIN — ERGOCALCIFEROL 50000 UNITS: 1.25 CAPSULE ORAL at 08:10

## 2020-01-01 RX ADMIN — OMEPRAZOLE 20 MG: 20 CAPSULE, DELAYED RELEASE ORAL at 17:11

## 2020-01-01 RX ADMIN — RIVAROXABAN 15 MG: 15 TABLET, FILM COATED ORAL at 18:02

## 2020-01-01 RX ADMIN — TAMSULOSIN HYDROCHLORIDE 0.4 MG: 0.4 CAPSULE ORAL at 18:45

## 2020-01-01 RX ADMIN — DRONABINOL 5 MG: 2.5 CAPSULE ORAL at 17:43

## 2020-01-01 RX ADMIN — AMLODIPINE BESYLATE 5 MG: 5 TABLET ORAL at 05:06

## 2020-01-01 RX ADMIN — OMEPRAZOLE 20 MG: 20 CAPSULE, DELAYED RELEASE ORAL at 17:42

## 2020-01-01 RX ADMIN — QUETIAPINE FUMARATE 50 MG: 25 TABLET ORAL at 18:04

## 2020-01-01 RX ADMIN — ATORVASTATIN CALCIUM 10 MG: 10 TABLET, FILM COATED ORAL at 20:29

## 2020-01-01 RX ADMIN — FINASTERIDE 5 MG: 5 TABLET, FILM COATED ORAL at 05:16

## 2020-01-01 RX ADMIN — LOSARTAN POTASSIUM 100 MG: 25 TABLET ORAL at 05:43

## 2020-01-01 RX ADMIN — AMLODIPINE BESYLATE 5 MG: 5 TABLET ORAL at 05:43

## 2020-01-01 RX ADMIN — OMEPRAZOLE 20 MG: 20 CAPSULE, DELAYED RELEASE ORAL at 06:06

## 2020-01-01 RX ADMIN — LOSARTAN POTASSIUM 100 MG: 25 TABLET ORAL at 06:08

## 2020-01-01 RX ADMIN — ERGOCALCIFEROL 50000 UNITS: 1.25 CAPSULE ORAL at 08:39

## 2020-01-01 RX ADMIN — QUETIAPINE FUMARATE 50 MG: 25 TABLET ORAL at 18:12

## 2020-01-01 RX ADMIN — LOSARTAN POTASSIUM 50 MG: 25 TABLET ORAL at 06:05

## 2020-01-01 RX ADMIN — ERGOCALCIFEROL 50000 UNITS: 1.25 CAPSULE ORAL at 09:16

## 2020-01-01 RX ADMIN — MIRTAZAPINE 15 MG: 15 TABLET, ORALLY DISINTEGRATING ORAL at 21:40

## 2020-01-01 RX ADMIN — DRONABINOL 5 MG: 2.5 CAPSULE ORAL at 06:46

## 2020-01-01 RX ADMIN — TAMSULOSIN HYDROCHLORIDE 0.4 MG: 0.4 CAPSULE ORAL at 17:24

## 2020-01-01 RX ADMIN — ATORVASTATIN CALCIUM 10 MG: 10 TABLET, FILM COATED ORAL at 21:37

## 2020-01-01 RX ADMIN — QUETIAPINE FUMARATE 50 MG: 25 TABLET ORAL at 18:15

## 2020-01-01 RX ADMIN — SUCRALFATE 1 G: 1 SUSPENSION ORAL at 21:28

## 2020-01-01 RX ADMIN — MAGNESIUM HYDROXIDE 30 ML: 400 SUSPENSION ORAL at 17:34

## 2020-01-01 RX ADMIN — DRONABINOL 5 MG: 2.5 CAPSULE ORAL at 18:13

## 2020-01-01 RX ADMIN — DRONABINOL 5 MG: 2.5 CAPSULE ORAL at 07:33

## 2020-01-01 RX ADMIN — LOSARTAN POTASSIUM 100 MG: 25 TABLET ORAL at 06:15

## 2020-01-01 RX ADMIN — AMLODIPINE BESYLATE 5 MG: 5 TABLET ORAL at 05:51

## 2020-01-01 RX ADMIN — ATORVASTATIN CALCIUM 10 MG: 10 TABLET, FILM COATED ORAL at 20:28

## 2020-01-01 RX ADMIN — QUETIAPINE FUMARATE 50 MG: 25 TABLET ORAL at 17:59

## 2020-01-01 RX ADMIN — RIVAROXABAN 15 MG: 15 TABLET, FILM COATED ORAL at 17:12

## 2020-01-01 RX ADMIN — LOSARTAN POTASSIUM 50 MG: 25 TABLET ORAL at 05:11

## 2020-01-01 RX ADMIN — ATORVASTATIN CALCIUM 10 MG: 10 TABLET, FILM COATED ORAL at 22:00

## 2020-01-01 RX ADMIN — ERGOCALCIFEROL 50000 UNITS: 1.25 CAPSULE ORAL at 12:29

## 2020-01-01 RX ADMIN — SUCRALFATE 1 G: 1 TABLET ORAL at 20:42

## 2020-01-01 RX ADMIN — POLYETHYLENE GLYCOL 3350 1 PACKET: 17 POWDER, FOR SOLUTION ORAL at 05:40

## 2020-01-01 RX ADMIN — TAMSULOSIN HYDROCHLORIDE 0.4 MG: 0.4 CAPSULE ORAL at 18:47

## 2020-01-01 RX ADMIN — ATORVASTATIN CALCIUM 10 MG: 10 TABLET, FILM COATED ORAL at 22:26

## 2020-01-01 RX ADMIN — SENNOSIDES AND DOCUSATE SODIUM 2 TABLET: 8.6; 5 TABLET ORAL at 17:27

## 2020-01-01 RX ADMIN — RIVAROXABAN 15 MG: 15 TABLET, FILM COATED ORAL at 18:07

## 2020-01-01 RX ADMIN — ATORVASTATIN CALCIUM 10 MG: 10 TABLET, FILM COATED ORAL at 22:09

## 2020-01-01 RX ADMIN — DRONABINOL 5 MG: 2.5 CAPSULE ORAL at 06:29

## 2020-01-01 RX ADMIN — TAMSULOSIN HYDROCHLORIDE 0.4 MG: 0.4 CAPSULE ORAL at 18:14

## 2020-01-01 RX ADMIN — QUETIAPINE FUMARATE 50 MG: 25 TABLET ORAL at 17:42

## 2020-01-01 RX ADMIN — TAMSULOSIN HYDROCHLORIDE 0.4 MG: 0.4 CAPSULE ORAL at 18:00

## 2020-01-01 RX ADMIN — LOSARTAN POTASSIUM 50 MG: 25 TABLET ORAL at 05:32

## 2020-01-01 RX ADMIN — DRONABINOL 5 MG: 2.5 CAPSULE ORAL at 18:01

## 2020-01-01 RX ADMIN — LOSARTAN POTASSIUM 100 MG: 25 TABLET ORAL at 05:49

## 2020-01-01 RX ADMIN — AMLODIPINE BESYLATE 10 MG: 5 TABLET ORAL at 08:55

## 2020-01-01 RX ADMIN — RIVAROXABAN 15 MG: 15 TABLET, FILM COATED ORAL at 17:27

## 2020-01-01 RX ADMIN — QUETIAPINE FUMARATE 50 MG: 25 TABLET ORAL at 17:09

## 2020-01-01 RX ADMIN — AMLODIPINE BESYLATE 10 MG: 5 TABLET ORAL at 05:16

## 2020-01-01 RX ADMIN — LOSARTAN POTASSIUM 100 MG: 25 TABLET ORAL at 05:20

## 2020-01-01 RX ADMIN — MIRTAZAPINE 15 MG: 15 TABLET, ORALLY DISINTEGRATING ORAL at 20:52

## 2020-01-01 RX ADMIN — RIVAROXABAN 15 MG: 15 TABLET, FILM COATED ORAL at 16:41

## 2020-01-01 RX ADMIN — LOSARTAN POTASSIUM 100 MG: 25 TABLET ORAL at 06:17

## 2020-01-01 RX ADMIN — TAMSULOSIN HYDROCHLORIDE 0.4 MG: 0.4 CAPSULE ORAL at 17:17

## 2020-01-01 RX ADMIN — MIRTAZAPINE 15 MG: 15 TABLET, ORALLY DISINTEGRATING ORAL at 20:38

## 2020-01-01 RX ADMIN — ATORVASTATIN CALCIUM 10 MG: 10 TABLET, FILM COATED ORAL at 21:40

## 2020-01-01 RX ADMIN — SENNOSIDES AND DOCUSATE SODIUM 2 TABLET: 8.6; 5 TABLET ORAL at 08:16

## 2020-01-01 RX ADMIN — DRONABINOL 5 MG: 2.5 CAPSULE ORAL at 08:35

## 2020-01-01 RX ADMIN — QUETIAPINE FUMARATE 50 MG: 25 TABLET ORAL at 18:14

## 2020-01-01 RX ADMIN — TAMSULOSIN HYDROCHLORIDE 0.4 MG: 0.4 CAPSULE ORAL at 17:59

## 2020-01-01 RX ADMIN — ATORVASTATIN CALCIUM 10 MG: 10 TABLET, FILM COATED ORAL at 00:12

## 2020-01-01 RX ADMIN — SENNOSIDES AND DOCUSATE SODIUM 2 TABLET: 8.6; 5 TABLET ORAL at 17:31

## 2020-01-01 RX ADMIN — OMEPRAZOLE 20 MG: 20 CAPSULE, DELAYED RELEASE ORAL at 05:58

## 2020-01-01 RX ADMIN — SUCRALFATE 1 G: 1 TABLET ORAL at 08:26

## 2020-01-01 RX ADMIN — QUETIAPINE FUMARATE 50 MG: 25 TABLET ORAL at 18:45

## 2020-01-01 RX ADMIN — OMEPRAZOLE 20 MG: 20 CAPSULE, DELAYED RELEASE ORAL at 05:37

## 2020-01-01 RX ADMIN — SUCRALFATE 1 G: 1 TABLET ORAL at 17:03

## 2020-01-01 RX ADMIN — DRONABINOL 5 MG: 2.5 CAPSULE ORAL at 16:29

## 2020-01-01 RX ADMIN — SUCRALFATE 1 G: 1 SUSPENSION ORAL at 07:38

## 2020-01-01 RX ADMIN — POLYETHYLENE GLYCOL 3350 1 PACKET: 17 POWDER, FOR SOLUTION ORAL at 17:31

## 2020-01-01 RX ADMIN — MULTIPLE VITAMINS W/ MINERALS TAB 1 TABLET: TAB at 05:11

## 2020-01-01 RX ADMIN — AMLODIPINE BESYLATE 10 MG: 5 TABLET ORAL at 05:59

## 2020-01-01 RX ADMIN — RIVAROXABAN 15 MG: 15 TABLET, FILM COATED ORAL at 18:13

## 2020-01-01 RX ADMIN — MULTIPLE VITAMINS W/ MINERALS TAB 1 TABLET: TAB at 05:51

## 2020-01-01 RX ADMIN — QUETIAPINE FUMARATE 50 MG: 25 TABLET ORAL at 20:40

## 2020-01-01 RX ADMIN — QUETIAPINE FUMARATE 50 MG: 25 TABLET ORAL at 17:27

## 2020-01-01 RX ADMIN — SENNOSIDES AND DOCUSATE SODIUM 2 TABLET: 8.6; 5 TABLET ORAL at 17:33

## 2020-01-01 RX ADMIN — MULTIPLE VITAMINS W/ MINERALS TAB 1 TABLET: TAB at 06:06

## 2020-01-01 RX ADMIN — ATORVASTATIN CALCIUM 10 MG: 10 TABLET, FILM COATED ORAL at 20:26

## 2020-01-01 RX ADMIN — LOSARTAN POTASSIUM 100 MG: 25 TABLET ORAL at 06:49

## 2020-01-01 RX ADMIN — LOSARTAN POTASSIUM 50 MG: 25 TABLET ORAL at 05:34

## 2020-01-01 RX ADMIN — RIVAROXABAN 15 MG: 15 TABLET, FILM COATED ORAL at 18:08

## 2020-01-01 RX ADMIN — RIVAROXABAN 15 MG: 15 TABLET, FILM COATED ORAL at 18:10

## 2020-01-01 RX ADMIN — SENNOSIDES AND DOCUSATE SODIUM 2 TABLET: 8.6; 5 TABLET ORAL at 17:14

## 2020-01-01 RX ADMIN — DRONABINOL 5 MG: 2.5 CAPSULE ORAL at 05:52

## 2020-01-01 RX ADMIN — DRONABINOL 5 MG: 2.5 CAPSULE ORAL at 05:20

## 2020-01-01 RX ADMIN — AMLODIPINE BESYLATE 10 MG: 5 TABLET ORAL at 05:17

## 2020-01-01 RX ADMIN — MIRTAZAPINE 15 MG: 15 TABLET, ORALLY DISINTEGRATING ORAL at 19:38

## 2020-01-01 RX ADMIN — DRONABINOL 5 MG: 2.5 CAPSULE ORAL at 06:06

## 2020-01-01 RX ADMIN — DRONABINOL 5 MG: 2.5 CAPSULE ORAL at 08:54

## 2020-01-01 RX ADMIN — QUETIAPINE FUMARATE 50 MG: 25 TABLET ORAL at 18:20

## 2020-01-01 RX ADMIN — TAMSULOSIN HYDROCHLORIDE 0.4 MG: 0.4 CAPSULE ORAL at 18:10

## 2020-01-01 RX ADMIN — AMLODIPINE BESYLATE 10 MG: 5 TABLET ORAL at 05:05

## 2020-01-01 RX ADMIN — POLYETHYLENE GLYCOL 3350 1 PACKET: 17 POWDER, FOR SOLUTION ORAL at 06:03

## 2020-01-01 RX ADMIN — DRONABINOL 5 MG: 2.5 CAPSULE ORAL at 05:16

## 2020-01-01 RX ADMIN — RIVAROXABAN 15 MG: 15 TABLET, FILM COATED ORAL at 18:03

## 2020-01-01 RX ADMIN — AMLODIPINE BESYLATE 10 MG: 5 TABLET ORAL at 08:14

## 2020-01-01 RX ADMIN — FINASTERIDE 5 MG: 5 TABLET, FILM COATED ORAL at 05:40

## 2020-01-01 RX ADMIN — ACETAMINOPHEN 500 MG: 500 TABLET, FILM COATED ORAL at 18:50

## 2020-01-01 RX ADMIN — AMLODIPINE BESYLATE 5 MG: 5 TABLET ORAL at 06:06

## 2020-01-01 RX ADMIN — AMLODIPINE BESYLATE 5 MG: 5 TABLET ORAL at 05:34

## 2020-01-01 RX ADMIN — TAMSULOSIN HYDROCHLORIDE 0.4 MG: 0.4 CAPSULE ORAL at 18:02

## 2020-01-01 RX ADMIN — RIVAROXABAN 15 MG: 15 TABLET, FILM COATED ORAL at 18:55

## 2020-01-01 RX ADMIN — QUETIAPINE FUMARATE 50 MG: 25 TABLET ORAL at 20:16

## 2020-01-01 RX ADMIN — ATORVASTATIN CALCIUM 10 MG: 10 TABLET, FILM COATED ORAL at 19:18

## 2020-01-01 RX ADMIN — MULTIPLE VITAMINS W/ MINERALS TAB 1 TABLET: TAB at 06:11

## 2020-01-01 RX ADMIN — ACETAMINOPHEN 500 MG: 500 TABLET, FILM COATED ORAL at 18:12

## 2020-01-01 RX ADMIN — TAMSULOSIN HYDROCHLORIDE 0.4 MG: 0.4 CAPSULE ORAL at 17:02

## 2020-01-01 RX ADMIN — ATORVASTATIN CALCIUM 10 MG: 10 TABLET, FILM COATED ORAL at 23:01

## 2020-01-01 RX ADMIN — DRONABINOL 5 MG: 2.5 CAPSULE ORAL at 18:05

## 2020-01-01 RX ADMIN — SENNOSIDES AND DOCUSATE SODIUM 2 TABLET: 8.6; 5 TABLET ORAL at 17:22

## 2020-01-01 RX ADMIN — AMLODIPINE BESYLATE 10 MG: 5 TABLET ORAL at 06:10

## 2020-01-01 RX ADMIN — LOSARTAN POTASSIUM 50 MG: 25 TABLET ORAL at 05:46

## 2020-01-01 RX ADMIN — LOSARTAN POTASSIUM 100 MG: 25 TABLET ORAL at 05:05

## 2020-01-01 RX ADMIN — DRONABINOL 5 MG: 2.5 CAPSULE ORAL at 05:23

## 2020-01-01 RX ADMIN — MINERAL OIL AND PETROLATUM 1 APPLICATION: 150; 830 OINTMENT OPHTHALMIC at 06:28

## 2020-01-01 RX ADMIN — TAMSULOSIN HYDROCHLORIDE 0.4 MG: 0.4 CAPSULE ORAL at 17:25

## 2020-01-01 RX ADMIN — AMLODIPINE BESYLATE 10 MG: 5 TABLET ORAL at 06:04

## 2020-01-01 RX ADMIN — MULTIPLE VITAMINS W/ MINERALS TAB 1 TABLET: TAB at 05:21

## 2020-01-01 RX ADMIN — TAMSULOSIN HYDROCHLORIDE 0.4 MG: 0.4 CAPSULE ORAL at 18:25

## 2020-01-01 RX ADMIN — SUCRALFATE 1 G: 1 TABLET ORAL at 17:09

## 2020-01-01 RX ADMIN — MULTIPLE VITAMINS W/ MINERALS TAB 1 TABLET: TAB at 05:32

## 2020-01-01 RX ADMIN — ATORVASTATIN CALCIUM 10 MG: 10 TABLET, FILM COATED ORAL at 20:07

## 2020-01-01 RX ADMIN — DRONABINOL 5 MG: 2.5 CAPSULE ORAL at 18:23

## 2020-01-01 RX ADMIN — LOSARTAN POTASSIUM 100 MG: 25 TABLET ORAL at 08:15

## 2020-01-01 RX ADMIN — DRONABINOL 5 MG: 2.5 CAPSULE ORAL at 19:20

## 2020-01-01 RX ADMIN — ATORVASTATIN CALCIUM 10 MG: 10 TABLET, FILM COATED ORAL at 21:25

## 2020-01-01 RX ADMIN — ATORVASTATIN CALCIUM 10 MG: 10 TABLET, FILM COATED ORAL at 20:20

## 2020-01-01 RX ADMIN — DRONABINOL 5 MG: 2.5 CAPSULE ORAL at 05:32

## 2020-01-01 RX ADMIN — RIVAROXABAN 15 MG: 15 TABLET, FILM COATED ORAL at 17:35

## 2020-01-01 RX ADMIN — TAMSULOSIN HYDROCHLORIDE 0.4 MG: 0.4 CAPSULE ORAL at 18:34

## 2020-01-01 RX ADMIN — QUETIAPINE FUMARATE 50 MG: 25 TABLET ORAL at 16:41

## 2020-01-01 RX ADMIN — AMLODIPINE BESYLATE 10 MG: 5 TABLET ORAL at 05:30

## 2020-01-01 RX ADMIN — MULTIPLE VITAMINS W/ MINERALS TAB 1 TABLET: TAB at 05:49

## 2020-01-01 RX ADMIN — POLYETHYLENE GLYCOL 3350 1 PACKET: 17 POWDER, FOR SOLUTION ORAL at 18:04

## 2020-01-01 RX ADMIN — AMLODIPINE BESYLATE 10 MG: 5 TABLET ORAL at 05:29

## 2020-01-01 RX ADMIN — DRONABINOL 5 MG: 2.5 CAPSULE ORAL at 07:41

## 2020-01-01 RX ADMIN — TAMSULOSIN HYDROCHLORIDE 0.4 MG: 0.4 CAPSULE ORAL at 17:28

## 2020-01-01 RX ADMIN — RIVAROXABAN 15 MG: 15 TABLET, FILM COATED ORAL at 17:05

## 2020-01-01 RX ADMIN — FINASTERIDE 5 MG: 5 TABLET, FILM COATED ORAL at 05:51

## 2020-01-01 RX ADMIN — AMLODIPINE BESYLATE 10 MG: 5 TABLET ORAL at 06:40

## 2020-01-01 RX ADMIN — SUCRALFATE 1 G: 1 TABLET ORAL at 09:06

## 2020-01-01 RX ADMIN — ATORVASTATIN CALCIUM 10 MG: 10 TABLET, FILM COATED ORAL at 20:16

## 2020-01-01 RX ADMIN — LOSARTAN POTASSIUM 100 MG: 25 TABLET ORAL at 06:10

## 2020-01-01 RX ADMIN — AMLODIPINE BESYLATE 10 MG: 5 TABLET ORAL at 06:29

## 2020-01-01 RX ADMIN — LOSARTAN POTASSIUM 100 MG: 25 TABLET ORAL at 05:23

## 2020-01-01 RX ADMIN — LOSARTAN POTASSIUM 100 MG: 25 TABLET ORAL at 05:51

## 2020-01-01 RX ADMIN — TAMSULOSIN HYDROCHLORIDE 0.4 MG: 0.4 CAPSULE ORAL at 17:40

## 2020-01-01 RX ADMIN — ACETAMINOPHEN 500 MG: 500 TABLET, FILM COATED ORAL at 05:59

## 2020-01-01 RX ADMIN — OMEPRAZOLE 20 MG: 20 CAPSULE, DELAYED RELEASE ORAL at 07:38

## 2020-01-01 RX ADMIN — ATORVASTATIN CALCIUM 10 MG: 10 TABLET, FILM COATED ORAL at 00:57

## 2020-01-01 RX ADMIN — DRONABINOL 5 MG: 2.5 CAPSULE ORAL at 18:10

## 2020-01-01 RX ADMIN — RIVAROXABAN 15 MG: 15 TABLET, FILM COATED ORAL at 17:02

## 2020-01-01 RX ADMIN — LOSARTAN POTASSIUM 100 MG: 25 TABLET ORAL at 04:04

## 2020-01-01 RX ADMIN — AMLODIPINE BESYLATE 10 MG: 5 TABLET ORAL at 07:15

## 2020-01-01 RX ADMIN — AMLODIPINE BESYLATE 10 MG: 5 TABLET ORAL at 05:49

## 2020-01-01 RX ADMIN — DRONABINOL 5 MG: 2.5 CAPSULE ORAL at 18:07

## 2020-01-01 RX ADMIN — LOSARTAN POTASSIUM 100 MG: 25 TABLET ORAL at 06:40

## 2020-01-01 RX ADMIN — ATORVASTATIN CALCIUM 10 MG: 10 TABLET, FILM COATED ORAL at 21:01

## 2020-01-01 RX ADMIN — AMLODIPINE BESYLATE 10 MG: 5 TABLET ORAL at 06:02

## 2020-01-01 RX ADMIN — QUETIAPINE FUMARATE 50 MG: 25 TABLET ORAL at 18:03

## 2020-01-01 RX ADMIN — QUETIAPINE FUMARATE 50 MG: 25 TABLET ORAL at 17:40

## 2020-01-01 RX ADMIN — POLYETHYLENE GLYCOL 3350 1 PACKET: 17 POWDER, FOR SOLUTION ORAL at 17:39

## 2020-01-01 RX ADMIN — DRONABINOL 5 MG: 2.5 CAPSULE ORAL at 05:53

## 2020-01-01 RX ADMIN — RIVAROXABAN 15 MG: 15 TABLET, FILM COATED ORAL at 18:20

## 2020-01-01 RX ADMIN — TAMSULOSIN HYDROCHLORIDE 0.4 MG: 0.4 CAPSULE ORAL at 17:37

## 2020-01-01 RX ADMIN — OMEPRAZOLE 20 MG: 20 CAPSULE, DELAYED RELEASE ORAL at 05:08

## 2020-01-01 RX ADMIN — ATORVASTATIN CALCIUM 10 MG: 10 TABLET, FILM COATED ORAL at 20:03

## 2020-01-01 RX ADMIN — AMLODIPINE BESYLATE 10 MG: 5 TABLET ORAL at 05:23

## 2020-01-01 RX ADMIN — AMLODIPINE BESYLATE 5 MG: 5 TABLET ORAL at 06:05

## 2020-01-01 RX ADMIN — TAMSULOSIN HYDROCHLORIDE 0.4 MG: 0.4 CAPSULE ORAL at 18:04

## 2020-01-01 RX ADMIN — ATORVASTATIN CALCIUM 10 MG: 10 TABLET, FILM COATED ORAL at 00:11

## 2020-01-01 RX ADMIN — ATORVASTATIN CALCIUM 10 MG: 10 TABLET, FILM COATED ORAL at 22:43

## 2020-01-01 RX ADMIN — QUETIAPINE FUMARATE 50 MG: 25 TABLET ORAL at 17:55

## 2020-01-01 RX ADMIN — DRONABINOL 5 MG: 2.5 CAPSULE ORAL at 20:16

## 2020-01-01 RX ADMIN — TAMSULOSIN HYDROCHLORIDE 0.4 MG: 0.4 CAPSULE ORAL at 17:52

## 2020-01-01 RX ADMIN — FINASTERIDE 5 MG: 5 TABLET, FILM COATED ORAL at 05:05

## 2020-01-01 RX ADMIN — LOSARTAN POTASSIUM 100 MG: 25 TABLET ORAL at 05:58

## 2020-01-01 RX ADMIN — DRONABINOL 2.5 MG: 2.5 CAPSULE ORAL at 06:21

## 2020-01-01 RX ADMIN — TAMSULOSIN HYDROCHLORIDE 0.4 MG: 0.4 CAPSULE ORAL at 17:54

## 2020-01-01 RX ADMIN — DRONABINOL 5 MG: 2.5 CAPSULE ORAL at 17:10

## 2020-01-01 RX ADMIN — QUETIAPINE FUMARATE 50 MG: 25 TABLET ORAL at 16:59

## 2020-01-01 RX ADMIN — DRONABINOL 5 MG: 2.5 CAPSULE ORAL at 04:04

## 2020-01-01 RX ADMIN — DRONABINOL 5 MG: 2.5 CAPSULE ORAL at 17:29

## 2020-01-01 RX ADMIN — TAMSULOSIN HYDROCHLORIDE 0.4 MG: 0.4 CAPSULE ORAL at 17:11

## 2020-01-01 RX ADMIN — DRONABINOL 5 MG: 2.5 CAPSULE ORAL at 17:23

## 2020-01-01 RX ADMIN — DRONABINOL 5 MG: 2.5 CAPSULE ORAL at 06:40

## 2020-01-01 RX ADMIN — LOSARTAN POTASSIUM 50 MG: 25 TABLET ORAL at 07:18

## 2020-01-01 RX ADMIN — SUCRALFATE 1 G: 1 TABLET ORAL at 12:46

## 2020-01-01 RX ADMIN — RIVAROXABAN 15 MG: 15 TABLET, FILM COATED ORAL at 17:59

## 2020-01-01 RX ADMIN — DRONABINOL 5 MG: 2.5 CAPSULE ORAL at 17:12

## 2020-01-01 RX ADMIN — ATORVASTATIN CALCIUM 10 MG: 10 TABLET, FILM COATED ORAL at 21:57

## 2020-01-01 RX ADMIN — LOSARTAN POTASSIUM 100 MG: 25 TABLET ORAL at 05:17

## 2020-01-01 RX ADMIN — TAMSULOSIN HYDROCHLORIDE 0.4 MG: 0.4 CAPSULE ORAL at 18:20

## 2020-01-01 RX ADMIN — AMLODIPINE BESYLATE 10 MG: 5 TABLET ORAL at 04:49

## 2020-01-01 RX ADMIN — LOSARTAN POTASSIUM 100 MG: 25 TABLET ORAL at 05:28

## 2020-01-01 RX ADMIN — LOSARTAN POTASSIUM 100 MG: 25 TABLET ORAL at 06:42

## 2020-01-01 RX ADMIN — LOSARTAN POTASSIUM 100 MG: 25 TABLET ORAL at 05:52

## 2020-01-01 RX ADMIN — OMEPRAZOLE 20 MG: 20 CAPSULE, DELAYED RELEASE ORAL at 05:43

## 2020-01-01 RX ADMIN — ATORVASTATIN CALCIUM 10 MG: 10 TABLET, FILM COATED ORAL at 20:05

## 2020-01-01 RX ADMIN — ATORVASTATIN CALCIUM 10 MG: 10 TABLET, FILM COATED ORAL at 20:42

## 2020-01-01 RX ADMIN — LOSARTAN POTASSIUM 50 MG: 25 TABLET ORAL at 05:55

## 2020-01-01 RX ADMIN — RIVAROXABAN 15 MG: 15 TABLET, FILM COATED ORAL at 18:04

## 2020-01-01 RX ADMIN — DRONABINOL 5 MG: 2.5 CAPSULE ORAL at 05:21

## 2020-01-01 RX ADMIN — RIVAROXABAN 15 MG: 15 TABLET, FILM COATED ORAL at 16:29

## 2020-01-01 RX ADMIN — ATORVASTATIN CALCIUM 10 MG: 10 TABLET, FILM COATED ORAL at 20:59

## 2020-01-01 RX ADMIN — SENNOSIDES AND DOCUSATE SODIUM 2 TABLET: 8.6; 5 TABLET ORAL at 06:43

## 2020-01-01 RX ADMIN — SUCRALFATE 1 G: 1 TABLET ORAL at 20:29

## 2020-01-01 RX ADMIN — DRONABINOL 5 MG: 2.5 CAPSULE ORAL at 06:08

## 2020-01-01 RX ADMIN — AMLODIPINE BESYLATE 10 MG: 5 TABLET ORAL at 07:10

## 2020-01-01 RX ADMIN — RIVAROXABAN 15 MG: 15 TABLET, FILM COATED ORAL at 17:25

## 2020-01-01 RX ADMIN — ERGOCALCIFEROL 50000 UNITS: 1.25 CAPSULE ORAL at 07:46

## 2020-01-01 RX ADMIN — ACETAMINOPHEN 500 MG: 500 TABLET, FILM COATED ORAL at 05:36

## 2020-01-01 RX ADMIN — SENNOSIDES AND DOCUSATE SODIUM 2 TABLET: 8.6; 5 TABLET ORAL at 18:37

## 2020-01-01 RX ADMIN — QUETIAPINE FUMARATE 50 MG: 25 TABLET ORAL at 17:11

## 2020-01-01 RX ADMIN — AMLODIPINE BESYLATE 10 MG: 5 TABLET ORAL at 05:32

## 2020-01-01 RX ADMIN — RIVAROXABAN 15 MG: 15 TABLET, FILM COATED ORAL at 17:11

## 2020-01-01 RX ADMIN — LOSARTAN POTASSIUM 50 MG: 25 TABLET ORAL at 05:18

## 2020-01-01 RX ADMIN — QUETIAPINE FUMARATE 50 MG: 25 TABLET ORAL at 17:30

## 2020-01-01 RX ADMIN — TAMSULOSIN HYDROCHLORIDE 0.4 MG: 0.4 CAPSULE ORAL at 16:29

## 2020-01-01 RX ADMIN — RIVAROXABAN 15 MG: 15 TABLET, FILM COATED ORAL at 17:10

## 2020-01-01 RX ADMIN — DRONABINOL 5 MG: 2.5 CAPSULE ORAL at 17:24

## 2020-01-01 RX ADMIN — RIVAROXABAN 15 MG: 15 TABLET, FILM COATED ORAL at 17:04

## 2020-01-01 RX ADMIN — RIVAROXABAN 15 MG: 15 TABLET, FILM COATED ORAL at 17:48

## 2020-01-01 RX ADMIN — DRONABINOL 5 MG: 2.5 CAPSULE ORAL at 08:14

## 2020-01-01 RX ADMIN — TAMSULOSIN HYDROCHLORIDE 0.4 MG: 0.4 CAPSULE ORAL at 17:18

## 2020-01-01 RX ADMIN — LOSARTAN POTASSIUM 50 MG: 25 TABLET ORAL at 05:57

## 2020-01-01 RX ADMIN — AMLODIPINE BESYLATE 10 MG: 5 TABLET ORAL at 04:04

## 2020-01-01 RX ADMIN — SUCRALFATE 1 G: 1 TABLET ORAL at 20:13

## 2020-01-01 RX ADMIN — SUCRALFATE 1 G: 1 SUSPENSION ORAL at 17:11

## 2020-01-01 RX ADMIN — QUETIAPINE FUMARATE 50 MG: 25 TABLET ORAL at 17:14

## 2020-01-01 RX ADMIN — DRONABINOL 5 MG: 2.5 CAPSULE ORAL at 04:50

## 2020-01-01 RX ADMIN — ERGOCALCIFEROL 50000 UNITS: 1.25 CAPSULE ORAL at 10:05

## 2020-01-01 RX ADMIN — SUCRALFATE 1 G: 1 TABLET ORAL at 13:13

## 2020-01-01 RX ADMIN — MULTIPLE VITAMINS W/ MINERALS TAB 1 TABLET: TAB at 07:35

## 2020-01-01 RX ADMIN — QUETIAPINE FUMARATE 50 MG: 25 TABLET ORAL at 17:52

## 2020-01-01 RX ADMIN — MULTIPLE VITAMINS W/ MINERALS TAB 1 TABLET: TAB at 06:17

## 2020-01-01 RX ADMIN — TAMSULOSIN HYDROCHLORIDE 0.4 MG: 0.4 CAPSULE ORAL at 17:32

## 2020-01-01 RX ADMIN — RIVAROXABAN 15 MG: 15 TABLET, FILM COATED ORAL at 16:51

## 2020-01-01 RX ADMIN — OMEPRAZOLE 20 MG: 20 CAPSULE, DELAYED RELEASE ORAL at 17:45

## 2020-01-01 RX ADMIN — MULTIPLE VITAMINS W/ MINERALS TAB 1 TABLET: TAB at 05:43

## 2020-01-01 RX ADMIN — OMEPRAZOLE 20 MG: 20 CAPSULE, DELAYED RELEASE ORAL at 05:32

## 2020-01-01 RX ADMIN — RIVAROXABAN 15 MG: 15 TABLET, FILM COATED ORAL at 18:45

## 2020-01-01 RX ADMIN — DRONABINOL 5 MG: 2.5 CAPSULE ORAL at 17:00

## 2020-01-01 RX ADMIN — QUETIAPINE FUMARATE 50 MG: 25 TABLET ORAL at 17:17

## 2020-01-01 RX ADMIN — ACETAMINOPHEN 500 MG: 500 TABLET, FILM COATED ORAL at 05:48

## 2020-01-01 RX ADMIN — RIVAROXABAN 15 MG: 15 TABLET, FILM COATED ORAL at 16:55

## 2020-01-01 RX ADMIN — ATORVASTATIN CALCIUM 10 MG: 10 TABLET, FILM COATED ORAL at 20:00

## 2020-01-01 RX ADMIN — LOSARTAN POTASSIUM 100 MG: 25 TABLET ORAL at 05:11

## 2020-01-01 RX ADMIN — SENNOSIDES AND DOCUSATE SODIUM 2 TABLET: 8.6; 5 TABLET ORAL at 18:08

## 2020-01-01 RX ADMIN — QUETIAPINE FUMARATE 50 MG: 25 TABLET ORAL at 17:41

## 2020-01-01 RX ADMIN — AMLODIPINE BESYLATE 5 MG: 5 TABLET ORAL at 05:18

## 2020-01-01 RX ADMIN — LIDOCAINE 1 PATCH: 50 PATCH TOPICAL at 04:04

## 2020-01-01 RX ADMIN — LOSARTAN POTASSIUM 100 MG: 25 TABLET ORAL at 06:09

## 2020-01-01 RX ADMIN — DRONABINOL 5 MG: 2.5 CAPSULE ORAL at 17:37

## 2020-01-01 RX ADMIN — ATORVASTATIN CALCIUM 10 MG: 10 TABLET, FILM COATED ORAL at 20:57

## 2020-01-01 RX ADMIN — AMLODIPINE BESYLATE 10 MG: 5 TABLET ORAL at 07:41

## 2020-01-01 RX ADMIN — SUCRALFATE 1 G: 1 TABLET ORAL at 19:28

## 2020-01-01 RX ADMIN — MIRTAZAPINE 15 MG: 15 TABLET, ORALLY DISINTEGRATING ORAL at 22:11

## 2020-01-01 RX ADMIN — RIVAROXABAN 15 MG: 15 TABLET, FILM COATED ORAL at 18:11

## 2020-01-01 RX ADMIN — DRONABINOL 5 MG: 2.5 CAPSULE ORAL at 05:49

## 2020-01-01 RX ADMIN — AMLODIPINE BESYLATE 10 MG: 5 TABLET ORAL at 05:28

## 2020-01-01 RX ADMIN — LOSARTAN POTASSIUM 100 MG: 25 TABLET ORAL at 05:29

## 2020-01-01 RX ADMIN — TAMSULOSIN HYDROCHLORIDE 0.4 MG: 0.4 CAPSULE ORAL at 17:20

## 2020-01-01 RX ADMIN — MINERAL OIL AND PETROLATUM 1 APPLICATION: 150; 830 OINTMENT OPHTHALMIC at 14:18

## 2020-01-01 RX ADMIN — RIVAROXABAN 15 MG: 15 TABLET, FILM COATED ORAL at 18:22

## 2020-01-01 RX ADMIN — DRONABINOL 5 MG: 2.5 CAPSULE ORAL at 06:02

## 2020-01-01 RX ADMIN — AMLODIPINE BESYLATE 5 MG: 5 TABLET ORAL at 05:57

## 2020-01-01 RX ADMIN — ATORVASTATIN CALCIUM 10 MG: 10 TABLET, FILM COATED ORAL at 20:48

## 2020-01-01 RX ADMIN — SUCRALFATE 1 G: 1 TABLET ORAL at 09:36

## 2020-01-01 RX ADMIN — LOSARTAN POTASSIUM 100 MG: 25 TABLET ORAL at 07:33

## 2020-01-01 RX ADMIN — MULTIPLE VITAMINS W/ MINERALS TAB 1 TABLET: TAB at 05:40

## 2020-01-01 RX ADMIN — DRONABINOL 5 MG: 2.5 CAPSULE ORAL at 16:55

## 2020-01-01 RX ADMIN — OMEPRAZOLE 20 MG: 20 CAPSULE, DELAYED RELEASE ORAL at 05:17

## 2020-01-01 RX ADMIN — ATORVASTATIN CALCIUM 10 MG: 10 TABLET, FILM COATED ORAL at 23:03

## 2020-01-01 RX ADMIN — MULTIPLE VITAMINS W/ MINERALS TAB 1 TABLET: TAB at 06:01

## 2020-01-01 RX ADMIN — AMLODIPINE BESYLATE 5 MG: 5 TABLET ORAL at 05:50

## 2020-01-01 RX ADMIN — MINERAL OIL AND PETROLATUM 1 APPLICATION: 150; 830 OINTMENT OPHTHALMIC at 19:50

## 2020-01-01 RX ADMIN — LOSARTAN POTASSIUM 100 MG: 25 TABLET ORAL at 04:50

## 2020-01-01 RX ADMIN — TAMSULOSIN HYDROCHLORIDE 0.4 MG: 0.4 CAPSULE ORAL at 17:03

## 2020-01-01 RX ADMIN — TAMSULOSIN HYDROCHLORIDE 0.4 MG: 0.4 CAPSULE ORAL at 17:31

## 2020-01-01 RX ADMIN — QUETIAPINE FUMARATE 50 MG: 25 TABLET ORAL at 17:33

## 2020-01-01 RX ADMIN — RIVAROXABAN 15 MG: 15 TABLET, FILM COATED ORAL at 17:54

## 2020-01-01 RX ADMIN — TAMSULOSIN HYDROCHLORIDE 0.4 MG: 0.4 CAPSULE ORAL at 18:24

## 2020-01-01 RX ADMIN — OMEPRAZOLE 20 MG: 20 CAPSULE, DELAYED RELEASE ORAL at 05:30

## 2020-01-01 RX ADMIN — SENNOSIDES AND DOCUSATE SODIUM 2 TABLET: 8.6; 5 TABLET ORAL at 05:03

## 2020-01-01 RX ADMIN — DRONABINOL 5 MG: 2.5 CAPSULE ORAL at 18:39

## 2020-01-01 RX ADMIN — QUETIAPINE FUMARATE 50 MG: 25 TABLET ORAL at 17:49

## 2020-01-01 RX ADMIN — DRONABINOL 5 MG: 2.5 CAPSULE ORAL at 16:53

## 2020-01-01 RX ADMIN — RIVAROXABAN 15 MG: 15 TABLET, FILM COATED ORAL at 17:30

## 2020-01-01 RX ADMIN — DRONABINOL 5 MG: 2.5 CAPSULE ORAL at 08:12

## 2020-01-01 RX ADMIN — DRONABINOL 5 MG: 2.5 CAPSULE ORAL at 05:43

## 2020-01-01 RX ADMIN — SENNOSIDES AND DOCUSATE SODIUM 2 TABLET: 8.6; 5 TABLET ORAL at 06:38

## 2020-01-01 RX ADMIN — QUETIAPINE FUMARATE 50 MG: 25 TABLET ORAL at 17:45

## 2020-01-01 RX ADMIN — RIVAROXABAN 15 MG: 15 TABLET, FILM COATED ORAL at 16:22

## 2020-01-01 RX ADMIN — TAMSULOSIN HYDROCHLORIDE 0.4 MG: 0.4 CAPSULE ORAL at 18:05

## 2020-01-01 RX ADMIN — SENNOSIDES AND DOCUSATE SODIUM 2 TABLET: 8.6; 5 TABLET ORAL at 05:31

## 2020-01-01 RX ADMIN — RIVAROXABAN 15 MG: 15 TABLET, FILM COATED ORAL at 16:53

## 2020-01-01 RX ADMIN — ATORVASTATIN CALCIUM 10 MG: 10 TABLET, FILM COATED ORAL at 21:14

## 2020-01-01 RX ADMIN — MULTIPLE VITAMINS W/ MINERALS TAB 1 TABLET: TAB at 05:34

## 2020-01-01 RX ADMIN — DRONABINOL 5 MG: 2.5 CAPSULE ORAL at 06:15

## 2020-01-01 RX ADMIN — LIDOCAINE 1 PATCH: 50 PATCH TOPICAL at 05:21

## 2020-01-01 RX ADMIN — DRONABINOL 5 MG: 2.5 CAPSULE ORAL at 17:55

## 2020-01-01 RX ADMIN — RIVAROXABAN 15 MG: 15 TABLET, FILM COATED ORAL at 17:32

## 2020-01-01 RX ADMIN — TAMSULOSIN HYDROCHLORIDE 0.4 MG: 0.4 CAPSULE ORAL at 17:05

## 2020-01-01 RX ADMIN — DRONABINOL 5 MG: 2.5 CAPSULE ORAL at 17:39

## 2020-01-01 RX ADMIN — ATORVASTATIN CALCIUM 10 MG: 10 TABLET, FILM COATED ORAL at 19:09

## 2020-01-01 RX ADMIN — MULTIPLE VITAMINS W/ MINERALS TAB 1 TABLET: TAB at 05:18

## 2020-01-01 RX ADMIN — OMEPRAZOLE 20 MG: 20 CAPSULE, DELAYED RELEASE ORAL at 17:23

## 2020-01-01 RX ADMIN — DRONABINOL 5 MG: 2.5 CAPSULE ORAL at 06:10

## 2020-01-01 RX ADMIN — QUETIAPINE FUMARATE 50 MG: 25 TABLET ORAL at 18:06

## 2020-01-01 RX ADMIN — DRONABINOL 5 MG: 2.5 CAPSULE ORAL at 20:41

## 2020-01-01 RX ADMIN — MIRTAZAPINE 15 MG: 15 TABLET, ORALLY DISINTEGRATING ORAL at 22:26

## 2020-01-01 RX ADMIN — AMLODIPINE BESYLATE 5 MG: 5 TABLET ORAL at 05:32

## 2020-01-01 RX ADMIN — AMLODIPINE BESYLATE 10 MG: 5 TABLET ORAL at 05:37

## 2020-01-01 RX ADMIN — SODIUM CHLORIDE 1000 ML: 9 INJECTION, SOLUTION INTRAVENOUS at 10:56

## 2020-01-01 RX ADMIN — QUETIAPINE FUMARATE 50 MG: 25 TABLET ORAL at 17:48

## 2020-01-01 RX ADMIN — OMEPRAZOLE 20 MG: 20 CAPSULE, DELAYED RELEASE ORAL at 17:04

## 2020-01-01 RX ADMIN — LOSARTAN POTASSIUM 50 MG: 25 TABLET ORAL at 05:30

## 2020-01-01 RX ADMIN — ATORVASTATIN CALCIUM 10 MG: 10 TABLET, FILM COATED ORAL at 20:01

## 2020-01-01 RX ADMIN — RIVAROXABAN 15 MG: 15 TABLET, FILM COATED ORAL at 17:40

## 2020-01-01 RX ADMIN — AMLODIPINE BESYLATE 10 MG: 5 TABLET ORAL at 08:11

## 2020-01-01 RX ADMIN — DRONABINOL 5 MG: 2.5 CAPSULE ORAL at 06:03

## 2020-01-01 RX ADMIN — AMLODIPINE BESYLATE 10 MG: 5 TABLET ORAL at 06:15

## 2020-01-01 RX ADMIN — MULTIPLE VITAMINS W/ MINERALS TAB 1 TABLET: TAB at 06:29

## 2020-01-01 RX ADMIN — ATORVASTATIN CALCIUM 10 MG: 10 TABLET, FILM COATED ORAL at 21:19

## 2020-01-01 RX ADMIN — SUCRALFATE 1 G: 1 TABLET ORAL at 17:10

## 2020-01-01 RX ADMIN — DRONABINOL 5 MG: 2.5 CAPSULE ORAL at 17:59

## 2020-01-01 RX ADMIN — ACETAMINOPHEN 500 MG: 500 TABLET, FILM COATED ORAL at 06:16

## 2020-01-01 RX ADMIN — DRONABINOL 5 MG: 2.5 CAPSULE ORAL at 05:17

## 2020-01-01 RX ADMIN — DRONABINOL 5 MG: 2.5 CAPSULE ORAL at 06:38

## 2020-01-01 RX ADMIN — TAMSULOSIN HYDROCHLORIDE 0.4 MG: 0.4 CAPSULE ORAL at 17:43

## 2020-01-01 RX ADMIN — TAMSULOSIN HYDROCHLORIDE 0.4 MG: 0.4 CAPSULE ORAL at 20:16

## 2020-01-01 RX ADMIN — TAMSULOSIN HYDROCHLORIDE 0.4 MG: 0.4 CAPSULE ORAL at 16:41

## 2020-01-01 RX ADMIN — QUETIAPINE FUMARATE 50 MG: 25 TABLET ORAL at 16:52

## 2020-01-01 RX ADMIN — AMLODIPINE BESYLATE 5 MG: 5 TABLET ORAL at 05:15

## 2020-01-01 RX ADMIN — ERGOCALCIFEROL 50000 UNITS: 1.25 CAPSULE ORAL at 10:47

## 2020-01-01 RX ADMIN — ATORVASTATIN CALCIUM 10 MG: 10 TABLET, FILM COATED ORAL at 19:47

## 2020-01-01 RX ADMIN — LOSARTAN POTASSIUM 100 MG: 25 TABLET ORAL at 08:35

## 2020-01-01 RX ADMIN — AMLODIPINE BESYLATE 10 MG: 5 TABLET ORAL at 06:09

## 2020-01-01 RX ADMIN — QUETIAPINE FUMARATE 50 MG: 25 TABLET ORAL at 16:22

## 2020-01-01 RX ADMIN — SENNOSIDES AND DOCUSATE SODIUM 2 TABLET: 8.6; 5 TABLET ORAL at 05:16

## 2020-01-01 RX ADMIN — ATORVASTATIN CALCIUM 10 MG: 10 TABLET, FILM COATED ORAL at 21:08

## 2020-01-01 RX ADMIN — ATORVASTATIN CALCIUM 10 MG: 10 TABLET, FILM COATED ORAL at 20:36

## 2020-01-01 RX ADMIN — DRONABINOL 5 MG: 2.5 CAPSULE ORAL at 18:21

## 2020-01-01 RX ADMIN — AMLODIPINE BESYLATE 10 MG: 5 TABLET ORAL at 04:58

## 2020-01-01 RX ADMIN — AMLODIPINE BESYLATE 10 MG: 5 TABLET ORAL at 06:46

## 2020-01-01 RX ADMIN — AMLODIPINE BESYLATE 5 MG: 5 TABLET ORAL at 05:42

## 2020-01-01 RX ADMIN — ATORVASTATIN CALCIUM 10 MG: 10 TABLET, FILM COATED ORAL at 00:51

## 2020-01-01 RX ADMIN — TAMSULOSIN HYDROCHLORIDE 0.4 MG: 0.4 CAPSULE ORAL at 16:22

## 2020-01-01 RX ADMIN — RIVAROXABAN 15 MG: 15 TABLET, FILM COATED ORAL at 18:34

## 2020-01-01 RX ADMIN — ATORVASTATIN CALCIUM 10 MG: 10 TABLET, FILM COATED ORAL at 19:55

## 2020-01-01 RX ADMIN — DRONABINOL 5 MG: 2.5 CAPSULE ORAL at 05:50

## 2020-01-01 RX ADMIN — TAMSULOSIN HYDROCHLORIDE 0.4 MG: 0.4 CAPSULE ORAL at 16:59

## 2020-01-01 RX ADMIN — ATORVASTATIN CALCIUM 10 MG: 10 TABLET, FILM COATED ORAL at 19:45

## 2020-01-01 RX ADMIN — DRONABINOL 5 MG: 2.5 CAPSULE ORAL at 18:02

## 2020-01-01 RX ADMIN — LOSARTAN POTASSIUM 50 MG: 25 TABLET ORAL at 07:23

## 2020-01-01 RX ADMIN — DRONABINOL 5 MG: 2.5 CAPSULE ORAL at 05:47

## 2020-01-01 RX ADMIN — AMLODIPINE BESYLATE 10 MG: 5 TABLET ORAL at 06:50

## 2020-01-01 RX ADMIN — ATORVASTATIN CALCIUM 10 MG: 10 TABLET, FILM COATED ORAL at 20:18

## 2020-01-01 RX ADMIN — ATORVASTATIN CALCIUM 10 MG: 10 TABLET, FILM COATED ORAL at 20:02

## 2020-01-01 RX ADMIN — DRONABINOL 5 MG: 2.5 CAPSULE ORAL at 05:57

## 2020-01-01 RX ADMIN — FINASTERIDE 5 MG: 5 TABLET, FILM COATED ORAL at 06:05

## 2020-01-01 RX ADMIN — ACETAMINOPHEN 500 MG: 500 TABLET, FILM COATED ORAL at 18:10

## 2020-01-01 RX ADMIN — FINASTERIDE 5 MG: 5 TABLET, FILM COATED ORAL at 06:01

## 2020-01-01 RX ADMIN — RIVAROXABAN 15 MG: 15 TABLET, FILM COATED ORAL at 17:29

## 2020-01-01 RX ADMIN — SUCRALFATE 1 G: 1 TABLET ORAL at 20:59

## 2020-01-01 RX ADMIN — LOSARTAN POTASSIUM 50 MG: 25 TABLET ORAL at 06:02

## 2020-01-01 RX ADMIN — SENNOSIDES AND DOCUSATE SODIUM 2 TABLET: 8.6; 5 TABLET ORAL at 16:41

## 2020-01-01 RX ADMIN — TAMSULOSIN HYDROCHLORIDE 0.4 MG: 0.4 CAPSULE ORAL at 17:33

## 2020-01-01 RX ADMIN — SUCRALFATE 1 G: 1 SUSPENSION ORAL at 12:29

## 2020-01-01 RX ADMIN — FINASTERIDE 5 MG: 5 TABLET, FILM COATED ORAL at 05:37

## 2020-01-01 RX ADMIN — SENNOSIDES AND DOCUSATE SODIUM 2 TABLET: 8.6; 5 TABLET ORAL at 18:23

## 2020-01-01 RX ADMIN — FINASTERIDE 5 MG: 5 TABLET, FILM COATED ORAL at 09:31

## 2020-01-01 RX ADMIN — ATORVASTATIN CALCIUM 10 MG: 10 TABLET, FILM COATED ORAL at 19:48

## 2020-01-01 RX ADMIN — LOSARTAN POTASSIUM 50 MG: 25 TABLET ORAL at 07:35

## 2020-01-01 RX ADMIN — SENNOSIDES AND DOCUSATE SODIUM 2 TABLET: 8.6; 5 TABLET ORAL at 05:46

## 2020-01-01 RX ADMIN — FINASTERIDE 5 MG: 5 TABLET, FILM COATED ORAL at 05:30

## 2020-01-01 RX ADMIN — LOSARTAN POTASSIUM 50 MG: 25 TABLET ORAL at 05:50

## 2020-01-01 RX ADMIN — OMEPRAZOLE 20 MG: 20 CAPSULE, DELAYED RELEASE ORAL at 07:23

## 2020-01-01 RX ADMIN — RIVAROXABAN 15 MG: 15 TABLET, FILM COATED ORAL at 18:06

## 2020-01-01 RX ADMIN — QUETIAPINE FUMARATE 50 MG: 25 TABLET ORAL at 18:08

## 2020-01-01 RX ADMIN — SUCRALFATE 1 G: 1 SUSPENSION ORAL at 10:37

## 2020-01-01 RX ADMIN — LIDOCAINE 1 PATCH: 50 PATCH TOPICAL at 05:48

## 2020-01-01 RX ADMIN — MULTIPLE VITAMINS W/ MINERALS TAB 1 TABLET: TAB at 05:30

## 2020-01-01 RX ADMIN — TAMSULOSIN HYDROCHLORIDE 0.4 MG: 0.4 CAPSULE ORAL at 18:06

## 2020-01-01 RX ADMIN — MULTIPLE VITAMINS W/ MINERALS TAB 1 TABLET: TAB at 07:19

## 2020-01-01 RX ADMIN — SENNOSIDES AND DOCUSATE SODIUM 2 TABLET: 8.6; 5 TABLET ORAL at 17:26

## 2020-01-01 RX ADMIN — SUCRALFATE 1 G: 1 SUSPENSION ORAL at 17:25

## 2020-01-01 RX ADMIN — LOSARTAN POTASSIUM 100 MG: 25 TABLET ORAL at 07:14

## 2020-01-01 RX ADMIN — DRONABINOL 5 MG: 2.5 CAPSULE ORAL at 17:14

## 2020-01-01 RX ADMIN — MULTIPLE VITAMINS W/ MINERALS TAB 1 TABLET: TAB at 06:05

## 2020-01-01 RX ADMIN — RIVAROXABAN 15 MG: 15 TABLET, FILM COATED ORAL at 18:37

## 2020-01-01 RX ADMIN — TAMSULOSIN HYDROCHLORIDE 0.4 MG: 0.4 CAPSULE ORAL at 18:37

## 2020-01-01 RX ADMIN — ATORVASTATIN CALCIUM 10 MG: 10 TABLET, FILM COATED ORAL at 21:03

## 2020-01-01 RX ADMIN — TAMSULOSIN HYDROCHLORIDE 0.4 MG: 0.4 CAPSULE ORAL at 17:34

## 2020-01-01 RX ADMIN — TAMSULOSIN HYDROCHLORIDE 0.4 MG: 0.4 CAPSULE ORAL at 20:41

## 2020-01-01 RX ADMIN — QUETIAPINE FUMARATE 50 MG: 25 TABLET ORAL at 17:29

## 2020-01-01 RX ADMIN — MULTIPLE VITAMINS W/ MINERALS TAB 1 TABLET: TAB at 07:23

## 2020-01-01 RX ADMIN — ATORVASTATIN CALCIUM 10 MG: 10 TABLET, FILM COATED ORAL at 23:40

## 2020-01-01 RX ADMIN — OMEPRAZOLE 20 MG: 20 CAPSULE, DELAYED RELEASE ORAL at 17:44

## 2020-01-01 RX ADMIN — LOSARTAN POTASSIUM 50 MG: 25 TABLET ORAL at 05:37

## 2020-01-01 RX ADMIN — AMLODIPINE BESYLATE 5 MG: 5 TABLET ORAL at 06:07

## 2020-01-01 RX ADMIN — DRONABINOL 5 MG: 2.5 CAPSULE ORAL at 18:15

## 2020-01-01 RX ADMIN — AMLODIPINE BESYLATE 5 MG: 5 TABLET ORAL at 07:38

## 2020-01-01 RX ADMIN — MINERAL OIL AND PETROLATUM 1 APPLICATION: 150; 830 OINTMENT OPHTHALMIC at 22:11

## 2020-01-01 RX ADMIN — SUCRALFATE 1 G: 1 TABLET ORAL at 10:24

## 2020-01-01 RX ADMIN — OMEPRAZOLE 20 MG: 20 CAPSULE, DELAYED RELEASE ORAL at 06:05

## 2020-01-01 RX ADMIN — TAMSULOSIN HYDROCHLORIDE 0.4 MG: 0.4 CAPSULE ORAL at 17:00

## 2020-01-01 RX ADMIN — DRONABINOL 5 MG: 2.5 CAPSULE ORAL at 18:03

## 2020-01-01 RX ADMIN — AMLODIPINE BESYLATE 10 MG: 5 TABLET ORAL at 06:28

## 2020-01-01 RX ADMIN — RIVAROXABAN 15 MG: 15 TABLET, FILM COATED ORAL at 18:14

## 2020-01-01 RX ADMIN — FINASTERIDE 5 MG: 5 TABLET, FILM COATED ORAL at 05:15

## 2020-01-01 RX ADMIN — LOSARTAN POTASSIUM 100 MG: 25 TABLET ORAL at 06:33

## 2020-01-01 RX ADMIN — AMLODIPINE BESYLATE 5 MG: 5 TABLET ORAL at 06:03

## 2020-01-01 RX ADMIN — AMLODIPINE BESYLATE 5 MG: 5 TABLET ORAL at 07:35

## 2020-01-01 RX ADMIN — AMLODIPINE BESYLATE 10 MG: 5 TABLET ORAL at 06:13

## 2020-01-01 RX ADMIN — TAMSULOSIN HYDROCHLORIDE 0.4 MG: 0.4 CAPSULE ORAL at 18:23

## 2020-01-01 RX ADMIN — SENNOSIDES AND DOCUSATE SODIUM 2 TABLET: 8.6; 5 TABLET ORAL at 17:28

## 2020-01-01 RX ADMIN — DRONABINOL 5 MG: 2.5 CAPSULE ORAL at 18:44

## 2020-01-01 RX ADMIN — ATORVASTATIN CALCIUM 10 MG: 10 TABLET, FILM COATED ORAL at 21:05

## 2020-01-01 RX ADMIN — AMLODIPINE BESYLATE 10 MG: 5 TABLET ORAL at 05:06

## 2020-01-01 RX ADMIN — ATORVASTATIN CALCIUM 10 MG: 10 TABLET, FILM COATED ORAL at 20:35

## 2020-01-01 RX ADMIN — DRONABINOL 5 MG: 2.5 CAPSULE ORAL at 05:05

## 2020-01-01 RX ADMIN — TAMSULOSIN HYDROCHLORIDE 0.4 MG: 0.4 CAPSULE ORAL at 17:30

## 2020-01-01 RX ADMIN — AMLODIPINE BESYLATE 5 MG: 5 TABLET ORAL at 05:17

## 2020-01-01 RX ADMIN — DRONABINOL 5 MG: 2.5 CAPSULE ORAL at 06:50

## 2020-01-01 RX ADMIN — ATORVASTATIN CALCIUM 10 MG: 10 TABLET, FILM COATED ORAL at 20:49

## 2020-01-01 RX ADMIN — RIVAROXABAN 15 MG: 15 TABLET, FILM COATED ORAL at 17:24

## 2020-01-01 RX ADMIN — ERGOCALCIFEROL 50000 UNITS: 1.25 CAPSULE ORAL at 08:14

## 2020-01-01 RX ADMIN — MINERAL OIL AND PETROLATUM 1 APPLICATION: 150; 830 OINTMENT OPHTHALMIC at 21:36

## 2020-01-01 RX ADMIN — LOSARTAN POTASSIUM 50 MG: 25 TABLET ORAL at 05:15

## 2020-01-01 RX ADMIN — MINERAL OIL AND PETROLATUM 1 APPLICATION: 150; 830 OINTMENT OPHTHALMIC at 14:00

## 2020-01-01 RX ADMIN — TAMSULOSIN HYDROCHLORIDE 0.4 MG: 0.4 CAPSULE ORAL at 19:21

## 2020-01-01 RX ADMIN — AMLODIPINE BESYLATE 10 MG: 5 TABLET ORAL at 08:15

## 2020-01-01 RX ADMIN — LOSARTAN POTASSIUM 100 MG: 25 TABLET ORAL at 06:38

## 2020-01-01 RX ADMIN — LOSARTAN POTASSIUM 100 MG: 25 TABLET ORAL at 05:38

## 2020-01-01 RX ADMIN — QUETIAPINE FUMARATE 50 MG: 25 TABLET ORAL at 18:23

## 2020-01-01 RX ADMIN — LOSARTAN POTASSIUM 100 MG: 25 TABLET ORAL at 05:37

## 2020-01-01 RX ADMIN — MIRTAZAPINE 15 MG: 15 TABLET, ORALLY DISINTEGRATING ORAL at 19:48

## 2020-01-01 RX ADMIN — ATORVASTATIN CALCIUM 10 MG: 10 TABLET, FILM COATED ORAL at 00:29

## 2020-01-01 RX ADMIN — MIRTAZAPINE 15 MG: 15 TABLET, ORALLY DISINTEGRATING ORAL at 20:04

## 2020-01-01 RX ADMIN — DRONABINOL 5 MG: 2.5 CAPSULE ORAL at 08:16

## 2020-01-01 RX ADMIN — LOSARTAN POTASSIUM 100 MG: 25 TABLET ORAL at 08:13

## 2020-01-01 RX ADMIN — TAMSULOSIN HYDROCHLORIDE 0.4 MG: 0.4 CAPSULE ORAL at 16:52

## 2020-01-01 RX ADMIN — LOSARTAN POTASSIUM 100 MG: 25 TABLET ORAL at 08:56

## 2020-01-01 RX ADMIN — SUCRALFATE 1 G: 1 SUSPENSION ORAL at 17:46

## 2020-01-01 RX ADMIN — DRONABINOL 5 MG: 2.5 CAPSULE ORAL at 17:44

## 2020-01-01 RX ADMIN — AMLODIPINE BESYLATE 10 MG: 5 TABLET ORAL at 06:17

## 2020-01-01 RX ADMIN — MULTIPLE VITAMINS W/ MINERALS TAB 1 TABLET: TAB at 05:50

## 2020-01-01 RX ADMIN — LOSARTAN POTASSIUM 100 MG: 25 TABLET ORAL at 06:05

## 2020-01-01 RX ADMIN — DRONABINOL 5 MG: 2.5 CAPSULE ORAL at 17:35

## 2020-01-01 RX ADMIN — SENNOSIDES AND DOCUSATE SODIUM 2 TABLET: 8.6; 5 TABLET ORAL at 17:05

## 2020-01-01 RX ADMIN — DRONABINOL 5 MG: 2.5 CAPSULE ORAL at 18:55

## 2020-01-01 RX ADMIN — ACETAMINOPHEN 500 MG: 500 TABLET, FILM COATED ORAL at 07:41

## 2020-01-01 RX ADMIN — AMLODIPINE BESYLATE 10 MG: 5 TABLET ORAL at 06:42

## 2020-01-01 RX ADMIN — DRONABINOL 5 MG: 2.5 CAPSULE ORAL at 06:41

## 2020-01-01 RX ADMIN — OMEPRAZOLE 20 MG: 20 CAPSULE, DELAYED RELEASE ORAL at 18:04

## 2020-01-01 RX ADMIN — MULTIPLE VITAMINS W/ MINERALS TAB 1 TABLET: TAB at 05:55

## 2020-01-01 RX ADMIN — ATORVASTATIN CALCIUM 10 MG: 10 TABLET, FILM COATED ORAL at 20:52

## 2020-01-01 RX ADMIN — TAMSULOSIN HYDROCHLORIDE 0.4 MG: 0.4 CAPSULE ORAL at 17:26

## 2020-01-01 RX ADMIN — FINASTERIDE 5 MG: 5 TABLET, FILM COATED ORAL at 06:07

## 2020-01-01 RX ADMIN — AMLODIPINE BESYLATE 5 MG: 5 TABLET ORAL at 05:55

## 2020-01-01 RX ADMIN — DRONABINOL 5 MG: 2.5 CAPSULE ORAL at 17:36

## 2020-01-01 RX ADMIN — SUCRALFATE 1 G: 1 TABLET ORAL at 10:32

## 2020-01-01 RX ADMIN — QUETIAPINE FUMARATE 50 MG: 25 TABLET ORAL at 18:13

## 2020-01-01 RX ADMIN — SENNOSIDES AND DOCUSATE SODIUM 2 TABLET: 8.6; 5 TABLET ORAL at 05:50

## 2020-01-01 RX ADMIN — ERGOCALCIFEROL 50000 UNITS: 1.25 CAPSULE ORAL at 10:26

## 2020-01-01 RX ADMIN — LOSARTAN POTASSIUM 100 MG: 25 TABLET ORAL at 07:09

## 2020-01-01 RX ADMIN — FINASTERIDE 5 MG: 5 TABLET, FILM COATED ORAL at 05:49

## 2020-01-01 RX ADMIN — ACETAMINOPHEN 500 MG: 500 TABLET, FILM COATED ORAL at 06:11

## 2020-01-01 RX ADMIN — MIRTAZAPINE 15 MG: 15 TABLET, ORALLY DISINTEGRATING ORAL at 21:14

## 2020-01-01 RX ADMIN — ACETAMINOPHEN 500 MG: 500 TABLET, FILM COATED ORAL at 18:23

## 2020-01-01 RX ADMIN — MIRTAZAPINE 15 MG: 15 TABLET, ORALLY DISINTEGRATING ORAL at 21:37

## 2020-01-01 RX ADMIN — ATORVASTATIN CALCIUM 10 MG: 10 TABLET, FILM COATED ORAL at 20:56

## 2020-01-01 RX ADMIN — ATORVASTATIN CALCIUM 10 MG: 10 TABLET, FILM COATED ORAL at 21:41

## 2020-01-01 RX ADMIN — SENNOSIDES AND DOCUSATE SODIUM 2 TABLET: 8.6; 5 TABLET ORAL at 05:59

## 2020-01-01 RX ADMIN — LOSARTAN POTASSIUM 100 MG: 25 TABLET ORAL at 05:59

## 2020-01-01 RX ADMIN — AMLODIPINE BESYLATE 5 MG: 5 TABLET ORAL at 07:18

## 2020-01-01 RX ADMIN — SUCRALFATE 1 G: 1 TABLET ORAL at 17:44

## 2020-01-01 RX ADMIN — RIVAROXABAN 15 MG: 15 TABLET, FILM COATED ORAL at 17:17

## 2020-01-01 RX ADMIN — ATORVASTATIN CALCIUM 10 MG: 10 TABLET, FILM COATED ORAL at 19:38

## 2020-01-01 RX ADMIN — OMEPRAZOLE 20 MG: 20 CAPSULE, DELAYED RELEASE ORAL at 17:28

## 2020-01-01 ASSESSMENT — ENCOUNTER SYMPTOMS
DIZZINESS: 0
SENSORY CHANGE: 0
FEVER: 0
DIARRHEA: 0
CHILLS: 0
HEADACHES: 0
CLAUDICATION: 0
MYALGIAS: 0
SHORTNESS OF BREATH: 0
VOMITING: 0
BLURRED VISION: 0
SHORTNESS OF BREATH: 0
HYPERTENSION: 1
FORGETFULNESS: 1
PHOTOPHOBIA: 0
HYPERTENSION: 1
CONSTIPATION: 0
DESCRIPTION OF MEMORY LOSS: SHORT TERM
COUGH CHARACTERISTICS: MOIST
DIZZINESS: 0
INSOMNIA: 0
SHORTNESS OF BREATH: 0
ABDOMINAL PAIN: 0
NERVOUS/ANXIOUS: 0
DIZZINESS: 0
BOWEL INCONTINENCE: 1
NAUSEA: 0
WEAKNESS: 0
WEAKNESS: 0
PHOTOPHOBIA: 0
WEAKNESS: 0
COUGH CHARACTERISTICS: MOIST
MYALGIAS: 0
CONSTIPATION: 0
BOWEL INCONTINENCE: 1
FATIGUES EASILY: 1
DEPRESSION: 0
NAUSEA: 0
COUGH: 1
SHORTNESS OF BREATH: 0
NERVOUS/ANXIOUS: 0
FORGETFULNESS: 1
SPEECH CHANGE: 0
CLAUDICATION: 0
DIZZINESS: 0
DESCRIPTION OF MEMORY LOSS: LONG TERM
FORGETFULNESS: 1
INSOMNIA: 0
SENSORY CHANGE: 0
DIARRHEA: 0
HEARTBURN: 0
VOMITING: 0
BACK PAIN: 0
PHOTOPHOBIA: 0
EYE DISCHARGE: 0
FATIGUES EASILY: 1
DRY SKIN: 1
SPEECH CHANGE: 0
NERVOUS/ANXIOUS: 0
SHORTNESS OF BREATH: 0
COUGH CHARACTERISTICS: MOIST
FEVER: 0
WEAKNESS: 0
DEPRESSION: 0
INSOMNIA: 0
DEPRESSION: 0
CONSTITUTIONAL NEGATIVE: 1
COUGH: 0
DIARRHEA: 0
NAUSEA: 0
COUGH: 0
DIARRHEA: 0
NAUSEA: 0
CHILLS: 0
FEVER: 0
CLAUDICATION: 0
DESCRIPTION OF MEMORY LOSS: LONG TERM
SENSORY CHANGE: 0
DEPRESSION: 0
NERVOUS/ANXIOUS: 0
PHOTOPHOBIA: 0
HEARTBURN: 0
HEADACHES: 0
INSOMNIA: 0
INSOMNIA: 0
DEPRESSION: 0
BOWEL INCONTINENCE: 1
HYPERTENSION: 1
HEADACHES: 0
SHORTNESS OF BREATH: 0
NERVOUS/ANXIOUS: 0
NAUSEA: 0
DIZZINESS: 0
LAST BOWEL MOVEMENT: 65644
NERVOUS/ANXIOUS: 0
NERVOUS/ANXIOUS: 0
FATIGUE: 1
DIARRHEA: 0
BLURRED VISION: 0
PHOTOPHOBIA: 0
FEVER: 0
CLAUDICATION: 0
PHOTOPHOBIA: 0
DIARRHEA: 0
FEVER: 0
HEARTBURN: 0
CONSTIPATION: 0
FATIGUE: 1
HEARTBURN: 0
FEVER: 0
COUGH: 0
SENSORY CHANGE: 0
NAUSEA: 0
ABDOMINAL PAIN: 0
SPEECH CHANGE: 0
COUGH: 1
NERVOUS/ANXIOUS: 0
DESCRIPTION OF MEMORY LOSS: LONG TERM
FEVER: 0
NAUSEA: 0
DEPRESSION: 0
FATIGUE: 1
PHOTOPHOBIA: 0
DIZZINESS: 0
FEVER: 0
SLEEP QUALITY: ADEQUATE
HEARTBURN: 0
CHILLS: 0
DIZZINESS: 0
DEPRESSION: 0
DIARRHEA: 0
HEARTBURN: 0
COUGH: 0
DIAPHORESIS: 0
CONSTIPATION: 0
CONSTIPATION: 0
HEARTBURN: 0
MYALGIAS: 0
DIARRHEA: 0
DEPRESSION: 0
BOWEL INCONTINENCE: 1
COUGH: 0
COUGH: 1
PHOTOPHOBIA: 0
ABDOMINAL PAIN: 0
INSOMNIA: 0
DIZZINESS: 0
HEADACHES: 0
HEADACHES: 0
FEVER: 0
CHILLS: 0
SHORTNESS OF BREATH: 0
CHILLS: 0
DIAPHORESIS: 0
COUGH CHARACTERISTICS: MOIST
CONSTIPATION: 0
BLURRED VISION: 0
COUGH: 0
COUGH: 0
NAUSEA: 0
COUGH CHARACTERISTICS: MOIST
HYPERTENSION: 1
COUGH CHARACTERISTICS: MOIST
HEADACHES: 0
ABDOMINAL PAIN: 0
NAUSEA: 0
SPEECH CHANGE: 0
COUGH CHARACTERISTICS: NON-PRODUCTIVE
COUGH: 0
WEAKNESS: 0
TINGLING: 0
COUGH: 1
ABDOMINAL PAIN: 0
MYALGIAS: 0
SLEEP QUALITY: ADEQUATE
HEADACHES: 0
SHORTNESS OF BREATH: 0
CHILLS: 0
INSOMNIA: 0
HEADACHES: 0
PHOTOPHOBIA: 0
STRIDOR: 0
SLEEP QUALITY: ADEQUATE
FATIGUE: 1
HEADACHES: 0
SLEEP QUALITY: ADEQUATE
COUGH: 0
DIZZINESS: 0
CHILLS: 0
FEVER: 0
FEVER: 0
CLAUDICATION: 0
COUGH CHARACTERISTICS: NON-PRODUCTIVE
DIZZINESS: 0
EYE REDNESS: 0
DESCRIPTION OF MEMORY LOSS: LONG TERM
HEADACHES: 0
SENSORY CHANGE: 0
VOMITING: 0
HEADACHES: 0
BOWEL INCONTINENCE: 1
DEPRESSION: 0
NAUSEA: 0
DESCRIPTION OF MEMORY LOSS: IMMEDIATE
BACK PAIN: 0
ABDOMINAL PAIN: 0
FATIGUES EASILY: 1
COUGH: 0
COUGH: 1
VOMITING: 0
CONSTIPATION: 0
CHILLS: 0
WEAKNESS: 0
CHILLS: 0
DEPRESSION: 0
CONSTIPATION: 0
VOMITING: 0
BLURRED VISION: 0
DEPRESSION: 0
HEADACHES: 0
SHORTNESS OF BREATH: 0
INSOMNIA: 0
INSOMNIA: 0
ABDOMINAL PAIN: 0
SHORTNESS OF BREATH: 0
COUGH: 0
NAUSEA: 0
GASTROINTESTINAL NEGATIVE: 1
HEADACHES: 0
SHORTNESS OF BREATH: 0
DIZZINESS: 0
FEVER: 0
HEADACHES: 0
DRY SKIN: 1
PALPITATIONS: 0
SORE THROAT: 0
MYALGIAS: 0
BOWEL INCONTINENCE: 1
COUGH: 0
LAST BOWEL MOVEMENT: 65616
NECK PAIN: 0
NAUSEA: 0
SORE THROAT: 0
SLEEP QUALITY: ADEQUATE
NERVOUS/ANXIOUS: 0
INSOMNIA: 0
WEAKNESS: 0
FEVER: 0
DESCRIPTION OF MEMORY LOSS: SHORT TERM
MYALGIAS: 0
ABDOMINAL PAIN: 0
MYALGIAS: 0
DIZZINESS: 0
MYALGIAS: 0
CHILLS: 0
DEPRESSION: 0
DIZZINESS: 0
COUGH: 0
COUGH: 0
SPEECH CHANGE: 0
BLURRED VISION: 0
CHILLS: 0
SHORTNESS OF BREATH: 0
COUGH CHARACTERISTICS: MOIST
DIARRHEA: 0
COUGH CHARACTERISTICS: NON-PRODUCTIVE
MYALGIAS: 1
MYALGIAS: 0
BACK PAIN: 0
SHORTNESS OF BREATH: 0
PHOTOPHOBIA: 0
COUGH: 0
CHILLS: 0
HEARTBURN: 0
CONSTIPATION: 0
DESCRIPTION OF MEMORY LOSS: SHORT TERM
WEAKNESS: 1
BOWEL INCONTINENCE: 1
PALPITATIONS: 0
INSOMNIA: 0
NAUSEA: 0
HEADACHES: 0
SPEECH CHANGE: 0
CLAUDICATION: 0
NAUSEA: 0
DIZZINESS: 0
SHORTNESS OF BREATH: 0
CARDIOVASCULAR NEGATIVE: 1
SHORTNESS OF BREATH: 0
CLAUDICATION: 0
HYPERTENSION: 1
FORGETFULNESS: 1
EYE DISCHARGE: 0
DEPRESSION: 0
INSOMNIA: 0
CLAUDICATION: 0
NERVOUS/ANXIOUS: 0
SPEECH CHANGE: 0
CLAUDICATION: 0
DEPRESSION: 0
COUGH: 0
PHOTOPHOBIA: 0
SPEECH CHANGE: 0
DEPRESSION: 0
EYE DISCHARGE: 0
NERVOUS/ANXIOUS: 0
PHOTOPHOBIA: 0
DRY SKIN: 1
SPEECH CHANGE: 0
NAUSEA: 0
DIARRHEA: 0
FATIGUES EASILY: 1
BOWEL INCONTINENCE: 1
HEARTBURN: 0
SHORTNESS OF BREATH: 0
DIARRHEA: 0
FEVER: 0
SLEEP QUALITY: ADEQUATE
DESCRIPTION OF MEMORY LOSS: LONG TERM
DIARRHEA: 0
DEPRESSION: 0
EYE REDNESS: 0
DEPRESSION: 0
FORGETFULNESS: 1
DEPRESSION: 0
NERVOUS/ANXIOUS: 0
PHOTOPHOBIA: 0
FEVER: 0
PHOTOPHOBIA: 0
CHILLS: 0
TREMORS: 0
CONSTIPATION: 0
DESCRIPTION OF MEMORY LOSS: LONG TERM
DEPRESSION: 0
SLEEP QUALITY: ADEQUATE
NAUSEA: 0
HEARTBURN: 0
PALPITATIONS: 0
VOMITING: 0
INSOMNIA: 0
COUGH: 0
CLAUDICATION: 0
MYALGIAS: 1
FATIGUE: 1
COUGH CHARACTERISTICS: MOIST
LAST BOWEL MOVEMENT: 65602
WEAKNESS: 0
EYE REDNESS: 0
COUGH: 1
HEADACHES: 0
FEVER: 0
WEAKNESS: 1
LAST BOWEL MOVEMENT: 65609
ABDOMINAL PAIN: 0
SHORTNESS OF BREATH: 0
CONSTIPATION: 0
DIARRHEA: 0
DESCRIPTION OF MEMORY LOSS: LONG TERM
NAUSEA: 0
FEVER: 0
LAST BOWEL MOVEMENT: 65526
INSOMNIA: 0
DESCRIPTION OF MEMORY LOSS: SHORT TERM
SENSORY CHANGE: 0
ABDOMINAL PAIN: 0
DESCRIPTION OF MEMORY LOSS: LONG TERM
DRY SKIN: 1
DRY SKIN: 1
SPEECH CHANGE: 0
COUGH: 0
COUGH: 0
COUGH: 1
ABDOMINAL PAIN: 0
BLURRED VISION: 0
VOMITING: 0
SHORTNESS OF BREATH: 0
NAUSEA: 0
DESCRIPTION OF MEMORY LOSS: SHORT TERM
PHOTOPHOBIA: 0
DIZZINESS: 0
SHORTNESS OF BREATH: 0
SHORTNESS OF BREATH: 0
CONSTIPATION: 0
CHILLS: 0
CONSTIPATION: 0
SENSORY CHANGE: 0
HEADACHES: 0
SPEECH CHANGE: 0
ABDOMINAL PAIN: 0
NAUSEA: 0
INSOMNIA: 0
SHORTNESS OF BREATH: 0
SHORTNESS OF BREATH: 0
SPEECH CHANGE: 0
CONSTIPATION: 0
MYALGIAS: 0
VOMITING: 0
MYALGIAS: 0
NERVOUS/ANXIOUS: 0
SHORTNESS OF BREATH: 0
SENSORY CHANGE: 0
ABDOMINAL PAIN: 0
HEARTBURN: 0
COUGH CHARACTERISTICS: MOIST
HEARTBURN: 0
INSOMNIA: 0
DIZZINESS: 0
SHORTNESS OF BREATH: 0
DIARRHEA: 0
DIZZINESS: 0
BOWEL INCONTINENCE: 1
DIZZINESS: 0
HEADACHES: 0
DIZZINESS: 0
SENSORY CHANGE: 0
NAUSEA: 0
PHOTOPHOBIA: 0
PHOTOPHOBIA: 0
CHILLS: 0
HEARTBURN: 0
HYPERTENSION: 1
WEAKNESS: 0
DIARRHEA: 0
HEARTBURN: 0
VOMITING: 0
DESCRIPTION OF MEMORY LOSS: LONG TERM
CHILLS: 0
DESCRIPTION OF MEMORY LOSS: SHORT TERM
FATIGUE: 1
CHILLS: 0
NECK PAIN: 0
COUGH: 0
COUGH: 0
CONSTIPATION: 0
WEAKNESS: 0
FEVER: 0
COUGH: 0
TINGLING: 0
CLAUDICATION: 0
DESCRIPTION OF MEMORY LOSS: LONG TERM
HEADACHES: 0
FEVER: 0
FEVER: 0
CLAUDICATION: 0
CONSTIPATION: 0
SENSORY CHANGE: 0
COUGH: 0
COUGH CHARACTERISTICS: MOIST
BACK PAIN: 0
BLURRED VISION: 0
COUGH: 0
FATIGUES EASILY: 1
WEAKNESS: 0
SPEECH CHANGE: 0
INSOMNIA: 0
CHILLS: 0
DIAPHORESIS: 0
CLAUDICATION: 0
VOMITING: 0
INSOMNIA: 0
INSOMNIA: 0
NERVOUS/ANXIOUS: 0
NERVOUS/ANXIOUS: 0
HEADACHES: 0
HYPERTENSION: 1
MYALGIAS: 0
DIARRHEA: 0
COUGH: 0
SHORTNESS OF BREATH: 0
HEARTBURN: 0
HYPERTENSION: 1
BLURRED VISION: 0
CHILLS: 0
HEADACHES: 0
HEARTBURN: 0
CHILLS: 0
NERVOUS/ANXIOUS: 0
MYALGIAS: 0
FATIGUES EASILY: 1
DIZZINESS: 0
SORE THROAT: 0
BLURRED VISION: 0
DIARRHEA: 0
SLEEP QUALITY: ADEQUATE
SENSORY CHANGE: 0
BLURRED VISION: 0
DIZZINESS: 0
COUGH: 0
BLURRED VISION: 0
MYALGIAS: 0
MYALGIAS: 0
WEAKNESS: 0
MYALGIAS: 0
COUGH: 0
NERVOUS/ANXIOUS: 0
HEADACHES: 0
FATIGUE: 1
VOMITING: 0
NAUSEA: 0
COUGH CHARACTERISTICS: NON-PRODUCTIVE
CONSTIPATION: 0
HEARTBURN: 0
CLAUDICATION: 0
SHORTNESS OF BREATH: 0
SHORTNESS OF BREATH: 0
LAST BOWEL MOVEMENT: 65637
HYPERTENSION: 1
NAUSEA: 0
HEARTBURN: 0
INSOMNIA: 0
SHORTNESS OF BREATH: 0
STRIDOR: 0
CONSTIPATION: 0
HEADACHES: 0
CHILLS: 0
COUGH CHARACTERISTICS: MOIST
DIARRHEA: 0
NAUSEA: 0
DRY SKIN: 1
SHORTNESS OF BREATH: 0
MYALGIAS: 0
CHILLS: 0
FATIGUES EASILY: 1
NAUSEA: 0
DIZZINESS: 0
SLEEP QUALITY: ADEQUATE
MYALGIAS: 0
NAUSEA: 0
PHOTOPHOBIA: 0
NAUSEA: 0
BOWEL INCONTINENCE: 1
VOMITING: 0
ABDOMINAL PAIN: 0
INSOMNIA: 0
DEPRESSION: 0
HEADACHES: 0
SLEEP QUALITY: ADEQUATE
VOMITING: 0
VOMITING: 0
EYE PAIN: 0
LAST BOWEL MOVEMENT: 65595
SLEEP QUALITY: ADEQUATE
CHILLS: 0
WEAKNESS: 1
FATIGUES EASILY: 1
DRY SKIN: 1
DEPRESSION: 0
FEVER: 0
ABDOMINAL PAIN: 0
DIZZINESS: 0
PHOTOPHOBIA: 0
FATIGUE: 1
CONSTIPATION: 0
SPEECH CHANGE: 0
DIARRHEA: 0
EYE PAIN: 0
INSOMNIA: 0
FALLS: 0
DEPRESSION: 0
PALPITATIONS: 0
CHILLS: 0
DEPRESSION: 0
COUGH: 0
COUGH CHARACTERISTICS: MOIST
SPEECH CHANGE: 0
COUGH CHARACTERISTICS: NON-PRODUCTIVE
HEADACHES: 0
FORGETFULNESS: 1
MYALGIAS: 0
COUGH: 0
VOMITING: 0
HEADACHES: 0
PHOTOPHOBIA: 0
ABDOMINAL PAIN: 0
BLURRED VISION: 0
DESCRIPTION OF MEMORY LOSS: LONG TERM
ABDOMINAL PAIN: 0
HEADACHES: 0
NERVOUS/ANXIOUS: 0
MYALGIAS: 1
DIZZINESS: 0
COUGH CHARACTERISTICS: NON-PRODUCTIVE
ABDOMINAL PAIN: 0
VOMITING: 0
CHILLS: 0
NERVOUS/ANXIOUS: 0
COUGH: 0
DRY SKIN: 1
BLOOD IN STOOL: 0
COUGH CHARACTERISTICS: NON-PRODUCTIVE
MYALGIAS: 0
FEVER: 0
NAUSEA: 0
COUGH: 1
CONSTIPATION: 0
DIZZINESS: 0
HEMOPTYSIS: 0
MYALGIAS: 0
COUGH CHARACTERISTICS: NON-PRODUCTIVE
BOWEL INCONTINENCE: 1
SPEECH CHANGE: 0
FEVER: 0
WEAKNESS: 0
WEAKNESS: 0
DIZZINESS: 0
SORE THROAT: 0
DEPRESSION: 0
NAUSEA: 0
BLURRED VISION: 0
WHEEZING: 0
CONSTIPATION: 0
DIZZINESS: 0
DIZZINESS: 0
BOWEL INCONTINENCE: 1
FATIGUES EASILY: 1
DESCRIPTION OF MEMORY LOSS: LONG TERM
SORE THROAT: 0
COUGH: 1
SHORTNESS OF BREATH: 0
COUGH: 0
DIZZINESS: 0
DRY SKIN: 1
FEVER: 0
CONSTIPATION: 0
DIARRHEA: 0
COUGH: 0
FATIGUE: 1
FATIGUE: 1
VOMITING: 0
MYALGIAS: 0
COUGH: 0
FEVER: 0
COUGH: 0
ABDOMINAL PAIN: 0
BACK PAIN: 0
CHILLS: 0
FEVER: 0
SHORTNESS OF BREATH: 0
WEAKNESS: 0
SHORTNESS OF BREATH: 0
DRY SKIN: 1
SPEECH CHANGE: 0
SHORTNESS OF BREATH: 0
CLAUDICATION: 0
COUGH: 0
CONSTIPATION: 0
DEPRESSION: 0
BOWEL INCONTINENCE: 1
SLEEP QUALITY: ADEQUATE
SPUTUM PRODUCTION: 0
DIARRHEA: 0
WEAKNESS: 0
MYALGIAS: 1
HEARTBURN: 0
VOMITING: 0
FEVER: 0
DEPRESSION: 0
HEADACHES: 0
HEADACHES: 0
WHEEZING: 0
BOWEL INCONTINENCE: 1
DESCRIPTION OF MEMORY LOSS: SHORT TERM
FALLS: 0
CONSTIPATION: 0
CLAUDICATION: 0
COUGH: 0
DIZZINESS: 0
HEADACHES: 0
SHORTNESS OF BREATH: 0
FORGETFULNESS: 1
NERVOUS/ANXIOUS: 0
HEARTBURN: 0
SORE THROAT: 0
CONSTIPATION: 0
ABDOMINAL PAIN: 0
COUGH: 0
ABDOMINAL PAIN: 0
ABDOMINAL PAIN: 0
WEAKNESS: 0
CONSTIPATION: 0
DESCRIPTION OF MEMORY LOSS: SHORT TERM
HEADACHES: 0
DRY SKIN: 1
NERVOUS/ANXIOUS: 0
CHILLS: 0
HEADACHES: 0
COUGH CHARACTERISTICS: MOIST
COUGH: 0
FATIGUE: 1
DEPRESSION: 0
SINUS PAIN: 0
CHILLS: 0
HEADACHES: 0
NERVOUS/ANXIOUS: 0
NERVOUS/ANXIOUS: 0
SORE THROAT: 0
FORGETFULNESS: 1
WEAKNESS: 1
DEPRESSION: 0
FEVER: 0
NAUSEA: 0
HEARTBURN: 0
NERVOUS/ANXIOUS: 0
EYE REDNESS: 0
DIARRHEA: 0
ABDOMINAL PAIN: 0
COUGH CHARACTERISTICS: NON-PRODUCTIVE
VOMITING: 0
ABDOMINAL PAIN: 0
FEVER: 0
DRY SKIN: 1
BLURRED VISION: 1
DESCRIPTION OF MEMORY LOSS: SHORT TERM
INSOMNIA: 0
CLAUDICATION: 0
FEVER: 0
SENSORY CHANGE: 0
NAUSEA: 0
WEAKNESS: 0
MYALGIAS: 0
NERVOUS/ANXIOUS: 0
HYPERTENSION: 1
NERVOUS/ANXIOUS: 0
BLURRED VISION: 0
DEPRESSION: 0
CLAUDICATION: 0
COUGH CHARACTERISTICS: NON-PRODUCTIVE
DESCRIPTION OF MEMORY LOSS: SHORT TERM
DIZZINESS: 0
HYPERTENSION: 1
ABDOMINAL PAIN: 0
NAUSEA: 0
NAUSEA: 0
HEARTBURN: 0
PHOTOPHOBIA: 0
SPEECH CHANGE: 0
INSOMNIA: 0
CONSTIPATION: 0
WEAKNESS: 0
MYALGIAS: 0
SHORTNESS OF BREATH: 0
ABDOMINAL PAIN: 0
CLAUDICATION: 0
MYALGIAS: 0
SHORTNESS OF BREATH: 0
SENSORY CHANGE: 0
PHOTOPHOBIA: 0
HEADACHES: 0
MYALGIAS: 0
SPEECH CHANGE: 0
PHOTOPHOBIA: 0
COUGH: 1
HEARTBURN: 0
SHORTNESS OF BREATH: 0
WEAKNESS: 0
FEVER: 0
MYALGIAS: 0
VOMITING: 0
HEADACHES: 0
COUGH: 1
INSOMNIA: 0
DIARRHEA: 0
CHILLS: 0
MYALGIAS: 0
COUGH: 0
WEAKNESS: 1
CONSTIPATION: 0
INSOMNIA: 0
SHORTNESS OF BREATH: 0
HEMOPTYSIS: 0
DESCRIPTION OF MEMORY LOSS: SHORT TERM
CLAUDICATION: 0
SHORTNESS OF BREATH: 0
FORGETFULNESS: 1
DIZZINESS: 1
CONSTIPATION: 0
ABDOMINAL PAIN: 0
TINGLING: 0
BLURRED VISION: 0
FEVER: 0
HEADACHES: 0
SLEEP QUALITY: ADEQUATE
CONSTIPATION: 0
COUGH: 1
MYALGIAS: 0
ABDOMINAL PAIN: 0
HEARTBURN: 0
SENSORY CHANGE: 0
INSOMNIA: 0
HYPERTENSION: 1
FEVER: 0
SPEECH CHANGE: 0
SHORTNESS OF BREATH: 0
SLEEP QUALITY: ADEQUATE
EYE REDNESS: 0
CONSTIPATION: 0
WEAKNESS: 0
ABDOMINAL PAIN: 0
HEARTBURN: 0
VOMITING: 0
SENSORY CHANGE: 0
COUGH: 1
COUGH: 0
DIZZINESS: 0
DEPRESSION: 0
VOMITING: 0
PHOTOPHOBIA: 0
DEPRESSION: 0
ABDOMINAL PAIN: 0
HEADACHES: 0
FEVER: 0
ABDOMINAL PAIN: 0
FORGETFULNESS: 1
BLURRED VISION: 0
HEARTBURN: 0
FATIGUES EASILY: 1
FEVER: 0
SHORTNESS OF BREATH: 0
FEVER: 0
DESCRIPTION OF MEMORY LOSS: SHORT TERM
FORGETFULNESS: 1
HEARTBURN: 0
HEARTBURN: 0
DIZZINESS: 0
CHILLS: 0
ABDOMINAL PAIN: 0
WEAKNESS: 0
CONSTIPATION: 0
INSOMNIA: 0
INSOMNIA: 0
SLEEP QUALITY: ADEQUATE
BOWEL INCONTINENCE: 1
COUGH CHARACTERISTICS: MOIST
FEVER: 0
VOMITING: 0
TREMORS: 0
FEVER: 0
CONSTIPATION: 0
BLURRED VISION: 0
NAUSEA: 0
SLEEP QUALITY: ADEQUATE
CONSTIPATION: 0
SENSORY CHANGE: 0
FORGETFULNESS: 1
VOMITING: 0
CONSTIPATION: 0
SPEECH CHANGE: 0
COUGH CHARACTERISTICS: NON-PRODUCTIVE
INSOMNIA: 0
HEARTBURN: 0
DIZZINESS: 0
COUGH: 0
HEARTBURN: 0
FORGETFULNESS: 1
SORE THROAT: 0
HYPERTENSION: 1
SPEECH CHANGE: 0
WEAKNESS: 0
CLAUDICATION: 0
LAST BOWEL MOVEMENT: 65582
SPEECH CHANGE: 0
COUGH CHARACTERISTICS: NON-PRODUCTIVE
INSOMNIA: 0
HEARTBURN: 0
PALPITATIONS: 0
COUGH CHARACTERISTICS: NON-PRODUCTIVE
CHILLS: 0
SENSORY CHANGE: 0
CONSTIPATION: 0
DIZZINESS: 0
MYALGIAS: 1
DIARRHEA: 0
LAST BOWEL MOVEMENT: 65542
BOWEL INCONTINENCE: 1
FATIGUES EASILY: 1
DIARRHEA: 0
WEAKNESS: 0
DIZZINESS: 0
CHILLS: 0
HEADACHES: 0
SHORTNESS OF BREATH: 0
SLEEP QUALITY: ADEQUATE
SPEECH CHANGE: 0
SPUTUM PRODUCTION: 0
WEAKNESS: 0
FATIGUES EASILY: 1
ABDOMINAL PAIN: 0
CLAUDICATION: 0
CHILLS: 0
CLAUDICATION: 0
SPEECH CHANGE: 0
PHOTOPHOBIA: 0
HEADACHES: 0
ABDOMINAL PAIN: 0
SHORTNESS OF BREATH: 0
SLEEP QUALITY: ADEQUATE
DIZZINESS: 0
DESCRIPTION OF MEMORY LOSS: SHORT TERM
HEADACHES: 0
SENSORY CHANGE: 0
DEPRESSION: 0
FORGETFULNESS: 1
CHILLS: 0
DIZZINESS: 0
HYPERTENSION: 1
CLAUDICATION: 0
DESCRIPTION OF MEMORY LOSS: LONG TERM
INSOMNIA: 0
DESCRIPTION OF MEMORY LOSS: SHORT TERM
BLURRED VISION: 0
DEPRESSION: 0
CHILLS: 0
CONSTIPATION: 0
DEPRESSION: 0
FEVER: 0
NAUSEA: 0
VOMITING: 0
LAST BOWEL MOVEMENT: 65504
DESCRIPTION OF MEMORY LOSS: LONG TERM
NAUSEA: 0
CHILLS: 0
DEPRESSION: 0
HEADACHES: 0
ABDOMINAL PAIN: 0
COUGH: 0
COUGH: 0
WEAKNESS: 0
SENSORY CHANGE: 0
SENSORY CHANGE: 0
DIZZINESS: 0
SHORTNESS OF BREATH: 0
DEPRESSION: 0
SENSORY CHANGE: 0
DIZZINESS: 0
INSOMNIA: 0
DIAPHORESIS: 0
VOMITING: 0
HEARTBURN: 0
NERVOUS/ANXIOUS: 0
SHORTNESS OF BREATH: 0
DEPRESSION: 0
PALPITATIONS: 0
FEVER: 0
NERVOUS/ANXIOUS: 0
LAST BOWEL MOVEMENT: 65533
CLAUDICATION: 0
BLURRED VISION: 0
DEPRESSION: 0
SLEEP QUALITY: ADEQUATE
NAUSEA: 0
CLAUDICATION: 0
SPEECH CHANGE: 0
DESCRIPTION OF MEMORY LOSS: SHORT TERM
PHOTOPHOBIA: 0
HEARTBURN: 0
DIARRHEA: 0
NAUSEA: 0
SHORTNESS OF BREATH: 0
VOMITING: 0
TINGLING: 0
BLURRED VISION: 0
SHORTNESS OF BREATH: 0
SENSORY CHANGE: 0
CONSTIPATION: 0
HYPERTENSION: 1
HEADACHES: 1
BOWEL INCONTINENCE: 1
SENSORY CHANGE: 0
SHORTNESS OF BREATH: 0
SHORTNESS OF BREATH: 0
DIZZINESS: 0
FEVER: 0
FATIGUE: 1
MYALGIAS: 0
DESCRIPTION OF MEMORY LOSS: SHORT TERM
COUGH: 0
DRY SKIN: 1
CHILLS: 0
PALPITATIONS: 0
SENSORY CHANGE: 0
DEPRESSION: 0
DIZZINESS: 0
SHORTNESS OF BREATH: 0
SPUTUM PRODUCTION: 0
BLURRED VISION: 0
HYPERTENSION: 1
LAST BOWEL MOVEMENT: 65519
SPEECH CHANGE: 0
DIARRHEA: 0
ABDOMINAL PAIN: 0
COUGH: 0
CLAUDICATION: 0
HYPERTENSION: 1
PHOTOPHOBIA: 0
BLURRED VISION: 0
PALPITATIONS: 0
VOMITING: 0
COUGH: 0
COUGH: 0
FATIGUE: 1
LAST BOWEL MOVEMENT: 65568
NAUSEA: 0
FATIGUES EASILY: 1
DIARRHEA: 0
DIARRHEA: 0
INSOMNIA: 0
HEADACHES: 0
NAUSEA: 0
DRY SKIN: 1
DIARRHEA: 0
SLEEP QUALITY: ADEQUATE
FORGETFULNESS: 1
HEADACHES: 0
HEADACHES: 0
INSOMNIA: 0
COUGH CHARACTERISTICS: NON-PRODUCTIVE
DIARRHEA: 0
FATIGUES EASILY: 1
NERVOUS/ANXIOUS: 0
FEVER: 0
EYE DISCHARGE: 0
FORGETFULNESS: 1
DIARRHEA: 0
WEAKNESS: 0
DIARRHEA: 0
ABDOMINAL PAIN: 0
CHILLS: 0
ABDOMINAL PAIN: 0
INSOMNIA: 0
WHEEZING: 0
SENSORY CHANGE: 0
VOMITING: 0
DIZZINESS: 0
CHILLS: 0
BLURRED VISION: 0
INSOMNIA: 0
NAUSEA: 0
PHOTOPHOBIA: 0
INSOMNIA: 0
NAUSEA: 0
DEPRESSION: 0
SENSORY CHANGE: 0
DESCRIPTION OF MEMORY LOSS: SHORT TERM
SLEEP QUALITY: ADEQUATE
FEVER: 0
HEARTBURN: 0
DESCRIPTION OF MEMORY LOSS: SHORT TERM
HEADACHES: 0
FEVER: 0
NERVOUS/ANXIOUS: 0
CHILLS: 0
HEARTBURN: 0
COUGH: 1
BOWEL INCONTINENCE: 1
BLURRED VISION: 0
BLURRED VISION: 0
FATIGUES EASILY: 1
HEARTBURN: 0
DIZZINESS: 0
FEVER: 0
SPEECH CHANGE: 0
VOMITING: 0
FATIGUES EASILY: 1
LAST BOWEL MOVEMENT: 65623
FATIGUE: 1
VOMITING: 0
DIZZINESS: 0
PHOTOPHOBIA: 0
DEPRESSION: 0
DESCRIPTION OF MEMORY LOSS: LONG TERM
DIZZINESS: 0
CHILLS: 0
DIAPHORESIS: 0
BOWEL INCONTINENCE: 1
INSOMNIA: 0
NERVOUS/ANXIOUS: 0
COUGH: 0
DIAPHORESIS: 0
CONSTIPATION: 0
NERVOUS/ANXIOUS: 0
HEADACHES: 0
INSOMNIA: 0
DESCRIPTION OF MEMORY LOSS: LONG TERM
SENSORY CHANGE: 0
FATIGUE: 1
SENSORY CHANGE: 0
NERVOUS/ANXIOUS: 0
MYALGIAS: 0
INSOMNIA: 0
DRY SKIN: 1
ABDOMINAL PAIN: 0
SHORTNESS OF BREATH: 0
CONSTIPATION: 0
SHORTNESS OF BREATH: 0
DIARRHEA: 0
INSOMNIA: 0
MYALGIAS: 0
CLAUDICATION: 0
BLURRED VISION: 0
CHILLS: 0
SHORTNESS OF BREATH: 0
NERVOUS/ANXIOUS: 0
DEPRESSION: 0
FEVER: 0
FEVER: 0
SENSORY CHANGE: 0
COUGH CHARACTERISTICS: NON-PRODUCTIVE
NERVOUS/ANXIOUS: 0
DIAPHORESIS: 0
INSOMNIA: 0
PALPITATIONS: 0
WEAKNESS: 0
CLAUDICATION: 0
SHORTNESS OF BREATH: 0
BOWEL INCONTINENCE: 1
SORE THROAT: 0
MYALGIAS: 0
INSOMNIA: 0
ABDOMINAL PAIN: 0
FEVER: 0
SHORTNESS OF BREATH: 0
BLURRED VISION: 0
HEADACHES: 0
HEADACHES: 0
BLURRED VISION: 0
DEPRESSION: 0
NAUSEA: 0
DESCRIPTION OF MEMORY LOSS: SHORT TERM
VOMITING: 0
CLAUDICATION: 0
HEADACHES: 0
FATIGUES EASILY: 1
SHORTNESS OF BREATH: 0
CLAUDICATION: 0
FATIGUE: 1
DESCRIPTION OF MEMORY LOSS: LONG TERM
FATIGUE: 1
LAST BOWEL MOVEMENT: 65555
BOWEL INCONTINENCE: 1
FEVER: 0
NAUSEA: 0
HYPERTENSION: 1
WEAKNESS: 0
WHEEZING: 0
STRIDOR: 0
STRIDOR: 0
CONSTIPATION: 0
HEMOPTYSIS: 0
SENSORY CHANGE: 0
COUGH: 0
NAUSEA: 0
ABDOMINAL PAIN: 0
WEAKNESS: 0
HYPERTENSION: 1
COUGH: 1
DESCRIPTION OF MEMORY LOSS: LONG TERM
FORGETFULNESS: 1
ABDOMINAL PAIN: 0
SHORTNESS OF BREATH: 0
BLURRED VISION: 0
HEARTBURN: 0
EYE REDNESS: 0
DEPRESSION: 0
SPEECH CHANGE: 0
MYALGIAS: 0
FEVER: 0
LAST BOWEL MOVEMENT: 65575
FEVER: 0
DIARRHEA: 0
VOMITING: 0
EYE DISCHARGE: 0
INSOMNIA: 0
COUGH: 0
BLURRED VISION: 0
DEPRESSION: 0
ABDOMINAL PAIN: 0
COUGH: 0
HEARTBURN: 0
CONSTIPATION: 0
BLURRED VISION: 0
FEVER: 0
PHOTOPHOBIA: 0
MYALGIAS: 0
SPEECH CHANGE: 0
SPEECH CHANGE: 0
INSOMNIA: 0
DESCRIPTION OF MEMORY LOSS: LONG TERM
CLAUDICATION: 0
DIARRHEA: 0
CONSTIPATION: 0
DRY SKIN: 1
SENSORY CHANGE: 0
SPEECH CHANGE: 0
DIZZINESS: 0
INSOMNIA: 0
WEAKNESS: 0
HEARTBURN: 0
DIARRHEA: 0
FORGETFULNESS: 1
HEARTBURN: 0
SPEECH CHANGE: 0
CHILLS: 0
BLURRED VISION: 0
DIARRHEA: 0
DIZZINESS: 0
WEAKNESS: 0
COUGH: 0
DIZZINESS: 0
CHILLS: 0
SPEECH CHANGE: 0
HEADACHES: 0
CLAUDICATION: 0
CLAUDICATION: 0
WEAKNESS: 0
BOWEL INCONTINENCE: 1
MYALGIAS: 0
HEARTBURN: 0
INSOMNIA: 0
HEADACHES: 0
VOMITING: 0
BACK PAIN: 0
DRY SKIN: 1
COUGH CHARACTERISTICS: MOIST
SHORTNESS OF BREATH: 0
COUGH: 1
BLURRED VISION: 0
CLAUDICATION: 0
FEVER: 0
FEVER: 0
VOMITING: 0
DESCRIPTION OF MEMORY LOSS: SHORT TERM
HYPERTENSION: 1
MYALGIAS: 0
CONSTIPATION: 0
INSOMNIA: 0
CLAUDICATION: 0
VOMITING: 0
NAUSEA: 0
CLAUDICATION: 0
FEVER: 0
DRY SKIN: 1
SPEECH CHANGE: 0
SORE THROAT: 0
FEVER: 0
DEPRESSION: 0
VOMITING: 0
COUGH: 0
MYALGIAS: 0
FATIGUE: 1
HEADACHES: 0
PHOTOPHOBIA: 0
VOMITING: 0
DEPRESSION: 0
HYPERTENSION: 1
NAUSEA: 0
PALPITATIONS: 0
BRUISES/BLEEDS EASILY: 0
TINGLING: 0
DRY SKIN: 1
SENSORY CHANGE: 0
FATIGUE: 1
DIARRHEA: 0
MYALGIAS: 0
CHILLS: 0
INSOMNIA: 0
CHILLS: 0
SENSORY CHANGE: 0
FATIGUE: 1
CLAUDICATION: 0
FORGETFULNESS: 1
CHILLS: 0
VOMITING: 0
BLURRED VISION: 0
FEVER: 0
CHILLS: 0
CONSTIPATION: 0
SENSORY CHANGE: 0
CHILLS: 0
FEVER: 0
HEADACHES: 0
NERVOUS/ANXIOUS: 0
HEADACHES: 0
DIARRHEA: 0
NERVOUS/ANXIOUS: 0
WEAKNESS: 0
MYALGIAS: 0
NERVOUS/ANXIOUS: 0
FEVER: 0
CHILLS: 0
BLURRED VISION: 0
WEAKNESS: 0
BLURRED VISION: 0
WEAKNESS: 0
DIARRHEA: 0
COUGH: 0
CLAUDICATION: 0
DIZZINESS: 0
LAST BOWEL MOVEMENT: 65561
CHILLS: 0
MYALGIAS: 1
PHOTOPHOBIA: 0
VOMITING: 0
NECK PAIN: 0
DESCRIPTION OF MEMORY LOSS: IMMEDIATE
FATIGUES EASILY: 1
MYALGIAS: 0
MYALGIAS: 0
SENSORY CHANGE: 0
FATIGUE: 1
CLAUDICATION: 0
EYE DISCHARGE: 0
SHORTNESS OF BREATH: 0
HEARTBURN: 0
DEPRESSION: 0
COUGH CHARACTERISTICS: MOIST
CONSTIPATION: 0
SENSORY CHANGE: 0
VOMITING: 0
HEADACHES: 0
FORGETFULNESS: 1
DESCRIPTION OF MEMORY LOSS: SHORT TERM
CONSTIPATION: 0
VOMITING: 0
DESCRIPTION OF MEMORY LOSS: LONG TERM
PHOTOPHOBIA: 0
INSOMNIA: 0
ABDOMINAL PAIN: 0
WEAKNESS: 0
BLOOD IN STOOL: 0
COUGH CHARACTERISTICS: NON-PRODUCTIVE
BLURRED VISION: 0
FEVER: 0
DESCRIPTION OF MEMORY LOSS: LONG TERM
SLEEP QUALITY: ADEQUATE
FATIGUES EASILY: 1
BRUISES/BLEEDS EASILY: 0
FEVER: 0
NERVOUS/ANXIOUS: 0
COUGH: 0
DIARRHEA: 0
SPEECH CHANGE: 0
COUGH: 1
ABDOMINAL PAIN: 0
DEPRESSION: 0
DESCRIPTION OF MEMORY LOSS: LONG TERM
FEVER: 0
WEAKNESS: 0
CONSTIPATION: 0
LAST BOWEL MOVEMENT: 65633
PHOTOPHOBIA: 0
MYALGIAS: 0
DIARRHEA: 0
CLAUDICATION: 0
CHILLS: 0
LAST BOWEL MOVEMENT: 65511
SORE THROAT: 0
SPEECH CHANGE: 0
SHORTNESS OF BREATH: 0
HEADACHES: 0
HEARTBURN: 0
FATIGUE: 1
FATIGUES EASILY: 1
SENSORY CHANGE: 0
CONSTIPATION: 0
HYPERTENSION: 1
HEARTBURN: 0
HEARTBURN: 0
FEVER: 0
INSOMNIA: 0
EYE DISCHARGE: 1
MYALGIAS: 0
SPUTUM PRODUCTION: 0
PHOTOPHOBIA: 0
SHORTNESS OF BREATH: 0
FATIGUES EASILY: 1
HEARTBURN: 0
ABDOMINAL PAIN: 0
NERVOUS/ANXIOUS: 0
INSOMNIA: 0
FATIGUES EASILY: 1
CLAUDICATION: 0
COUGH: 1
COUGH: 1
VOMITING: 0
BLURRED VISION: 0
FORGETFULNESS: 1
CONSTIPATION: 0
PHOTOPHOBIA: 0
WEAKNESS: 0
FORGETFULNESS: 1
NERVOUS/ANXIOUS: 0
BLURRED VISION: 0
INSOMNIA: 0
BLURRED VISION: 0
LAST BOWEL MOVEMENT: 65549
ABDOMINAL PAIN: 0
SPEECH CHANGE: 0
TREMORS: 0
INSOMNIA: 0
SHORTNESS OF BREATH: 0
NERVOUS/ANXIOUS: 0
HEARTBURN: 0
EYE PAIN: 0
ABDOMINAL PAIN: 0
PHOTOPHOBIA: 0
DESCRIPTION OF MEMORY LOSS: SHORT TERM
VOMITING: 0

## 2020-01-01 ASSESSMENT — PAIN SCALES - PAIN ASSESSMENT IN ADVANCED DEMENTIA (PAINAD)
CONSOLABILITY: NO NEED TO CONSOLE
BREATHING: NORMAL
TOTALSCORE: 0
FACIALEXPRESSION: SMILING OR INEXPRESSIVE
BODYLANGUAGE: RELAXED
BREATHING: NORMAL
BREATHING: NORMAL
BODYLANGUAGE: RELAXED
CONSOLABILITY: NO NEED TO CONSOLE
CONSOLABILITY: NO NEED TO CONSOLE
TOTALSCORE: 0
BODYLANGUAGE: RELAXED
FACIALEXPRESSION: SMILING OR INEXPRESSIVE
TOTALSCORE: 0
TOTALSCORE: 0
FACIALEXPRESSION: SMILING OR INEXPRESSIVE
CONSOLABILITY: NO NEED TO CONSOLE
BREATHING: NORMAL
CONSOLABILITY: NO NEED TO CONSOLE
CONSOLABILITY: NO NEED TO CONSOLE
BREATHING: NORMAL
FACIALEXPRESSION: SMILING OR INEXPRESSIVE
BODYLANGUAGE: RELAXED
BREATHING: NORMAL
TOTALSCORE: 0
TOTALSCORE: 0
BODYLANGUAGE: RELAXED
FACIALEXPRESSION: SMILING OR INEXPRESSIVE
FACIALEXPRESSION: SMILING OR INEXPRESSIVE
BODYLANGUAGE: RELAXED

## 2020-01-01 ASSESSMENT — ACTIVITIES OF DAILY LIVING (ADL)
MONEY MANAGEMENT (EXPENSES/BILLS): TOTALLY DEPENDENT
PREPARING MEALS: DEPENDENT
MONEY MANAGEMENT (EXPENSES/BILLS): TOTALLY DEPENDENT
GROOMING_CURRENT_FUNCTION: MAXIMUM ASSIST
USING THE TELPHONE: DEPENDENT
MONEY MANAGEMENT (EXPENSES/BILLS): TOTALLY DEPENDENT
HOUSEKEEPING ASSESSED: 1
DRESSING_UB_CURRENT_FUNCTION: MODERATE ASSIST
MONEY MANAGEMENT (EXPENSES/BILLS): TOTALLY DEPENDENT
TOILETING: MAXIMUM ASSIST
MONEY MANAGEMENT (EXPENSES/BILLS): TOTALLY DEPENDENT
TELEPHONE USE ASSESSED: 1
MONEY MANAGEMENT (EXPENSES/BILLS): TOTALLY DEPENDENT
FEEDING: MINIMUM ASSIST
LAUNDRY ASSESSED: 1
GROOMING_MAXIMUM_ASSIST: 1
AMBULATION_REQUIRES_ASSISTANCE: 1
TOILETING: UNABLE TO DETERMINE AT THIS TIME
CURRENT_FUNCTION: MAXIMUM ASSIST
TOILETING: 1
MONEY MANAGEMENT (EXPENSES/BILLS): TOTALLY DEPENDENT
ORAL_CARE_ASSESSED: 1
MONEY MANAGEMENT (EXPENSES/BILLS): TOTALLY DEPENDENT
CONTINENCE_REQUIRES_ASSISTANCE: 1
MONEY MANAGEMENT (EXPENSES/BILLS): TOTALLY DEPENDENT
HAIR_CARE_ASSESSED: 1
PHYSICAL_TRANSFER_REQUIRES_ASSISTANCE: 1
AMBULATION ASSISTANCE: 1
DRESSING_LB_CURRENT_FUNCTION: MAXIMUM ASSIST
MONEY MANAGEMENT (EXPENSES/BILLS): TOTALLY DEPENDENT
SPONGING_LB_CURRENT_FUNCTION: MAXIMUM ASSIST
FEEDING ASSESSED: 1
HAIR_CARE_MODERATE_ASSIST: 1
MONEY MANAGEMENT (EXPENSES/BILLS): TOTALLY DEPENDENT
AMBULATION ASSISTANCE: MAXIMUM ASSIST
SPONGING_UB_CURRENT_FUNCTION: MAXIMUM ASSIST
MONEY MANAGEMENT (EXPENSES/BILLS): TOTALLY DEPENDENT
GROOMING ASSESSED: 1
BATHING_REQUIRES_ASSISTANCE: 1
MONEY MANAGEMENT (EXPENSES/BILLS): TOTALLY DEPENDENT
MONEY MANAGEMENT (EXPENSES/BILLS): TOTALLY DEPENDENT
SHAVING_ASSESSED: 1
MONEY MANAGEMENT (EXPENSES/BILLS): TOTALLY DEPENDENT
DRESSING_REQUIRES_ASSISTANCE: 1
BATHING ASSESSED: 1
MONEY MANAGEMENT (EXPENSES/BILLS): TOTALLY DEPENDENT
SHAVING_MAXIMUM_ASSIST: 1
MONEY MANAGEMENT (EXPENSES/BILLS): TOTALLY DEPENDENT
BATHING_CURRENT_FUNCTION: MAXIMUM ASSIST
ORAL_CARE_CURRENT_FUNCTION: DEPENDENT
SHOPPING ASSESSED: 1
PHYSICAL TRANSFERS ASSESSED: 1

## 2020-01-01 ASSESSMENT — SOCIAL DETERMINANTS OF HEALTH (SDOH)
ACTIVE STRESSOR - LOSS OF CONTROL: 1

## 2020-01-01 ASSESSMENT — COGNITIVE AND FUNCTIONAL STATUS - GENERAL
CLIMB 3 TO 5 STEPS WITH RAILING: TOTAL
DRESSING REGULAR LOWER BODY CLOTHING: A LOT
WALKING IN HOSPITAL ROOM: A LOT
MOVING TO AND FROM BED TO CHAIR: A LITTLE
STANDING UP FROM CHAIR USING ARMS: TOTAL
PERSONAL GROOMING: A LITTLE
SUGGESTED CMS G CODE MODIFIER MOBILITY: CM
HELP NEEDED FOR BATHING: A LOT
HELP NEEDED FOR BATHING: A LOT
DRESSING REGULAR LOWER BODY CLOTHING: A LOT
PERSONAL GROOMING: A LITTLE
TURNING FROM BACK TO SIDE WHILE IN FLAT BAD: UNABLE
DRESSING REGULAR UPPER BODY CLOTHING: A LITTLE
SUGGESTED CMS G CODE MODIFIER DAILY ACTIVITY: CK
TOILETING: A LOT
SUGGESTED CMS G CODE MODIFIER MOBILITY: CL
STANDING UP FROM CHAIR USING ARMS: A LOT
MOBILITY SCORE: 9
TOILETING: A LOT
DAILY ACTIVITIY SCORE: 15
MOVING FROM LYING ON BACK TO SITTING ON SIDE OF FLAT BED: A LOT
MOBILITY SCORE: 13
DRESSING REGULAR UPPER BODY CLOTHING: A LOT
MOVING TO AND FROM BED TO CHAIR: A LITTLE
TURNING FROM BACK TO SIDE WHILE IN FLAT BAD: A LITTLE
WALKING IN HOSPITAL ROOM: TOTAL
SUGGESTED CMS G CODE MODIFIER DAILY ACTIVITY: CK
CLIMB 3 TO 5 STEPS WITH RAILING: TOTAL
MOVING FROM LYING ON BACK TO SITTING ON SIDE OF FLAT BED: A LOT
DAILY ACTIVITIY SCORE: 16

## 2020-01-01 ASSESSMENT — GAIT ASSESSMENTS
DISTANCE (FEET): 5
ASSISTIVE DEVICE: FRONT WHEEL WALKER
GAIT LEVEL OF ASSIST: MODERATE ASSIST
DEVIATION: ATAXIC;STEP TO;DECREASED BASE OF SUPPORT;SHUFFLED GAIT;DECREASED HEEL STRIKE;DECREASED TOE OFF
GAIT LEVEL OF ASSIST: UNABLE TO PARTICIPATE
GAIT LEVEL OF ASSIST: UNABLE TO PARTICIPATE

## 2020-01-01 ASSESSMENT — FIBROSIS 4 INDEX
FIB4 SCORE: 1.69
FIB4 SCORE: 1.12
FIB4 SCORE: 1.12
FIB4 SCORE: 1.6

## 2020-01-01 ASSESSMENT — PATIENT HEALTH QUESTIONNAIRE - PHQ9
1. LITTLE INTEREST OR PLEASURE IN DOING THINGS: NOT AT ALL
2. FEELING DOWN, DEPRESSED, IRRITABLE, OR HOPELESS: NOT AT ALL
1. LITTLE INTEREST OR PLEASURE IN DOING THINGS: NOT AT ALL
2. FEELING DOWN, DEPRESSED, IRRITABLE, OR HOPELESS: NOT AT ALL
SUM OF ALL RESPONSES TO PHQ9 QUESTIONS 1 AND 2: 0
1. LITTLE INTEREST OR PLEASURE IN DOING THINGS: NOT AT ALL
1. LITTLE INTEREST OR PLEASURE IN DOING THINGS: NOT AT ALL
SUM OF ALL RESPONSES TO PHQ9 QUESTIONS 1 AND 2: 0
1. LITTLE INTEREST OR PLEASURE IN DOING THINGS: NOT AT ALL
CLINICAL INTERPRETATION OF PHQ2 SCORE: 0
SUM OF ALL RESPONSES TO PHQ9 QUESTIONS 1 AND 2: 0
2. FEELING DOWN, DEPRESSED, IRRITABLE, OR HOPELESS: NOT AT ALL

## 2020-01-01 ASSESSMENT — CHA2DS2 SCORE
SEX: MALE
AGE 65 TO 74: NO
PRIOR STROKE OR TIA OR THROMBOEMBOLISM: NO
CHA2DS2 VASC SCORE: 3
VASCULAR DISEASE: NO
AGE 75 OR GREATER: YES
CHF OR LEFT VENTRICULAR DYSFUNCTION: NO
DIABETES: NO
HYPERTENSION: YES

## 2020-01-01 NOTE — CARE PLAN
Problem: Communication  Goal: The ability to communicate needs accurately and effectively will improve  Outcome: PROGRESSING AS EXPECTED   Pt tends no to call for assistance. Bed alarm on. Encouraged and reminded to call using call light for any needs.      Problem: Safety  Goal: Will remain free from injury  Outcome: PROGRESSING AS EXPECTED   Pt educated to call when in need. Call light and personal belongings w/n reach. Room free of clutters. Bed locked and in lowest position. Answer call light immediately.  Bed alarm on

## 2020-01-01 NOTE — PROGRESS NOTES
"      Spiritual Care Note    Patient Information     Patient's Name: Eulogio Jeronimo   MRN: [id number]    YOB: 1940   Age and Gender: 79 y.o. male   Service Area: GEN SURGERY [facility]   Room (and Bed): 2210/01   Ethnicity or Nationality:     Primary Language: English   Mandaeism/Spiritual preference: Non-Mandaeism   Place of Residence: [place], [place]   Code Status: DNAR/DNI    Date of Admission: 12/27/2019   Length of Stay: 0 days        Spiritual Care Provider Information:  Name of Spiritual Care Provider: Jocelin Fabian  Title of Spiritual Care Provider: Associate   Phone Number: [phone]  E-mail: [email or web address]  Total time : 15 minutes    Spiritual Screen Results:    Gen Nursing  Spiritual Screen  Is your spiritual health or inner well-being important to you as you cope with your medical condition?: Yes  Would you like to receive a visit from our Spiritual Care team or your own Lutheran or spiritual leader?: Yes  Was spiritual care education provided to the patient?: Yes     Palliative Care  PC Mandaeism/Spiritual Screening  Was spiritual care education provided to the patient?: Yes      Encounter/Request Information  Encounter/Request Type     Visited With: Patient    Nature of the Visit: Initial, On shift    Continue Visiting: (UPON REQUEST)    General Visit: Yes    Referral From/ Origin of Request: Epic nursing    Religous Needs/Values       Spiritual Assessment   Spiritual Care Encounters    Observations/Symptoms: Accepting, Thankfulness(Pt alert, eating breakfast)    Interacton/Conversation:  introduced self to pt - pt was pleasant, eating breakfast and spoke of \"feeling much better today - generally ok.\" Pt stated he has not had any visitors and \"I want it that way.\" Pt declined any particular spiritual care needs at this time.    Assessment: (N/A)    Interventions: Conversation, Support    Outcomes: Value/Dignity/Respect, Ability to Communicate with Truth " and Honesty    Plan: Visit Upon Request    Notes:

## 2020-01-01 NOTE — PROGRESS NOTES
Pt AAOx2. Denies any pain, nausea, SOB, chills . Due meds given.Pt can be impulsive and forgets to call. Bed alarm on. Reminded to call when in need. Room close to nursing station. Safety and comfort measures in place. Fresh water provided. No additional needs at this time.

## 2020-01-01 NOTE — CARE PLAN
Problem: Safety  Goal: Will remain free from injury  Outcome: PROGRESSING AS EXPECTEDFall precautions in place: treaded slipper socks on, mobility signs posted, hourly rounding, bed in lowest position, belongings and call light are within reach, bed alarm on, and near nurses station.      Problem: Venous Thromboembolism (VTW)/Deep Vein Thrombosis (DVT) Prevention:  Goal: Patient will participate in Venous Thrombosis (VTE)/Deep Vein Thrombosis (DVT)Prevention Measures  Outcome: PROGRESSING AS EXPECTED  Encouraged to get OOB, xarelto given

## 2020-01-01 NOTE — CARE PLAN
Problem: Safety  Goal: Will remain free from injury  Outcome: PROGRESSING AS EXPECTED  Fall precautions in place: treaded slipper socks on, mobility signs posted, hourly rounding, bed in lowest position, belongings and call light are within reach, bed alarm on, and near nurses station.      Problem: Venous Thromboembolism (VTW)/Deep Vein Thrombosis (DVT) Prevention:  Goal: Patient will participate in Venous Thrombosis (VTE)/Deep Vein Thrombosis (DVT)Prevention Measures  Outcome: PROGRESSING AS EXPECTED   Encouraged Pt to ambulated, xarelto given as order

## 2020-01-01 NOTE — PROGRESS NOTES
Report received from night shift RN. Assume care. Pt. AAOx1 pt is bed,  Assessment completed. VSS. Denies pain, able to wiggle toes and dorsi/plantar flex feet, good CMS and pulses to ana LE, denies numbness and tingling. Pt was update for the care for the day. White board updated, All question answered. Pt has call light within reach,  bed is in the lowest position. Pt has no other needs at this time.

## 2020-01-01 NOTE — PROGRESS NOTES
"[facility] Medicine Daily Progress Note    Date of Service  2020    Chief Complaint  79 y.o. male admitted 2019 with inability to care for self.    Hospital Course    History of Presenting Illness per Dr. Ernesto Harvey's H&P:  79 y.o. male who presented 2019 with the inability to care for himself. He had a full time caregiver at home who unfortunately  in his home today. The home health RN referred him to the ED when she noted he was unable to get up on his own. He has no complaints and has not been ill recently.       Interval Problem Update  : He is awake and alert, he is not eating much.  Patient does not seem to know why he was brought to the hospital and could not remember that his home health nurse had him brought in because he could not ambulate on his own or get out of his chair. Staff who know him from prior admissions stated that his caregiver was not much healthier than the patient.  : stable, weak can't get out of bed on his own per RN. Won't answer orientation questions: \"why does it matter!\"  : still won't answer orientation questions. Urinalysis is normal.  : Agitated, trying to get out of bed and leave.  Haldol added  : Calm, cooperative.  No physical complaints.  Occasionally incontinent of urine    Consultants/Specialty  NONE    Code Status  DNAR/DNI    Disposition  TBD-needs placement, possible group home as he is in obvious status difficult    Review of Systems  Review of Systems   Constitutional: Negative.    Cardiovascular: Negative.    Gastrointestinal: Negative.         Physical Exam  Temp:  [36.4 °C (97.6 °F)-36.9 °C (98.4 °F)] 36.6 °C (97.9 °F)  Pulse:  [50-73] 73  Resp:  [18] 18  BP: (137-160)/() 159/81  SpO2:  [91 %-97 %] 95 %    Physical Exam  Vitals signs and nursing note reviewed.   Constitutional:       General: He is not in acute distress.     Appearance: Normal appearance.      Comments: Thin, elderly   HENT:      Head: Normocephalic " "and atraumatic.      Right Ear: External ear normal.      Left Ear: External ear normal.      Nose: Nose normal.   Eyes:      General:         Right eye: No discharge.         Left eye: No discharge.      Conjunctiva/sclera: Conjunctivae normal.   Cardiovascular:      Rate and Rhythm: Normal rate and regular rhythm.   Pulmonary:      Effort: Pulmonary effort is normal. No respiratory distress.   Abdominal:      General: Abdomen is flat. There is no distension.   Musculoskeletal:      Right lower leg: No edema.      Left lower leg: No edema.   Skin:     General: Skin is warm and dry.      Coloration: Skin is not jaundiced or pale.   Neurological:      General: No focal deficit present.      Mental Status: He is alert and oriented to person, place, and time.      Motor: Weakness (general) present.   Psychiatric:         Mood and Affect: Mood is anxious. Affect is angry.         Behavior: Behavior is agitated.         Cognition and Memory: Cognition is impaired. Memory is impaired.         Judgment: Judgment is impulsive and inappropriate.         Fluids    Intake/Output Summary (Last 24 hours) at 1/1/2020 1239  Last data filed at 1/1/2020 1200  Gross per 24 hour   Intake 720 ml   Output 550 ml   Net 170 ml       Laboratory                        Imaging  No orders to display        Assessment/Plan  * Requires supervision due to deficit in self-care  Assessment & Plan  Unclear what his social situation is, apparently his \"caregiver\" was more of a roommate who was also ill.  Discussed with , difficult placement anticipated    Agitation  Assessment & Plan  Intermittent, he is not safe to care for himself at home  He is calm today  EKG slightly elevated QTC  Haldol ok as needed    Chronic atrial fibrillation  Assessment & Plan  Rate controlled. Continue meds including anticoagulation.     Severe protein-calorie malnutrition (HCC)  Assessment & Plan  Continue meds. Encourage oral intake    Hyperlipidemia- " (present on admission)  Assessment & Plan  Continue meds    COPD (chronic obstructive pulmonary disease) ([facility])- (present on admission)  Assessment & Plan  No flare. Prn rt protocol.     CKD (chronic kidney disease) stage 3, GFR 30-59 ml/min ([facility])- (present on admission)  Assessment & Plan  Stable     Essential hypertension- (present on admission)  Assessment & Plan  Continue meds.        VTE prophylaxis: SCD

## 2020-01-02 NOTE — PROGRESS NOTES
Pt denies pain. Condom cath in place. Pt refuses ambulation. Education provided. No needs at this time. Safety precautions implemented.

## 2020-01-02 NOTE — CARE PLAN
Problem: Communication  Goal: The ability to communicate needs accurately and effectively will improve  Outcome: PROGRESSING AS EXPECTED     Problem: Safety  Goal: Will remain free from injury  Outcome: PROGRESSING AS EXPECTED     Problem: Bowel/Gastric:  Goal: Normal bowel function is maintained or improved  Outcome: PROGRESSING SLOWER THAN EXPECTED

## 2020-01-03 NOTE — PROGRESS NOTES
Hospital Medicine Daily Progress Note    Date of Service  1/2/2020    Chief Complaint  Dead roommate    Hospital Course    *This is an 79-year-old male who was apparently living with a roommate/caregiver who was also ill.  Home health found the patient with roommate dead.  Patient clearly unable to care for self and brought to the hospital*      Interval Problem Update  No interval events he denies pain    Consultants/Specialty  None    Code Status  DNR    Disposition  Likely group home will probably require guardianship    Review of Systems  Review of Systems   Unable to perform ROS: Dementia        Physical Exam  Temp:  [36.4 °C (97.5 °F)-36.7 °C (98 °F)] 36.7 °C (98 °F)  Pulse:  [66-69] 69  Resp:  [18] 18  BP: (141-164)/(77-80) 149/77  SpO2:  [96 %-97 %] 96 %    Physical Exam  Vitals signs and nursing note reviewed.   Constitutional:       Comments: Mildly disheveled and thin   HENT:      Head: Normocephalic and atraumatic.      Right Ear: External ear normal.      Left Ear: External ear normal.      Nose: Nose normal.      Mouth/Throat:      Mouth: Mucous membranes are moist.      Pharynx: Oropharynx is clear.   Eyes:      General:         Right eye: Discharge present.         Left eye: Discharge present.     Extraocular Movements: Extraocular movements intact.      Pupils: Pupils are equal, round, and reactive to light.      Comments: Bilateral mild clear ocular discharge   Cardiovascular:      Rate and Rhythm: Normal rate. Rhythm irregular.   Pulmonary:      Effort: Pulmonary effort is normal.      Comments: Moderately diminished  Abdominal:      General: Abdomen is flat. Bowel sounds are normal. There is no distension.      Palpations: Abdomen is soft.      Tenderness: There is no tenderness.   Musculoskeletal:         General: No swelling or deformity.      Right lower leg: No edema.      Left lower leg: No edema.   Skin:     General: Skin is warm and dry.   Neurological:      General: No focal deficit  "present.      Mental Status: He is alert.      Comments: Probably at baseline   Psychiatric:      Comments: Currently cooperative         Fluids    Intake/Output Summary (Last 24 hours) at 1/2/2020 1802  Last data filed at 1/2/2020 1300  Gross per 24 hour   Intake 360 ml   Output --   Net 360 ml       Laboratory  Recent Labs     01/02/20  0258   WBC 7.3   RBC 3.01*   HEMOGLOBIN 9.4*   HEMATOCRIT 29.5*   MCV 98.0*   MCH 31.2   MCHC 31.9*   RDW 50.0   PLATELETCT 255   MPV 10.2     Recent Labs     01/02/20  0258   SODIUM 141   POTASSIUM 5.0   CHLORIDE 107   CO2 22   GLUCOSE 89   BUN 52*   CREATININE 1.88*   CALCIUM 8.8                   Imaging  No orders to display        Assessment/Plan  * Requires supervision due to deficit in self-care  Assessment & Plan  Unclear what his social situation is, apparently his \"caregiver\" was more of a roommate who was also ill.  Discussed with , difficult placement anticipated    Agitation  Assessment & Plan  Intermittent, he is not safe to care for himself at home  He is calm today  EKG slightly elevated QTC  Haldol ok as needed    Chronic atrial fibrillation  Assessment & Plan  Rate controlled. Continue meds including anticoagulation.     Severe protein-calorie malnutrition (HCC)  Assessment & Plan  Continue meds. Encourage oral intake    Hyperlipidemia- (present on admission)  Assessment & Plan  Continue meds    COPD (chronic obstructive pulmonary disease) ([facility])- (present on admission)  Assessment & Plan  No flare. Prn rt protocol.     CKD (chronic kidney disease) stage 3, GFR 30-59 ml/min ([facility])- (present on admission)  Assessment & Plan  Stable     Essential hypertension- (present on admission)  Assessment & Plan  Continue meds.          VTE prophylaxis: xarelto      "

## 2020-01-03 NOTE — CARE PLAN
Problem: Communication  Goal: The ability to communicate needs accurately and effectively will improve  Outcome: PROGRESSING AS EXPECTED     Problem: Safety  Goal: Will remain free from injury  Outcome: PROGRESSING AS EXPECTED     Problem: Discharge Barriers/Planning  Goal: Patient's continuum of care needs will be met  Outcome: PROGRESSING AS EXPECTED     Problem: Skin Integrity  Goal: Risk for impaired skin integrity will decrease  Outcome: PROGRESSING AS EXPECTED     Problem: Mobility  Goal: Risk for activity intolerance will decrease  Outcome: PROGRESSING AS EXPECTED

## 2020-01-03 NOTE — CARE PLAN
Problem: Safety  Goal: Will remain free from injury  Outcome: PROGRESSING AS EXPECTED   Pt educated to call when in need. Call light and personal belongings w/n reach. Room free of clutters. Bed locked and in lowest position. Bed alarm on      Problem: Urinary Elimination:  Goal: Ability to reestablish a normal urinary elimination pattern will improve  Outcome: PROGRESSING AS EXPECTED   Pt on condom cath.

## 2020-01-03 NOTE — PROGRESS NOTES
Pt disoriented to place and time. Due meds given. Pt incontinent of both BM and urine. Pt voided and had large loose BM tonight. New condom cath applied. Safety and comforteasures in place. Bed alarm on. Fresh water provided. Pt reminded to call when in need of assistance. No additional needs at this time.

## 2020-01-04 NOTE — PROGRESS NOTES
Hospital Medicine Daily Progress Note    Date of Service  1/4/2020    Chief Complaint  Dead roommate    Hospital Course    *This is an 79-year-old male who was apparently living with a roommate/caregiver who was also ill.  Home health found the patient with roommate dead.  Patient clearly unable to care for self and brought to the hospital*      Interval Problem Update  Intervally bathed  No other events    Consultants/Specialty  None    Code Status  DNR    Disposition  Likely group home will probably require guardianship    Review of Systems  Review of Systems   Unable to perform ROS: Dementia        Physical Exam  Temp:  [36.4 °C (97.6 °F)-36.8 °C (98.3 °F)] 36.5 °C (97.7 °F)  Pulse:  [68-72] 70  Resp:  [18] 18  BP: (124-148)/(68-71) 124/68  SpO2:  [93 %-97 %] 97 %    Physical Exam  Vitals signs and nursing note reviewed.   Constitutional:       Comments: thin   HENT:      Head: Normocephalic and atraumatic.      Right Ear: External ear normal.      Left Ear: External ear normal.      Nose: Nose normal.      Mouth/Throat:      Mouth: Mucous membranes are moist.      Pharynx: Oropharynx is clear.   Eyes:      General:         Right eye: No discharge.         Left eye: No discharge.      Extraocular Movements: Extraocular movements intact.      Pupils: Pupils are equal, round, and reactive to light.   Cardiovascular:      Rate and Rhythm: Normal rate. Rhythm irregular.   Pulmonary:      Effort: Pulmonary effort is normal.      Comments: Moderately diminished  Abdominal:      General: Abdomen is flat. Bowel sounds are normal. There is no distension.      Palpations: Abdomen is soft.      Tenderness: There is no tenderness.   Musculoskeletal:         General: No swelling or deformity.      Right lower leg: No edema.      Left lower leg: No edema.   Skin:     General: Skin is warm and dry.   Neurological:      General: No focal deficit present.      Mental Status: He is alert.      Comments: Probably at baseline  "  Psychiatric:      Comments: Currently cooperative         Fluids    Intake/Output Summary (Last 24 hours) at 1/4/2020 1322  Last data filed at 1/4/2020 1200  Gross per 24 hour   Intake 1080 ml   Output --   Net 1080 ml       Laboratory  Recent Labs     01/02/20  0258   WBC 7.3   RBC 3.01*   HEMOGLOBIN 9.4*   HEMATOCRIT 29.5*   MCV 98.0*   MCH 31.2   MCHC 31.9*   RDW 50.0   PLATELETCT 255   MPV 10.2     Recent Labs     01/02/20  0258   SODIUM 141   POTASSIUM 5.0   CHLORIDE 107   CO2 22   GLUCOSE 89   BUN 52*   CREATININE 1.88*   CALCIUM 8.8                   Imaging  No orders to display        Assessment/Plan  * Requires supervision due to deficit in self-care  Assessment & Plan  Unclear what his social situation is, apparently his \"caregiver\" was more of a roommate who was also ill.  Discussed with , difficult placement anticipated    Agitation  Assessment & Plan  Intermittent, he is not safe to care for himself at home  He is calm today  EKG slightly elevated QTC  Haldol ok as needed    Chronic atrial fibrillation  Assessment & Plan  Rate controlled. Continue meds including anticoagulation.     Severe protein-calorie malnutrition (HCC)  Assessment & Plan  Continue meds. Encourage oral intake    Hyperlipidemia- (present on admission)  Assessment & Plan  Continue meds    COPD (chronic obstructive pulmonary disease) ([facility])- (present on admission)  Assessment & Plan  No flare. Prn rt protocol.     CKD (chronic kidney disease) stage 3, GFR 30-59 ml/min ([facility])- (present on admission)  Assessment & Plan  Stable     Essential hypertension- (present on admission)  Assessment & Plan  Continue meds.        VTE prophylaxis: xarelto      "

## 2020-01-04 NOTE — PROGRESS NOTES
Hospital Medicine Daily Progress Note    Date of Service  1/3/2020    Chief Complaint  Dead roommate    Hospital Course    *This is an 79-year-old male who was apparently living with a roommate/caregiver who was also ill.  Home health found the patient with roommate dead.  Patient clearly unable to care for self and brought to the hospital*      Interval Problem Update  No change, patient is increasingly disheveled  Needs to be showered    Consultants/Specialty  None    Code Status  DNR    Disposition  Likely group home will probably require guardianship    Review of Systems  Review of Systems   Unable to perform ROS: Dementia        Physical Exam  Temp:  [36.4 °C (97.6 °F)-36.9 °C (98.5 °F)] 36.4 °C (97.6 °F)  Pulse:  [72-98] 72  Resp:  [18] 18  BP: (133-148)/(65-71) 148/68  SpO2:  [93 %-97 %] 93 %    Physical Exam  Vitals signs and nursing note reviewed.   Constitutional:       Comments: Mildly disheveled and thin   HENT:      Head: Normocephalic and atraumatic.      Right Ear: External ear normal.      Left Ear: External ear normal.      Nose: Nose normal.      Mouth/Throat:      Mouth: Mucous membranes are moist.      Pharynx: Oropharynx is clear.   Eyes:      General:         Right eye: Discharge present.         Left eye: Discharge present.     Extraocular Movements: Extraocular movements intact.      Pupils: Pupils are equal, round, and reactive to light.      Comments: Bilateral mild clear ocular discharge   Cardiovascular:      Rate and Rhythm: Normal rate. Rhythm irregular.   Pulmonary:      Effort: Pulmonary effort is normal.      Comments: Moderately diminished  Abdominal:      General: Abdomen is flat. Bowel sounds are normal. There is no distension.      Palpations: Abdomen is soft.      Tenderness: There is no tenderness.   Musculoskeletal:         General: No swelling or deformity.      Right lower leg: No edema.      Left lower leg: No edema.   Skin:     General: Skin is warm and dry.  "  Neurological:      General: No focal deficit present.      Mental Status: He is alert.      Comments: Probably at baseline   Psychiatric:      Comments: Currently cooperative         Fluids    Intake/Output Summary (Last 24 hours) at 1/3/2020 1744  Last data filed at 1/3/2020 1145  Gross per 24 hour   Intake 360 ml   Output --   Net 360 ml       Laboratory  Recent Labs     01/02/20  0258   WBC 7.3   RBC 3.01*   HEMOGLOBIN 9.4*   HEMATOCRIT 29.5*   MCV 98.0*   MCH 31.2   MCHC 31.9*   RDW 50.0   PLATELETCT 255   MPV 10.2     Recent Labs     01/02/20  0258   SODIUM 141   POTASSIUM 5.0   CHLORIDE 107   CO2 22   GLUCOSE 89   BUN 52*   CREATININE 1.88*   CALCIUM 8.8                   Imaging  No orders to display        Assessment/Plan  * Requires supervision due to deficit in self-care  Assessment & Plan  Unclear what his social situation is, apparently his \"caregiver\" was more of a roommate who was also ill.  Discussed with , difficult placement anticipated    Agitation  Assessment & Plan  Intermittent, he is not safe to care for himself at home  He is calm today  EKG slightly elevated QTC  Haldol ok as needed    Chronic atrial fibrillation  Assessment & Plan  Rate controlled. Continue meds including anticoagulation.     Severe protein-calorie malnutrition (HCC)  Assessment & Plan  Continue meds. Encourage oral intake    Hyperlipidemia- (present on admission)  Assessment & Plan  Continue meds    COPD (chronic obstructive pulmonary disease) ([facility])- (present on admission)  Assessment & Plan  No flare. Prn rt protocol.     CKD (chronic kidney disease) stage 3, GFR 30-59 ml/min ([facility])- (present on admission)  Assessment & Plan  Stable     Essential hypertension- (present on admission)  Assessment & Plan  Continue meds.        VTE prophylaxis: xarelto      "

## 2020-01-04 NOTE — CARE PLAN
Problem: Safety  Goal: Will remain free from injury  Outcome: PROGRESSING AS EXPECTED   Pt educated to call when in need. Call light and personal belongings w/n reach. Room free of clutters. Bed locked and in lowest position. Bed alarm on. Answer call light/bed alarm immediately. Hourly rounding.       Problem: Discharge Barriers/Planning  Goal: Patient's continuum of care needs will be met  Outcome: PROGRESSING SLOWER THAN EXPECTED   Pt will likely be d/c'd to group home per MD and handoff report.

## 2020-01-04 NOTE — CARE PLAN
Problem: Safety  Goal: Will remain free from falls  Outcome: PROGRESSING AS EXPECTED  Intervention: Implement fall precautions  Flowsheets (Taken 1/4/2020 1555)  Bed Alarm: Yes - Alarm On  Environmental Precautions: Treaded Slipper Socks on Patient; Bed in Low Position  Note:   Pt bed alarm on, upper side rails up, non-slip socks on, pt instructed to use call light for needs. Will continue to monitor.     Problem: Communication  Goal: The ability to communicate needs accurately and effectively will improve  Outcome: PROGRESSING SLOWER THAN EXPECTED  Intervention: Reorient patient to environment as needed  Note:   Pt getting out of bed without call light, per Annette RN 3 or 4 times. Pt confused, saying he is going home. Pt reoriented to unit. Pt given haldol for agitation per MAR. Will continue to monitor.     Problem: Venous Thromboembolism (VTW)/Deep Vein Thrombosis (DVT) Prevention:  Goal: Patient will participate in Venous Thrombosis (VTE)/Deep Vein Thrombosis (DVT)Prevention Measures  Outcome: PROGRESSING SLOWER THAN EXPECTED  Intervention: Ensure patient wears graduated elastic stockings (JULIANNA hose) and/or SCDs, if ordered, when in bed or chair (Remove at least once per shift for skin check)  Flowsheets (Taken 1/4/2020 1555)  SCDs, Sequential Compression Device: Refused  Note:   Pt refused SCD's. Pt encouraged to perform ankle flex, foot rotation, and knee flex exercises hourly while awake. Will continue to educate pt.

## 2020-01-05 NOTE — CARE PLAN
Problem: Safety  Goal: Will remain free from injury  Outcome: PROGRESSING AS EXPECTED  Note:   Pt is injury-free at this moment   Fall precautions in place including: bed locked & at lowest position, call light & personal belongings within reach, x2 upper bed rails up   Bed alarm on   Hourly rounding in progress      Problem: Discharge Barriers/Planning  Goal: Patient's continuum of care needs will be met  Outcome: PROGRESSING AS EXPECTED  Note:   Pending placement      Problem: Skin Integrity  Goal: Risk for impaired skin integrity will decrease  Outcome: PROGRESSING AS EXPECTED  Note:   Pt is fragile and skinny, redness to buttock but blanching => Mepilex applied   Advised pt to frequently reposition in bed  Will continue to monitor for skin breakdown

## 2020-01-05 NOTE — CARE PLAN
Problem: Safety  Goal: Will remain free from falls  Outcome: PROGRESSING AS EXPECTED     Problem: Communication  Goal: The ability to communicate needs accurately and effectively will improve  Outcome: PROGRESSING SLOWER THAN EXPECTED     Problem: Safety  Goal: Will remain free from injury  Outcome: PROGRESSING SLOWER THAN EXPECTED

## 2020-01-05 NOTE — PROGRESS NOTES
Hospital Medicine Daily Progress Note    Date of Service  1/5/2020    Chief Complaint  Dead roommate    Hospital Course    *This is an 79-year-old male who was apparently living with a roommate/caregiver who was also ill.  Home health found the patient with roommate dead.  Patient clearly unable to care for self and brought to the hospital*      Interval Problem Update  Oral intake remains marginal  He is becoming more irritable with staff  We will add bedtime Seroquel to address both issues    Consultants/Specialty  None    Code Status  DNR    Disposition  Likely group home will probably require guardianship    Review of Systems  Review of Systems   Unable to perform ROS: Dementia        Physical Exam  Temp:  [36.2 °C (97.2 °F)-36.5 °C (97.7 °F)] 36.3 °C (97.3 °F)  Pulse:  [66-72] 72  Resp:  [18-20] 18  BP: (130-164)/(66-80) 130/70  SpO2:  [97 %-98 %] 98 %    Physical Exam  Vitals signs and nursing note reviewed.   Constitutional:       Comments: thin   HENT:      Head: Normocephalic and atraumatic.      Right Ear: External ear normal.      Left Ear: External ear normal.      Nose: Nose normal.      Mouth/Throat:      Mouth: Mucous membranes are moist.      Pharynx: Oropharynx is clear.   Eyes:      General:         Right eye: No discharge.         Left eye: No discharge.      Extraocular Movements: Extraocular movements intact.      Pupils: Pupils are equal, round, and reactive to light.   Cardiovascular:      Rate and Rhythm: Normal rate. Rhythm irregular.   Pulmonary:      Effort: Pulmonary effort is normal.      Comments: Moderately diminished  Abdominal:      General: Abdomen is flat. Bowel sounds are normal. There is no distension.      Palpations: Abdomen is soft.      Tenderness: There is no tenderness.   Musculoskeletal:         General: No swelling or deformity.      Right lower leg: No edema.      Left lower leg: No edema.   Skin:     General: Skin is warm and dry.   Neurological:      General: No focal  "deficit present.      Mental Status: He is alert.      Comments: Probably at baseline   Psychiatric:      Comments: More irritable         Fluids    Intake/Output Summary (Last 24 hours) at 1/5/2020 1419  Last data filed at 1/5/2020 1300  Gross per 24 hour   Intake 960 ml   Output 400 ml   Net 560 ml       Laboratory                        Imaging  No orders to display        Assessment/Plan  * Requires supervision due to deficit in self-care  Assessment & Plan  Unclear what his social situation is, apparently his \"caregiver\" was more of a roommate who was also ill.  Discussed with , difficult placement anticipated    Agitation  Assessment & Plan  Intermittent, he is not safe to care for himself at home  He is calm today  EKG slightly elevated QTC  Haldol ok as needed    Chronic atrial fibrillation  Assessment & Plan  Rate controlled. Continue meds including anticoagulation.     Severe protein-calorie malnutrition (HCC)  Assessment & Plan  Continue meds. Encourage oral intake    Hyperlipidemia- (present on admission)  Assessment & Plan  Continue meds    COPD (chronic obstructive pulmonary disease) ([facility])- (present on admission)  Assessment & Plan  No flare. Prn rt protocol.     CKD (chronic kidney disease) stage 3, GFR 30-59 ml/min ([facility])- (present on admission)  Assessment & Plan  Stable     Essential hypertension- (present on admission)  Assessment & Plan  Continue meds.        VTE prophylaxis: xarelto      "

## 2020-01-05 NOTE — PROGRESS NOTES
"Pt awake in bed. VSS. Refuses to answer orientation questions. States \"everybody asks me the same questions!\" Ate well for breakfast. Back to rest after eating. Bed alarm on. Will monitor.   "

## 2020-01-05 NOTE — PROGRESS NOTES
Received report from day shift nurse.   Assumed pt care   Pt is A&Ox 2-3, resting comfortably in bed.  Pt on r.a.. No signs of SOB/respiratory distress.   Labs noted, VSS.   Pt denied any pain at this moment.   Assessment completed; condom cath placed  Fall precautions in place.   Bed at lowest position. Bed alarm on  Call light and personal belongings within reach.   Continue to monitor

## 2020-01-06 NOTE — CARE PLAN
"  Problem: Discharge Barriers/Planning  Goal: Patient's continuum of care needs will be met  Outcome: PROGRESSING SLOWER THAN EXPECTED  Note:   Pending placement; possible group home per report      Problem: Psychosocial Needs:  Goal: Level of anxiety will decrease  Outcome: PROGRESSING SLOWER THAN EXPECTED  Flowsheets (Taken 1/6/2020 0654)  Patient Behaviors: Fatigue; Irritable; Agitated  Note:   Seroquel 50mg added to pt's scheduled meds   Pt continues to become irritated and agitated towards nursing staff - refused to take night time meds and verbalized \"you do not touch me again\" when RN performed assessment   Will continue to monitor        "

## 2020-01-06 NOTE — PROGRESS NOTES
"Received report from day shift nurse.   Assumed pt care   Pt on r.a.. No signs of SOB/respiratory distress.   Labs noted  Pt sleeping in bed but easy to arouse. When this RN wakes this pt up for night time medication, pt becomes very irritated. Pt yells out loud and keeps saying NO to medication. Pt also does not want this RN to perform assessment; pt states \"you do not touch me again\". Will attempt to assess pt again.  Condom cath in place  Fall precautions in place.   Bed at lowest position. Bed alarm on  Call light and personal belongings within reach.   Continue to monitor  "

## 2020-01-06 NOTE — THERAPY
"Physical Therapy treatment completed.   Bed Mobility:  Supine to Sit: Moderate Assist  Transfers: Sit to Stand: Minimal Assist  Gait: Level Of Assist: Unable to Participate(pt could take a few side steps up side of bed with FWW modA)   Plan of Care: Will benefit from Physical Therapy 3 times per week  Discharge Recommendations: Equipment: Will Continue to Assess for Equipment Needs. Post-acute therapy Discharge to a transitional care facility for continued skilled therapy services.    Pt unable to take a steps or willing to try ambulation with PT, stated 'I don't trust my legs\". Overall pt remains very weak and would benefit from SNF placement for mobility training and strengthening. Pt has not made much progress since last week, per RN has been OOB very minimally with poor motivation to mobilize.    See \"Rehab Therapy-Acute\" Patient Summary Report for complete documentation.     "

## 2020-01-06 NOTE — PROGRESS NOTES
Pt resting in bed. Awakens to voice. No signs of distress noted. VSS. Somewhat cooperative this AM.

## 2020-01-06 NOTE — CARE PLAN
Problem: Safety  Goal: Will remain free from falls  Outcome: PROGRESSING AS EXPECTED     Problem: Communication  Goal: The ability to communicate needs accurately and effectively will improve  Outcome: PROGRESSING SLOWER THAN EXPECTED     Problem: Safety  Goal: Will remain free from injury  Outcome: PROGRESSING SLOWER THAN EXPECTED     Problem: Infection  Goal: Will remain free from infection  Outcome: PROGRESSING SLOWER THAN EXPECTED

## 2020-01-07 NOTE — CARE PLAN
Problem: Communication  Goal: The ability to communicate needs accurately and effectively will improve  Outcome: PROGRESSING SLOWER THAN EXPECTED     Problem: Safety  Goal: Will remain free from injury  Outcome: PROGRESSING SLOWER THAN EXPECTED  Goal: Will remain free from falls  Outcome: PROGRESSING SLOWER THAN EXPECTED     Problem: Venous Thromboembolism (VTW)/Deep Vein Thrombosis (DVT) Prevention:  Goal: Patient will participate in Venous Thrombosis (VTE)/Deep Vein Thrombosis (DVT)Prevention Measures  Outcome: PROGRESSING SLOWER THAN EXPECTED     Problem: Knowledge Deficit  Goal: Knowledge of disease process/condition, treatment plan, diagnostic tests, and medications will improve  Outcome: PROGRESSING SLOWER THAN EXPECTED  Goal: Knowledge of the prescribed therapeutic regimen will improve  Outcome: PROGRESSING SLOWER THAN EXPECTED     Problem: Skin Integrity  Goal: Risk for impaired skin integrity will decrease  Outcome: PROGRESSING SLOWER THAN EXPECTED

## 2020-01-07 NOTE — PROGRESS NOTES
Hospital Medicine Daily Progress Note    Date of Service  1/7/2020    Chief Complaint  Dead roommate    Hospital Course    *This is an 79-year-old male who was apparently living with a roommate/caregiver who was also ill.  Home health found the patient with roommate dead.  Patient clearly unable to care for self and brought to the hospital*      Interval Problem Update  1/7.  Patient still quite confused.  But pleasant.  Unable to take care of himself at home.  Require placement.  Complains of general body achiness. Patient's pain is general 2-3/10, intermittent and does not radiate to other location, sharp and with some tingling. Can be controlled by pain meds.     Consultants/Specialty  None    Code Status  DNR    Disposition  Likely group home will probably require guardianship    Review of Systems  Review of Systems   Unable to perform ROS: Dementia        Physical Exam  Temp:  [36.6 °C (97.8 °F)-36.8 °C (98.2 °F)] 36.6 °C (97.9 °F)  Pulse:  [73-79] 73  Resp:  [16-18] 18  BP: (125-150)/(62-79) 150/74  SpO2:  [93 %-96 %] 95 %    Physical Exam  Vitals signs and nursing note reviewed.   Constitutional:       General: He is not in acute distress.     Appearance: Normal appearance. He is normal weight.      Comments: More disheveled today   HENT:      Head: Normocephalic and atraumatic.      Right Ear: Ear canal and external ear normal.      Left Ear: Ear canal and external ear normal.      Nose: Nose normal.      Mouth/Throat:      Mouth: Mucous membranes are moist.      Pharynx: Oropharynx is clear.   Eyes:      General:         Right eye: No discharge.         Left eye: No discharge.      Extraocular Movements: Extraocular movements intact.      Conjunctiva/sclera: Conjunctivae normal.      Pupils: Pupils are equal, round, and reactive to light.   Neck:      Musculoskeletal: Normal range of motion and neck supple. No neck rigidity.      Vascular: No carotid bruit.   Cardiovascular:      Rate and Rhythm: Normal  "rate. Rhythm irregular.      Pulses: Normal pulses.      Heart sounds: Normal heart sounds. No murmur.   Pulmonary:      Effort: Pulmonary effort is normal. No respiratory distress.      Breath sounds: Normal breath sounds. No stridor. No rhonchi or rales.      Comments: Moderately diminished  Chest:      Chest wall: No tenderness.   Abdominal:      General: Abdomen is flat. Bowel sounds are normal. There is no distension.      Palpations: Abdomen is soft. There is no mass.      Tenderness: There is no tenderness. There is no guarding or rebound.   Musculoskeletal: Normal range of motion.         General: No swelling or deformity.      Right lower leg: No edema.      Left lower leg: No edema.   Lymphadenopathy:      Cervical: No cervical adenopathy.   Skin:     General: Skin is warm and dry.      Capillary Refill: Capillary refill takes more than 3 seconds.      Coloration: Skin is not jaundiced or pale.   Neurological:      General: No focal deficit present.      Mental Status: He is alert. Mental status is at baseline. He is disoriented.      Comments: Probably at baseline   Psychiatric:         Mood and Affect: Mood normal.         Behavior: Behavior normal.      Comments: More pleasant today         Fluids    Intake/Output Summary (Last 24 hours) at 1/7/2020 1440  Last data filed at 1/7/2020 0500  Gross per 24 hour   Intake 120 ml   Output 200 ml   Net -80 ml       Laboratory                        Imaging  No orders to display        Assessment/Plan  * Requires supervision due to deficit in self-care  Assessment & Plan  Unclear what his social situation is, apparently his \"caregiver\" was more of a roommate who was also ill.  Discussed with , difficult placement anticipated  Placed cognitive evaluation requested by     Agitation  Assessment & Plan  Intermittent, he is not safe to care for himself at home  He is calm today  EKG slightly elevated QTC  Haldol ok as needed    Currently mood " stable    Chronic atrial fibrillation  Assessment & Plan  Rate controlled. Continue meds including anticoagulation.     Severe protein-calorie malnutrition (HCC)  Assessment & Plan  Continue meds. Encourage oral intake    Hyperlipidemia- (present on admission)  Assessment & Plan  Continue meds    COPD (chronic obstructive pulmonary disease) (HCC)- (present on admission)  Assessment & Plan  No flare. Prn rt protocol.     CKD (chronic kidney disease) stage 3, GFR 30-59 ml/min ([facility])- (present on admission)  Assessment & Plan  Stable     Essential hypertension- (present on admission)  Assessment & Plan  Continue meds.        VTE prophylaxis: xarelto    I have seen and examined patient on 1/7/2020. I have reviewed vitals, new labs and imaging. I have discussed POC with RN. There are no changes from (1/6/2020) except for what is mentioned above.     Current Facility-Administered Medications:   •  QUEtiapine (SEROQUEL) tablet 50 mg, 50 mg, Oral, Q EVENING, Isatu Deras M.D., 50 mg at 01/06/20 1702  •  senna-docusate (PERICOLACE or SENOKOT S) 8.6-50 MG per tablet 2 Tab, 2 Tab, Oral, BID, Breana Young M.D., 2 Tab at 01/07/20 0510  •  polyethylene glycol/lytes (MIRALAX) PACKET 1 Packet, 1 Packet, Oral, QDAY PRN, Breana Young M.D.  •  magnesium hydroxide (MILK OF MAGNESIA) suspension 30 mL, 30 mL, Oral, QDAY PRN, Breana Young M.D.  •  bisacodyl (DULCOLAX) suppository 10 mg, 10 mg, Rectal, QDAY PRN, Breana Young M.D.  •  amLODIPine (NORVASC) tablet 10 mg, 10 mg, Oral, DAILY, Sarah Rutledge D.O., 10 mg at 01/07/20 0511  •  haloperidol (HALDOL) tablet 5 mg, 5 mg, Oral, Q6HRS PRN, Sarah Rutledge D.O., 5 mg at 01/04/20 1517  •  rivaroxaban (XARELTO) tablet 15 mg, 15 mg, Oral, PM MEAL, Evelyn Harvey M.D., 15 mg at 01/06/20 1702  •  atorvastatin (LIPITOR) tablet 10 mg, 10 mg, Oral, Nightly, Ernesto Harvey M.D., 10 mg at 01/06/20 2043  •  cloNIDine (CATAPRES) tablet 0.1 mg, 0.1 mg,  Oral, Q6HRS PRN, Ernesto Harvey M.D., 0.1 mg at 12/28/19 2035  •  dronabinol (MARINOL) capsule 5 mg, 5 mg, Oral, BID, Ernesto Harvey M.D., 5 mg at 01/07/20 0510  •  lidocaine (LIDODERM) 5 % 1 Patch, 1 Patch, Transdermal, Q24HRS, Ernesto Harvey M.D., Stopped at 12/29/19 0600  •  losartan (COZAAR) tablet 100 mg, 100 mg, Oral, DAILY, Ernesto Harvey M.D., 100 mg at 01/07/20 0511  •  oxyCODONE immediate-release (ROXICODONE) tablet 5 mg, 5 mg, Oral, Q4HRS PRN, Ernesto Harvey M.D., 5 mg at 01/07/20 0512  •  tamsulosin (FLOMAX) capsule 0.4 mg, 0.4 mg, Oral, Q EVENING, Ernesto Harvey M.D., 0.4 mg at 01/06/20 1702  •  vitamin D (Ergocalciferol) (DRISDOL) capsule 50,000 Units, 50,000 Units, Oral, Q FRIDAY, Ernesto Harvey M.D., 50,000 Units at 01/03/20 0839

## 2020-01-07 NOTE — PROGRESS NOTES
Pt awake then asleep. Up and down in bed. Mostly cooperative this AM. Refused chair for breakfast. Inc urine.

## 2020-01-07 NOTE — CARE PLAN
Problem: Safety  Goal: Will remain free from injury  Outcome: PROGRESSING AS EXPECTED  Goal: Will remain free from falls  Outcome: PROGRESSING AS EXPECTED     Problem: Infection  Goal: Will remain free from infection  Outcome: PROGRESSING AS EXPECTED     Problem: Communication  Goal: The ability to communicate needs accurately and effectively will improve  Outcome: PROGRESSING SLOWER THAN EXPECTED

## 2020-01-07 NOTE — PROGRESS NOTES
Hospital Medicine Daily Progress Note    Date of Service  1/6/2020    Chief Complaint  Dead roommate    Hospital Course    *This is an 79-year-old male who was apparently living with a roommate/caregiver who was also ill.  Home health found the patient with roommate dead.  Patient clearly unable to care for self and brought to the hospital*      Interval Problem Update  Per nurse the patient is eating well  The patient is little more pleasant today  Still resistant to being cleaned up by nursing staff    Consultants/Specialty  None    Code Status  DNR    Disposition  Likely group home will probably require guardianship    Review of Systems  Review of Systems   Unable to perform ROS: Dementia        Physical Exam  Temp:  [36.3 °C (97.3 °F)-36.5 °C (97.7 °F)] 36.5 °C (97.7 °F)  Pulse:  [68-77] 68  Resp:  [18] 18  BP: (113-122)/(50-63) 122/63  SpO2:  [94 %-98 %] 94 %    Physical Exam  Vitals signs and nursing note reviewed.   Constitutional:       Comments: More disheveled today   HENT:      Head: Normocephalic and atraumatic.      Right Ear: External ear normal.      Left Ear: External ear normal.      Nose: Nose normal.      Mouth/Throat:      Mouth: Mucous membranes are moist.      Pharynx: Oropharynx is clear.   Eyes:      General:         Right eye: No discharge.         Left eye: No discharge.      Extraocular Movements: Extraocular movements intact.      Pupils: Pupils are equal, round, and reactive to light.   Cardiovascular:      Rate and Rhythm: Normal rate. Rhythm irregular.   Pulmonary:      Effort: Pulmonary effort is normal.      Comments: Moderately diminished  Abdominal:      General: Abdomen is flat. Bowel sounds are normal. There is no distension.      Palpations: Abdomen is soft.      Tenderness: There is no tenderness.   Musculoskeletal:         General: No swelling or deformity.      Right lower leg: No edema.      Left lower leg: No edema.   Skin:     General: Skin is warm and dry.   Neurological:  "     General: No focal deficit present.      Mental Status: He is alert.      Comments: Probably at baseline   Psychiatric:      Comments: More pleasant today         Fluids    Intake/Output Summary (Last 24 hours) at 1/6/2020 1613  Last data filed at 1/6/2020 0700  Gross per 24 hour   Intake 360 ml   Output 340 ml   Net 20 ml       Laboratory                        Imaging  No orders to display        Assessment/Plan  * Requires supervision due to deficit in self-care  Assessment & Plan  Unclear what his social situation is, apparently his \"caregiver\" was more of a roommate who was also ill.  Discussed with , difficult placement anticipated    Agitation  Assessment & Plan  Intermittent, he is not safe to care for himself at home  He is calm today  EKG slightly elevated QTC  Haldol ok as needed    Chronic atrial fibrillation  Assessment & Plan  Rate controlled. Continue meds including anticoagulation.     Severe protein-calorie malnutrition (HCC)  Assessment & Plan  Continue meds. Encourage oral intake    Hyperlipidemia- (present on admission)  Assessment & Plan  Continue meds    COPD (chronic obstructive pulmonary disease) ([facility])- (present on admission)  Assessment & Plan  No flare. Prn rt protocol.     CKD (chronic kidney disease) stage 3, GFR 30-59 ml/min ([facility])- (present on admission)  Assessment & Plan  Stable     Essential hypertension- (present on admission)  Assessment & Plan  Continue meds.        VTE prophylaxis: xarelto      "

## 2020-01-08 NOTE — PROGRESS NOTES
Pt is alert and oriented x2, denies pain, numbness or tingling  Bed alarm and treaded socks on, bed locked in low position, call light within reach, hourly rounding  Incontinent of urine at times  Tolerating diet, declines out of bed at this time, education provided

## 2020-01-08 NOTE — CARE PLAN
Problem: Communication  Goal: The ability to communicate needs accurately and effectively will improve  Outcome: PROGRESSING AS EXPECTED  Note:   Plan of care discussed, patient verbalizes understanding      Problem: Safety  Goal: Will remain free from injury  Outcome: PROGRESSING AS EXPECTED  Note:   Treaded socks in place, call light within reach, bed locked in low position, room near nursing station, hourly rounding      Problem: Skin Integrity  Goal: Risk for impaired skin integrity will decrease  Outcome: PROGRESSING AS EXPECTED  Note:   Patient turns self in bed, mepelex in place to sacrum, skin intact     Problem: Pain Management  Goal: Pain level will decrease to patient's comfort goal  Outcome: PROGRESSING AS EXPECTED  Note:   Patient denies pain at this time, hourly rounding

## 2020-01-08 NOTE — CARE PLAN
Problem: Safety  Goal: Will remain free from injury  Outcome: PROGRESSING AS EXPECTED  Note:   Bed alarm in use       Problem: Venous Thromboembolism (VTW)/Deep Vein Thrombosis (DVT) Prevention:  Goal: Patient will participate in Venous Thrombosis (VTE)/Deep Vein Thrombosis (DVT)Prevention Measures  Outcome: PROGRESSING AS EXPECTED  Flowsheets (Taken 1/7/2020 2240)  Pharmacologic Prophylaxis Used: Rivaroxaban (Xarelto) - (ONLY for Total Knee and Total Hip Surgery)

## 2020-01-09 NOTE — THERAPY
"Speech Language Therapy Evaluation completed to address cognition    Functional Status: Pt was seen today for cognitive-linguistic evaluation. Upon arrival to Pt's room, Pt was asleep; however, he was agreeable to wake up fully and participate with therapy objectives. Pt denied acute changes in cognition this admission. Pt was oriented to self and .     Pt was administered the standardized assessment, the Cognitive-Linguistic Quick Test (CLQT) which evaluates various cognitive domains. Pt's scores are compared against same-aged peers and a composite severity rating is assigned. Pt scored within the MIld severity range for Language (25) followed by Moderate severity for Memory (107). Pt scored in the Severe range for measures of Attention (38), Executive Functions (4) and Visuospatial Skills (15). Pt received an overall composite severity rating of 1.6 which is considered Moderate. Pt scored +6/13 for Clock Drawing, with noted disorganization, impaired hand placement and numeric errors. Pt endorsed visual deficits; however, did not have glasses with him during this testing which likely influenced performance. Recommend 24/7 supervision following discharge due to severe cognitive deficits. RN notified and aware. SLP following. Thank you for the consult.    Recommendations:  Recommend 24/7 supervision following discharge due to severe cognitive deficits.    Plan of Care: Will benefit from Speech Therapy 3 times per week  Post-Acute Therapy: Recommend inpatient transitional care services for continued speech therapy services.      See \"Rehab Therapy-Acute\" Patient Summary Report for complete documentation.   "

## 2020-01-09 NOTE — CARE PLAN
Problem: Communication  Goal: The ability to communicate needs accurately and effectively will improve  Outcome: PROGRESSING AS EXPECTED  Note:   Plan of care discussed, patient verbalizes understanding, educated on importance of call light     Problem: Safety  Goal: Will remain free from injury  Outcome: PROGRESSING AS EXPECTED  Note:   Treaded socks in place, call light within reach, bed locked in low position, room near nursing station, hourly rounding , bed alarm on      Problem: Urinary Elimination:  Goal: Ability to reestablish a normal urinary elimination pattern will improve  Outcome: PROGRESSING SLOWER THAN EXPECTED  Note:   Incontinent, frequently checked during hourly rounding

## 2020-01-09 NOTE — THERAPY
"Occupational Therapy Treatment completed with focus on ADLs, ADL transfers, patient education and cognition.  Functional Status:  A&Ox1. Confusion present. Pt tolerates UIC for >1 hr. Toileting - needs MaxA for hygiene. LB dressing with MaxA. STS with CGA. Transfers from chair to bed with Pam, v/c's. Unsteady. Scoots with Pam. BTB with Pam.     Plan of Care: Will benefit from Occupational Therapy 2 times per week  Discharge Recommendations:  Equipment No Equipment Needed. Post-acute therapy Discharge to a transitional care facility for continued skilled therapy services.  See \"Rehab Therapy-Acute\" Patient Summary Report for complete documentation.   "

## 2020-01-09 NOTE — PROGRESS NOTES
Pt oriented to self and place, denies pain, numbness and tingling  Heels floated, pink and blanching  Turns self in bed, 2x assist with FWW, unsteady, bed alarm on  No IV access, safety precautions in place

## 2020-01-09 NOTE — PROGRESS NOTES
Hospital Medicine Daily Progress Note    Date of Service  1/8/2020    Chief Complaint  Dead roommate    Hospital Course    *This is an 79-year-old male who was apparently living with a roommate/caregiver who was also ill.  Home health found the patient with roommate dead.  Patient clearly unable to care for self and brought to the hospital*      Interval Problem Update  1/7.  Patient still quite confused.  But pleasant.  Unable to take care of himself at home.  Require placement.  Complains of general body achiness. Patient's pain is general 2-3/10, intermittent and does not radiate to other location, sharp and with some tingling. Can be controlled by pain meds.     1/8.  Patient is relatively stable but agitated this morning.  Continue pending nursing care facility placement.  Possibly need guardianship.Patient otherwise denies fever, chills, nausea, vomiting, adb pain, SOB, CP, headache, constipation, diarrhea, cough, or sputum.     Consultants/Specialty  None    Code Status  DNR    Disposition  Likely group home will probably require guardianship    Review of Systems  Review of Systems   Unable to perform ROS: Dementia        Physical Exam  Temp:  [36.6 °C (97.9 °F)-36.8 °C (98.2 °F)] 36.6 °C (97.9 °F)  Pulse:  [65-77] 65  Resp:  [16-18] 16  BP: (116-142)/(62-71) 138/71  SpO2:  [94 %-97 %] 96 %    Physical Exam  Vitals signs and nursing note reviewed.   Constitutional:       General: He is not in acute distress.     Appearance: Normal appearance. He is normal weight. He is not ill-appearing.      Comments: More disheveled today   HENT:      Head: Normocephalic and atraumatic.      Right Ear: Ear canal and external ear normal.      Left Ear: Ear canal and external ear normal.      Nose: Nose normal.      Mouth/Throat:      Mouth: Mucous membranes are moist.      Pharynx: Oropharynx is clear.   Eyes:      General:         Right eye: No discharge.         Left eye: No discharge.      Extraocular Movements:  "Extraocular movements intact.      Conjunctiva/sclera: Conjunctivae normal.      Pupils: Pupils are equal, round, and reactive to light.   Neck:      Musculoskeletal: Normal range of motion and neck supple. No neck rigidity or muscular tenderness.   Cardiovascular:      Rate and Rhythm: Normal rate. Rhythm irregular.      Pulses: Normal pulses.      Heart sounds: Normal heart sounds. No murmur.   Pulmonary:      Effort: Pulmonary effort is normal. No respiratory distress.      Breath sounds: Normal breath sounds. No stridor. No rales.      Comments: Moderately diminished  Chest:      Chest wall: No tenderness.   Abdominal:      General: Abdomen is flat. Bowel sounds are normal. There is no distension.      Palpations: Abdomen is soft. There is no mass.      Tenderness: There is no tenderness. There is no guarding.   Musculoskeletal: Normal range of motion.         General: No swelling or deformity.      Right lower leg: No edema.      Left lower leg: No edema.   Lymphadenopathy:      Cervical: No cervical adenopathy.   Skin:     General: Skin is warm and dry.      Capillary Refill: Capillary refill takes more than 3 seconds.      Coloration: Skin is not jaundiced or pale.   Neurological:      General: No focal deficit present.      Mental Status: He is alert. Mental status is at baseline. He is disoriented.      Comments: Probably at baseline   Psychiatric:         Mood and Affect: Mood normal.         Behavior: Behavior normal.      Comments: More pleasant today         Fluids    Intake/Output Summary (Last 24 hours) at 1/8/2020 2459  Last data filed at 1/8/2020 1000  Gross per 24 hour   Intake 720 ml   Output 600 ml   Net 120 ml       Laboratory                        Imaging  No orders to display        Assessment/Plan  * Requires supervision due to deficit in self-care  Assessment & Plan  Unclear what his social situation is, apparently his \"caregiver\" was more of a roommate who was also ill.  Discussed with " , difficult placement anticipated  Per speech evaluation.  Patient will need supervision    Agitation  Assessment & Plan  Intermittent, he is not safe to care for himself at home  He is calm today  EKG slightly elevated QTC  Haldol ok as needed    Currently mood wax and wanes    Chronic atrial fibrillation  Assessment & Plan  Rate controlled. Continue meds including anticoagulation.     Severe protein-calorie malnutrition (HCC)  Assessment & Plan  Continue meds. Encourage oral intake    Hyperlipidemia- (present on admission)  Assessment & Plan  Continue meds    COPD (chronic obstructive pulmonary disease) ([facility])- (present on admission)  Assessment & Plan  No flare. Prn rt protocol.     CKD (chronic kidney disease) stage 3, GFR 30-59 ml/min ([facility])- (present on admission)  Assessment & Plan  Stable     Essential hypertension- (present on admission)  Assessment & Plan  Continue meds.        VTE prophylaxis: xarelto    I have seen and examined patient on 1/8/2020. I have reviewed vitals, new labs and imaging. I have discussed POC with RN. There are no changes from (1/7/2020) except for what is mentioned above.     Current Facility-Administered Medications:   •  QUEtiapine (SEROQUEL) tablet 50 mg, 50 mg, Oral, Q EVENING, Isatu Deras M.D., 50 mg at 01/07/20 1710  •  senna-docusate (PERICOLACE or SENOKOT S) 8.6-50 MG per tablet 2 Tab, 2 Tab, Oral, BID, Breana Young M.D., 2 Tab at 01/07/20 0510  •  polyethylene glycol/lytes (MIRALAX) PACKET 1 Packet, 1 Packet, Oral, QDAY PRN, Breana Young M.D.  •  magnesium hydroxide (MILK OF MAGNESIA) suspension 30 mL, 30 mL, Oral, QDAY PRN, Breana Young M.D.  •  bisacodyl (DULCOLAX) suppository 10 mg, 10 mg, Rectal, QDAY PRN, Breana Young M.D.  •  amLODIPine (NORVASC) tablet 10 mg, 10 mg, Oral, DAILY, Sarah Rutledge D.O., 10 mg at 01/07/20 0511  •  haloperidol (HALDOL) tablet 5 mg, 5 mg, Oral, Q6HRS PRN, Sarah Rutledge D.O., 5  mg at 01/04/20 1517  •  rivaroxaban (XARELTO) tablet 15 mg, 15 mg, Oral, PM MEAL, Evelyn Harvey M.D., 15 mg at 01/07/20 1710  •  atorvastatin (LIPITOR) tablet 10 mg, 10 mg, Oral, Nightly, Ernesto Harvey M.D., 10 mg at 01/07/20 2250  •  cloNIDine (CATAPRES) tablet 0.1 mg, 0.1 mg, Oral, Q6HRS PRN, Ernesto Harvey M.D., 0.1 mg at 12/28/19 2035  •  dronabinol (MARINOL) capsule 5 mg, 5 mg, Oral, BID, Ernesto Harvey M.D., 5 mg at 01/07/20 1710  •  lidocaine (LIDODERM) 5 % 1 Patch, 1 Patch, Transdermal, Q24HRS, Ernesto Harvey M.D., Stopped at 12/29/19 0600  •  losartan (COZAAR) tablet 100 mg, 100 mg, Oral, DAILY, Ernesto Harvey M.D., 100 mg at 01/07/20 0511  •  oxyCODONE immediate-release (ROXICODONE) tablet 5 mg, 5 mg, Oral, Q4HRS PRN, Ernesto Harvey M.D., 5 mg at 01/07/20 0512  •  tamsulosin (FLOMAX) capsule 0.4 mg, 0.4 mg, Oral, Q EVENING, Ernesto Harvey M.D., 0.4 mg at 01/06/20 1702  •  vitamin D (Ergocalciferol) (DRISDOL) capsule 50,000 Units, 50,000 Units, Oral, Q FRIDAY, Ernesto Harvey M.D., 50,000 Units at 01/03/20 0839

## 2020-01-09 NOTE — PROGRESS NOTES
Hospital Medicine Daily Progress Note    Date of Service  1/9/2020    Chief Complaint  Dead roommate    Hospital Course    *This is an 79-year-old male who was apparently living with a roommate/caregiver who was also ill.  Home health found the patient with roommate dead.  Patient clearly unable to care for self and brought to the hospital*      Interval Problem Update  1/7.  Patient still quite confused.  But pleasant.  Unable to take care of himself at home.  Require placement.  Complains of general body achiness. Patient's pain is general 2-3/10, intermittent and does not radiate to other location, sharp and with some tingling. Can be controlled by pain meds.     1/8.  Patient is relatively stable but agitated this morning.  Continue pending nursing care facility placement.  Possibly need guardianship.Patient otherwise denies fever, chills, nausea, vomiting, adb pain, SOB, CP, headache, constipation, diarrhea, cough, or sputum.   1/9.  Overall remained comfortable overnight.  Cooperated.  Still demented.  Confused.  Complains of general body achiness. Patient's pain is general 1-2/10, intermittent and does not radiate to other location, sharp and with some tingling. Can be controlled by pain meds.    Consultants/Specialty  None    Code Status  DNR    Disposition  Likely group home will probably require guardianship    Review of Systems  Review of Systems   Unable to perform ROS: Dementia        Physical Exam  Temp:  [36.4 °C (97.5 °F)-36.7 °C (98 °F)] 36.7 °C (98 °F)  Pulse:  [74-87] 82  Resp:  [16-18] 18  BP: (123-160)/(78-83) 149/79  SpO2:  [92 %-97 %] 97 %    Physical Exam  Vitals signs and nursing note reviewed.   Constitutional:       General: He is not in acute distress.     Appearance: Normal appearance. He is normal weight. He is not diaphoretic.      Comments: More disheveled today   HENT:      Head: Normocephalic and atraumatic.      Right Ear: Ear canal and external ear normal.      Left Ear: Ear canal  and external ear normal.      Nose: Nose normal.      Mouth/Throat:      Mouth: Mucous membranes are moist.      Pharynx: Oropharynx is clear.   Eyes:      General:         Right eye: No discharge.         Left eye: No discharge.      Extraocular Movements: Extraocular movements intact.      Conjunctiva/sclera: Conjunctivae normal.      Pupils: Pupils are equal, round, and reactive to light.   Neck:      Musculoskeletal: Normal range of motion and neck supple. No neck rigidity or muscular tenderness.   Cardiovascular:      Rate and Rhythm: Normal rate. Rhythm irregular.      Pulses: Normal pulses.      Heart sounds: Normal heart sounds. No murmur. No friction rub.   Pulmonary:      Effort: Pulmonary effort is normal. No respiratory distress.      Breath sounds: Normal breath sounds. No stridor. No rales.      Comments: Moderately diminished  Chest:      Chest wall: No tenderness.   Abdominal:      General: Abdomen is flat. Bowel sounds are normal. There is no distension.      Palpations: Abdomen is soft.      Tenderness: There is no tenderness. There is no guarding or rebound.   Musculoskeletal: Normal range of motion.         General: No swelling or deformity.      Right lower leg: No edema.      Left lower leg: No edema.   Lymphadenopathy:      Cervical: No cervical adenopathy.   Skin:     General: Skin is warm and dry.      Capillary Refill: Capillary refill takes more than 3 seconds.      Coloration: Skin is not jaundiced or pale.   Neurological:      General: No focal deficit present.      Mental Status: He is alert. Mental status is at baseline. He is disoriented.      Comments: Probably at baseline   Psychiatric:         Mood and Affect: Mood normal.         Behavior: Behavior normal.      Comments: More pleasant today         Fluids    Intake/Output Summary (Last 24 hours) at 1/9/2020 1358  Last data filed at 1/9/2020 1300  Gross per 24 hour   Intake 1320 ml   Output --   Net 1320 ml       Laboratory         "                Imaging  No orders to display        Assessment/Plan  * Requires supervision due to deficit in self-care  Assessment & Plan  Unclear what his social situation is, apparently his \"caregiver\" was more of a roommate who was also ill.  Discussed with , difficult placement anticipated  Per speech evaluation.  Patient will need supervision    Pending nursing care facility to accept patient.    Agitation  Assessment & Plan  Intermittent, he is not safe to care for himself at home  He is calm today  EKG slightly elevated QTC  Haldol ok as needed    Currently mood wax and wanes  Pending nursing transfer facility to accept patient.    Chronic atrial fibrillation  Assessment & Plan  Rate controlled. Continue meds including anticoagulation.     Severe protein-calorie malnutrition (HCC)  Assessment & Plan  Continue meds. Encourage oral intake    Hyperlipidemia- (present on admission)  Assessment & Plan  Continue meds    COPD (chronic obstructive pulmonary disease) ([facility])- (present on admission)  Assessment & Plan  No flare. Prn rt protocol.     CKD (chronic kidney disease) stage 3, GFR 30-59 ml/min ([facility])- (present on admission)  Assessment & Plan  Stable     Essential hypertension- (present on admission)  Assessment & Plan  Continue meds.        VTE prophylaxis: xarelto    I have seen and examined patient on 1/9/2020. I have reviewed vitals, new labs and imaging. I have discussed POC with RN. There are no changes from (1/8/2020) except for what is mentioned above.     Current Facility-Administered Medications:   •  QUEtiapine (SEROQUEL) tablet 50 mg, 50 mg, Oral, Q EVENING, Isatu Deras M.D., 50 mg at 01/08/20 1705  •  senna-docusate (PERICOLACE or SENOKOT S) 8.6-50 MG per tablet 2 Tab, 2 Tab, Oral, BID, Breana Young M.D., 2 Tab at 01/08/20 1705  •  polyethylene glycol/lytes (MIRALAX) PACKET 1 Packet, 1 Packet, Oral, QDAY PRN, Breana Young M.D.  •  magnesium hydroxide (MILK " OF MAGNESIA) suspension 30 mL, 30 mL, Oral, QDAY PRN, Breana Young M.D.  •  bisacodyl (DULCOLAX) suppository 10 mg, 10 mg, Rectal, QDAY PRN, Breana Young M.D.  •  amLODIPine (NORVASC) tablet 10 mg, 10 mg, Oral, DAILY, Sarah Rutledge D.O., 10 mg at 01/09/20 0606  •  haloperidol (HALDOL) tablet 5 mg, 5 mg, Oral, Q6HRS PRN, DELONTE Carnes.OMonique, 5 mg at 01/04/20 1517  •  rivaroxaban (XARELTO) tablet 15 mg, 15 mg, Oral, PM MEAL, Evelyn Harvey M.D., 15 mg at 01/08/20 1705  •  atorvastatin (LIPITOR) tablet 10 mg, 10 mg, Oral, Nightly, Ernesto Harvey M.D., 10 mg at 01/08/20 2007  •  cloNIDine (CATAPRES) tablet 0.1 mg, 0.1 mg, Oral, Q6HRS PRN, Ernesto Harvey M.D., 0.1 mg at 12/28/19 2035  •  dronabinol (MARINOL) capsule 5 mg, 5 mg, Oral, BID, Ernesto Harvey M.D., 5 mg at 01/09/20 0606  •  lidocaine (LIDODERM) 5 % 1 Patch, 1 Patch, Transdermal, Q24HRS, Ernesto Harvey M.D., Stopped at 12/29/19 0600  •  losartan (COZAAR) tablet 100 mg, 100 mg, Oral, DAILY, Ernesto Harvey M.D., 100 mg at 01/09/20 0606  •  oxyCODONE immediate-release (ROXICODONE) tablet 5 mg, 5 mg, Oral, Q4HRS PRN, Ernesto Harvey M.D., 5 mg at 01/07/20 0512  •  tamsulosin (FLOMAX) capsule 0.4 mg, 0.4 mg, Oral, Q EVENING, Ernesto Harvey M.D., 0.4 mg at 01/08/20 1705  •  vitamin D (Ergocalciferol) (DRISDOL) capsule 50,000 Units, 50,000 Units, Oral, Q FRIDAY, Ernesto Harvey M.D., 50,000 Units at 01/03/20 0883

## 2020-01-09 NOTE — CARE PLAN
Problem: Safety  Goal: Will remain free from injury  Outcome: PROGRESSING AS EXPECTED  Note:   Pt educated to call for assistance prior to ambulating to bathroom. Bed alarm in place.        Problem: Venous Thromboembolism (VTW)/Deep Vein Thrombosis (DVT) Prevention:  Goal: Patient will participate in Venous Thrombosis (VTE)/Deep Vein Thrombosis (DVT)Prevention Measures  Outcome: PROGRESSING AS EXPECTED  Flowsheets (Taken 1/8/2020 2000)  Pharmacologic Prophylaxis Used: Rivaroxaban (Xarelto) - (ONLY for Total Knee and Total Hip Surgery)

## 2020-01-09 NOTE — THERAPY
SPEECH THERAPY CONTACT NOTE:    Attempted to see Pt for cognitive-linguistic evaluation; however, Pt declined. SLP attempted to encourage Pt to participate; however, Pt adamantly declined again. SLP will attempt to follow-up at a later time to complete eval as Pt is willing to participate. RN notified and aware. Thank you.

## 2020-01-10 NOTE — PROGRESS NOTES
Bedside report received from night RN. Assumed care of patient. Daily plan of care discussed. Pt resting comfortably in bed at this time with no signs of distress noted. Breathing even and unlabored. Hourly rounding in place.

## 2020-01-10 NOTE — CARE PLAN
Problem: Safety  Goal: Will remain free from falls  Outcome: PROGRESSING AS EXPECTED  Intervention: Implement fall precautions  Flowsheets  Taken 1/10/2020 1306  Bed Alarm: Yes - Alarm On  Taken 1/10/2020 1100  Environmental Precautions: Treaded Slipper Socks on Patient;Personal Belongings, Wastebasket, Call Bell etc. in Easy Reach;Report Given to Other Health Care Providers Regarding Fall Risk;Bed in Low Position;Communication Sign for Patients & Families;Mobility Assessed & Appropriate Sign Placed  Note:   Environmental fall precautions and hourly rounding in place. Pt instructed to call for assistance before mobilizing. Pt verbalized understanding but does not call appropriately. Bed alarm in place at all times.       Problem: Skin Integrity  Goal: Risk for impaired skin integrity will decrease  Outcome: PROGRESSING AS EXPECTED  Intervention: Implement precautions to protect skin integrity in collaboration with the interdisciplinary team  Flowsheets (Taken 1/10/2020 1306)  Skin Preventative Measures: Pillows in Use for Support / Positioning; Waffle Cushion  Note:   Waffle mattress in place, pt able to turn himself as needed. Pt refusing use of sacral mepilex, understands need to change position frequently throughout shift to avoid skin breakdown.

## 2020-01-10 NOTE — THERAPY
PT Contact Note:    Arrived to treat and pt asleep but easily aroused. Initially very pleasant and answering all of PT's questions, however once mobility was encouraged, his mood changed and he adamantly declined to participate. Encouraged pt with explaining the benefits of mobility and getting OOB to the chair for breakfast, however he remained adamant. No PT today 2/2 pt's refusal.    Adriana Cárdenas, PT

## 2020-01-10 NOTE — PROGRESS NOTES
Hospital Medicine Daily Progress Note    Date of Service  1/10/2020    Chief Complaint  Dead roommate    Hospital Course    *This is an 79-year-old male who was apparently living with a roommate/caregiver who was also ill.  Home health found the patient with roommate dead.  Patient clearly unable to care for self and brought to the hospital*      Interval Problem Update  1/7.  Patient still quite confused.  But pleasant.  Unable to take care of himself at home.  Require placement.  Complains of general body achiness. Patient's pain is general 2-3/10, intermittent and does not radiate to other location, sharp and with some tingling. Can be controlled by pain meds.     1/8.  Patient is relatively stable but agitated this morning.  Continue pending nursing care facility placement.  Possibly need guardianship.Patient otherwise denies fever, chills, nausea, vomiting, adb pain, SOB, CP, headache, constipation, diarrhea, cough, or sputum.   1/9.  Overall remained comfortable overnight.  Cooperated.  Still demented.  Confused.  Complains of general body achiness. Patient's pain is general 1-2/10, intermittent and does not radiate to other location, sharp and with some tingling. Can be controlled by pain meds.  1/10.  Patient overall is comfortable but still confused.  Periodically agitated but able to be redirected.  Pending psychiatry evaluation to determine if patient will need guardianship or not. Patient otherwise denies fever, chills, nausea, vomiting, adb pain, SOB, CP, headache, constipation, diarrhea, cough, or sputum.      Consultants/Specialty  Psychiatry    Code Status  DNR    Disposition  Likely group home will probably require guardianship    Review of Systems  Review of Systems   Unable to perform ROS: Dementia        Physical Exam  Temp:  [36.4 °C (97.5 °F)-36.9 °C (98.4 °F)] 36.7 °C (98.1 °F)  Pulse:  [74-93] 77  Resp:  [18] 18  BP: (136-156)/(71-85) 145/85  SpO2:  [92 %-98 %] 92 %    Physical Exam  Vitals  signs and nursing note reviewed.   Constitutional:       General: He is not in acute distress.     Appearance: Normal appearance. He is normal weight. He is not ill-appearing.      Comments: More disheveled today   HENT:      Head: Normocephalic and atraumatic.      Right Ear: External ear normal.      Left Ear: External ear normal.      Nose: Nose normal.      Mouth/Throat:      Mouth: Mucous membranes are moist.      Pharynx: Oropharynx is clear.   Eyes:      General:         Right eye: No discharge.         Left eye: No discharge.      Extraocular Movements: Extraocular movements intact.      Conjunctiva/sclera: Conjunctivae normal.      Pupils: Pupils are equal, round, and reactive to light.   Neck:      Musculoskeletal: Normal range of motion and neck supple. No neck rigidity or muscular tenderness.   Cardiovascular:      Rate and Rhythm: Normal rate. Rhythm irregular.      Pulses: Normal pulses.      Heart sounds: Normal heart sounds. No murmur. No friction rub.   Pulmonary:      Effort: Pulmonary effort is normal. No respiratory distress.      Breath sounds: Normal breath sounds. No stridor. No rales.      Comments: Moderately diminished  Chest:      Chest wall: No tenderness.   Abdominal:      General: Abdomen is flat. Bowel sounds are normal. There is no distension.      Palpations: Abdomen is soft.      Tenderness: There is no tenderness. There is no guarding or rebound.   Musculoskeletal: Normal range of motion.         General: No swelling or deformity.      Right lower leg: No edema.      Left lower leg: No edema.   Lymphadenopathy:      Cervical: No cervical adenopathy.   Skin:     General: Skin is warm and dry.      Capillary Refill: Capillary refill takes more than 3 seconds.      Coloration: Skin is not jaundiced or pale.   Neurological:      General: No focal deficit present.      Mental Status: He is alert. Mental status is at baseline. He is disoriented.      Motor: No weakness.      Comments:  "Probably at baseline   Psychiatric:         Mood and Affect: Mood normal.         Behavior: Behavior normal.      Comments: More pleasant today         Fluids    Intake/Output Summary (Last 24 hours) at 1/10/2020 1412  Last data filed at 1/10/2020 1200  Gross per 24 hour   Intake 960 ml   Output 750 ml   Net 210 ml       Laboratory                        Imaging  No orders to display        Assessment/Plan  * Requires supervision due to deficit in self-care  Assessment & Plan  Unclear what his social situation is, apparently his \"caregiver\" was more of a roommate who was also ill.  Discussed with , difficult placement anticipated  Per speech evaluation.  Patient will need supervision    Pending nursing care facility to accept patient.  Possible need guardianship and will need psychiatry official evaluation for capacity.  I do not believe patient has medical capacity to make medical decision and I believe patient will need guardianship at this moment.    Agitation  Assessment & Plan  Intermittent, he is not safe to care for himself at home  He is calm today  EKG slightly elevated QTC    Currently mood wax and wanes  Pending nursing transfer facility to accept patient.  Symptoms can be controlled by Haldol    Chronic atrial fibrillation  Assessment & Plan  Rate controlled. Continue meds including anticoagulation.     Severe protein-calorie malnutrition (HCC)  Assessment & Plan  Continue meds. Encourage oral intake    Hyperlipidemia- (present on admission)  Assessment & Plan  Continue meds    COPD (chronic obstructive pulmonary disease) ([facility])- (present on admission)  Assessment & Plan  No flare. Prn rt protocol.     CKD (chronic kidney disease) stage 3, GFR 30-59 ml/min ([facility])- (present on admission)  Assessment & Plan  Stable     Essential hypertension- (present on admission)  Assessment & Plan  Continue meds.        VTE prophylaxis: xarelto    I have seen and examined patient on 1/10/2020. I have reviewed " vitals, new labs and imaging. I have discussed POC with RN. There are no changes from (1/9/2020) except for what is mentioned above.     Current Facility-Administered Medications:   •  QUEtiapine (SEROQUEL) tablet 50 mg, 50 mg, Oral, Q EVENING, Isatu Deras M.D., 50 mg at 01/09/20 1722  •  senna-docusate (PERICOLACE or SENOKOT S) 8.6-50 MG per tablet 2 Tab, 2 Tab, Oral, BID, Breana Young M.D., 2 Tab at 01/10/20 0634  •  polyethylene glycol/lytes (MIRALAX) PACKET 1 Packet, 1 Packet, Oral, QDAY PRN, Breana Young M.D.  •  magnesium hydroxide (MILK OF MAGNESIA) suspension 30 mL, 30 mL, Oral, QDAY PRN, Breana Young M.D.  •  bisacodyl (DULCOLAX) suppository 10 mg, 10 mg, Rectal, QDAY PRN, Breana Young M.D.  •  amLODIPine (NORVASC) tablet 10 mg, 10 mg, Oral, DAILY, Sarah Rutledge, D.OMonique, 10 mg at 01/10/20 0634  •  haloperidol (HALDOL) tablet 5 mg, 5 mg, Oral, Q6HRS PRN, Sarah Rutledge D.OMonique, 5 mg at 01/04/20 1517  •  rivaroxaban (XARELTO) tablet 15 mg, 15 mg, Oral, PM MEAL, Evelyn Harvey M.D., 15 mg at 01/09/20 1722  •  atorvastatin (LIPITOR) tablet 10 mg, 10 mg, Oral, Nightly, Ernesto Harvey M.D., 10 mg at 01/09/20 2035  •  cloNIDine (CATAPRES) tablet 0.1 mg, 0.1 mg, Oral, Q6HRS PRN, Ernesto Harvey M.D., 0.1 mg at 12/28/19 2035  •  dronabinol (MARINOL) capsule 5 mg, 5 mg, Oral, BID, Ernesto Harvey M.D., 5 mg at 01/10/20 0632  •  lidocaine (LIDODERM) 5 % 1 Patch, 1 Patch, Transdermal, Q24HRS, Ernesto Harvey M.D., Stopped at 12/29/19 0600  •  losartan (COZAAR) tablet 100 mg, 100 mg, Oral, DAILY, Ernesto Harvey M.D., 100 mg at 01/10/20 0633  •  oxyCODONE immediate-release (ROXICODONE) tablet 5 mg, 5 mg, Oral, Q4HRS PRN, Ernesto Harvey M.D., 5 mg at 01/07/20 0512  •  tamsulosin (FLOMAX) capsule 0.4 mg, 0.4 mg, Oral, Q EVENING, Ernesto Harvey M.D., 0.4 mg at 01/09/20 1722  •  vitamin D (Ergocalciferol) (DRISDOL) capsule 50,000 Units, 50,000 Units, Oral, Q FRIDAY,  Ernesto Harvey M.D., 50,000 Units at 01/10/20 1200

## 2020-01-10 NOTE — PROGRESS NOTES
No significant changes from AM assessment noted. Pt slept well this AM, currently denies complaints, remains fairly cooperative today. Pt disoriented to time only this AM. Per report, pt is waiting on placement prior to discharge. Pt remains incontinent, attempts to use urinal. Waffle cushion in place, bed alarm on at all times; pt does not call for assistance before attempting to mobilize. No other concerns to note at this time.

## 2020-01-10 NOTE — CARE PLAN
Problem: Safety  Goal: Will remain free from injury  Outcome: PROGRESSING AS EXPECTED  Note:   Pt educated to call for assistance prior to ambulating. Bed alarm in place.      Problem: Pain Management  Goal: Pain level will decrease to patient's comfort goal  Outcome: PROGRESSING AS EXPECTED  Flowsheets (Taken 1/10/2020 0214)  Non Verbal Scale : Calm; Sleeping; Unlabored Breathing

## 2020-01-11 NOTE — PROGRESS NOTES
[facility] Medicine Daily Progress Note    Date of Service  1/11/2020    Chief Complaint  Dead roommate    Hospital Course    *This is an 79-year-old male who was apparently living with a roommate/caregiver who was also ill.  Home health found the patient with roommate dead.  Patient clearly unable to care for self and brought to the hospital*      Interval Problem Update  1/7.  Patient still quite confused.  But pleasant.  Unable to take care of himself at home.  Require placement.  Complains of general body achiness. Patient's pain is general 2-3/10, intermittent and does not radiate to other location, sharp and with some tingling. Can be controlled by pain meds.     1/8.  Patient is relatively stable but agitated this morning.  Continue pending nursing care facility placement.  Possibly need guardianship.Patient otherwise denies fever, chills, nausea, vomiting, adb pain, SOB, CP, headache, constipation, diarrhea, cough, or sputum.   1/9.  Overall remained comfortable overnight.  Cooperated.  Still demented.  Confused.  Complains of general body achiness. Patient's pain is general 1-2/10, intermittent and does not radiate to other location, sharp and with some tingling. Can be controlled by pain meds.  1/10.  Patient overall is comfortable but still confused.  Periodically agitated but able to be redirected.  Pending psychiatry evaluation to determine if patient will need guardianship or not. Patient otherwise denies fever, chills, nausea, vomiting, adb pain, SOB, CP, headache, constipation, diarrhea, cough, or sputum.  1/11.  Patient relatively comfortable.  Mood is stable and at baseline.  Per psychiatry lack of capacity.  Pending for guardianship.  Complains of general body achiness. Patient's pain is general, 2-3/10, intermittent and does not radiate to other location, sharp and with some tingling. Can be controlled by pain meds.       Consultants/Specialty  Psychiatry    Code Status  DNR    Disposition  Likely  group home will probably require guardianship    Review of Systems  Review of Systems   Unable to perform ROS: Dementia        Physical Exam  Temp:  [36.4 °C (97.5 °F)-37 °C (98.6 °F)] 37 °C (98.6 °F)  Pulse:  [75-88] 75  Resp:  [18-20] 20  BP: (136-159)/(72-95) 136/72  SpO2:  [93 %-94 %] 94 %    Physical Exam  Vitals signs and nursing note reviewed.   Constitutional:       General: He is not in acute distress.     Appearance: Normal appearance. He is normal weight. He is not diaphoretic.      Comments: More disheveled today   HENT:      Head: Normocephalic.      Right Ear: External ear normal.      Left Ear: External ear normal.      Nose: Nose normal.      Mouth/Throat:      Mouth: Mucous membranes are moist.      Pharynx: Oropharynx is clear.   Eyes:      General:         Right eye: No discharge.         Left eye: No discharge.      Extraocular Movements: Extraocular movements intact.      Conjunctiva/sclera: Conjunctivae normal.      Pupils: Pupils are equal, round, and reactive to light.   Neck:      Musculoskeletal: Normal range of motion and neck supple. No neck rigidity or muscular tenderness.   Cardiovascular:      Rate and Rhythm: Normal rate. Rhythm irregular.      Pulses: Normal pulses.      Heart sounds: Normal heart sounds. No murmur. No gallop.    Pulmonary:      Effort: Pulmonary effort is normal. No respiratory distress.      Breath sounds: Normal breath sounds. No rhonchi or rales.      Comments: Moderately diminished  Chest:      Chest wall: No tenderness.   Abdominal:      General: Abdomen is flat. Bowel sounds are normal. There is no distension.      Palpations: Abdomen is soft.      Tenderness: There is no tenderness. There is no rebound.      Hernia: No hernia is present.   Musculoskeletal: Normal range of motion.         General: No swelling or deformity.      Right lower leg: No edema.      Left lower leg: No edema.   Lymphadenopathy:      Cervical: No cervical adenopathy.   Skin:      "General: Skin is warm and dry.      Capillary Refill: Capillary refill takes more than 3 seconds.      Coloration: Skin is not jaundiced or pale.   Neurological:      General: No focal deficit present.      Mental Status: He is alert. Mental status is at baseline. He is disoriented.      Motor: No weakness.      Comments: Probably at baseline   Psychiatric:         Mood and Affect: Mood normal.         Behavior: Behavior normal.      Comments: More pleasant today         Fluids    Intake/Output Summary (Last 24 hours) at 1/11/2020 1130  Last data filed at 1/11/2020 0600  Gross per 24 hour   Intake 600 ml   Output 600 ml   Net 0 ml       Laboratory                        Imaging  No orders to display        Assessment/Plan  * Requires supervision due to deficit in self-care  Assessment & Plan  Unclear what his social situation is, apparently his \"caregiver\" was more of a roommate who was also ill.  Discussed with , difficult placement anticipated  Per speech evaluation.  Patient will need supervision    Pending nursing care facility to accept patient.  Possible need guardianship .  Per psychiatry patient does not have capacity to make medical decision     Agitation  Assessment & Plan  Intermittent, he is not safe to care for himself at home  He is calm today  EKG slightly elevated QTC    Currently mood wax and wanes  Pending nursing transfer facility to accept patient.  Symptoms can be controlled by Haldol    Chronic atrial fibrillation  Assessment & Plan  Rate controlled. Continue meds including anticoagulation.     Severe protein-calorie malnutrition (HCC)  Assessment & Plan  Continue meds. Encourage oral intake    Hyperlipidemia- (present on admission)  Assessment & Plan  Continue meds    COPD (chronic obstructive pulmonary disease) ([facility])- (present on admission)  Assessment & Plan  No flare. Prn rt protocol.     CKD (chronic kidney disease) stage 3, GFR 30-59 ml/min ([facility])- (present on " admission)  Assessment & Plan  Stable     Essential hypertension- (present on admission)  Assessment & Plan  Continue meds.        VTE prophylaxis: xarelto    I have seen and examined patient on 1/11/2020. I have reviewed vitals, new labs and imaging. I have discussed POC with RN. There are no changes from (1/10/2020) except for what is mentioned above.     Current Facility-Administered Medications:   •  QUEtiapine (SEROQUEL) tablet 50 mg, 50 mg, Oral, Q EVENING, Isatu Deras M.D., 50 mg at 01/10/20 1737  •  senna-docusate (PERICOLACE or SENOKOT S) 8.6-50 MG per tablet 2 Tab, 2 Tab, Oral, BID, Breana Young M.D., Stopped at 01/10/20 1735  •  polyethylene glycol/lytes (MIRALAX) PACKET 1 Packet, 1 Packet, Oral, QDAY PRN, Breana Young M.D.  •  magnesium hydroxide (MILK OF MAGNESIA) suspension 30 mL, 30 mL, Oral, QDAY PRN, Breana Young M.D.  •  bisacodyl (DULCOLAX) suppository 10 mg, 10 mg, Rectal, QDAY PRN, Breana Young M.D.  •  amLODIPine (NORVASC) tablet 10 mg, 10 mg, Oral, DAILY, Sarah Rutledge D.OMonique, 10 mg at 01/11/20 0856  •  haloperidol (HALDOL) tablet 5 mg, 5 mg, Oral, Q6HRS PRN, Sarah Rutledge, D.O., 5 mg at 01/04/20 1517  •  rivaroxaban (XARELTO) tablet 15 mg, 15 mg, Oral, PM MEAL, Evelyn Harvey M.D., 15 mg at 01/10/20 1737  •  atorvastatin (LIPITOR) tablet 10 mg, 10 mg, Oral, Nightly, Ernesto Harvey M.D., 10 mg at 01/10/20 2043  •  cloNIDine (CATAPRES) tablet 0.1 mg, 0.1 mg, Oral, Q6HRS PRN, Ernesto Harvey M.D., 0.1 mg at 12/28/19 2035  •  dronabinol (MARINOL) capsule 5 mg, 5 mg, Oral, BID, Ernesto Harvey M.D., 5 mg at 01/11/20 0856  •  lidocaine (LIDODERM) 5 % 1 Patch, 1 Patch, Transdermal, Q24HRS, Ernesto Harvey M.D., Stopped at 12/29/19 0600  •  losartan (COZAAR) tablet 100 mg, 100 mg, Oral, DAILY, Ernesto Harvey M.D., 100 mg at 01/11/20 0856  •  oxyCODONE immediate-release (ROXICODONE) tablet 5 mg, 5 mg, Oral, Q4HRS PRN, Ernesto Harvey M.D., 5  mg at 01/07/20 0512  •  tamsulosin (FLOMAX) capsule 0.4 mg, 0.4 mg, Oral, Q EVENING, Ernesto Harvey M.D., 0.4 mg at 01/10/20 1737  •  vitamin D (Ergocalciferol) (DRISDOL) capsule 50,000 Units, 50,000 Units, Oral, Q FRIDAY, Ernesto Harvey M.D., 50,000 Units at 01/10/20 1200

## 2020-01-11 NOTE — CARE PLAN
Problem: Safety  Goal: Will remain free from injury  Outcome: PROGRESSING AS EXPECTED  Note:   Pt is injury-free at this moment ; although pt tried to get out of bed x1 time last night   Fall precautions in place including: bed locked & at lowest position, call light & personal belongings within reach; x2 upper bed rails up, bed alarm on   Will continue to closely monitor to prevent fall      Problem: Discharge Barriers/Planning  Goal: Patient's continuum of care needs will be met  Outcome: PROGRESSING AS EXPECTED  Note:   Pending psych eval to determine if pt requires guardianship or not

## 2020-01-11 NOTE — CARE PLAN
Problem: Nutritional:  Goal: Achieve adequate nutritional intake  Description  Patient will consume 50% of meals and supplements   Outcome: PROGRESSING AS EXPECTED

## 2020-01-11 NOTE — DIETARY
"Nutrition Services: Update   Day 14 of admit.  Eulogio Jeronimo is a 79 y.o. male with admitting DX of Requires supervision due to deficit in self-care    Variable PO intake on weekly re-screen. No new weight since admit 12/27/19. This RD took new bed scale weight (48.8 kg). Of note, bed scale was not zeroed prior to taking weight (2 pillows, 2 blankets on bed). BMI now 17.37. Pt appears very thin, new weight seems accurate. Asked CNA to get another weight as able after bed is zeroed.    Re-assessment:  Height: 167.6 cm (5' 5.98\")  Weight: 48.8 kg (107 lb 9.4 oz)  Body mass index is 17.37 kg/m²., BMI classification: Underweight  Diet/Intake: Regular Dysphagia 3 diet + Boost Plus BID. PO intake variable <%.     Evaluation:  1. Weight change: 64.86 kg on admit (12/27), now 48.8 kg indicating 16 kg weight loss since admit (~2 weeks). Question accuracy of bed scale weight taken on 12/27. Pt does not appear to have lost 35 lb upon visual examination from this RD.   2. PO intake: 70% of breakfast this am, confused (A/O self only) per RN note    Malnutrition Risk (12/28/19 RD assessment): Pt with severe malnutrition related to multiple comorbidities evident by severe fat and severe muscle loss noted on nutrition based physical exam.    Recommendations/Plan:  1. Increase Boost Plus to TID for weight gain  2. Encourage intake of meals  3. Document intake of all meals as % taken in ADL's to provide interdisciplinary communication across all shifts.   4. Monitor weight.  5. Nutrition rep will continue to see patient for ongoing meal and snack preferences.    RD following  "

## 2020-01-11 NOTE — PROGRESS NOTES
"Received report from day shift nurse.   Assumed pt   Pt is confused and trying to get out of bed; pt stated \" I need to go downstairs\"; reoriented pt.  Pt on r.a.. No signs of SOB/respiratory distress.   Labs noted, VSS.   Assessment completed  Fall precautions in place.   Bed at lowest position. Bed alarm on  Call light and personal belongings within reach.   Continue to monitor  "

## 2020-01-11 NOTE — PSYCHIATRY
"PSYCHIATRIC CONSULTATION:  Reason for admission:   ftt  Reason for consult: capacity  Requesting Physician:denise    Legal status:  N/A    Chief Complaint: \"I'm getting good service here!\"     HPI:   Eulogio Jeronimo is a 79 y.o. year old male with a PMH of dementia who presented to [facility] complaining of inability to care for self, FTT. He was found in his home with his full time caregiver in December who  in the home, the home health RN found him and had him brought to the ED. He was unable to get up on his own. He had not called for help.  Chart review indicates that patient didn't know who his home health nurse was but she brought him \"chili with cheese\" and couldn't discuss his living arrangement. Pt was noted to be unable to live alone. OT note of  shows he has general weakness but poor safety awareness. OT agreed he would need 24 hour care. PT note of  also noted poor safety awareness and that he was max assist going from supine to sit and sit to stand. Cofusion was also noted by PT. SLP did cog eval on .  He was noted to have moderate deficits in memory and severe deficits in attention, executive function, and visuospatial skills. SLP also recommended 24 hour supervision post discharge due to severe cognitive deficits.   On exam patient is very pleasant, happy, and asking when he is going to get out of here. He doesn't know where \"here\" is. He is unable to tell me details of why he is here. He states he doesn't have any medical problems. He appears happy, joking with me and nursing staff, just confused. He seems to have no awareness of his cognitive deficits. He has some awareness that he is weak, but can't give me any details about why and just states he is \"wobbly\" if he walks. I am not sure he has even walked at all since he has been here, per PT/OT notes.   Review of Systems:  Psychiatric:  Depression:      Denies depressed mood, no anhedonia. Energy fair   Robyn:No signs or " "symptoms   Anxiety/Panic Attacks denies anxiety   PTSD symptom: No signs or symptoms   Psychosis:No signs or symptoms   Other: very confused but pleasant   Constitutional: very thin  Neurologic:  Weakness. No HA.   ENT:  No blurry vision, no double vision.   Skin:  No rahs. No itching   Musculoskeletal:  Denies considerable pain   CV:  Denies cp or palpitatinos   Lungs:  Denies sob or cough   GI:  Denies n/v or abd pain   :    Denies issues   All other systems reviewed and are negative.        Psychiatric Examination: observed phenomenon:  Vitals: /78   Pulse 88   Temp 36.9 °C (98.4 °F) (Oral)   Resp 18   Ht 1.676 m (5' 5.98\")   Wt 48.8 kg (107 lb 9.4 oz)   SpO2 96%   BMI 17.37 kg/m²  Body mass index is 17.37 kg/m².      Appearance: appears deconditioned   Muscle Strength/Tone: weake   Gait/Station: unable to ambulate currently, even with assistace   Speech: Nl tone, rate, and volume. Not pressured. Understandable.   Thought Process: very confused   Associations:no loose associations   Abnormal/Psychotic Thoughts (ex):No a/vh, no evidence of delusions, no ideas of reference, no internal stimulation noted   Insight/Judgement:very poor   Orientation:not oritneted  Memory:impaired   Attention/Concentration:impaired   Language:fluent   Fund of Knowledge:decreased  Mood:          Good   Affect:         Full, congruent   SI/HI:   Denies     Past Psychiatric Hx:   Unknown. Pt denies but is poor historian     Family Psychiatric Hx:  Unkown. Pt denies but is poor historian    Social Hx:  Social History     Socioeconomic History   • Marital status: Single     Spouse name: Not on file   • Number of children: Not on file   • Years of education: Not on file   • Highest education level: Not on file   Occupational History   • Not on file   Social Needs   • Financial resource strain: Not on file   • Food insecurity:     Worry: Not on file     Inability: Not on file   • Transportation needs:     Medical: Not on file "     Non-medical: Not on file   Tobacco Use   • Smoking status: Current Every Day Smoker     Packs/day: 0.00     Types: Cigarettes   • Smokeless tobacco: Never Used   Substance and Sexual Activity   • Alcohol use: Yes     Frequency: 4 or more times a week     Drinks per session: 1 or 2     Comment: 2-3 cocktails of vodka/daily   • Drug use: Never   • Sexual activity: Not on file   Lifestyle   • Physical activity:     Days per week: Not on file     Minutes per session: Not on file   • Stress: Not on file   Relationships   • Social connections:     Talks on phone: Not on file     Gets together: Not on file     Attends Nondenominational service: Not on file     Active member of club or organization: Not on file     Attends meetings of clubs or organizations: Not on file     Relationship status: Not on file   • Intimate partner violence:     Fear of current or ex partner: Not on file     Emotionally abused: Not on file     Physically abused: Not on file     Forced sexual activity: Not on file   Other Topics Concern   • Not on file   Social History Narrative   • Not on file   was living in a trailer with his roommate / caregiver. Also had someone who came in regularly to help. His roommate  and he was found by home health nurse, very deconditioned. He has a brother in [place].     Drug/Alcohol/Tobacco Hx:   Drugs: denies    Alcohol:denies    Tobacco:denies     Medical Hx: labs, MARS, medications, etc were reviewed. Only those findings of potential interest to psychiatry are noted below:    Past Medical History:   Diagnosis Date   • Abdominal aortic aneurysm (AAA) (HCC)    • Alcohol dependence (HCC)    • Artificial lens present    • Cardiac pacemaker    • Complete atrioventricular block (HCC)    • Dermatochalasis    • Erectile dysfunction    • Hard of hearing    • Hematuria    • Hyperlipidemia    • Hypertension    • Myopia    • Presbyopia    • Vitamin B12 deficiency      Past Surgical History:   Procedure Laterality Date   • PB  OPEN FIX INTER/SUBTROCH FX,IMPLNT  9/28/2019    Procedure: INSERTION, INTRAMEDULLARY EMGGAN, FEMUR, PROXIMAL;  Surgeon: Jaquan Evans M.D.;  Location: SURGERY [facility];  Service: Orthopedics       Allergies:   Allergies   Allergen Reactions   • Lisinopril Unspecified     Lethargy        Medications:  Current Facility-Administered Medications   Medication Dose Route Frequency Provider Last Rate Last Dose   • amLODIPine (NORVASC) tablet 10 mg  10 mg Oral DAILY Mery Gutierrez D.O.   10 mg at 02/01/20 0449   • atorvastatin (LIPITOR) tablet 10 mg  10 mg Oral Nightly Merysherie Gutierrez D.O.   10 mg at 01/31/20 2036   • dronabinol (MARINOL) capsule 5 mg  5 mg Oral BID Merysherie Gutierrez D.O.   5 mg at 02/01/20 0450   • lidocaine (LIDODERM) 5 % 1 Patch  1 Patch Transdermal Q24HRS Mery Gutierrez D.O.   Stopped at 02/01/20 0600   • losartan (COZAAR) tablet 100 mg  100 mg Oral DAILY Merysherie Gutierrez, D.O.   100 mg at 02/01/20 0450   • QUEtiapine (SEROQUEL) tablet 50 mg  50 mg Oral Q EVENING Merysherie Gutierrez D.O.   50 mg at 01/31/20 1752   • rivaroxaban (XARELTO) tablet 15 mg  15 mg Oral PM MEAL Merysherie Gutierrez D.O.   15 mg at 01/31/20 1752   • senna-docusate (PERICOLACE or SENOKOT S) 8.6-50 MG per tablet 2 Tab  2 Tab Oral BID Merysherie Gutierrez, D.O.       • tamsulosin (FLOMAX) capsule 0.4 mg  0.4 mg Oral Q EVENING Merysherie Gutierrez, D.O.   0.4 mg at 01/31/20 1752   • [START ON 2/7/2020] vitamin D (Ergocalciferol) (DRISDOL) capsule 50,000 Units  50,000 Units Oral Q FRIDAY Merysherie Gutierrez D.O.       • polyethylene glycol/lytes (MIRALAX) PACKET 1 Packet  1 Packet Oral QDAY ISAIAS Young M.D.   1 Packet at 01/31/20 5477   • magnesium hydroxide (MILK OF MAGNESIA) suspension 30 mL  30 mL Oral QDAY PRN Breana Young M.D.       • bisacodyl (DULCOLAX) suppository 10 mg  10 mg Rectal QDAY PRN Breana Young M.D.       • haloperidol (HALDOL) tablet 5 mg  5 mg Oral Q6HRS PRN VIRAJ CarnesOMonique   5 mg at 01/04/20 1517   •  cloNIDine (CATAPRES) tablet 0.1 mg  0.1 mg Oral Q6HRS PRN Ernesto Harvey M.D.   0.1 mg at 12/28/19 2035   • oxyCODONE immediate-release (ROXICODONE) tablet 5 mg  5 mg Oral Q4HRS PRN Ernesto Harvey M.D.   5 mg at 01/26/20 0200       Labs/ Testing:  No results found for this or any previous visit (from the past 48 hour(s)).    No orders to display         ASSESSMENT:   Dementia   FTT in adult     PLAN:  Legal status: n/a  Pt is INCAPACITATED to make medical decisions. In addition, per my exam and the exams of other providers, specifically PT/OT/SLP, he is unable to live alone and will require 24/7 care. He will likely require either guardianship or a  POA who is able to sign him in to an extended care facility.   Signing off   Thank you for the consult.

## 2020-01-12 NOTE — PROGRESS NOTES
Pt slept entire night and did not want to be woken. Woke up pleasant at 0630, asked for coffee, and did not want his medications until later. Fell back to sleep shortly afterward.

## 2020-01-12 NOTE — DISCHARGE PLANNING
"Called patients Memo soto, and DC planning. Informed Memo that patient has been deemed incapacitated, can not make any medical decisions, and will need 24/7 supervision. Memo informed that either he will have to make medical decisions for the patient or guardianship will have to be pursued. Memo stated he is POA and could provide the paperwork proving he has POA. Memo would like to help in finding patient a place to go but does not have access to patients financials. Memo [facility] and find placement for him there. Memo will e-mail BRENTON ROMERO POA papers and follow up in a few days.   "

## 2020-01-12 NOTE — PROGRESS NOTES
Hospital Medicine Daily Progress Note    Date of Service  1/12/2020    Chief Complaint  Dead roommate    Hospital Course    *This is an 79-year-old male who was apparently living with a roommate/caregiver who was also ill.  Home health found the patient with roommate dead.  Patient clearly unable to care for self and brought to the hospital*      Interval Problem Update  1/7.  Patient still quite confused.  But pleasant.  Unable to take care of himself at home.  Require placement.  Complains of general body achiness. Patient's pain is general 2-3/10, intermittent and does not radiate to other location, sharp and with some tingling. Can be controlled by pain meds.     1/8.  Patient is relatively stable but agitated this morning.  Continue pending nursing care facility placement.  Possibly need guardianship.Patient otherwise denies fever, chills, nausea, vomiting, adb pain, SOB, CP, headache, constipation, diarrhea, cough, or sputum.   1/9.  Overall remained comfortable overnight.  Cooperated.  Still demented.  Confused.  Complains of general body achiness. Patient's pain is general 1-2/10, intermittent and does not radiate to other location, sharp and with some tingling. Can be controlled by pain meds.  1/10.  Patient overall is comfortable but still confused.  Periodically agitated but able to be redirected.  Pending psychiatry evaluation to determine if patient will need guardianship or not. Patient otherwise denies fever, chills, nausea, vomiting, adb pain, SOB, CP, headache, constipation, diarrhea, cough, or sputum.  1/11.  Patient relatively comfortable.  Mood is stable and at baseline.  Per psychiatry lack of capacity.  Pending for guardianship.  Complains of general body achiness. Patient's pain is general, 2-3/10, intermittent and does not radiate to other location, sharp and with some tingling. Can be controlled by pain meds.   1/12.  Patient is pleasant today.  No acute adverse event overnight.   Cooperative.  Awaiting for guardianship. Patient otherwise denies fever, chills, nausea, vomiting, adb pain, SOB, CP, headache, constipation, diarrhea, cough, or sputum.      Consultants/Specialty  Psychiatry    Code Status  DNR    Disposition  Likely group home will probably require guardianship    Review of Systems  Review of Systems   Unable to perform ROS: Dementia        Physical Exam  Temp:  [36.5 °C (97.7 °F)-36.9 °C (98.4 °F)] 36.7 °C (98.1 °F)  Pulse:  [79-85] 79  Resp:  [18-20] 20  BP: (128-139)/(66-75) 128/75  SpO2:  [93 %-96 %] 93 %    Physical Exam  Vitals signs and nursing note reviewed.   Constitutional:       General: He is not in acute distress.     Appearance: Normal appearance. He is normal weight. He is not diaphoretic.      Comments: More disheveled today   HENT:      Head: Normocephalic.      Right Ear: Tympanic membrane and external ear normal.      Left Ear: Tympanic membrane and external ear normal.      Nose: Nose normal.      Mouth/Throat:      Mouth: Mucous membranes are moist.      Pharynx: Oropharynx is clear.   Eyes:      General:         Right eye: No discharge.         Left eye: No discharge.      Extraocular Movements: Extraocular movements intact.      Conjunctiva/sclera: Conjunctivae normal.      Pupils: Pupils are equal, round, and reactive to light.   Neck:      Musculoskeletal: Normal range of motion and neck supple. No neck rigidity or muscular tenderness.   Cardiovascular:      Rate and Rhythm: Normal rate. Rhythm irregular.      Pulses: Normal pulses.      Heart sounds: Normal heart sounds. No friction rub. No gallop.    Pulmonary:      Effort: Pulmonary effort is normal. No respiratory distress.      Breath sounds: Normal breath sounds. No stridor. No rhonchi or rales.      Comments: Moderately diminished  Abdominal:      General: Abdomen is flat. Bowel sounds are normal. There is no distension.      Palpations: Abdomen is soft. There is no mass.      Tenderness: There is  "no tenderness.      Hernia: No hernia is present.   Musculoskeletal: Normal range of motion.         General: No swelling or deformity.      Right lower leg: No edema.      Left lower leg: No edema.   Lymphadenopathy:      Cervical: No cervical adenopathy.   Skin:     General: Skin is warm and dry.      Capillary Refill: Capillary refill takes more than 3 seconds.      Coloration: Skin is not jaundiced or pale.   Neurological:      General: No focal deficit present.      Mental Status: He is alert. Mental status is at baseline. He is disoriented.      Motor: No weakness.      Comments: Probably at baseline   Psychiatric:         Mood and Affect: Mood normal.         Behavior: Behavior normal.      Comments: More pleasant today         Fluids    Intake/Output Summary (Last 24 hours) at 1/12/2020 1116  Last data filed at 1/12/2020 0800  Gross per 24 hour   Intake 720 ml   Output 200 ml   Net 520 ml       Laboratory                        Imaging  No orders to display        Assessment/Plan  * Requires supervision due to deficit in self-care  Assessment & Plan  Unclear what his social situation is, apparently his \"caregiver\" was more of a roommate who was also ill.  Discussed with , difficult placement anticipated  Per speech evaluation.  Patient will need supervision    Pending nursing care facility to accept patient.  Awaiting for guardianship .  Per psychiatry patient does not have capacity to make medical decision     Agitation  Assessment & Plan  Intermittent, he is not safe to care for himself at home  He is calm today  EKG slightly elevated QTC    Currently mood wax and wanes  Pending nursing transfer facility to accept patient.  Awaiting for cardiology  Symptoms can be controlled by Haldol    Chronic atrial fibrillation  Assessment & Plan  Rate controlled. Continue meds including anticoagulation.     Severe protein-calorie malnutrition (HCC)  Assessment & Plan  Continue meds. Encourage oral " intake    Hyperlipidemia- (present on admission)  Assessment & Plan  Continue meds    COPD (chronic obstructive pulmonary disease) (HCC)- (present on admission)  Assessment & Plan  No flare. Prn rt protocol.     CKD (chronic kidney disease) stage 3, GFR 30-59 ml/min ([facility])- (present on admission)  Assessment & Plan  Stable     Essential hypertension- (present on admission)  Assessment & Plan  Continue meds.        VTE prophylaxis: xarelto    I have seen and examined patient on 1/12/2020. I have reviewed vitals, new labs and imaging. I have discussed POC with RN. There are no changes from (1/11/2020) except for what is mentioned above.     Current Facility-Administered Medications:   •  QUEtiapine (SEROQUEL) tablet 50 mg, 50 mg, Oral, Q EVENING, Isatu Deras M.D., 50 mg at 01/11/20 1815  •  senna-docusate (PERICOLACE or SENOKOT S) 8.6-50 MG per tablet 2 Tab, 2 Tab, Oral, BID, Breana Young M.D., Stopped at 01/10/20 1735  •  polyethylene glycol/lytes (MIRALAX) PACKET 1 Packet, 1 Packet, Oral, QDAY PRN, Breana Young M.D.  •  magnesium hydroxide (MILK OF MAGNESIA) suspension 30 mL, 30 mL, Oral, QDAY PRN, Breana Young M.D.  •  bisacodyl (DULCOLAX) suppository 10 mg, 10 mg, Rectal, QDAY PRN, Breana Young M.D.  •  amLODIPine (NORVASC) tablet 10 mg, 10 mg, Oral, DAILY, Sarha Rutledge DMoniqueOMonique, 10 mg at 01/12/20 0855  •  haloperidol (HALDOL) tablet 5 mg, 5 mg, Oral, Q6HRS PRN, Sarah Rutledge D.OMonique, 5 mg at 01/04/20 1517  •  rivaroxaban (XARELTO) tablet 15 mg, 15 mg, Oral, PM MEAL, Evelyn Harvey M.D., 15 mg at 01/11/20 1815  •  atorvastatin (LIPITOR) tablet 10 mg, 10 mg, Oral, Nightly, Ernesto Harvey M.D., Stopped at 01/11/20 2100  •  cloNIDine (CATAPRES) tablet 0.1 mg, 0.1 mg, Oral, Q6HRS PRN, Ernesto Harvey M.D., 0.1 mg at 12/28/19 2035  •  dronabinol (MARINOL) capsule 5 mg, 5 mg, Oral, BID, Ernesto Harvey M.D., 5 mg at 01/12/20 0854  •  lidocaine (LIDODERM) 5 % 1 Patch, 1  Patch, Transdermal, Q24HRS, Ernesto Harvey M.D., Stopped at 12/29/19 0600  •  losartan (COZAAR) tablet 100 mg, 100 mg, Oral, DAILY, Ernesto Harvey M.D., 100 mg at 01/12/20 0855  •  oxyCODONE immediate-release (ROXICODONE) tablet 5 mg, 5 mg, Oral, Q4HRS PRN, Ernesto Harvey M.D., 5 mg at 01/07/20 0512  •  tamsulosin (FLOMAX) capsule 0.4 mg, 0.4 mg, Oral, Q EVENING, Ernesto Harvey M.D., 0.4 mg at 01/11/20 1815  •  vitamin D (Ergocalciferol) (DRISDOL) capsule 50,000 Units, 50,000 Units, Oral, Q FRIDAY, Ernesto Harvey M.D., 50,000 Units at 01/10/20 1200

## 2020-01-12 NOTE — PROGRESS NOTES
Received report from NOC RN; assumed pt care. Pt sitting up in bed. A&Ox1. Voided into urinal. Denies pain. Pleasant. Pt notified to call for assistance, bed alarm on.

## 2020-01-13 NOTE — PROGRESS NOTES
Pt resting in bed at this time. Set up for breakfast. Pt calm and pleasant at this time. No signs of distress noted.

## 2020-01-13 NOTE — PROGRESS NOTES
[facility] Medicine Daily Progress Note    Date of Service  1/13/2020    Chief Complaint  Dead roommate    Hospital Course    *This is an 79-year-old male who was apparently living with a roommate/caregiver who was also ill.  Home health found the patient with roommate dead.  Patient clearly unable to care for self and brought to the hospital*      Interval Problem Update  1/7.  Patient still quite confused.  But pleasant.  Unable to take care of himself at home.  Require placement.  Complains of general body achiness. Patient's pain is general 2-3/10, intermittent and does not radiate to other location, sharp and with some tingling. Can be controlled by pain meds.     1/8.  Patient is relatively stable but agitated this morning.  Continue pending nursing care facility placement.  Possibly need guardianship.Patient otherwise denies fever, chills, nausea, vomiting, adb pain, SOB, CP, headache, constipation, diarrhea, cough, or sputum.   1/9.  Overall remained comfortable overnight.  Cooperated.  Still demented.  Confused.  Complains of general body achiness. Patient's pain is general 1-2/10, intermittent and does not radiate to other location, sharp and with some tingling. Can be controlled by pain meds.  1/10.  Patient overall is comfortable but still confused.  Periodically agitated but able to be redirected.  Pending psychiatry evaluation to determine if patient will need guardianship or not. Patient otherwise denies fever, chills, nausea, vomiting, adb pain, SOB, CP, headache, constipation, diarrhea, cough, or sputum.  1/11.  Patient relatively comfortable.  Mood is stable and at baseline.  Per psychiatry lack of capacity.  Pending for guardianship.  Complains of general body achiness. Patient's pain is general, 2-3/10, intermittent and does not radiate to other location, sharp and with some tingling. Can be controlled by pain meds.   1/12.  Patient is pleasant today.  No acute adverse event overnight.   Cooperative.  Awaiting for guardianship. Patient otherwise denies fever, chills, nausea, vomiting, adb pain, SOB, CP, headache, constipation, diarrhea, cough, or sputum.  1/13.  Patient is pleasant and awaiting for nursing care facility to accept.  Overall remained stable and no active orders event.  Complains of general body achiness but can be controlled.      Consultants/Specialty  Psychiatry    Code Status  DNR    Disposition  Likely group home will probably require guardianship    Review of Systems  Review of Systems   Unable to perform ROS: Dementia        Physical Exam  Temp:  [36.1 °C (97 °F)-36.7 °C (98.1 °F)] 36.6 °C (97.9 °F)  Pulse:  [70-85] 70  Resp:  [18-20] 18  BP: (125-152)/(66-81) 152/72  SpO2:  [92 %-97 %] 97 %    Physical Exam  Vitals signs and nursing note reviewed.   Constitutional:       General: He is not in acute distress.     Appearance: Normal appearance. He is normal weight. He is not toxic-appearing.      Comments: More disheveled today   HENT:      Head: Normocephalic.      Right Ear: External ear normal.      Left Ear: External ear normal.      Nose: Nose normal.      Mouth/Throat:      Mouth: Mucous membranes are moist.      Pharynx: Oropharynx is clear.   Eyes:      General:         Right eye: No discharge.         Left eye: No discharge.      Extraocular Movements: Extraocular movements intact.      Conjunctiva/sclera: Conjunctivae normal.      Pupils: Pupils are equal, round, and reactive to light.   Neck:      Musculoskeletal: Normal range of motion and neck supple. No neck rigidity.   Cardiovascular:      Rate and Rhythm: Normal rate. Rhythm irregular.      Pulses: Normal pulses.      Heart sounds: Normal heart sounds. No friction rub. No gallop.    Pulmonary:      Effort: Pulmonary effort is normal. No respiratory distress.      Breath sounds: Normal breath sounds. No rhonchi or rales.      Comments: Moderately diminished  Chest:      Chest wall: No tenderness.   Abdominal:       "General: Abdomen is flat. Bowel sounds are normal. There is no distension.      Palpations: Abdomen is soft.      Tenderness: There is no tenderness. There is no guarding.      Hernia: No hernia is present.   Musculoskeletal: Normal range of motion.         General: No swelling or deformity.      Right lower leg: No edema.      Left lower leg: No edema.   Lymphadenopathy:      Cervical: No cervical adenopathy.   Skin:     General: Skin is warm and dry.      Capillary Refill: Capillary refill takes more than 3 seconds.      Coloration: Skin is not jaundiced or pale.   Neurological:      General: No focal deficit present.      Mental Status: He is alert. Mental status is at baseline. He is disoriented.      Motor: No weakness.      Comments: Probably at baseline   Psychiatric:         Mood and Affect: Mood normal.         Behavior: Behavior normal.      Comments: More pleasant today         Fluids    Intake/Output Summary (Last 24 hours) at 1/13/2020 1435  Last data filed at 1/13/2020 1200  Gross per 24 hour   Intake 740 ml   Output 350 ml   Net 390 ml       Laboratory  Recent Labs     01/13/20  0802   WBC 7.7   RBC 3.18*   HEMOGLOBIN 9.8*   HEMATOCRIT 31.0*   MCV 97.5   MCH 30.8   MCHC 31.6*   RDW 45.9   PLATELETCT 330   MPV 9.5     Recent Labs     01/13/20  0802   SODIUM 138   POTASSIUM 4.6   CHLORIDE 105   CO2 21   GLUCOSE 93   BUN 65*   CREATININE 1.92*   CALCIUM 8.9                   Imaging  No orders to display        Assessment/Plan  * Requires supervision due to deficit in self-care  Assessment & Plan  Unclear what his social situation is, apparently his \"caregiver\" was more of a roommate who was also ill.  Discussed with , difficult placement anticipated  Per speech evaluation.  Patient will need supervision    Pending nursing care facility to accept patient.  Awaiting for guardianship,  to help  Per psychiatry patient does not have capacity to make medical decision "     Agitation  Assessment & Plan  Intermittent, he is not safe to care for himself at home  He is calm today  EKG slightly elevated QTC    Currently mood wax and wanes  Pending nursing transfer facility to accept patient.  Awaiting for cardiology  Symptoms can be controlled by Haldol  monitor    Chronic atrial fibrillation  Assessment & Plan  Rate controlled. Continue meds including anticoagulation.     Severe protein-calorie malnutrition (HCC)  Assessment & Plan  Continue meds. Encourage oral intake    Hyperlipidemia- (present on admission)  Assessment & Plan  Continue meds    COPD (chronic obstructive pulmonary disease) (HCC)- (present on admission)  Assessment & Plan  No flare. Prn rt protocol.     CKD (chronic kidney disease) stage 3, GFR 30-59 ml/min ([facility])- (present on admission)  Assessment & Plan  Stable     Essential hypertension- (present on admission)  Assessment & Plan  Continue meds.         VTE prophylaxis: xarelto    I have seen and examined patient on 1/13/2020. I have reviewed vitals, new labs and imaging. I have discussed POC with RN. There are no changes from (1/12/2020) except for what is mentioned above.     Current Facility-Administered Medications:   •  QUEtiapine (SEROQUEL) tablet 50 mg, 50 mg, Oral, Q EVENING, Isatu Deras M.D., 50 mg at 01/12/20 1724  •  senna-docusate (PERICOLACE or SENOKOT S) 8.6-50 MG per tablet 2 Tab, 2 Tab, Oral, BID, Breana Young M.D., Stopped at 01/10/20 1735  •  polyethylene glycol/lytes (MIRALAX) PACKET 1 Packet, 1 Packet, Oral, QDAY PRN, Breana Young M.D.  •  magnesium hydroxide (MILK OF MAGNESIA) suspension 30 mL, 30 mL, Oral, QDAY PRN, Breana Young M.D.  •  bisacodyl (DULCOLAX) suppository 10 mg, 10 mg, Rectal, QDAY PRN, Breana Young M.D.  •  amLODIPine (NORVASC) tablet 10 mg, 10 mg, Oral, DAILY, Sarah Rutledge D.O., 10 mg at 01/12/20 0855  •  haloperidol (HALDOL) tablet 5 mg, 5 mg, Oral, Q6HRS PRN, Sarah PRESTON  SYBIL Rutledge, 5 mg at 01/04/20 1517  •  rivaroxaban (XARELTO) tablet 15 mg, 15 mg, Oral, PM MEAL, Evelyn Harvey M.D., 15 mg at 01/12/20 1724  •  atorvastatin (LIPITOR) tablet 10 mg, 10 mg, Oral, Nightly, Ernesto Harvey M.D., 10 mg at 01/12/20 2007  •  cloNIDine (CATAPRES) tablet 0.1 mg, 0.1 mg, Oral, Q6HRS PRN, Ernesto Harvey M.D., 0.1 mg at 12/28/19 2035  •  dronabinol (MARINOL) capsule 5 mg, 5 mg, Oral, BID, Ernesto Harvey M.D., 5 mg at 01/12/20 1724  •  lidocaine (LIDODERM) 5 % 1 Patch, 1 Patch, Transdermal, Q24HRS, Ernesto Harvey M.D., Stopped at 12/29/19 0600  •  losartan (COZAAR) tablet 100 mg, 100 mg, Oral, DAILY, Ernesto Harvey M.D., 100 mg at 01/12/20 0855  •  oxyCODONE immediate-release (ROXICODONE) tablet 5 mg, 5 mg, Oral, Q4HRS PRN, Ernesto Harvey M.D., 5 mg at 01/07/20 0512  •  tamsulosin (FLOMAX) capsule 0.4 mg, 0.4 mg, Oral, Q EVENING, Ernesto Harvey M.D., 0.4 mg at 01/12/20 1724  •  vitamin D (Ergocalciferol) (DRISDOL) capsule 50,000 Units, 50,000 Units, Oral, Q FRIDAY, Ernesto Harvey M.D., 50,000 Units at 01/10/20 1200

## 2020-01-14 NOTE — THERAPY
PT Contact Note:    Arrived to treat and pt adamantly refusing. This is the 3rd attempt by PT to treat him and he has refused all 3 times. He moves easily in bed with good functional strength, as witnessed during discussion to encourage pt to participate, which would indicate he can likely ambulate. Given his continued refusals, he is not appropriate to remain on PT services and can ambulate with the nursing staff.     Adriana Cárdenas, PT

## 2020-01-14 NOTE — PROGRESS NOTES
Hospital Medicine Daily Progress Note    Date of Service  1/14/2020    Chief Complaint  Dead roommate    Hospital Course    *This is an 79-year-old male who was apparently living with a roommate/caregiver who was also ill.  Home health found the patient with roommate dead.  Patient clearly unable to care for self and brought to the hospital*      Interval Problem Update    1/14  No acute issues overnight, patient overall a poor historian    Consultants/Specialty  Psychiatry    Code Status  DNR    Disposition  Needs Guardianship and will need group home.    Review of Systems  Review of Systems   Unable to perform ROS: Dementia        Physical Exam  Temp:  [36.4 °C (97.6 °F)-36.8 °C (98.2 °F)] 36.4 °C (97.6 °F)  Pulse:  [70-81] 71  Resp:  [18] 18  BP: (126-152)/(72-85) 144/85  SpO2:  [94 %-97 %] 95 %    Physical Exam  Vitals signs and nursing note reviewed.   Constitutional:       General: He is not in acute distress.     Appearance: Normal appearance. He is well-developed and normal weight. He is not ill-appearing, toxic-appearing or diaphoretic.      Comments: More disheveled today   HENT:      Head: Normocephalic.      Right Ear: External ear normal.      Left Ear: External ear normal.      Nose: Nose normal.      Mouth/Throat:      Mouth: Mucous membranes are moist.      Pharynx: Oropharynx is clear. No oropharyngeal exudate or posterior oropharyngeal erythema.   Eyes:      General: No scleral icterus.        Right eye: No discharge.         Left eye: No discharge.      Extraocular Movements: Extraocular movements intact.      Conjunctiva/sclera: Conjunctivae normal.      Pupils: Pupils are equal, round, and reactive to light.   Neck:      Musculoskeletal: Normal range of motion and neck supple. No neck rigidity.      Vascular: No JVD.      Trachea: No tracheal deviation.   Cardiovascular:      Rate and Rhythm: Normal rate. Rhythm irregular.      Pulses: Normal pulses.      Heart sounds: Normal heart sounds. No  murmur. No friction rub. No gallop.    Pulmonary:      Effort: Pulmonary effort is normal. No respiratory distress.      Breath sounds: Normal breath sounds. No wheezing, rhonchi or rales.      Comments: Diminished breath sounds bilateral lung bases  Chest:      Chest wall: No tenderness.   Abdominal:      General: Abdomen is flat. Bowel sounds are normal. There is no distension.      Palpations: Abdomen is soft. There is no mass.      Tenderness: There is no tenderness. There is no guarding or rebound.      Hernia: No hernia is present.   Musculoskeletal: Normal range of motion.         General: No swelling, tenderness or deformity.      Right lower leg: No edema.      Left lower leg: No edema.   Lymphadenopathy:      Cervical: No cervical adenopathy.   Skin:     General: Skin is warm and dry.      Capillary Refill: Capillary refill takes more than 3 seconds.      Coloration: Skin is not jaundiced or pale.      Findings: No erythema.   Neurological:      General: No focal deficit present.      Mental Status: He is alert. Mental status is at baseline. He is disoriented.      Cranial Nerves: No cranial nerve deficit.      Motor: No weakness.   Psychiatric:         Mood and Affect: Mood normal.         Fluids    Intake/Output Summary (Last 24 hours) at 1/14/2020 0838  Last data filed at 1/14/2020 0500  Gross per 24 hour   Intake 240 ml   Output 700 ml   Net -460 ml       Laboratory  Recent Labs     01/13/20  0802   WBC 7.7   RBC 3.18*   HEMOGLOBIN 9.8*   HEMATOCRIT 31.0*   MCV 97.5   MCH 30.8   MCHC 31.6*   RDW 45.9   PLATELETCT 330   MPV 9.5     Recent Labs     01/13/20  0802   SODIUM 138   POTASSIUM 4.6   CHLORIDE 105   CO2 21   GLUCOSE 93   BUN 65*   CREATININE 1.92*   CALCIUM 8.9                   Imaging  No orders to display        Assessment/Plan  * Requires supervision due to deficit in self-care  Assessment & Plan  Awaiting for guardianship, patient will need a group home for assistance  Psychiatry did see  the patient and did not think that patient has decision-making capacity      Pending nursing care facility to accept patient.  Awaiting for guardianship,  to help  Per psychiatry patient does not have capacity to make medical decision     Agitation  Assessment & Plan  Intermittent overall been calm for the past 2 days  PRN Haldol as needed    Chronic atrial fibrillation  Assessment & Plan  Rate overall controlled  Patient is on Xarelto       Severe protein-calorie malnutrition (HCC)  Assessment & Plan  Dietary following, continue with boost with meals      Hyperlipidemia- (present on admission)  Assessment & Plan  Resume home dose of atorvastatin    COPD (chronic obstructive pulmonary disease) ([facility])- (present on admission)  Assessment & Plan  Not in acute exacerbation  Continue RT protocol, duo nebs, Pep therapy if warranted, and incentive spirometry.       CKD (chronic kidney disease) stage 3, GFR 30-59 ml/min ([facility])- (present on admission)  Assessment & Plan  Renal functions within baseline  Avoid nephrotoxins and NSAIDs    Essential hypertension- (present on admission)  Assessment & Plan  Controlled  Resume Norvasc and losartan.      VTE prophylaxis: Xarelto     Patient plan of care discussed at multidisplinary team rounds and with patient and R.N at [facility].

## 2020-01-14 NOTE — CARE PLAN
Problem: Safety  Goal: Will remain free from injury  Outcome: PROGRESSING AS EXPECTED  Goal: Will remain free from falls  Outcome: PROGRESSING AS EXPECTED     Problem: Infection  Goal: Will remain free from infection  Outcome: PROGRESSING AS EXPECTED    Cooperative with staff. Refuses this and that. Eating OK, likes his boost supplement.

## 2020-01-14 NOTE — PROGRESS NOTES
Pt took AM meds without much complaint. Ate some breakfast. Resting now. Calm and cooperative with staff.

## 2020-01-14 NOTE — THERAPY
SPEECH PATHOLOGY CONTACT NOTE:    Per discussion, Pt has fluctuating compliance with medical interventions and guardianship is being sought at this time. Per chart review, Pt was deemed incompetent by psych and is not able to care for himself. Inpatient skilled SLP services do not appear warranted at this time; however, Pt may benefit from post-acute cognitive therapy. RN aware. Thank you.

## 2020-01-14 NOTE — CARE PLAN
Problem: Nutritional:  Goal: Achieve adequate nutritional intake  Description  Patient will consume 50% of meals and supplements   Outcome: PROGRESSING AS EXPECTED    CNA and nurse report that pt is drinking Boost Plus supplement and eating well. Recorded PO intake is improving with breakfast noted to be % and dinner on 12/13 was 50-75%. RD requested new weight. RD will continue to monitor.

## 2020-01-15 NOTE — PROGRESS NOTES
0655:  Bedside report completed w/ Dimitry/RN.  Assumed pt care. Patient in bed, sleeping, in no apparent distress.  Safety precautions in place. Call light & personal belongings within reach.  Plan of care discussed/    0800:  Patient agreeable to taking medications with breakfast.    0708:  Bedside report w/ Dimitry/[place]

## 2020-01-15 NOTE — PROGRESS NOTES
"Pt oriented to self, irritable, but took his pill tonight. Denies any SOB, nausea, but reports mild pain in the \"stomach\". Prefers to rest. POC discussed w/ pt and requesting that his morning meds will be given after breakfast. Bed alarm on. Safety measures in place. No other needs at this time.   "

## 2020-01-15 NOTE — CARE PLAN
Problem: Safety  Goal: Will remain free from injury  Outcome: PROGRESSING AS EXPECTED   Pt forgets to call for assistance. Bed alarm on. Pt reminded to asked for assistance when in need. Belongings w/n reached.      Problem: Medication  Goal: Compliance with prescribed medication will improve  Outcome: PROGRESSING AS EXPECTED   Pt takes medication tonight, but during POC discussion, pt refuses to take pain med or any early morning scheduled meds before breakfast. This was discussed by day RN during bedside report as well. Thus, morning meds were rescheduled after breakfast.

## 2020-01-15 NOTE — CARE PLAN
Problem: Safety  Goal: Will remain free from falls  Outcome: PROGRESSING AS EXPECTED  Intervention: Implement fall precautions  Flowsheets  Taken 1/10/2020 1306 by Izaiah Srinivasan R.N.  Bed Alarm: Yes - Alarm On  Taken 1/15/2020 0900 by Jennifer Gilligan, R.N.  Environmental Precautions: Treaded Slipper Socks on Patient;Personal Belongings, Wastebasket, Call Bell etc. in Easy Reach;Report Given to Other Health Care Providers Regarding Fall Risk;Bed in Low Position;Communication Sign for Patients & Families;Mobility Assessed & Appropriate Sign Placed  Taken 1/15/2020 0953 by Jennifer Gilligan, R.N.  Bedrails: Bedrails Closest to Bathroom Down  Note:   Patient educated and understands the fall precautions in place to prevent falls.  Bed alarm is on, IV pole closest to bathroom, treaded slipper socks on, and bedrails closest to bathroom down.  Patient also educated and understands the use of the call button for any assistance with mobility.      Problem: Venous Thromboembolism (VTW)/Deep Vein Thrombosis (DVT) Prevention:  Goal: Patient will participate in Venous Thrombosis (VTE)/Deep Vein Thrombosis (DVT)Prevention Measures  Outcome: PROGRESSING AS EXPECTED  Intervention: Ensure patient wears graduated elastic stockings (JULIANNA hose) and/or SCDs, if ordered, when in bed or chair (Remove at least once per shift for skin check)  Flowsheets (Taken 1/15/2020 0900)  Mechanical Prophylaxis : SCDs, Sequential Compression Device  SCDs, Sequential Compression Device: Refused  Note:   Patient refusing SCDs despite education, he is on xarelto.

## 2020-01-16 NOTE — CARE PLAN
Problem: Communication  Goal: The ability to communicate needs accurately and effectively will improve  Outcome: PROGRESSING AS EXPECTED     Problem: Safety  Goal: Will remain free from injury  Outcome: PROGRESSING AS EXPECTED     Problem: Venous Thromboembolism (VTW)/Deep Vein Thrombosis (DVT) Prevention:  Goal: Patient will participate in Venous Thrombosis (VTE)/Deep Vein Thrombosis (DVT)Prevention Measures  Outcome: PROGRESSING AS EXPECTED     Problem: Bowel/Gastric:  Goal: Normal bowel function is maintained or improved  Outcome: PROGRESSING AS EXPECTED

## 2020-01-16 NOTE — PROGRESS NOTES
Report received from night shift RN. Assume care. Pt. AAOx1 pt is bed,  Assessment completed. VSS. Denies somatic pain, N/V, good CMS and pulses to ana LE, denies numbness and tingling.  Pt was update for the care for the day. White board updated, All question answered. Pt has call light within reach,  bed is in the lowest position. Pt has no other needs at this time. Hot fresh coffee provided

## 2020-01-16 NOTE — CARE PLAN
Problem: Safety  Goal: Will remain free from injury  Outcome: PROGRESSING AS EXPECTED   Pt educated to call when in need. Call light and personal belongings w/n reach. Room free of clutters. Bed locked and in lowest position. Answer call light immediately. Hourly rounding. Bed alarm on.      Problem: Discharge Barriers/Planning  Goal: Patient's continuum of care needs will be met  Outcome: PROGRESSING SLOWER THAN EXPECTED   Needs Guardianship and will need group home.

## 2020-01-16 NOTE — PROGRESS NOTES
Hospital Medicine Daily Progress Note    Date of Service  1/16/2020    Chief Complaint  Dead roommate    Hospital Course    *This is an 79-year-old male who was apparently living with a roommate/caregiver who was also ill.  Home health found the patient with roommate dead.  Patient clearly unable to care for self and brought to the hospital*      Interval Problem Update  No acute needs, has been calm over the past several days, no use of haldol or restraints.     Consultants/Specialty  Psychiatry    Code Status  DNR    Disposition  Needs Guardianship and will need group home.    Review of Systems  Review of Systems   Unable to perform ROS: Dementia        Physical Exam  Temp:  [36.4 °C (97.5 °F)-36.9 °C (98.4 °F)] 36.4 °C (97.5 °F)  Pulse:  [73-87] 87  Resp:  [16-18] 18  BP: (125-168)/(62-90) 168/90  SpO2:  [91 %-96 %] 96 %    Physical Exam  Vitals signs and nursing note reviewed.   Constitutional:       General: He is not in acute distress.     Appearance: Normal appearance. He is well-developed and normal weight. He is not ill-appearing, toxic-appearing or diaphoretic.      Comments: More disheveled today   HENT:      Head: Normocephalic.      Right Ear: External ear normal.      Left Ear: External ear normal.      Nose: Nose normal.      Mouth/Throat:      Mouth: Mucous membranes are moist.      Pharynx: Oropharynx is clear. No oropharyngeal exudate or posterior oropharyngeal erythema.   Eyes:      General: No scleral icterus.        Right eye: No discharge.         Left eye: No discharge.      Extraocular Movements: Extraocular movements intact.      Conjunctiva/sclera: Conjunctivae normal.      Pupils: Pupils are equal, round, and reactive to light.   Neck:      Musculoskeletal: Normal range of motion and neck supple. No neck rigidity.      Vascular: No JVD.      Trachea: No tracheal deviation.   Cardiovascular:      Rate and Rhythm: Normal rate. Rhythm irregular.      Pulses: Normal pulses.      Heart sounds:  Normal heart sounds. No murmur. No friction rub. No gallop.    Pulmonary:      Effort: Pulmonary effort is normal. No respiratory distress.      Breath sounds: Normal breath sounds. No wheezing, rhonchi or rales.      Comments: Diminished breath sounds bilateral lung bases  Chest:      Chest wall: No tenderness.   Abdominal:      General: Abdomen is flat. Bowel sounds are normal. There is no distension.      Palpations: Abdomen is soft. There is no mass.      Tenderness: There is no tenderness. There is no guarding or rebound.      Hernia: No hernia is present.   Musculoskeletal: Normal range of motion.         General: No swelling, tenderness or deformity.      Right lower leg: No edema.      Left lower leg: No edema.   Lymphadenopathy:      Cervical: No cervical adenopathy.   Skin:     General: Skin is warm and dry.      Capillary Refill: Capillary refill takes more than 3 seconds.      Coloration: Skin is not jaundiced or pale.      Findings: No erythema.   Neurological:      General: No focal deficit present.      Mental Status: He is alert. Mental status is at baseline. He is disoriented.      Cranial Nerves: No cranial nerve deficit.      Motor: No weakness.   Psychiatric:         Mood and Affect: Mood normal.         Fluids    Intake/Output Summary (Last 24 hours) at 1/16/2020 0842  Last data filed at 1/16/2020 0500  Gross per 24 hour   Intake 120 ml   Output 600 ml   Net -480 ml       Laboratory                        Imaging  No orders to display        Assessment/Plan  * Requires supervision due to deficit in self-care  Assessment & Plan  Awaiting for guardianship, patient will need a group home for assistance  Psychiatry did see the patient and did not think that patient has decision-making capacity  Awaiting for placement.    Agitation  Assessment & Plan  Intermittent overall been calm for the past 2 days  PRN Haldol as needed    Chronic atrial fibrillation  Assessment & Plan  Rate overall  controlled  Patient is on Xarelto    Severe protein-calorie malnutrition (HCC)  Assessment & Plan  Dietary following, continue with boost with meals      Hyperlipidemia- (present on admission)  Assessment & Plan  Resume home dose of atorvastatin    COPD (chronic obstructive pulmonary disease) ([facility])- (present on admission)  Assessment & Plan  Not in acute exacerbation  Continue RT protocol, duo nebs, Pep therapy if warranted, and incentive spirometry.     CKD (chronic kidney disease) stage 3, GFR 30-59 ml/min ([facility])- (present on admission)  Assessment & Plan  Renal functions within baseline  Avoid nephrotoxins and NSAIDs    Essential hypertension- (present on admission)  Assessment & Plan  Controlled  Resume Norvasc and losartan.          VTE prophylaxis: Xarelto     Patient plan of care discussed at multidisplinary team rounds and with patient and R.N at beside.    I have performed a physical exam and reviewed and updated ROS as of today.  In review of yesterday's note dated  1/15/2019, there are no changes except as documented above.

## 2020-01-16 NOTE — PROGRESS NOTES
Pt oriented to self. C/o intermittent cramping in the abd. Denies any nausea, SOB, dizziness. Refused to take scheduled med tonight. Education provided. POC discussed w/ pt. Brushed his teeth tonight. Repositioned and extra pillow provided for comfort. Safety and comfort measures in place.  No additional needs at this time. Encouraged to call when in need for assistance. Call light and belongings w/n reach. Bed alarm on.

## 2020-01-17 NOTE — CARE PLAN
Problem: Urinary Elimination:  Goal: Ability to reestablish a normal urinary elimination pattern will improve  Outcome: PROGRESSING AS EXPECTED   Pt trained to use urinal whenever the need to void arises.       Problem: Skin Integrity  Goal: Risk for impaired skin integrity will decrease  Outcome: PROGRESSING AS EXPECTED   Barrier cream and moisturizer used to protect pt's skin. Pt turns side to side.      Problem: Safety  Goal: Will remain free from falls  Outcome: PROGRESSING AS EXPECTED   Pt educated to call when in need. Call light and personal belongings w/n reach. Room free of clutters. Bed locked and in lowest position. Bed alarm on.

## 2020-01-17 NOTE — PROGRESS NOTES
Hospital Medicine Daily Progress Note    Date of Service  1/17/2020    Chief Complaint  79 y.o. male admitted 12/27/2019 with inability to care for himself    Hospital Course    This is an 79-year-old male who was apparently living with a roommate/caregiver who was also ill.  Home health found the patient with roommate dead. The patient is unable to care for himself due to cognitive deficits and guardianship and placement is pending.      Interval Problem Update  The patient is pleasant and responds appropriately    Consultants/Specialty  none    Code Status  DNR    Disposition  tbd    Review of Systems  Review of Systems   Constitutional: Negative for chills, diaphoresis and fever.   HENT: Negative for sore throat.    Respiratory: Negative for cough and shortness of breath.    Cardiovascular: Negative for chest pain.   Gastrointestinal: Negative for diarrhea and nausea.   Genitourinary: Negative for dysuria.   Skin: Negative for itching.   Neurological: Negative for headaches.        Physical Exam  Temp:  [36.5 °C (97.7 °F)-36.8 °C (98.2 °F)] 36.5 °C (97.7 °F)  Pulse:  [] 119  Resp:  [18] 18  BP: (119-164)/(66-90) 119/66  SpO2:  [95 %-98 %] 97 %    Physical Exam  Vitals signs and nursing note reviewed.   Constitutional:       Appearance: He is not ill-appearing.   HENT:      Nose: Nose normal.      Mouth/Throat:      Mouth: Mucous membranes are moist.   Eyes:      Pupils: Pupils are equal, round, and reactive to light.   Cardiovascular:      Rate and Rhythm: Regular rhythm.      Heart sounds: Normal heart sounds.   Pulmonary:      Breath sounds: Normal breath sounds.   Abdominal:      General: There is no distension.   Musculoskeletal:         General: No swelling.   Neurological:      General: No focal deficit present.      Mental Status: He is alert.   Psychiatric:         Behavior: Behavior normal.         Cognition and Memory: Cognition is impaired.         Fluids    Intake/Output Summary (Last 24 hours)  at 1/17/2020 1507  Last data filed at 1/17/2020 0900  Gross per 24 hour   Intake 580 ml   Output 550 ml   Net 30 ml       Laboratory                        Imaging  No orders to display        Assessment/Plan  * Requires supervision due to deficit in self-care  Assessment & Plan  Awaiting for guardianship,  is talking with family from out of state    Agitation  Assessment & Plan  No haldol needed since 1/7  Continue seroquel at night    Chronic atrial fibrillation  Assessment & Plan  Rate controlled  Xarelto for anticoagulation    Severe protein-calorie malnutrition (HCC)  Assessment & Plan   continue with boost with meals      Hyperlipidemia- (present on admission)  Assessment & Plan  Resume home dose of atorvastatin    COPD (chronic obstructive pulmonary disease) ([facility])- (present on admission)  Assessment & Plan  Not in acute exacerbation  Continue RT protocol    CKD (chronic kidney disease) stage 3, GFR 30-59 ml/min ([facility])- (present on admission)  Assessment & Plan  Renal function at baseline      Essential hypertension- (present on admission)  Assessment & Plan  Resume Norvasc and losartan.           VTE prophylaxis: xarelto

## 2020-01-17 NOTE — CARE PLAN
Problem: Communication  Goal: The ability to communicate needs accurately and effectively will improve  Outcome: PROGRESSING AS EXPECTED  Intervention: Reorient patient to environment as needed  Flowsheets (Taken 1/17/2020 9682)  Oriented to:: Call Light & Bedside Controls; Unit Routine  Note:   Pt reoriented to date and location. Pt encouraged to use call light for needs.     Problem: Safety  Goal: Will remain free from falls  Outcome: PROGRESSING AS EXPECTED  Note:   Bed alarm on, bed at lowest position, side rails up, non-slip footwear on. Pt encouraged to use call light before getting out of bed.     Problem: Venous Thromboembolism (VTW)/Deep Vein Thrombosis (DVT) Prevention:  Goal: Patient will participate in Venous Thrombosis (VTE)/Deep Vein Thrombosis (DVT)Prevention Measures  Outcome: PROGRESSING AS EXPECTED  Intervention: Ensure patient wears graduated elastic stockings (JULIANNA hose) and/or SCDs, if ordered, when in bed or chair (Remove at least once per shift for skin check)  Note:   Pt has been occasionally refusing SCD's. Pt SCD's reapplied during morning assessment. Pt education provided. Will continue to monitor.

## 2020-01-17 NOTE — CARE PLAN
Problem: Nutritional:  Goal: Achieve adequate nutritional intake  Description  Patient will consume 50% of meals and supplements   Outcome: PROGRESSING SLOWER THAN EXPECTED

## 2020-01-17 NOTE — PROGRESS NOTES
Report received from BRENTON Cantu.     Pt resting comfortably in bed w/o signs of distress or discomfort. Communication board updated. Call light and personal belongings w/n reach. Bed alarm on. No other needs at this time.

## 2020-01-17 NOTE — DIETARY
Nutrition Services: Update   Day 0 of admit.  Eulogio Jeronimo is a 79 y.o. male with admitting DX of Requires supervision due to deficit in self-care    Pt is currently on regular, dysphagia 3 (mech soft) diet. PO intake of meals is poor at < 25% (5 out of 7 recorded meals) since 1/14/20. Recorded PO intake of Boost Plus is good at % of most supplements and > 50% of all since 12/14/20. Current order for Boost Plus is providing 1080 kcals and 42 gm protein. Estimated needs are ~ 1300 kcals and ~60 gm of protein. Pt may benefit from addition of Boost VHC with dinner. Boost Plus BID and Boost VHC with dinner will provide 1250 kcals and 50 gm of protein. This will more closely meet his estimated needs and make up for poor PO intake of his meals.     No new weight obtained. Current BMI indicates pt is underweight. This weight was taken on bed that was not zeroed out. RD has requested a new weight in the past.     Last BM noted on 1/17/20 (small brown) with incontinence of bowel noted. UOP is yellow. No report of skin breakdown.    Meds: Marinol    Malnutrition Risk: (from RD noted on 12/28/19) Pt with severe malnutrition related to multiple comorbidities evident by severe fat and severe muscle loss noted on nutrition based physical exam.    Recommendations/Plan:  1. Provide diet as ordered.  2. Modify supplement order to Boost Plus BID and Boost VHC with dinner.    3. Encourage intake of meals  4. Document intake of all meals as % taken in ADL's to provide interdisciplinary communication across all shifts.   5. Monitor weight.  6. Nutrition rep will continue to see patient for ongoing meal and snack preferences.    RD following

## 2020-01-18 NOTE — PROGRESS NOTES
Patient alert and oriented x self and time. Patient vss, denies pain. Patient encouraged to turn in bed and allow staff to place heels on pillow as heels are red and blanching. Patient allows skin interventions at times. Patient allowed medication to be taken later in shift once more awake. Pending placement and guardianship for discharge.

## 2020-01-18 NOTE — PROGRESS NOTES
Hospital Medicine Daily Progress Note    Date of Service  1/18/2020    Chief Complaint  79 y.o. male admitted 12/27/2019 with inability to care for himself    Hospital Course    This is an 79-year-old male who was apparently living with a roommate/caregiver who was also ill.  Home health found the patient with roommate dead. The patient is unable to care for himself due to cognitive deficits and guardianship and placement is pending.      Interval Problem Update  The patient is in good spirits and slept well last night    Consultants/Specialty  none    Code Status  DNR    Disposition  tbd    Review of Systems  Review of Systems   Constitutional: Negative for diaphoresis and fever.   HENT: Negative for sore throat.    Respiratory: Negative for shortness of breath.    Cardiovascular: Negative for chest pain and leg swelling.   Gastrointestinal: Negative for diarrhea, nausea and vomiting.   Genitourinary: Negative for dysuria and urgency.   Skin: Negative for itching and rash.   Neurological: Negative for headaches.        Physical Exam  Temp:  [36.4 °C (97.5 °F)-36.8 °C (98.2 °F)] 36.4 °C (97.5 °F)  Pulse:  [] 69  Resp:  [18] 18  BP: (119-161)/(66-91) 161/91  SpO2:  [93 %-97 %] 94 %    Physical Exam  Vitals signs and nursing note reviewed.   Constitutional:       Appearance: He is not ill-appearing.   HENT:      Nose: Nose normal.      Mouth/Throat:      Mouth: Mucous membranes are moist.   Eyes:      General: No scleral icterus.     Conjunctiva/sclera: Conjunctivae normal.   Cardiovascular:      Rate and Rhythm: Normal rate and regular rhythm.   Pulmonary:      Effort: Pulmonary effort is normal. No respiratory distress.      Breath sounds: Normal breath sounds.   Abdominal:      General: There is no distension.   Musculoskeletal:         General: No swelling.   Neurological:      General: No focal deficit present.      Mental Status: He is alert.   Psychiatric:         Behavior: Behavior normal.          Cognition and Memory: Cognition is impaired.         Fluids    Intake/Output Summary (Last 24 hours) at 1/18/2020 1056  Last data filed at 1/18/2020 0600  Gross per 24 hour   Intake 360 ml   Output 250 ml   Net 110 ml       Laboratory                        Imaging  No orders to display        Assessment/Plan  * Requires supervision due to deficit in self-care  Assessment & Plan  Awaiting for guardianship,  is talking with family from out of state    Agitation  Assessment & Plan  No haldol needed since 1/7  Continue seroquel at night, patient is awake and alert in the day    Chronic atrial fibrillation  Assessment & Plan  Rate controlled  Xarelto for anticoagulation    Severe protein-calorie malnutrition (HCC)  Assessment & Plan   continue with boost and vitamin D for deficiency      Hyperlipidemia- (present on admission)  Assessment & Plan  Resume home dose of atorvastatin    COPD (chronic obstructive pulmonary disease) ([facility])- (present on admission)  Assessment & Plan  Not in acute exacerbation  Continue RT protocol    CKD (chronic kidney disease) stage 3, GFR 30-59 ml/min ([facility])- (present on admission)  Assessment & Plan  Renal function at baseline      Essential hypertension- (present on admission)  Assessment & Plan  Resume Norvasc and losartan.  Monitor on flomax for enlarged prostate         VTE prophylaxis: xarelto

## 2020-01-18 NOTE — CARE PLAN
Problem: Safety  Goal: Will remain free from injury  Outcome: PROGRESSING AS EXPECTED  Note:   Patient with non skid socks in place, bed alarm on, patient calls for assistance as needed.   Goal: Will remain free from falls  Outcome: PROGRESSING AS EXPECTED     Problem: Venous Thromboembolism (VTW)/Deep Vein Thrombosis (DVT) Prevention:  Goal: Patient will participate in Venous Thrombosis (VTE)/Deep Vein Thrombosis (DVT)Prevention Measures  Outcome: PROGRESSING AS EXPECTED  Flowsheets (Taken 1/17/2020 2200)  Pharmacologic Prophylaxis Used: Rivaroxaban (Xarelto) - (ONLY for Total Knee and Total Hip Surgery)     Problem: Skin Integrity  Goal: Risk for impaired skin integrity will decrease  Outcome: PROGRESSING AS EXPECTED  Note:   Patient heels red and blanching as well as coccyx, educated patient on skin integrity and patient refuses turns at times despite education. Patient heels floated and encouraged to turn as patient allows.

## 2020-01-19 NOTE — PROGRESS NOTES
Hospital Medicine Daily Progress Note    Date of Service  1/19/2020    Chief Complaint  79 y.o. male admitted 12/27/2019 with inability to care for himself    Hospital Course    This is an 79-year-old male who was apparently living with a roommate/caregiver who was also ill.  Home health found the patient with roommate dead. The patient is unable to care for himself due to cognitive deficits and guardianship and placement is pending.      Interval Problem Update  The patient is in good spirits and eating all his supplements    Consultants/Specialty  none    Code Status  DNR    Disposition  tbd    Review of Systems  Review of Systems   Constitutional: Negative for fever.   HENT: Negative for sore throat.    Respiratory: Negative for shortness of breath.    Cardiovascular: Negative for chest pain and palpitations.   Gastrointestinal: Negative for diarrhea, heartburn, nausea and vomiting.   Genitourinary: Negative for dysuria.   Skin: Negative for itching.   Neurological: Negative for headaches.        Physical Exam  Temp:  [36.2 °C (97.2 °F)-36.6 °C (97.9 °F)] 36.3 °C (97.3 °F)  Pulse:  [] 105  Resp:  [18] 18  BP: (134-161)/(75-94) 134/79  SpO2:  [90 %-98 %] 90 %    Physical Exam  Vitals signs and nursing note reviewed.   Constitutional:       Appearance: He is not ill-appearing.   HENT:      Nose: No congestion or rhinorrhea.      Mouth/Throat:      Mouth: Mucous membranes are moist.      Pharynx: No oropharyngeal exudate or posterior oropharyngeal erythema.   Eyes:      General: No scleral icterus.     Conjunctiva/sclera: Conjunctivae normal.   Cardiovascular:      Rate and Rhythm: Normal rate and regular rhythm.   Pulmonary:      Effort: Pulmonary effort is normal. No respiratory distress.      Breath sounds: No stridor.   Abdominal:      General: There is no distension.   Musculoskeletal:         General: No swelling.   Skin:     General: Skin is dry.      Coloration: Skin is not jaundiced or pale.       Findings: No erythema.   Neurological:      General: No focal deficit present.      Mental Status: He is alert.   Psychiatric:         Mood and Affect: Mood normal.         Behavior: Behavior normal.         Cognition and Memory: Cognition is impaired.         Fluids    Intake/Output Summary (Last 24 hours) at 1/19/2020 1315  Last data filed at 1/19/2020 0500  Gross per 24 hour   Intake 240 ml   Output 450 ml   Net -210 ml       Laboratory                        Imaging  No orders to display        Assessment/Plan  * Requires supervision due to deficit in self-care  Assessment & Plan  Awaiting for guardianship,  is talking with family from out of state    Agitation  Assessment & Plan  No haldol needed since 1/7  Continue seroquel at night, well tolerated    Chronic atrial fibrillation  Assessment & Plan  Rate controlled  Xarelto for anticoagulation    Severe protein-calorie malnutrition (HCC)  Assessment & Plan   continue with boost and vitamin D for deficiency      Hyperlipidemia- (present on admission)  Assessment & Plan  Resume home dose of atorvastatin    COPD (chronic obstructive pulmonary disease) ([facility])- (present on admission)  Assessment & Plan  Continue RT protocol    CKD (chronic kidney disease) stage 3, GFR 30-59 ml/min ([facility])- (present on admission)  Assessment & Plan  Renal function at baseline      Essential hypertension- (present on admission)  Assessment & Plan  Resume Norvasc and losartan.  Monitor on flomax as well         VTE prophylaxis: xarelto

## 2020-01-19 NOTE — PROGRESS NOTES
Patient alert x2, vss, denies pain. Agitated at one point this evening and attempted to get out of bed without assistance. Patient educated on fall risk however is not receptive to teaching. Patient with bed alarm in place. Patient awaiting placement at this time. Will monitor.

## 2020-01-19 NOTE — CARE PLAN
Problem: Safety  Goal: Will remain free from injury  Outcome: PROGRESSING AS EXPECTED  Note:   Patient with bed alarm on as patient is impulsive at times. Patient with non skid socks in place and educated on fall safety.      Problem: Skin Integrity  Goal: Risk for impaired skin integrity will decrease  Outcome: PROGRESSING AS EXPECTED  Note:   Patient with skin intact at this time and educated on pressure ulcer prevention however despite education patient refuses turns at times. Patient able to turn self however does need encouragement.

## 2020-01-20 NOTE — CARE PLAN
Problem: Venous Thromboembolism (VTW)/Deep Vein Thrombosis (DVT) Prevention:  Goal: Patient will participate in Venous Thrombosis (VTE)/Deep Vein Thrombosis (DVT)Prevention Measures  Outcome: PROGRESSING AS EXPECTED  Flowsheets (Taken 1/20/2020 0230)  Mechanical Prophylaxis : SCDs, Sequential Compression Device  AV Foot Pumps: Refused     Problem: Bowel/Gastric:  Goal: Normal bowel function is maintained or improved  Outcome: PROGRESSING AS EXPECTED  Note:   Patient with two large BM on 1/19/20. Will hold stool softeners this morning to prevent BM turning to loose stool.

## 2020-01-20 NOTE — PROGRESS NOTES
Patient alert x1-2,vss, denies pain. Patient agitated at times. Refuses turns at times. mepilex placed on coccyx for prevention as patient very thin and does not like to change positions. Heels red and blanching. Plan for placement needs for discharge. Will monitor.

## 2020-01-20 NOTE — CARE PLAN
Problem: Knowledge Deficit  Goal: Knowledge of disease process/condition, treatment plan, diagnostic tests, and medications will improve  Outcome: PROGRESSING AS EXPECTED     Problem: Skin Integrity  Goal: Risk for impaired skin integrity will decrease  Outcome: PROGRESSING AS EXPECTED     Problem: Psychosocial Needs:  Goal: Level of anxiety will decrease  Outcome: PROGRESSING AS EXPECTED     Problem: Mobility  Goal: Risk for activity intolerance will decrease  Outcome: PROGRESSING AS EXPECTED

## 2020-01-20 NOTE — PROGRESS NOTES
Hospital Medicine Daily Progress Note    Date of Service  1/20/2020    Chief Complaint  79 y.o. male admitted 12/27/2019 with inability to care for himself    Hospital Course    This is an 79-year-old male who was apparently living with a roommate/caregiver who was also ill.  Home health found the patient with roommate dead. The patient is unable to care for himself due to cognitive deficits and guardianship and placement is pending.      Interval Problem Update  The patient is drinking all his boost, he does not care to be turned for skin protective measures    Consultants/Specialty  none    Code Status  DNR    Disposition  tbd    Review of Systems  Review of Systems   Constitutional: Negative for fever and malaise/fatigue.   HENT: Negative for congestion, sinus pain and sore throat.    Respiratory: Negative for cough, shortness of breath and wheezing.    Cardiovascular: Negative for chest pain and leg swelling.   Gastrointestinal: Negative for abdominal pain, diarrhea, heartburn and vomiting.   Genitourinary: Negative for dysuria, frequency and urgency.   Neurological: Positive for weakness. Negative for speech change and headaches.        Physical Exam  Temp:  [36.2 °C (97.2 °F)-36.8 °C (98.2 °F)] 36.8 °C (98.2 °F)  Pulse:  [70-87] 87  Resp:  [18] 18  BP: (124-157)/(68-88) 124/68  SpO2:  [92 %-97 %] 92 %    Physical Exam  Vitals signs and nursing note reviewed.   Constitutional:       Appearance: He is cachectic. He is not ill-appearing.   HENT:      Nose: No rhinorrhea.      Mouth/Throat:      Pharynx: Oropharynx is clear. No oropharyngeal exudate or posterior oropharyngeal erythema.   Eyes:      General: No scleral icterus.     Conjunctiva/sclera: Conjunctivae normal.   Cardiovascular:      Rate and Rhythm: Normal rate and regular rhythm.   Pulmonary:      Effort: Pulmonary effort is normal. No respiratory distress.      Breath sounds: No stridor.   Abdominal:      General: There is no distension.       Palpations: There is no mass.      Tenderness: There is no tenderness.   Musculoskeletal:         General: No swelling.   Skin:     General: Skin is dry.      Coloration: Skin is not jaundiced or pale.      Findings: No erythema.   Neurological:      General: No focal deficit present.      Mental Status: He is alert.      Motor: Weakness present.   Psychiatric:         Mood and Affect: Mood normal.         Behavior: Behavior normal.         Cognition and Memory: Cognition is impaired.         Fluids    Intake/Output Summary (Last 24 hours) at 1/20/2020 1143  Last data filed at 1/20/2020 0600  Gross per 24 hour   Intake 120 ml   Output 650 ml   Net -530 ml       Laboratory                        Imaging  No orders to display        Assessment/Plan  * Requires supervision due to deficit in self-care  Assessment & Plan  Awaiting for guardianship,  is talking with family from out of state    Agitation  Assessment & Plan  No haldol needed since 1/7  Continue seroquel at night  Patient is pleasant and cooperative    Chronic atrial fibrillation  Assessment & Plan  Rate controlled  Xarelto for anticoagulation    Severe protein-calorie malnutrition (HCC)  Assessment & Plan   Continue boost, patient drinks these well      Hyperlipidemia- (present on admission)  Assessment & Plan  Resume home dose of atorvastatin    COPD (chronic obstructive pulmonary disease) ([facility])- (present on admission)  Assessment & Plan  Continue RT protocol    CKD (chronic kidney disease) stage 3, GFR 30-59 ml/min ([facility])- (present on admission)  Assessment & Plan  Renal function at baseline      Essential hypertension- (present on admission)  Assessment & Plan  Resume Norvasc and losartan.  Monitor on flomax for urinary retention         VTE prophylaxis: xarelto

## 2020-01-21 NOTE — CARE PLAN
Problem: Communication  Goal: The ability to communicate needs accurately and effectively will improve  Outcome: PROGRESSING AS EXPECTED  Patient encouraged to communicate feelings and thoughts regarding his possible discharge plan.     Problem: Safety  Goal: Will remain free from injury  Outcome: PROGRESSING AS EXPECTED     Problem: Skin Integrity  Goal: Risk for impaired skin integrity will decrease  Outcome: PROGRESSING AS EXPECTED  Patient turning every 2 hours. Mepilex in place to coccyx area.     Problem: Mobility  Goal: Risk for activity intolerance will decrease  Outcome: PROGRESSING AS EXPECTED  Patient continues to work with PT/OT.

## 2020-01-21 NOTE — CARE PLAN
Problem: Communication  Goal: The ability to communicate needs accurately and effectively will improve  Outcome: PROGRESSING AS EXPECTED     Problem: Safety  Goal: Will remain free from injury  Outcome: PROGRESSING AS EXPECTED     Problem: Knowledge Deficit  Goal: Knowledge of disease process/condition, treatment plan, diagnostic tests, and medications will improve  Outcome: PROGRESSING AS EXPECTED     Problem: Skin Integrity  Goal: Risk for impaired skin integrity will decrease  1/20/2020 1745 by Kayli Jefferson R.N.  Outcome: PROGRESSING AS EXPECTED  1/20/2020 1240 by Kayli Jefferson R.N.  Outcome: PROGRESSING AS EXPECTED     Problem: Psychosocial Needs:  Goal: Level of anxiety will decrease  Outcome: PROGRESSING AS EXPECTED     Problem: Mobility  Goal: Risk for activity intolerance will decrease  Outcome: PROGRESSING AS EXPECTED

## 2020-01-21 NOTE — DIETARY
Nutrition Services: Update   Day 24 of admit.  Eulogio Jeronimo is a 79 y.o. male with admitting DX of Requires supervision due to deficit in self-care    Pt is currently on Regular, Dysphagia 3 diet. Providing Boost Plus BID and Boost VHS with dinner.  Recorded PO intake usually <50%, but pt does drink Boost.  Observed pt ate little at Breakfast today, but open Boost at bedside.  Pt states he does like the Boost.  If pt drinks all Boost, this will provide 1250 kcal and 50 gm protein.  If most recent weight of 48.8 kg is correct, this will provide 25.6 kcal/kg and 1 gm protein/kg.  PO intake may actually be close to adequate.  No new weight since 1/11: 48.8 kg.  Discussed with nursing - will obtain new weight when able to get pt out of bed and re-zero bed scale.    Malnutrition Risk: (12/28/19 RD assessment): Pt with severe malnutrition related to multiple comorbidities evident by severe fat and severe muscle loss noted on nutrition based physical exam.    Recommendations/Plan:  1. Continue Boost Plus with Breakfast and Lunch, Boost VHC with Dinner.   2. Encourage intake of meals and supplements.  3. Document intake of all meals and supplements as % taken in ADL's to provide interdisciplinary communication across all shifts.   4. Monitor weight.  5. Nutrition rep will continue to see patient for ongoing meal and snack preferences.    RD following

## 2020-01-21 NOTE — PROGRESS NOTES
Patient welcomed to unit. 2RN skin assessment completed. Redness, swelling RLE. All bony prominences intact. No other skin concerns

## 2020-01-21 NOTE — DISCHARGE PLANNING
Spoke to pts brother about DC planning. Brother has medical and financial POA but is unable to access pts finances. Brother informed that patient is going to need 24/7 care that is not covered by medicare and patient is not eligible for Medicaid. RN CM asked brother if he would be able to fly to [place] to access patients finances and house. Brother stated he likely couldn't come to [place]. Brother informed another option is guardianship. Brother informed about the guardianship process. Brother stated he would like to think about it. BRENTON ROMERO will follow up with brother tomorrow.

## 2020-01-21 NOTE — CARE PLAN
Problem: Nutritional:  Goal: Achieve adequate nutritional intake  Description  Patient will consume 50% of meals and supplements   Outcome: PROGRESSING AS EXPECTED   See RD note.

## 2020-01-21 NOTE — CARE PLAN
Problem: Communication  Goal: The ability to communicate needs accurately and effectively will improve  Outcome: PROGRESSING AS EXPECTED  Note:   Effectively communicates needs with staff      Problem: Pain Management  Goal: Pain level will decrease to patient's comfort goal  Outcome: PROGRESSING AS EXPECTED  Note:   Pain level within acceptable limits with use of PRN medications

## 2020-01-22 NOTE — CARE PLAN
Problem: Safety  Goal: Will remain free from falls  Outcome: PROGRESSING AS EXPECTED  Intervention: Implement fall precautions  Flowsheets (Taken 1/21/2020 2045)  Environmental Precautions: Treaded Slipper Socks on Patient;Personal Belongings, Wastebasket, Call [employer] etc. in Easy Reach;Transferred to Stronger Side;Report Given to Other Health Care Providers Regarding Fall Risk;Bed in Low Position;Communication Sign for Patients & Families;Mobility Assessed & Appropriate Sign Placed     Problem: Infection  Goal: Will remain free from infection  Outcome: PROGRESSING AS EXPECTED  Intervention: Implement standard precautions and perform hand washing before and after patient contact  Note:   No s/s of infection

## 2020-01-22 NOTE — PROGRESS NOTES
Hospital Medicine Daily Progress Note    Date of Service  1/21/2020    Chief Complaint  Dead roommate    Hospital Course    *This is an 79-year-old male who was apparently living with a roommate/caregiver who was also ill.  Home health found the patient with roommate dead.  Patient clearly unable to care for self and brought to the hospital*      Interval Problem Update  Patient's oral intake appears to be somewhat marginal in terms of solids but reportedly is drinking his boost, per RN he was oriented x4 earlier today.    Consultants/Specialty  None    Code Status  DNR    Disposition  Likely group home will probably require guardianship    Review of Systems  Review of Systems   Unable to perform ROS: Dementia        Physical Exam  Temp:  [36.4 °C (97.6 °F)-36.9 °C (98.4 °F)] 36.6 °C (97.8 °F)  Pulse:  [70-88] 71  Resp:  [18] 18  BP: (128-143)/(70-76) 128/75  SpO2:  [95 %-97 %] 96 %    Physical Exam  Vitals signs and nursing note reviewed.   Constitutional:       Comments: Remains disheveled   HENT:      Head: Normocephalic and atraumatic.      Right Ear: External ear normal.      Left Ear: External ear normal.      Nose: Nose normal.   Eyes:      General:         Right eye: No discharge.         Left eye: No discharge.      Extraocular Movements: Extraocular movements intact.      Pupils: Pupils are equal, round, and reactive to light.   Cardiovascular:      Rate and Rhythm: Normal rate. Rhythm irregular.   Pulmonary:      Effort: Pulmonary effort is normal.      Comments: Moderately diminished  Abdominal:      General: Abdomen is flat. Bowel sounds are normal. There is no distension.      Palpations: Abdomen is soft.      Tenderness: There is no tenderness.   Musculoskeletal:         General: No swelling or deformity.      Right lower leg: No edema.      Left lower leg: No edema.   Skin:     General: Skin is warm and dry.   Neurological:      General: No focal deficit present.      Mental Status: He is alert.       Comments: Probably at baseline   Psychiatric:      Comments: [place], flat         Fluids    Intake/Output Summary (Last 24 hours) at 1/21/2020 1603  Last data filed at 1/21/2020 0600  Gross per 24 hour   Intake 360 ml   Output 400 ml   Net -40 ml       Laboratory                        Imaging  No orders to display        Assessment/Plan  * Requires supervision due to deficit in self-care  Assessment & Plan  Awaiting for guardianship,  is talking with family from out of state    Agitation  Assessment & Plan  No haldol needed since 1/7  Continue seroquel at night      Chronic atrial fibrillation  Assessment & Plan  Rate controlled  Xarelto for anticoagulation    Severe protein-calorie malnutrition (HCC)  Assessment & Plan   Continue boost, patient drinks these well      Hyperlipidemia- (present on admission)  Assessment & Plan  Resume home dose of atorvastatin    COPD (chronic obstructive pulmonary disease) ([facility])- (present on admission)  Assessment & Plan  Continue RT protocol    CKD (chronic kidney disease) stage 3, GFR 30-59 ml/min ([facility])- (present on admission)  Assessment & Plan  Renal function at baseline      Essential hypertension- (present on admission)  Assessment & Plan  Controlled on Norvasc and losartan.           VTE prophylaxis: xarelto

## 2020-01-22 NOTE — PROGRESS NOTES
Pt resting in bed, wakes easily to voice, denies pain. POC discussed, pt denies further needs. Safety measures and hourly rounding in place.

## 2020-01-23 NOTE — PROGRESS NOTES
Hospital Medicine Daily Progress Note    Date of Service  1/22/2020    Chief Complaint  Dead roommate    Hospital Course    *This is an 79-year-old male who was apparently living with a roommate/caregiver who was also ill.  Home health found the patient with roommate dead.  Patient clearly unable to care for self and brought to the hospital*      Interval Problem Update  Patient's oral intake appears to be somewhat marginal in terms of solids but reportedly is drinking his boost, per RN he was oriented x4 earlier today.    Consultants/Specialty  None    Code Status  DNR    Disposition  Likely group home will probably require guardianship    Review of Systems  Review of Systems   Unable to perform ROS: Dementia        Physical Exam  Temp:  [36.4 °C (97.5 °F)-36.7 °C (98.1 °F)] 36.4 °C (97.6 °F)  Pulse:  [69-78] 76  Resp:  [18] 18  BP: (130-153)/(65-82) 130/65  SpO2:  [96 %-100 %] 100 %    Physical Exam  Vitals signs and nursing note reviewed.   Constitutional:       Comments: More disheveled, malodorous   HENT:      Head: Normocephalic and atraumatic.      Right Ear: External ear normal.      Left Ear: External ear normal.      Nose: Nose normal.   Eyes:      General:         Right eye: No discharge.         Left eye: No discharge.      Extraocular Movements: Extraocular movements intact.      Pupils: Pupils are equal, round, and reactive to light.   Cardiovascular:      Rate and Rhythm: Normal rate. Rhythm irregular.   Pulmonary:      Effort: Pulmonary effort is normal.      Comments: Moderately diminished  Abdominal:      General: Abdomen is flat. Bowel sounds are normal. There is no distension.      Palpations: Abdomen is soft.      Tenderness: There is no tenderness.   Musculoskeletal:         General: No swelling or deformity.      Right lower leg: No edema.      Left lower leg: No edema.   Skin:     General: Skin is warm and dry.   Neurological:      General: No focal deficit present.      Mental Status: He is  alert.      Comments: Probably at baseline   Psychiatric:      Comments: More conversational         Fluids    Intake/Output Summary (Last 24 hours) at 1/22/2020 1838  Last data filed at 1/22/2020 1800  Gross per 24 hour   Intake 760 ml   Output 0 ml   Net 760 ml       Laboratory                        Imaging  No orders to display        Assessment/Plan  * Requires supervision due to deficit in self-care  Assessment & Plan  Awaiting for guardianship,  is talking with family from out of state    Agitation  Assessment & Plan  No haldol needed since 1/7  Continue seroquel at night      Chronic atrial fibrillation  Assessment & Plan  Rate controlled  Xarelto for anticoagulation    Severe protein-calorie malnutrition (HCC)  Assessment & Plan   Continue boost, patient drinks these well      Hyperlipidemia- (present on admission)  Assessment & Plan  Resume home dose of atorvastatin    COPD (chronic obstructive pulmonary disease) ([facility])- (present on admission)  Assessment & Plan  Continue RT protocol    CKD (chronic kidney disease) stage 3, GFR 30-59 ml/min ([facility])- (present on admission)  Assessment & Plan  Renal function at baseline      Essential hypertension- (present on admission)  Assessment & Plan  Controlled on Norvasc and losartan.           VTE prophylaxis: xarelto

## 2020-01-23 NOTE — CARE PLAN
Problem: Mobility  Goal: Risk for activity intolerance will decrease  Outcome: PROGRESSING SLOWER THAN EXPECTED   Refused physical therapy. Stated he could not get to the chair and didn't want to try today.   Problem: Safety  Goal: Will remain free from injury  Outcome: PROGRESSING AS EXPECTED   Bed alarm used.     Problem: Skin Integrity  Goal: Risk for impaired skin integrity will decrease  Outcome: PROGRESSING AS EXPECTED   Waffle cushion, q2 turn, barrier cream for skin integrity promotion. Sacrum is pink/blanchable/intact.

## 2020-01-23 NOTE — PROGRESS NOTES
Patient resting in bed, decreased appetite, denies pain, pressure points blanching.  Refusing bathing and ambulation

## 2020-01-23 NOTE — PROGRESS NOTES
Received report from day shift nurse.   Assumed pt care  Pt is A&Ox4, sleeping comfortably in bed.   Pt on r.a.. No signs of SOB/respiratory distress.   Labs noted, VSS.   Will returns for night time meds and assessment.   Fall precautions in place.   Bed at lowest position.   Call light and personal belongings within reach.   Continue to monitor

## 2020-01-24 NOTE — CARE PLAN
Problem: Communication  Goal: The ability to communicate needs accurately and effectively will improve  Outcome: PROGRESSING AS EXPECTED     Problem: Safety  Goal: Will remain free from injury  Outcome: PROGRESSING AS EXPECTED  Note:   Bed in low and locked position.  Side rails up X2.  Bed alarm remains on.  Hourly rounding continues.     Problem: Mobility  Goal: Risk for activity intolerance will decrease  Outcome: PROGRESSING AS EXPECTED  Note:   Patient encouraged to get out of bed and ambulate with assistance but refusing.

## 2020-01-24 NOTE — PROGRESS NOTES
Hospital Medicine Daily Progress Note    Date of Service  1/23/2020    Chief Complaint  Dead roommate    Hospital Course    *This is an 79-year-old male who was apparently living with a roommate/caregiver who was also ill.  Home health found the patient with roommate dead.  Patient clearly unable to care for self and brought to the hospital*      Interval Problem Update  Alert, more irritable today    Consultants/Specialty  None    Code Status  DNR    Disposition  Likely group home will probably require guardianship    Review of Systems  Review of Systems   Unable to perform ROS: Dementia        Physical Exam  Temp:  [36.4 °C (97.5 °F)-36.4 °C (97.6 °F)] 36.4 °C (97.5 °F)  Pulse:  [70-76] 70  Resp:  [18] 18  BP: (129-138)/(65-70) 138/69  SpO2:  [95 %-100 %] 95 %    Physical Exam  Vitals signs and nursing note reviewed.   Constitutional:       Comments: He is less disheveled but still has some odor I think that he refused to shower and has been given limited grooming.   HENT:      Head: Normocephalic and atraumatic.      Right Ear: External ear normal.      Left Ear: External ear normal.      Nose: Nose normal.   Eyes:      General:         Right eye: No discharge.         Left eye: No discharge.      Extraocular Movements: Extraocular movements intact.      Pupils: Pupils are equal, round, and reactive to light.   Cardiovascular:      Rate and Rhythm: Normal rate. Rhythm irregular.   Pulmonary:      Effort: Pulmonary effort is normal.      Comments: Moderately diminished  Abdominal:      General: Abdomen is flat. Bowel sounds are normal. There is no distension.      Palpations: Abdomen is soft.      Tenderness: There is no tenderness.   Musculoskeletal:         General: No swelling or deformity.      Right lower leg: No edema.      Left lower leg: No edema.   Skin:     General: Skin is warm and dry.   Neurological:      General: No focal deficit present.      Mental Status: He is alert.      Comments: Probably at  baseline   Psychiatric:      Comments: More irritable today when questions asked           Fluids    Intake/Output Summary (Last 24 hours) at 1/23/2020 1641  Last data filed at 1/23/2020 1200  Gross per 24 hour   Intake 460 ml   Output 800 ml   Net -340 ml       Laboratory                        Imaging  No orders to display        Assessment/Plan  * Requires supervision due to deficit in self-care  Assessment & Plan  Awaiting for guardianship,  is talking with family from out of state    Agitation  Assessment & Plan  No haldol needed since 1/7  Continue seroquel at night      Chronic atrial fibrillation  Assessment & Plan  Rate controlled  Xarelto for anticoagulation    Severe protein-calorie malnutrition (HCC)  Assessment & Plan   Continue boost, patient drinks these well      Hyperlipidemia- (present on admission)  Assessment & Plan  Resume home dose of atorvastatin    COPD (chronic obstructive pulmonary disease) ([facility])- (present on admission)  Assessment & Plan  Continue RT protocol    CKD (chronic kidney disease) stage 3, GFR 30-59 ml/min ([facility])- (present on admission)  Assessment & Plan  Renal function at baseline      Essential hypertension- (present on admission)  Assessment & Plan  Controlled on Norvasc and losartan.           VTE prophylaxis: xarelto

## 2020-01-24 NOTE — CARE PLAN
Problem: Nutritional:  Goal: Achieve adequate nutritional intake  Description  Patient will consume 50% of meals and supplements   Outcome: PROGRESSING SLOWER THAN EXPECTED   PO intake variable: 1/23 pt ate <25% x 2 meals.  1/22 PO intake 50-75% x 3 meals.  Pt states he does drink some Boost.  Requested new weight.  Continue to encourage PO intake.

## 2020-01-24 NOTE — PROGRESS NOTES
Patient slept most of the night and denies pain.  Lidocaine patch returned to med select per patient refusal.  Patient polite and cooperative throughout the shift.

## 2020-01-24 NOTE — PROGRESS NOTES
Assumed care of patient at 1900.  Patient is resting in bed and appears to be sleeping and in no distress.  Hourly rounding continues.

## 2020-01-24 NOTE — PROGRESS NOTES
Hospital Medicine Daily Progress Note    Date of Service  1/24/2020    Chief Complaint  Dead roommate    Hospital Course    *This is an 79-year-old male who was apparently living with a roommate/caregiver who was also ill.  Home health found the patient with roommate dead.  Patient clearly unable to care for self and brought to the hospital*      Interval Problem Update  More pleasant today able to have a conversation with him.  He still eating minimal amounts of food but does not appear to be losing weight here in the hospital    Consultants/Specialty  None    Code Status  DNR    Disposition  Likely group home will probably require guardianship, social work notes reviewed.    Review of Systems  Review of Systems   Unable to perform ROS: Dementia        Physical Exam  Temp:  [36.4 °C (97.5 °F)-36.9 °C (98.4 °F)] 36.4 °C (97.5 °F)  Pulse:  [70-75] 75  Resp:  [18] 18  BP: (122-140)/(68-83) 122/68  SpO2:  [92 %-97 %] 97 %    Physical Exam  Vitals signs and nursing note reviewed.   HENT:      Head: Normocephalic and atraumatic.      Right Ear: External ear normal.      Left Ear: External ear normal.      Nose: Nose normal.   Eyes:      General:         Right eye: No discharge.         Left eye: No discharge.      Extraocular Movements: Extraocular movements intact.      Pupils: Pupils are equal, round, and reactive to light.   Cardiovascular:      Rate and Rhythm: Normal rate. Rhythm irregular.   Pulmonary:      Effort: Pulmonary effort is normal.      Comments: Moderately diminished  Abdominal:      General: Abdomen is flat. Bowel sounds are normal. There is no distension.      Palpations: Abdomen is soft.      Tenderness: There is no tenderness.   Musculoskeletal:         General: No swelling or deformity.      Right lower leg: No edema.      Left lower leg: No edema.   Skin:     General: Skin is warm and dry.   Neurological:      General: No focal deficit present.      Mental Status: He is alert.      Comments:  Probably at baseline   Psychiatric:         Mood and Affect: Mood normal.      Comments:            Fluids    Intake/Output Summary (Last 24 hours) at 1/24/2020 1437  Last data filed at 1/24/2020 0530  Gross per 24 hour   Intake 480 ml   Output 1075 ml   Net -595 ml       Laboratory                        Imaging  No orders to display        Assessment/Plan  * Requires supervision due to deficit in self-care  Assessment & Plan  Awaiting for guardianship,  is talking with family from out of state    Agitation  Assessment & Plan  No haldol needed since 1/7  Continue seroquel at night      Chronic atrial fibrillation  Assessment & Plan  Rate controlled  Xarelto for anticoagulation    Severe protein-calorie malnutrition (HCC)  Assessment & Plan   Continue boost, patient drinks these well      Hyperlipidemia- (present on admission)  Assessment & Plan  Resume home dose of atorvastatin    COPD (chronic obstructive pulmonary disease) ([facility])- (present on admission)  Assessment & Plan  Continue RT protocol    CKD (chronic kidney disease) stage 3, GFR 30-59 ml/min ([facility])- (present on admission)  Assessment & Plan  Renal function at baseline      Essential hypertension- (present on admission)  Assessment & Plan  Controlled on Norvasc and losartan.           VTE prophylaxis: xarelto

## 2020-01-24 NOTE — DISCHARGE PLANNING
KATHE called Memo to follow up conversation NICK Moran had with him earlier in the week.  Per Memo he is still really not sure what to do at this point.  He shared that he had never discussed finances with his brother and is not aware how much money his brother may have or how much money his brother makes monthly.  KATHE discussed the guardianship or rep-payee options with Memo and he was open to getting a re-payee to assist.  KATHE discussed local re-payee's and it was decided that this KATHE would contact [facility] for Memo and then ask that they contact him.       [facility] (# [phone]) referral form completed and submitted per Memo's request. KATHE called and confirmed that they did receive this KATHE referral however that they close at noon so they will be responding to the referral on e-mail on Monday.

## 2020-01-25 NOTE — CARE PLAN
Problem: Urinary Elimination:  Goal: Ability to reestablish a normal urinary elimination pattern will improve  Outcome: PROGRESSING AS EXPECTED  Flowsheets (Taken 1/24/2020 2056)  Urinary Elimination: Incontinence     Problem: Pain Management  Goal: Pain level will decrease to patient's comfort goal  Outcome: PROGRESSING AS EXPECTED  Note:   Patient denies pain.

## 2020-01-25 NOTE — PROGRESS NOTES
Received report from Alondra FARRIS. Assumed care. This pt is AOx4, denies pain, Patient and RN discussed plan of care including ambulation, linen changes, home medications: questions answered. Chart reviewed. Call light in place, fall precautions in place, patient educated on importance of calling for assistance. No additional needs at this time.

## 2020-01-25 NOTE — PROGRESS NOTES
Assumed care of patient at 1900.  Patient is sleeping quietly in bed and appears to be in no distress.  Side rails are up X2 and bed alarm remains on.  Hourly rounding continues.

## 2020-01-25 NOTE — PROGRESS NOTES
Hospital Medicine Daily Progress Note    Date of Service  1/25/2020    Chief Complaint  Dead roommate    Hospital Course    *This is an 79-year-old male who was apparently living with a roommate/caregiver who was also ill.  Home health found the patient with roommate dead.  Patient clearly unable to care for self and brought to the hospital*      Interval Problem Update  Remains pleasant and conversational but continues to refuse to shower  No interval events    Consultants/Specialty  None    Code Status  DNR    Disposition  Likely group home will probably require guardianship, social work notes reviewed.    Review of Systems  Review of Systems   Unable to perform ROS: Dementia        Physical Exam  Temp:  [36.2 °C (97.2 °F)-36.7 °C (98 °F)] 36.2 °C (97.2 °F)  Pulse:  [64-77] 64  Resp:  [18] 18  BP: (115-145)/(66-83) 131/78  SpO2:  [95 %-97 %] 95 %    Physical Exam  Vitals signs and nursing note reviewed.   Constitutional:       Comments: Pleasantly disheveled  He is thin but does not appear to be losing weight   HENT:      Head: Normocephalic and atraumatic.      Right Ear: External ear normal.      Left Ear: External ear normal.      Nose: Nose normal.   Eyes:      General:         Right eye: No discharge.         Left eye: No discharge.      Extraocular Movements: Extraocular movements intact.      Pupils: Pupils are equal, round, and reactive to light.   Cardiovascular:      Rate and Rhythm: Normal rate. Rhythm irregular.   Pulmonary:      Effort: Pulmonary effort is normal.      Comments: Moderately diminished  Abdominal:      General: Abdomen is flat. Bowel sounds are normal. There is no distension.      Palpations: Abdomen is soft.      Tenderness: There is no tenderness.   Musculoskeletal:         General: No swelling or deformity.      Right lower leg: No edema.      Left lower leg: No edema.   Skin:     General: Skin is warm and dry.   Neurological:      General: No focal deficit present.      Mental  Status: He is alert.      Comments: Probably at baseline   Psychiatric:         Mood and Affect: Mood normal.      Comments:            Fluids    Intake/Output Summary (Last 24 hours) at 1/25/2020 1407  Last data filed at 1/25/2020 0518  Gross per 24 hour   Intake 600 ml   Output 650 ml   Net -50 ml       Laboratory                        Imaging  No orders to display        Assessment/Plan  * Requires supervision due to deficit in self-care  Assessment & Plan  Awaiting for guardianship,  is talking with family from out of state    Agitation  Assessment & Plan  No haldol needed since 1/7  Continue seroquel at night      Chronic atrial fibrillation  Assessment & Plan  Rate controlled  Xarelto for anticoagulation    Severe protein-calorie malnutrition (HCC)  Assessment & Plan   Continue boost, patient drinks these well      Hyperlipidemia- (present on admission)  Assessment & Plan  Resume home dose of atorvastatin    COPD (chronic obstructive pulmonary disease) ([facility])- (present on admission)  Assessment & Plan  Continue RT protocol    CKD (chronic kidney disease) stage 3, GFR 30-59 ml/min ([facility])- (present on admission)  Assessment & Plan  Renal function at baseline      Essential hypertension- (present on admission)  Assessment & Plan  Controlled on Norvasc and losartan.           VTE prophylaxis: xarelto

## 2020-01-26 NOTE — CARE PLAN
Problem: Safety  Goal: Will remain free from injury  Outcome: PROGRESSING AS EXPECTED     Problem: Skin Integrity  Goal: Risk for impaired skin integrity will decrease  Outcome: PROGRESSING AS EXPECTED     Problem: Mobility  Goal: Risk for activity intolerance will decrease  Outcome: PROGRESSING AS EXPECTED

## 2020-01-27 NOTE — PROGRESS NOTES
Report received from night shift RN. Assume care. Pt. AAOx2 pt is bed,  Assessment completed. VSS. Denies pain, N/V/D.  Pt was update for the care for the day. White board updated, All question answered. Pt has call light within reach,  bed is in the lowest position. Pt has no other needs at this time. States will take a shower today.

## 2020-01-27 NOTE — CARE PLAN
Problem: Safety  Goal: Will remain free from falls  Outcome: PROGRESSING SLOWER THAN EXPECTED     Problem: Venous Thromboembolism (VTW)/Deep Vein Thrombosis (DVT) Prevention:  Goal: Patient will participate in Venous Thrombosis (VTE)/Deep Vein Thrombosis (DVT)Prevention Measures  Outcome: PROGRESSING SLOWER THAN EXPECTED     Problem: Bowel/Gastric:  Goal: Normal bowel function is maintained or improved  Outcome: PROGRESSING SLOWER THAN EXPECTED     Problem: Respiratory:  Goal: Respiratory status will improve  Outcome: PROGRESSING SLOWER THAN EXPECTED

## 2020-01-27 NOTE — CARE PLAN
Problem: Safety  Goal: Will remain free from injury  Outcome: PROGRESSING AS EXPECTED     Problem: Infection  Goal: Will remain free from infection  Outcome: PROGRESSING AS EXPECTED     Problem: Skin Integrity  Goal: Risk for impaired skin integrity will decrease  Outcome: PROGRESSING AS EXPECTED

## 2020-01-27 NOTE — PROGRESS NOTES
Hospital Medicine Daily Progress Note    Date of Service  1/27/2020    Chief Complaint  Dead roommate    Hospital Course    *This is an 79-year-old male who was apparently living with a roommate/caregiver who was also ill.  Home health found the patient with roommate dead.  Patient clearly unable to care for self and brought to the hospital*      Interval Problem Update  Remains pleasant -has finally agreed to shower, no other new events    Consultants/Specialty  None    Code Status  DNR    Disposition  Likely group home will probably require guardianship, social work notes reviewed.    Review of Systems  Review of Systems   Unable to perform ROS: Dementia        Physical Exam  Temp:  [36.4 °C (97.5 °F)-36.6 °C (97.8 °F)] 36.6 °C (97.8 °F)  Pulse:  [68-71] 70  Resp:  [16-18] 16  BP: (123-167)/(82-91) 148/87  SpO2:  [97 %-98 %] 98 %    Physical Exam  Vitals signs and nursing note reviewed.   Constitutional:       Comments: Pleasantly disheveled  He is thin but does not appear to be losing weight   HENT:      Head: Normocephalic and atraumatic.      Right Ear: External ear normal.      Left Ear: External ear normal.      Nose: Nose normal.   Eyes:      General:         Right eye: No discharge.         Left eye: No discharge.      Extraocular Movements: Extraocular movements intact.      Pupils: Pupils are equal, round, and reactive to light.   Cardiovascular:      Rate and Rhythm: Normal rate. Rhythm irregular.   Pulmonary:      Effort: Pulmonary effort is normal.      Comments: Moderately diminished  Abdominal:      General: Abdomen is flat. Bowel sounds are normal. There is no distension.      Palpations: Abdomen is soft.      Tenderness: There is no tenderness.   Musculoskeletal:         General: No swelling or deformity.      Right lower leg: No edema.      Left lower leg: No edema.   Skin:     General: Skin is warm and dry.   Neurological:      General: No focal deficit present.      Mental Status: He is alert.       Comments: Probably at baseline   Psychiatric:         Mood and Affect: Mood normal.      Comments:            Fluids    Intake/Output Summary (Last 24 hours) at 1/27/2020 1555  Last data filed at 1/27/2020 1200  Gross per 24 hour   Intake 300 ml   Output 200 ml   Net 100 ml       Laboratory                        Imaging  No orders to display        Assessment/Plan  * Requires supervision due to deficit in self-care  Assessment & Plan  Awaiting for guardianship,  is talking with family from out of state    Agitation  Assessment & Plan  No haldol needed since 1/7  Continue seroquel at night      Chronic atrial fibrillation  Assessment & Plan  Rate controlled  Xarelto for anticoagulation    Severe protein-calorie malnutrition (HCC)  Assessment & Plan   Continue boost, patient drinks these well      Hyperlipidemia- (present on admission)  Assessment & Plan  Resume home dose of atorvastatin    COPD (chronic obstructive pulmonary disease) ([facility])- (present on admission)  Assessment & Plan  Continue RT protocol    CKD (chronic kidney disease) stage 3, GFR 30-59 ml/min ([facility])- (present on admission)  Assessment & Plan  Renal function at baseline      Essential hypertension- (present on admission)  Assessment & Plan  Controlled on Norvasc and losartan.           VTE prophylaxis: xarelto

## 2020-01-27 NOTE — CARE PLAN
Problem: Safety  Goal: Will remain free from falls  Outcome: PROGRESSING AS EXPECTED     Problem: Bowel/Gastric:  Goal: Normal bowel function is maintained or improved  Outcome: PROGRESSING AS EXPECTED     Problem: Knowledge Deficit  Goal: Knowledge of disease process/condition, treatment plan, diagnostic tests, and medications will improve  Outcome: PROGRESSING AS EXPECTED

## 2020-01-27 NOTE — PROGRESS NOTES
Report received from night shift RN. Assume care. Pt. AAOx3 pt is bed,  Assessment completed. VSS. Denies pain.  Pt was update for the care for the day. Pt cont refused shower. White board updated, All question answered. Pt went back to asleep. Pt has call light within reach,  bed is in the lowest position. Pt has no other needs at this time.

## 2020-01-27 NOTE — PROGRESS NOTES
Hospital Medicine Daily Progress Note    Date of Service  1/26/2020    Chief Complaint  Dead roommate    Hospital Course    *This is an 79-year-old male who was apparently living with a roommate/caregiver who was also ill.  Home health found the patient with roommate dead.  Patient clearly unable to care for self and brought to the hospital*      Interval Problem Update  Remains pleasant - no interval events    Consultants/Specialty  None    Code Status  DNR    Disposition  Likely group home will probably require guardianship, social work notes reviewed.    Review of Systems  Review of Systems   Unable to perform ROS: Dementia        Physical Exam  Temp:  [36.4 °C (97.5 °F)-36.4 °C (97.6 °F)] 36.4 °C (97.6 °F)  Pulse:  [76-81] 76  Resp:  [18] 18  BP: (102-146)/(61-75) 146/75  SpO2:  [95 %-96 %] 96 %    Physical Exam  Vitals signs and nursing note reviewed.   Constitutional:       Comments: Pleasantly disheveled  He is thin but does not appear to be losing weight   HENT:      Head: Normocephalic and atraumatic.      Right Ear: External ear normal.      Left Ear: External ear normal.      Nose: Nose normal.   Eyes:      General:         Right eye: No discharge.         Left eye: No discharge.      Extraocular Movements: Extraocular movements intact.      Pupils: Pupils are equal, round, and reactive to light.   Cardiovascular:      Rate and Rhythm: Normal rate. Rhythm irregular.   Pulmonary:      Effort: Pulmonary effort is normal.      Comments: Moderately diminished  Abdominal:      General: Abdomen is flat. Bowel sounds are normal. There is no distension.      Palpations: Abdomen is soft.      Tenderness: There is no tenderness.   Musculoskeletal:         General: No swelling or deformity.      Right lower leg: No edema.      Left lower leg: No edema.   Skin:     General: Skin is warm and dry.   Neurological:      General: No focal deficit present.      Mental Status: He is alert.      Comments: Probably at  baseline   Psychiatric:         Mood and Affect: Mood normal.      Comments:            Fluids    Intake/Output Summary (Last 24 hours) at 1/26/2020 1719  Last data filed at 1/26/2020 0949  Gross per 24 hour   Intake 120 ml   Output 400 ml   Net -280 ml       Laboratory                        Imaging  No orders to display        Assessment/Plan  * Requires supervision due to deficit in self-care  Assessment & Plan  Awaiting for guardianship,  is talking with family from out of state    Agitation  Assessment & Plan  No haldol needed since 1/7  Continue seroquel at night      Chronic atrial fibrillation  Assessment & Plan  Rate controlled  Xarelto for anticoagulation    Severe protein-calorie malnutrition (HCC)  Assessment & Plan   Continue boost, patient drinks these well      Hyperlipidemia- (present on admission)  Assessment & Plan  Resume home dose of atorvastatin    COPD (chronic obstructive pulmonary disease) ([facility])- (present on admission)  Assessment & Plan  Continue RT protocol    CKD (chronic kidney disease) stage 3, GFR 30-59 ml/min ([facility])- (present on admission)  Assessment & Plan  Renal function at baseline      Essential hypertension- (present on admission)  Assessment & Plan  Controlled on Norvasc and losartan.           VTE prophylaxis: xarelto

## 2020-01-27 NOTE — PROGRESS NOTES
Report received from day shift.Pt  Refused med and asked me lo leave him alone. All needs met at this time.Bed in low position.Call light within reach.Rounding in place.

## 2020-01-28 NOTE — PROGRESS NOTES
Patient was cooperative and pleasant this shift.  Patient had a large loose brown incontinent stool this morning but continues to use the urinal to void.  Bed alarm remains on and side rails up X2.

## 2020-01-28 NOTE — DISCHARGE PLANNING
[facility] from [facility] , Loren.  Estefania requesting phone call about patient's DC plan at [phone].     BRENTON ROMERO called Estefania and updated SW on DC plan. RN CM explained that brother must make a decision on placement for patient or we will have to file for guardianship. Estefania asked to be informed on DC plan once closer to DC.

## 2020-01-28 NOTE — CARE PLAN
Problem: Infection  Goal: Will remain free from infection  Outcome: PROGRESSING AS EXPECTED  Note:   Bed in low and locked position.  Side rails up X2.  Bed alarm remains on.  Patients room is close to the nurses station.     Problem: Venous Thromboembolism (VTW)/Deep Vein Thrombosis (DVT) Prevention:  Goal: Patient will participate in Venous Thrombosis (VTE)/Deep Vein Thrombosis (DVT)Prevention Measures  Outcome: PROGRESSING AS EXPECTED  Flowsheets (Taken 1/27/2020 2222)  Mechanical Prophylaxis : SCDs, Sequential Compression Device  SCDs, Sequential Compression Device: Refused  Pharmacologic Prophylaxis Used: Rivaroxaban (Xarelto) - (ONLY for Total Knee and Total Hip Surgery)

## 2020-01-28 NOTE — CARE PLAN
Problem: Safety  Goal: Will remain free from injury  Outcome: PROGRESSING AS EXPECTED     Problem: Bowel/Gastric:  Goal: Normal bowel function is maintained or improved  Outcome: PROGRESSING AS EXPECTED     Problem: Knowledge Deficit  Goal: Knowledge of disease process/condition, treatment plan, diagnostic tests, and medications will improve  Outcome: PROGRESSING AS EXPECTED     Problem: Respiratory:  Goal: Respiratory status will improve  Outcome: PROGRESSING AS EXPECTED     Problem: Skin Integrity  Goal: Risk for impaired skin integrity will decrease  Outcome: PROGRESSING AS EXPECTED     Problem: Psychosocial Needs:  Goal: Level of anxiety will decrease  Outcome: PROGRESSING AS EXPECTED

## 2020-01-28 NOTE — PROGRESS NOTES
Assumed care of patient at 1900.  Patient is sleeping quietly in bed and appears to be in no distress.  Hourly rounding continues.

## 2020-01-28 NOTE — PROGRESS NOTES
Hospital Medicine Daily Progress Note    Date of Service  1/28/2020    Chief Complaint  79 y.o. male admitted 12/27/2019 with inability to care for self.    Hospital Course    Patient presented from home when his caregiver was found dead in his home on day of admission.  Patient is not able to care for himself and needs 24 hour supervision.  He was seen by psychiatry on 1/11/2020 and deemed incapacitated for medical decisions.       Interval Problem Update  Patient pleasant when woken for examination today, states he didn't want his lunch as he doesn't like the hospital food.    Consultants/Specialty  Psych - s/o    Code Status  DNR    Disposition  Guardianship pending?    Review of Systems  Review of Systems   Constitutional: Negative for chills and fever.   HENT: Negative for congestion.    Eyes: Negative for blurred vision and photophobia.   Respiratory: Negative for cough and shortness of breath.    Cardiovascular: Negative for chest pain, claudication and leg swelling.   Gastrointestinal: Negative for abdominal pain, constipation, diarrhea, heartburn and vomiting.   Genitourinary: Negative for dysuria and hematuria.   Musculoskeletal: Negative for joint pain and myalgias.   Skin: Negative for itching and rash.   Neurological: Negative for dizziness, sensory change, speech change, weakness and headaches.   Psychiatric/Behavioral: Negative for depression. The patient is not nervous/anxious and does not have insomnia.         Physical Exam  Temp:  [36.3 °C (97.3 °F)-36.6 °C (97.8 °F)] 36.3 °C (97.3 °F)  Pulse:  [] 79  Resp:  [16-20] 18  BP: (141-152)/(65-87) 141/81  SpO2:  [90 %-99 %] 97 %    Physical Exam  Vitals signs and nursing note reviewed.   Constitutional:       General: He is not in acute distress.     Appearance: Normal appearance.   HENT:      Head: Normocephalic and atraumatic.   Eyes:      General: No scleral icterus.     Extraocular Movements: Extraocular movements intact.   Neck:       Musculoskeletal: Normal range of motion and neck supple.   Cardiovascular:      Rate and Rhythm: Normal rate and regular rhythm.      Pulses: Normal pulses.      Heart sounds: Normal heart sounds. No murmur.   Pulmonary:      Effort: Pulmonary effort is normal. No respiratory distress.      Breath sounds: Normal breath sounds. No wheezing, rhonchi or rales.   Abdominal:      General: Abdomen is flat. Bowel sounds are normal. There is no distension.      Palpations: Abdomen is soft.      Tenderness: There is no rebound.   Musculoskeletal:         General: No swelling or tenderness.   Lymphadenopathy:      Cervical: No cervical adenopathy.   Skin:     Coloration: Skin is not jaundiced.      Findings: No erythema.   Neurological:      General: No focal deficit present.      Mental Status: He is alert. Mental status is at baseline.      Cranial Nerves: No cranial nerve deficit.      Comments: Oriented to self     Psychiatric:         Mood and Affect: Mood normal.         Behavior: Behavior normal.         Fluids    Intake/Output Summary (Last 24 hours) at 1/28/2020 1559  Last data filed at 1/28/2020 1158  Gross per 24 hour   Intake 420 ml   Output 300 ml   Net 120 ml       Laboratory                        Imaging  No orders to display        Assessment/Plan  * Requires supervision due to deficit in self-care  Assessment & Plan  Awaiting for guardianship,  is talking with family from out of state    Agitation  Assessment & Plan  No haldol needed since 1/7  Continue seroquel at night      Chronic atrial fibrillation  Assessment & Plan  Rate controlled  Xarelto for anticoagulation    Severe protein-calorie malnutrition (HCC)  Assessment & Plan   Continue boost, patient drinks these well      Hyperlipidemia- (present on admission)  Assessment & Plan  Atorvastatin    COPD (chronic obstructive pulmonary disease) (HCC)- (present on admission)  Assessment & Plan  Continue RT protocol    CKD (chronic kidney  disease) stage 3, GFR 30-59 ml/min (HCC)- (present on admission)  Assessment & Plan  Renal function at baseline      Essential hypertension- (present on admission)  Assessment & Plan  Controlled on Norvasc and losartan.             VTE prophylaxis: xarelto

## 2020-01-28 NOTE — DISCHARGE PLANNING
RN CM called and spoke to brother Memo. BRENTON ROMERO asked Memo is he has been in contact with [facility]. Memo stated he has not spoke to [facility]. Memo provided with [facility]'s phone number and he stated he will be calling them tomorrow.

## 2020-01-28 NOTE — PROGRESS NOTES
Report received from night shift RN. Assume care. Pt. AAOx pt is bed,  Assessment completed. VSS.  Denies pain,N/V/D,  able to wiggle toes and dorsi/plantar flex feet, good CMS and pulses to ana LE,  Pt was update for the care for the day. White board updated, All question answered. Pt has call light within reach,  bed is in the lowest position. Pt has no other needs at this time.

## 2020-01-28 NOTE — DIETARY
Nutrition Services: Update   Day 31 of admit.  Eulogio Jeronimo is a 79 y.o. male with admitting DX of Requires supervision due to deficit in self-care    Pt is currently on regular, dysphagia 3 diet with Boost Plus BID and Boost VHC 1x daily. PO intake is variable but most recent meals have been good at 50-75%. Pt also is drinking his Boost supplements.    No new weight to assess. No recent labs to review. Pt continues to receive Marinol to improve appetite.     Malnutrition Risk:  (12/28/19 RD assessment): Pt with severe malnutrition related to multiple comorbidities evident by severe fat and severe muscle loss noted on nutrition based physical exam.       Recommendations/Plan:  1. Provide diet and supplements as ordered.   2. Encourage intake of meals  3. Document intake of all meals as % taken in ADL's to provide interdisciplinary communication across all shifts.   4. Monitor weight.  5. Nutrition rep will continue to see patient for ongoing meal and snack preferences.    RD following

## 2020-01-29 NOTE — CARE PLAN
Problem: Mobility  Goal: Risk for activity intolerance will decrease  Outcome: PROGRESSING SLOWER THAN EXPECTED  Note:   Pt assisted up to chair x2 person assist using front wheel walker, tolerated well.      Problem: Skin Integrity  Goal: Risk for impaired skin integrity will decrease  Note:   Waffle mattress overlay to bed and to chair. Pt showered yesterday. Face shaved per pt request. Pt also asking to have his nails trimmed - wound consult placed.

## 2020-01-29 NOTE — PROGRESS NOTES
Bedside report received from Alondra FARRIS, care of pt assumed. Pt sleeping in bed, easily arouses to voice. Pt denies any needs at this time. Updated on plan of care and encouraged to call for any questions or concerns. Bed alarm on.

## 2020-01-29 NOTE — PROGRESS NOTES
[facility] Medicine Daily Progress Note    Date of Service  1/29/2020    Chief Complaint  79 y.o. male admitted 12/27/2019 with inability to care for self.    Hospital Course    Patient presented from home when his caregiver was found dead in his home on day of admission.  Patient is not able to care for himself and needs 24 hour supervision.  He was seen by psychiatry on 1/11/2020 and deemed incapacitated for medical decisions.       Interval Problem Update  1/28 Patient pleasant when woken for examination today, states he didn't want his lunch as he doesn't like the hospital food.  1/29 Patient without complaint, up to chair when examined.    Consultants/Specialty  Psych - s/o    Code Status  DNR    Disposition  Guardianship pending?    Review of Systems  Review of Systems   Constitutional: Negative for chills and fever.   HENT: Negative for congestion.    Eyes: Negative for blurred vision and photophobia.   Respiratory: Negative for cough and shortness of breath.    Cardiovascular: Negative for chest pain, claudication and leg swelling.   Gastrointestinal: Negative for abdominal pain, constipation, diarrhea, heartburn and vomiting.   Genitourinary: Negative for dysuria and hematuria.   Musculoskeletal: Negative for joint pain and myalgias.   Skin: Negative for itching and rash.   Neurological: Negative for dizziness, sensory change, speech change, weakness and headaches.   Psychiatric/Behavioral: Negative for depression. The patient is not nervous/anxious and does not have insomnia.         Physical Exam  Temp:  [36.5 °C (97.7 °F)-36.9 °C (98.4 °F)] 36.5 °C (97.7 °F)  Pulse:  [48-70] 48  Resp:  [18] 18  BP: (112-149)/(54-86) 112/54  SpO2:  [89 %-98 %] 89 %    Physical Exam  Vitals signs and nursing note reviewed.   Constitutional:       General: He is not in acute distress.     Appearance: Normal appearance.   HENT:      Head: Normocephalic and atraumatic.   Eyes:      General: No scleral icterus.     Extraocular  Movements: Extraocular movements intact.   Neck:      Musculoskeletal: Normal range of motion and neck supple.   Cardiovascular:      Rate and Rhythm: Normal rate and regular rhythm.      Pulses: Normal pulses.      Heart sounds: Normal heart sounds. No murmur.   Pulmonary:      Effort: Pulmonary effort is normal. No respiratory distress.      Breath sounds: Normal breath sounds. No wheezing, rhonchi or rales.   Abdominal:      General: Abdomen is flat. Bowel sounds are normal. There is no distension.      Palpations: Abdomen is soft.      Tenderness: There is no rebound.   Musculoskeletal:         General: No swelling or tenderness.   Lymphadenopathy:      Cervical: No cervical adenopathy.   Skin:     Coloration: Skin is not jaundiced.      Findings: No erythema.   Neurological:      General: No focal deficit present.      Mental Status: He is alert. Mental status is at baseline.      Cranial Nerves: No cranial nerve deficit.      Comments: Oriented to self     Psychiatric:         Mood and Affect: Mood normal.         Behavior: Behavior normal.         Fluids    Intake/Output Summary (Last 24 hours) at 1/29/2020 1252  Last data filed at 1/29/2020 1100  Gross per 24 hour   Intake 480 ml   Output 275 ml   Net 205 ml       Laboratory                        Imaging  No orders to display        Assessment/Plan  * Requires supervision due to deficit in self-care  Assessment & Plan  Awaiting for guardianship,  is talking with family from out of state    Agitation  Assessment & Plan  No haldol needed since 1/7  Continue seroquel at night      Chronic atrial fibrillation  Assessment & Plan  Rate controlled  Xarelto for anticoagulation    Severe protein-calorie malnutrition (HCC)  Assessment & Plan   Continue boost, patient drinks these well      Hyperlipidemia- (present on admission)  Assessment & Plan  Atorvastatin    COPD (chronic obstructive pulmonary disease) (HCC)- (present on admission)  Assessment &  Plan  Continue RT protocol    CKD (chronic kidney disease) stage 3, GFR 30-59 ml/min ([facility])- (present on admission)  Assessment & Plan  Renal function at baseline      Essential hypertension- (present on admission)  Assessment & Plan  Controlled on Norvasc and losartan.           VTE prophylaxis: xarelto

## 2020-01-30 NOTE — PROGRESS NOTES
Report from Azul FARRIS. Pt sitting up in bed, smiling, offers no complaints. Pt provided with coffee per request. Will continue to monitor.

## 2020-01-30 NOTE — CARE PLAN
Problem: Safety  Goal: Will remain free from injury  Outcome: PROGRESSING AS EXPECTED  Goal: Will remain free from falls  Note:   Call light and personal belongings within reach, bed in low locked position, treaded slipper socks in place, room free of clutter, bed alarm activated. Hourly rounding in place to ensure pt safety and needs are met.

## 2020-01-30 NOTE — PROGRESS NOTES
[facility] Medicine Daily Progress Note    Date of Service  1/30/2020    Chief Complaint  79 y.o. male admitted 12/27/2019 with inability to care for self.    Hospital Course    Patient presented from home when his caregiver was found dead in his home on day of admission.  Patient is not able to care for himself and needs 24 hour supervision.  He was seen by psychiatry on 1/11/2020 and deemed incapacitated for medical decisions.       Interval Problem Update  1/28 Patient pleasant when woken for examination today, states he didn't want his lunch as he doesn't like the hospital food.  1/29 Patient without complaint, up to chair when examined.  1/30 Patient without significant change, no complaints.    Consultants/Specialty  Psych - s/o    Code Status  DNR    Disposition  Guardianship pending?    Review of Systems  Review of Systems   Constitutional: Negative for chills and fever.   HENT: Negative for congestion.    Eyes: Negative for blurred vision and photophobia.   Respiratory: Negative for cough and shortness of breath.    Cardiovascular: Negative for chest pain, claudication and leg swelling.   Gastrointestinal: Negative for abdominal pain, constipation, diarrhea, heartburn and vomiting.   Genitourinary: Negative for dysuria and hematuria.   Musculoskeletal: Negative for joint pain and myalgias.   Skin: Negative for itching and rash.   Neurological: Negative for dizziness, sensory change, speech change, weakness and headaches.   Psychiatric/Behavioral: Negative for depression. The patient is not nervous/anxious and does not have insomnia.         Physical Exam  Temp:  [36.4 °C (97.5 °F)-37.1 °C (98.7 °F)] 36.8 °C (98.2 °F)  Pulse:  [58-72] 58  Resp:  [17-18] 18  BP: (114-150)/(65-77) 114/67  SpO2:  [91 %-98 %] 95 %    Physical Exam  Vitals signs and nursing note reviewed.   Constitutional:       General: He is not in acute distress.     Appearance: Normal appearance.   HENT:      Head: Normocephalic and atraumatic.    Eyes:      General: No scleral icterus.     Extraocular Movements: Extraocular movements intact.   Neck:      Musculoskeletal: Normal range of motion and neck supple.   Cardiovascular:      Rate and Rhythm: Normal rate and regular rhythm.      Pulses: Normal pulses.      Heart sounds: Normal heart sounds. No murmur.   Pulmonary:      Effort: Pulmonary effort is normal. No respiratory distress.      Breath sounds: Normal breath sounds. No wheezing, rhonchi or rales.   Abdominal:      General: Abdomen is flat. Bowel sounds are normal. There is no distension.      Palpations: Abdomen is soft.      Tenderness: There is no rebound.   Musculoskeletal:         General: No swelling or tenderness.   Lymphadenopathy:      Cervical: No cervical adenopathy.   Skin:     Coloration: Skin is not jaundiced.      Findings: No erythema.   Neurological:      Mental Status: He is alert. Mental status is at baseline.      Cranial Nerves: No cranial nerve deficit.      Comments: Oriented to self     Psychiatric:         Mood and Affect: Mood normal.         Behavior: Behavior normal.         Fluids    Intake/Output Summary (Last 24 hours) at 1/30/2020 1121  Last data filed at 1/30/2020 0830  Gross per 24 hour   Intake 650 ml   Output 800 ml   Net -150 ml       Laboratory                        Imaging  No orders to display        Assessment/Plan  * Requires supervision due to deficit in self-care  Assessment & Plan  Awaiting for guardianship,  is talking with family from out of state    Agitation  Assessment & Plan  No haldol needed since 1/7  Continue seroquel at night      Chronic atrial fibrillation  Assessment & Plan  Rate controlled  Xarelto for anticoagulation    Severe protein-calorie malnutrition (HCC)  Assessment & Plan   Continue boost, patient drinks these well      Hyperlipidemia- (present on admission)  Assessment & Plan  Atorvastatin    COPD (chronic obstructive pulmonary disease) (HCC)- (present on  admission)  Assessment & Plan  Continue RT protocol    CKD (chronic kidney disease) stage 3, GFR 30-59 ml/min ([facility])- (present on admission)  Assessment & Plan  Renal function at baseline      Essential hypertension- (present on admission)  Assessment & Plan  Controlled on Norvasc and losartan.           VTE prophylaxis: xarelto

## 2020-01-30 NOTE — CARE PLAN
Problem: Skin Integrity  Goal: Risk for impaired skin integrity will decrease  Outcome: PROGRESSING AS EXPECTED     Problem: Urinary Elimination:  Goal: Ability to reestablish a normal urinary elimination pattern will improve  Outcome: PROGRESSING AS EXPECTED     Problem: Mobility  Goal: Risk for activity intolerance will decrease  Outcome: PROGRESSING AS EXPECTED

## 2020-01-31 NOTE — PROGRESS NOTES
[facility] Medicine Daily Progress Note    Date of Service  1/31/2020    Chief Complaint  79 y.o. male admitted 12/27/2019 with inability to care for self.    Hospital Course    Patient presented from home when his caregiver was found dead in his home on day of admission.  Patient is not able to care for himself and needs 24 hour supervision.  He was seen by psychiatry on 1/11/2020 and deemed incapacitated for medical decisions.       Interval Problem Update  1/28 Patient pleasant when woken for examination today, states he didn't want his lunch as he doesn't like the hospital food.  1/29 Patient without complaint, up to chair when examined.  1/30 Patient without significant change, no complaints.  1/31 Patient states he is doing well, no complaints. Awaiting guardianship for placement.    Consultants/Specialty  Psych - s/o    Code Status  DNR    Disposition  Guardianship pending    Review of Systems  Review of Systems   Constitutional: Negative for chills and fever.   HENT: Negative for congestion.    Eyes: Negative for blurred vision and photophobia.   Respiratory: Negative for cough and shortness of breath.    Cardiovascular: Negative for chest pain, claudication and leg swelling.   Gastrointestinal: Negative for abdominal pain, constipation, diarrhea, heartburn and vomiting.   Genitourinary: Negative for dysuria and hematuria.   Musculoskeletal: Negative for joint pain and myalgias.   Skin: Negative for itching and rash.   Neurological: Negative for dizziness, sensory change, speech change, weakness and headaches.   Psychiatric/Behavioral: Negative for depression. The patient is not nervous/anxious and does not have insomnia.         Physical Exam  Temp:  [36.6 °C (97.9 °F)-36.7 °C (98.1 °F)] 36.7 °C (98.1 °F)  Pulse:  [69-87] 69  Resp:  [18] 18  BP: (131-152)/(67-98) 131/67  SpO2:  [94 %-100 %] 94 %    Physical Exam  Vitals signs and nursing note reviewed.   Constitutional:       General: He is not in acute  distress.     Appearance: Normal appearance.   HENT:      Head: Normocephalic and atraumatic.   Eyes:      General: No scleral icterus.     Extraocular Movements: Extraocular movements intact.   Neck:      Musculoskeletal: Normal range of motion and neck supple.   Cardiovascular:      Rate and Rhythm: Normal rate and regular rhythm.      Pulses: Normal pulses.      Heart sounds: Normal heart sounds. No murmur.   Pulmonary:      Effort: Pulmonary effort is normal. No respiratory distress.      Breath sounds: Normal breath sounds. No wheezing, rhonchi or rales.   Abdominal:      General: Abdomen is flat. Bowel sounds are normal. There is no distension.      Palpations: Abdomen is soft.      Tenderness: There is no rebound.   Musculoskeletal:         General: No swelling or tenderness.   Lymphadenopathy:      Cervical: No cervical adenopathy.   Skin:     Coloration: Skin is not jaundiced.      Findings: No erythema.   Neurological:      Mental Status: He is alert. Mental status is at baseline.      Cranial Nerves: No cranial nerve deficit.      Comments: Oriented to self     Psychiatric:         Mood and Affect: Mood normal.         Behavior: Behavior normal.         Fluids    Intake/Output Summary (Last 24 hours) at 1/31/2020 1205  Last data filed at 1/31/2020 1100  Gross per 24 hour   Intake 0 ml   Output 875 ml   Net -875 ml       Laboratory                        Imaging  No orders to display        Assessment/Plan  * Requires supervision due to deficit in self-care  Assessment & Plan  Awaiting for guardianship,  is talking with family from out of state    Agitation  Assessment & Plan  No haldol needed since 1/7  Continue seroquel at night      Chronic atrial fibrillation  Assessment & Plan  Rate controlled  Xarelto for anticoagulation    Severe protein-calorie malnutrition (HCC)  Assessment & Plan   Continue boost, patient drinks these well      Hyperlipidemia- (present on admission)  Assessment &  Plan  Atorvastatin    COPD (chronic obstructive pulmonary disease) ([facility])- (present on admission)  Assessment & Plan  Continue RT protocol    CKD (chronic kidney disease) stage 3, GFR 30-59 ml/min ([facility])- (present on admission)  Assessment & Plan  Renal function at baseline      Essential hypertension- (present on admission)  Assessment & Plan  Controlled on Norvasc and losartan.           VTE prophylaxis: xarelto

## 2020-01-31 NOTE — CARE PLAN
Problem: Nutritional:  Goal: Achieve adequate nutritional intake  Description  Patient will consume 50% of meals and supplements   Outcome: MET    Pt with avg PO intake of 40% of his recorded meals and 80% of his recorded supplements since 1/28. This is estimated to provide between 8930-9277 kcals and 67-77 gm protein. Estimated needs are ~ 1300 kcals and ~60 gm of protein. Pt continues to receive Marinol as appetite stimulant. RD will continue to monitor weekly or PRN.

## 2020-01-31 NOTE — PROGRESS NOTES
Pt slept >12 hrs last night. Offered to assist with getting up to chair this morning and pt refuses.

## 2020-01-31 NOTE — PROGRESS NOTES
Pharmacy Pharmacotherapy Consult for LOS >30 days    Admit Date: 12/27/2019      Medications were reviewed for appropriateness and ongoing need.     Current Facility-Administered Medications   Medication Dose Route Frequency Provider Last Rate Last Dose   • [START ON 2/1/2020] amLODIPine (NORVASC) tablet 10 mg  10 mg Oral DAILY Mery Gutierrez D.O.       • atorvastatin (LIPITOR) tablet 10 mg  10 mg Oral Nightly Merysherie Gutierrez D.O.       • dronabinol (MARINOL) capsule 5 mg  5 mg Oral BID Mery Gutierrez D.O.       • [START ON 2/1/2020] lidocaine (LIDODERM) 5 % 1 Patch  1 Patch Transdermal Q24HRS Mery Gutierrez D.O.       • [START ON 2/1/2020] losartan (COZAAR) tablet 100 mg  100 mg Oral DAILY Mery Gutierrez D.O.       • QUEtiapine (SEROQUEL) tablet 50 mg  50 mg Oral Q EVENING Merysherie Gutierrez D.O.       • rivaroxaban (XARELTO) tablet 15 mg  15 mg Oral PM MEAL Merysherie Gutierrez D.O.       • senna-docusate (PERICOLACE or SENOKOT S) 8.6-50 MG per tablet 2 Tab  2 Tab Oral BID Mery Gutierrez D.O.       • tamsulosin (FLOMAX) capsule 0.4 mg  0.4 mg Oral Q EVENING Merysherie Gutierrez, D.O.       • [START ON 2/7/2020] vitamin D (Ergocalciferol) (DRISDOL) capsule 50,000 Units  50,000 Units Oral Q FRIDAY Merysherie Gutierrez D.O.       • polyethylene glycol/lytes (MIRALAX) PACKET 1 Packet  1 Packet Oral QDAY PRN Breana Young M.D.   1 Packet at 01/26/20 1739   • magnesium hydroxide (MILK OF MAGNESIA) suspension 30 mL  30 mL Oral QDAY PRN Breana Young M.D.       • bisacodyl (DULCOLAX) suppository 10 mg  10 mg Rectal QDAY PRN Breana Young M.D.       • haloperidol (HALDOL) tablet 5 mg  5 mg Oral Q6HRS PRN Sarah Rutledge D.O.   5 mg at 01/04/20 1517   • cloNIDine (CATAPRES) tablet 0.1 mg  0.1 mg Oral Q6HRS PRN Ernesto Harvey M.D.   0.1 mg at 12/28/19 2035   • oxyCODONE immediate-release (ROXICODONE) tablet 5 mg  5 mg Oral Q4HRS PRN Ernesto Harvey M.D.   5 mg at 01/26/20 0200       Recommendations: loreto Bateman  Gardner PharmD.

## 2020-01-31 NOTE — PROGRESS NOTES
Received report, assumed pt care. Discussed POC. Encouraged Boost drink. Assessment done. VSS. Will cont to monitor.

## 2020-02-01 NOTE — PROGRESS NOTES
Pt rouses easily to voice. He is currently refusing to eat breakfast or get up to chair. VSS. Will cont to monitor.

## 2020-02-01 NOTE — CARE PLAN
Problem: Communication  Goal: The ability to communicate needs accurately and effectively will improve  Outcome: PROGRESSING AS EXPECTED     Problem: Safety  Goal: Will remain free from injury  Outcome: PROGRESSING AS EXPECTED     Problem: Infection  Goal: Will remain free from infection  Outcome: PROGRESSING AS EXPECTED     Problem: Knowledge Deficit  Goal: Knowledge of the prescribed therapeutic regimen will improve  Outcome: PROGRESSING SLOWER THAN EXPECTED

## 2020-02-01 NOTE — PROGRESS NOTES
[facility] Medicine Daily Progress Note    Date of Service  2/1/2020    Chief Complaint  79 y.o. male admitted 12/27/2019 with inability to care for self.    Hospital Course    Patient presented from home when his caregiver was found dead in his home on day of admission.  Patient is not able to care for himself and needs 24 hour supervision.  He was seen by psychiatry on 1/11/2020 and deemed incapacitated for medical decisions.       Interval Problem Update  1/28 Patient pleasant when woken for examination today, states he didn't want his lunch as he doesn't like the hospital food.  1/29 Patient without complaint, up to chair when examined.  1/30 Patient without significant change, no complaints.  1/31 Patient states he is doing well, no complaints. Awaiting guardianship for placement.  2/1 Patient without complaint today, states he feels fine.    Consultants/Specialty  Psych - s/o    Code Status  DNR    Disposition  Guardianship pending    Review of Systems  Review of Systems   Constitutional: Negative for chills and fever.   HENT: Negative for congestion.    Eyes: Negative for blurred vision and photophobia.   Respiratory: Negative for cough and shortness of breath.    Cardiovascular: Negative for chest pain, claudication and leg swelling.   Gastrointestinal: Negative for abdominal pain, constipation, diarrhea, heartburn and vomiting.   Genitourinary: Negative for dysuria and hematuria.   Musculoskeletal: Negative for joint pain and myalgias.   Skin: Negative for itching and rash.   Neurological: Negative for dizziness, sensory change, speech change, weakness and headaches.   Psychiatric/Behavioral: Negative for depression. The patient is not nervous/anxious and does not have insomnia.         Physical Exam  Temp:  [36.7 °C (98.1 °F)-36.9 °C (98.4 °F)] 36.9 °C (98.4 °F)  Pulse:  [65-88] 88  Resp:  [18] 18  BP: (128-132)/(67-78) 132/78  SpO2:  [94 %-97 %] 96 %    Physical Exam  Vitals signs and nursing note reviewed.    Constitutional:       General: He is not in acute distress.     Appearance: Normal appearance.   HENT:      Head: Normocephalic and atraumatic.   Eyes:      General: No scleral icterus.     Extraocular Movements: Extraocular movements intact.   Neck:      Musculoskeletal: Normal range of motion and neck supple.   Cardiovascular:      Rate and Rhythm: Normal rate and regular rhythm.      Pulses: Normal pulses.      Heart sounds: Normal heart sounds. No murmur.   Pulmonary:      Effort: Pulmonary effort is normal. No respiratory distress.      Breath sounds: Normal breath sounds. No wheezing, rhonchi or rales.   Abdominal:      General: Abdomen is flat. Bowel sounds are normal. There is no distension.      Palpations: Abdomen is soft.      Tenderness: There is no rebound.   Musculoskeletal:         General: No swelling or tenderness.   Lymphadenopathy:      Cervical: No cervical adenopathy.   Skin:     Coloration: Skin is not jaundiced.      Findings: No erythema.   Neurological:      Mental Status: He is alert. Mental status is at baseline.      Cranial Nerves: No cranial nerve deficit.      Comments: Oriented to self     Psychiatric:         Mood and Affect: Mood normal.         Behavior: Behavior normal.         Fluids    Intake/Output Summary (Last 24 hours) at 2/1/2020 1027  Last data filed at 2/1/2020 0800  Gross per 24 hour   Intake 360 ml   Output 600 ml   Net -240 ml       Laboratory                        Imaging  No orders to display        Assessment/Plan  * Requires supervision due to deficit in self-care  Assessment & Plan  Awaiting for guardianship,  is talking with family from out of state    Agitation  Assessment & Plan  No haldol needed since 1/7  Continue seroquel at night      Chronic atrial fibrillation  Assessment & Plan  Rate controlled  Xarelto for anticoagulation    Severe protein-calorie malnutrition (HCC)  Assessment & Plan   Continue boost, patient drinks these  well      Hyperlipidemia- (present on admission)  Assessment & Plan  Atorvastatin    COPD (chronic obstructive pulmonary disease) ([facility])- (present on admission)  Assessment & Plan  Continue RT protocol    CKD (chronic kidney disease) stage 3, GFR 30-59 ml/min ([facility])- (present on admission)  Assessment & Plan  Renal function at baseline      Essential hypertension- (present on admission)  Assessment & Plan  Controlled on Norvasc and losartan.           VTE prophylaxis: xarelto

## 2020-02-02 NOTE — PROGRESS NOTES
[facility] Medicine Daily Progress Note    Date of Service  2/2/2020    Chief Complaint  79 y.o. male admitted 12/27/2019 with inability to care for self.    Hospital Course    Patient presented from home when his caregiver was found dead in his home on day of admission.  Patient is not able to care for himself and needs 24 hour supervision.  He was seen by psychiatry on 1/11/2020 and deemed incapacitated for medical decisions.       Interval Problem Update  1/28 Patient pleasant when woken for examination today, states he didn't want his lunch as he doesn't like the hospital food.  1/29 Patient without complaint, up to chair when examined.  1/30 Patient without significant change, no complaints.  1/31 Patient states he is doing well, no complaints. Awaiting guardianship for placement.  2/1 Patient without complaint today, states he feels fine.  2/2 Patient feeling okay today, pleasant when woken from sleep, denies any complaints.    Consultants/Specialty  Psych - s/o    Code Status  DNR    Disposition  Guardianship pending    Review of Systems  Review of Systems   Constitutional: Negative for chills and fever.   HENT: Negative for congestion.    Eyes: Negative for blurred vision and photophobia.   Respiratory: Negative for cough and shortness of breath.    Cardiovascular: Negative for chest pain, claudication and leg swelling.   Gastrointestinal: Negative for abdominal pain, constipation, diarrhea, heartburn and vomiting.   Genitourinary: Negative for dysuria and hematuria.   Musculoskeletal: Negative for joint pain and myalgias.   Skin: Negative for itching and rash.   Neurological: Negative for dizziness, sensory change, speech change, weakness and headaches.   Psychiatric/Behavioral: Negative for depression. The patient is not nervous/anxious and does not have insomnia.         Physical Exam  Temp:  [36.3 °C (97.3 °F)-36.7 °C (98 °F)] 36.4 °C (97.6 °F)  Pulse:  [57-87] 72  Resp:  [18] 18  BP: (122-149)/(69-75)  149/75  SpO2:  [95 %-97 %] 95 %    Physical Exam  Vitals signs and nursing note reviewed.   Constitutional:       General: He is not in acute distress.     Appearance: Normal appearance.   HENT:      Head: Normocephalic and atraumatic.   Eyes:      General: No scleral icterus.     Extraocular Movements: Extraocular movements intact.   Neck:      Musculoskeletal: Normal range of motion and neck supple.   Cardiovascular:      Rate and Rhythm: Normal rate and regular rhythm.      Pulses: Normal pulses.      Heart sounds: Normal heart sounds. No murmur.   Pulmonary:      Effort: Pulmonary effort is normal. No respiratory distress.      Breath sounds: Normal breath sounds. No wheezing, rhonchi or rales.   Abdominal:      General: Abdomen is flat. Bowel sounds are normal. There is no distension.      Palpations: Abdomen is soft.      Tenderness: There is no rebound.   Musculoskeletal:         General: No swelling or tenderness.   Lymphadenopathy:      Cervical: No cervical adenopathy.   Skin:     Coloration: Skin is not jaundiced.      Findings: No erythema.   Neurological:      Mental Status: He is alert. Mental status is at baseline.      Cranial Nerves: No cranial nerve deficit.      Comments: Oriented to self     Psychiatric:         Mood and Affect: Mood normal.         Behavior: Behavior normal.         Fluids    Intake/Output Summary (Last 24 hours) at 2/2/2020 1019  Last data filed at 2/2/2020 0700  Gross per 24 hour   Intake 360 ml   Output 300 ml   Net 60 ml       Laboratory                        Imaging  No orders to display        Assessment/Plan  * Requires supervision due to deficit in self-care  Assessment & Plan  Awaiting for guardianship,  is talking with family from out of state    Agitation  Assessment & Plan  No haldol needed since 1/7  Continue seroquel at night      Chronic atrial fibrillation  Assessment & Plan  Rate controlled  Xarelto for anticoagulation    Severe protein-calorie  malnutrition (HCC)  Assessment & Plan   Continue boost, patient drinks these well      Hyperlipidemia- (present on admission)  Assessment & Plan  Atorvastatin    COPD (chronic obstructive pulmonary disease) ([facility])- (present on admission)  Assessment & Plan  Continue RT protocol    CKD (chronic kidney disease) stage 3, GFR 30-59 ml/min ([facility])- (present on admission)  Assessment & Plan  Renal function at baseline      Essential hypertension- (present on admission)  Assessment & Plan  Controlled on Norvasc and losartan.           VTE prophylaxis: xarelto

## 2020-02-03 NOTE — CARE PLAN
Problem: Communication  Goal: The ability to communicate needs accurately and effectively will improve  Outcome: PROGRESSING AS EXPECTED     Problem: Safety  Goal: Will remain free from injury  Outcome: PROGRESSING AS EXPECTED     Problem: Venous Thromboembolism (VTW)/Deep Vein Thrombosis (DVT) Prevention:  Goal: Patient will participate in Venous Thrombosis (VTE)/Deep Vein Thrombosis (DVT)Prevention Measures  Outcome: PROGRESSING AS EXPECTED     Problem: Knowledge Deficit  Goal: Knowledge of disease process/condition, treatment plan, diagnostic tests, and medications will improve  Outcome: PROGRESSING AS EXPECTED

## 2020-02-03 NOTE — CARE PLAN
Problem: Safety  Goal: Will remain free from injury  Outcome: PROGRESSING AS EXPECTED     Problem: Bowel/Gastric:  Goal: Normal bowel function is maintained or improved  Outcome: PROGRESSING AS EXPECTED     Problem: Knowledge Deficit  Goal: Knowledge of disease process/condition, treatment plan, diagnostic tests, and medications will improve  Outcome: PROGRESSING AS EXPECTED     Problem: Respiratory:  Goal: Respiratory status will improve  Outcome: PROGRESSING AS EXPECTED

## 2020-02-03 NOTE — PROGRESS NOTES
Report received from night shift RN. Assume care. Pt. AAOx2 pt is bed, calm and polite this am.  Assessment completed. VSS. Denies pain,  Pt was update for the care for the day. Pt back to asleep.  White board updated, All question answered. Pt has call light within reach,  bed is in the lowest position, alarm on. Pt has no other needs at this time.

## 2020-02-03 NOTE — PROGRESS NOTES
[facility] Medicine Daily Progress Note    Date of Service  2/3/2020    Chief Complaint  79 y.o. male admitted 12/27/2019 with inability to care for self.    Hospital Course    Patient presented from home when his caregiver was found dead in his home on day of admission.  Patient is not able to care for himself and needs 24 hour supervision.  He was seen by psychiatry on 1/11/2020 and deemed incapacitated for medical decisions.       Interval Problem Update  1/28 Patient pleasant when woken for examination today, states he didn't want his lunch as he doesn't like the hospital food.  1/29 Patient without complaint, up to chair when examined.  1/30 Patient without significant change, no complaints.  1/31 Patient states he is doing well, no complaints. Awaiting guardianship for placement.  2/1 Patient without complaint today, states he feels fine.  2/2 Patient feeling okay today, pleasant when woken from sleep, denies any complaints.  2/3 Patient states feels well today, no acute complaints.    Consultants/Specialty  Psych - s/o    Code Status  DNR    Disposition  Guardianship pending    Review of Systems  Review of Systems   Constitutional: Negative for chills and fever.   HENT: Negative for congestion.    Eyes: Negative for blurred vision and photophobia.   Respiratory: Negative for cough and shortness of breath.    Cardiovascular: Negative for chest pain, claudication and leg swelling.   Gastrointestinal: Negative for abdominal pain, constipation, diarrhea, heartburn, nausea and vomiting.   Genitourinary: Negative for dysuria and hematuria.   Musculoskeletal: Negative for joint pain and myalgias.   Skin: Negative for itching and rash.   Neurological: Negative for dizziness, sensory change, speech change, weakness and headaches.   Psychiatric/Behavioral: Negative for depression. The patient is not nervous/anxious and does not have insomnia.         Physical Exam  Temp:  [36.2 °C (97.1 °F)-36.4 °C (97.6 °F)] 36.3 °C  (97.4 °F)  Pulse:  [67-80] 71  Resp:  [16-18] 16  BP: (123-152)/(64-91) 123/64  SpO2:  [93 %-97 %] 96 %    Physical Exam  Vitals signs and nursing note reviewed.   Constitutional:       General: He is not in acute distress.     Appearance: Normal appearance.   HENT:      Head: Normocephalic and atraumatic.   Eyes:      General: No scleral icterus.     Extraocular Movements: Extraocular movements intact.   Neck:      Musculoskeletal: Normal range of motion and neck supple.   Cardiovascular:      Rate and Rhythm: Normal rate and regular rhythm.      Pulses: Normal pulses.      Heart sounds: Normal heart sounds. No murmur.   Pulmonary:      Effort: Pulmonary effort is normal. No respiratory distress.      Breath sounds: Normal breath sounds. No wheezing, rhonchi or rales.   Abdominal:      General: Abdomen is flat. Bowel sounds are normal. There is no distension.      Palpations: Abdomen is soft.      Tenderness: There is no rebound.   Musculoskeletal:         General: No swelling or tenderness.   Lymphadenopathy:      Cervical: No cervical adenopathy.   Skin:     Coloration: Skin is not jaundiced.      Findings: No erythema.   Neurological:      Mental Status: He is alert. Mental status is at baseline.      Cranial Nerves: No cranial nerve deficit.      Comments: Oriented to self     Psychiatric:         Mood and Affect: Mood normal.         Behavior: Behavior normal.         Fluids    Intake/Output Summary (Last 24 hours) at 2/3/2020 1146  Last data filed at 2/3/2020 0700  Gross per 24 hour   Intake 720 ml   Output 200 ml   Net 520 ml       Laboratory                        Imaging  No orders to display        Assessment/Plan  * Requires supervision due to deficit in self-care  Assessment & Plan  Awaiting for guardianship,  is talking with family from out of state    Agitation  Assessment & Plan  No haldol needed since 1/7  Continue seroquel at night      Chronic atrial fibrillation  Assessment &  Plan  Rate controlled  Xarelto for anticoagulation    Severe protein-calorie malnutrition (HCC)  Assessment & Plan   Continue boost, patient drinks these well      Hyperlipidemia- (present on admission)  Assessment & Plan  Atorvastatin    COPD (chronic obstructive pulmonary disease) ([facility])- (present on admission)  Assessment & Plan  Continue RT protocol    CKD (chronic kidney disease) stage 3, GFR 30-59 ml/min ([facility])- (present on admission)  Assessment & Plan  Renal function at baseline      Essential hypertension- (present on admission)  Assessment & Plan  Controlled on Norvasc and losartan.           VTE prophylaxis: xarelto

## 2020-02-04 NOTE — CARE PLAN
Problem: Communication  Goal: The ability to communicate needs accurately and effectively will improve  Outcome: PROGRESSING SLOWER THAN EXPECTED  Patient is A&Ox2 but does follow commands and is reoriented without trouble. Patient plan of care reviewed and white board updated. Door open for maximum visibility and safety measures in place. Bed alarm on.     Problem: Bowel/Gastric:  Goal: Normal bowel function is maintained or improved  Outcome: PROGRESSING SLOWER THAN EXPECTED   Patient incontinent of bowel and bladder. Coordination with CNA In place. Will continue to monitor urinary and bowel habits. Bowel sounds normoactive x4 quadrants.

## 2020-02-04 NOTE — PROGRESS NOTES
Received report from BRENTON Cantu. Assumed care of patient. Patient is currently laying in bed resting at this time. No IV access notable. No pain stated at this time and all VSS at this time. Patient calm and A&Ox2 with disorientation to time and event. Plan of care reviewed. No additional needs requested by patient at this time. Bed locked and in lowest position with call light within reach. Bed alarm on.

## 2020-02-04 NOTE — PROGRESS NOTES
Report received from night shift RN. Assume care. Pt. AAOx2 pt is bed, pleasant this AM.  Assessment completed. VSS. Denies pain, able to wiggle toes and dorsi/plantar flex feet, good CMS and pulses to ana LE, Pt was update for the care for the day. White board updated, All question answered. Pt has call light within reach,  bed is in the lowest position. Pt has no other needs at this time.

## 2020-02-04 NOTE — CARE PLAN
Problem: Knowledge Deficit  Goal: Knowledge of disease process/condition, treatment plan, diagnostic tests, and medications will improve  Outcome: PROGRESSING AS EXPECTED     Problem: Respiratory:  Goal: Respiratory status will improve  Outcome: PROGRESSING AS EXPECTED     Problem: Skin Integrity  Goal: Risk for impaired skin integrity will decrease  Outcome: PROGRESSING AS EXPECTED

## 2020-02-04 NOTE — WOUND TEAM
In to see pt for nail care. Introduced self and reason for consult. Pt refused. Explained that this is his opportunity to have nails trimmed. He still refused. Order DC'd

## 2020-02-04 NOTE — PROGRESS NOTES
Hospital Medicine Daily Progress Note    Date of Service  2/4/2020    Chief Complaint  79 y.o. male admitted 12/27/2019 with inability to care for self.    Hospital Course    Patient presented from home when his caregiver was found dead in his home on day of admission.  Patient is not able to care for himself and needs 24 hour supervision.  He was seen by psychiatry on 1/11/2020 and deemed incapacitated for medical decisions.       Interval Problem Update  The patient states he is comfortable and likes the food here    Consultants/Specialty  Psych - s/o    Code Status  DNR    Disposition  Guardianship pending    Review of Systems  Review of Systems   Constitutional: Negative for fever.   Respiratory: Negative for shortness of breath.    Cardiovascular: Negative for chest pain.   Gastrointestinal: Negative for abdominal pain, constipation, heartburn and vomiting.   Genitourinary: Negative for dysuria.   Musculoskeletal: Negative for joint pain.   Skin: Negative for itching.   Neurological: Negative for headaches.   Psychiatric/Behavioral: The patient does not have insomnia.         Physical Exam  Temp:  [36.4 °C (97.6 °F)-36.8 °C (98.2 °F)] 36.6 °C (97.9 °F)  Pulse:  [69-77] 72  Resp:  [16-20] 18  BP: (134-152)/(63-73) 141/72  SpO2:  [92 %-96 %] 94 %    Physical Exam  Vitals signs and nursing note reviewed.   Constitutional:       General: He is not in acute distress.     Appearance: Normal appearance.   HENT:      Head: Atraumatic.   Eyes:      General:         Right eye: No discharge.         Left eye: No discharge.   Cardiovascular:      Rate and Rhythm: Normal rate and regular rhythm.      Heart sounds: Normal heart sounds.   Pulmonary:      Effort: Pulmonary effort is normal.      Breath sounds: Normal breath sounds.   Abdominal:      General: There is no distension.   Musculoskeletal:         General: No swelling or tenderness.   Lymphadenopathy:      Cervical: No cervical adenopathy.   Skin:     General: Skin  is dry.      Findings: No erythema.   Neurological:      Mental Status: He is alert. Mental status is at baseline.      Cranial Nerves: No cranial nerve deficit.      Comments: Oriented to self     Psychiatric:         Mood and Affect: Mood normal.         Behavior: Behavior normal.         Fluids    Intake/Output Summary (Last 24 hours) at 2/4/2020 1450  Last data filed at 2/4/2020 1123  Gross per 24 hour   Intake 600 ml   Output 750 ml   Net -150 ml       Laboratory                        Imaging  No orders to display        Assessment/Plan  * Requires supervision due to deficit in self-care  Assessment & Plan  Awaiting for guardianship    Agitation  Assessment & Plan  No acute issues, Continue seroquel at night      Chronic atrial fibrillation  Assessment & Plan  Rate controlled  Xarelto for anticoagulation    Severe protein-calorie malnutrition (HCC)  Assessment & Plan   Continue boost, patient drinks these well      Hyperlipidemia- (present on admission)  Assessment & Plan  Atorvastatin    COPD (chronic obstructive pulmonary disease) ([facility])- (present on admission)  Assessment & Plan  Continue RT protocol    CKD (chronic kidney disease) stage 3, GFR 30-59 ml/min ([facility])- (present on admission)  Assessment & Plan  Renal function at baseline      Essential hypertension- (present on admission)  Assessment & Plan  Controlled on Norvasc and losartan.           VTE prophylaxis: xarelto

## 2020-02-05 NOTE — DISCHARGE PLANNING
Called [facility] about referral that was made. [facility] stated they never received the referral. [facility] also stated they have not heard from Memo Jeronimo.  BRENTON ROMERO filled out another referral form on [facility]'s web site.     RN CM called pts brother, Memo, to discuss DC plan. No answer - Left VM.

## 2020-02-05 NOTE — PROGRESS NOTES
Hospital Medicine Daily Progress Note    Date of Service  2/5/2020    Chief Complaint  79 y.o. male admitted 12/27/2019 with inability to care for self.    Hospital Course    Patient presented from home when his caregiver was found dead in his home on day of admission.  Patient is not able to care for himself and needs 24 hour supervision.  He was seen by psychiatry on 1/11/2020 and deemed incapacitated for medical decisions.       Interval Problem Update  The patient is pleasant and completed his boost this morning    Consultants/Specialty  Psych - s/o    Code Status  DNR    Disposition  Guardianship pending    Review of Systems  Review of Systems   Constitutional: Negative for malaise/fatigue.   Respiratory: Negative for shortness of breath and wheezing.    Cardiovascular: Negative for chest pain and palpitations.   Gastrointestinal: Negative for constipation, heartburn and vomiting.   Genitourinary: Negative for dysuria and urgency.   Neurological: Negative for headaches.   Psychiatric/Behavioral: The patient does not have insomnia.         Physical Exam  Temp:  [36.4 °C (97.6 °F)-36.6 °C (97.9 °F)] 36.6 °C (97.9 °F)  Pulse:  [59-79] 59  Resp:  [18] 18  BP: (112-144)/(65-88) 112/65  SpO2:  [95 %-97 %] 95 %    Physical Exam  Vitals signs and nursing note reviewed.   Constitutional:       General: He is not in acute distress.  HENT:      Nose: No rhinorrhea.      Mouth/Throat:      Mouth: Mucous membranes are dry.   Eyes:      General:         Right eye: No discharge.         Left eye: No discharge.   Cardiovascular:      Rate and Rhythm: Normal rate and regular rhythm.      Heart sounds: Normal heart sounds.   Pulmonary:      Effort: Pulmonary effort is normal. No respiratory distress.   Abdominal:      General: There is no distension.   Musculoskeletal:         General: No swelling or tenderness.   Lymphadenopathy:      Cervical: No cervical adenopathy.   Skin:     General: Skin is dry.      Findings: No  erythema.   Neurological:      Mental Status: He is alert. Mental status is at baseline.      Motor: No weakness.      Comments: Oriented to self     Psychiatric:         Mood and Affect: Mood normal.         Behavior: Behavior normal.         Fluids    Intake/Output Summary (Last 24 hours) at 2/5/2020 1518  Last data filed at 2/5/2020 0900  Gross per 24 hour   Intake 480 ml   Output 250 ml   Net 230 ml       Laboratory                        Imaging  No orders to display        Assessment/Plan  * Requires supervision due to deficit in self-care  Assessment & Plan  Awaiting for guardianship    Agitation  Assessment & Plan  No acute issues, Continue seroquel at night      Chronic atrial fibrillation  Assessment & Plan  Rate controlled  Xarelto for anticoagulation    Severe protein-calorie malnutrition (HCC)  Assessment & Plan   Continue boost, intake is adequate    Hyperlipidemia- (present on admission)  Assessment & Plan  Atorvastatin    COPD (chronic obstructive pulmonary disease) ([facility])- (present on admission)  Assessment & Plan  Continue RT protocol    CKD (chronic kidney disease) stage 3, GFR 30-59 ml/min ([facility])- (present on admission)  Assessment & Plan  Renal function at baseline      Essential hypertension- (present on admission)  Assessment & Plan  Controlled on Norvasc and losartan.           VTE prophylaxis: xarelto

## 2020-02-06 NOTE — CARE PLAN
Problem: Safety  Goal: Will remain free from injury  Outcome: PROGRESSING AS EXPECTED  Goal: Will remain free from falls  Outcome: PROGRESSING AS EXPECTED  Intervention: Assess risk factors for falls  Flowsheets  Taken 2/6/2020 0800  Pt Calls for Assistance: No  Taken 2/6/2020 0812  Fall Risk: High Risk to Fall - 2 or more points   History of fall: 0  Mobility Status Assessment: 3-3 Healthcare Providers Required for Assistance with Ambulation & Transfer  Risk for Injury-Any positive answers results in the pt being at high risk for fall related injury: Not Applicable     Problem: Knowledge Deficit  Goal: Knowledge of disease process/condition, treatment plan, diagnostic tests, and medications will improve  Outcome: PROGRESSING AS EXPECTED     Problem: Skin Integrity  Goal: Risk for impaired skin integrity will decrease  Outcome: PROGRESSING AS EXPECTED     Problem: Knowledge Deficit  Goal: Knowledge of the prescribed therapeutic regimen will improve  Outcome: PROGRESSING SLOWER THAN EXPECTED

## 2020-02-06 NOTE — PROGRESS NOTES
Received report from BRENTON Buckley. Assumed care of patient. Patient is currently sitting up in the chair requesting a warm blanket. Warm blanket given. Patient would like to remain in the chair at this time. All VSS and patient A&Ox2 with disorientation to time and event. No IV present at this time. Patient incontinent of bowel and bladder. Coordination with CNA In place. Plan of care reviewed. Hourly rounding in place and fall precautions in place: call light within reach and room door open for maximum visibility.

## 2020-02-06 NOTE — PROGRESS NOTES
Hospital Medicine Daily Progress Note    Date of Service  2/6/2020    Chief Complaint  79 y.o. male admitted 12/27/2019 with inability to care for self.    Hospital Course    Patient presented from home when his caregiver was found dead in his home on day of admission.  Patient is not able to care for himself and needs 24 hour supervision.  He was seen by psychiatry on 1/11/2020 and deemed incapacitated for medical decisions.       Interval Problem Update  The patient is sleeping intermittently today    Consultants/Specialty  Psych - s/o    Code Status  DNR    Disposition  Guardianship pending    Review of Systems  Review of Systems   Constitutional: Negative for malaise/fatigue.   Respiratory: Negative for cough, shortness of breath and wheezing.    Cardiovascular: Negative for chest pain.   Gastrointestinal: Negative for constipation and nausea.   Genitourinary: Negative for dysuria and hematuria.   Neurological: Negative for dizziness and headaches.   Psychiatric/Behavioral: The patient does not have insomnia.         Physical Exam  Temp:  [36.4 °C (97.5 °F)-36.6 °C (97.8 °F)] 36.4 °C (97.5 °F)  Pulse:  [71-72] 71  Resp:  [18-20] 20  BP: (123-151)/(65-73) 124/72  SpO2:  [93 %-96 %] 94 %    Physical Exam  Vitals signs and nursing note reviewed.   Constitutional:       General: He is not in acute distress.     Appearance: He is not ill-appearing.   HENT:      Mouth/Throat:      Pharynx: No oropharyngeal exudate.   Eyes:      General: No scleral icterus.     Conjunctiva/sclera: Conjunctivae normal.   Cardiovascular:      Rate and Rhythm: Normal rate and regular rhythm.      Heart sounds: Normal heart sounds.   Pulmonary:      Effort: Pulmonary effort is normal. No respiratory distress.   Abdominal:      General: Bowel sounds are normal. There is no distension.   Musculoskeletal:         General: No swelling or tenderness.   Lymphadenopathy:      Cervical: No cervical adenopathy.   Skin:     General: Skin is dry.       Findings: No erythema.   Neurological:      Mental Status: Mental status is at baseline.      Comments: Oriented to self     Psychiatric:         Mood and Affect: Mood normal.         Behavior: Behavior normal.         Fluids    Intake/Output Summary (Last 24 hours) at 2/6/2020 1354  Last data filed at 2/6/2020 0839  Gross per 24 hour   Intake --   Output 800 ml   Net -800 ml       Laboratory                        Imaging  No orders to display        Assessment/Plan  * Requires supervision due to deficit in self-care  Assessment & Plan  Awaiting for guardianship    Agitation  Assessment & Plan   Continue seroquel at night      Chronic atrial fibrillation  Assessment & Plan  Rate controlled  Xarelto for anticoagulation    Severe protein-calorie malnutrition (HCC)  Assessment & Plan   Continue boost supplements    Hyperlipidemia- (present on admission)  Assessment & Plan  Atorvastatin    COPD (chronic obstructive pulmonary disease) ([facility])- (present on admission)  Assessment & Plan  Continue RT protocol    CKD (chronic kidney disease) stage 3, GFR 30-59 ml/min ([facility])- (present on admission)  Assessment & Plan  Baseline creatinine 1.9      Essential hypertension- (present on admission)  Assessment & Plan  Controlled on Norvasc and losartan.           VTE prophylaxis: xarelto

## 2020-02-06 NOTE — PROGRESS NOTES
Report received from NOC RN, assumed care of pt.   POC and medications reviewed with pt.   Pt resting in bed. Easily arousable.   AOx2  Safety measures in place.  Hourly rounding in place.

## 2020-02-06 NOTE — CARE PLAN
Problem: Safety  Goal: Will remain free from injury  Outcome: PROGRESSING SLOWER THAN EXPECTED   Fall precautions in place for patient: bed and chair alarm on and active at all times, treaded socks on, bed locked and in lowest position with call light within reach, max assist needed when transferring and 3 side rails up. Bedroom door open for maximum visibility and appropriate reorientation given when patient confused.     Problem: Skin Integrity  Goal: Risk for impaired skin integrity will decrease  Outcome: PROGRESSING SLOWER THAN EXPECTED   Patient skin integrity compromised. Q2 turns in place and mepilex placed to red but blanching sacrum. Coordination with CNA in place to help keep patient dry and free of excess moisture.

## 2020-02-07 NOTE — DISCHARGE PLANNING
Called and spoke to pts sister-in-law. She stated she called [facility] but they had no heard of the patient. RN NICK informed her that another referral was filed a couple of days ago. She stated she will call them again on Monday.

## 2020-02-07 NOTE — CARE PLAN
Problem: Nutritional:  Goal: Achieve adequate nutritional intake  Description  Patient will consume >25-50% of meals and supplements.   Outcome: NOT MET

## 2020-02-07 NOTE — PROGRESS NOTES
Care of pt assumed from Laura FARRIS. Pt currently sleeping, easily arouses to voice. Pt denies any needs at this time.

## 2020-02-07 NOTE — PROGRESS NOTES
Pt continuing to sleep, easily arousable but irritable when woken. Pt is refusing vital signs and scheduled atorvastatin at this time. Also refusing to be repositioned.

## 2020-02-07 NOTE — PROGRESS NOTES
Hospital Medicine Daily Progress Note    Date of Service  2/7/2020    Chief Complaint  79 y.o. male admitted 12/27/2019 with inability to care for self.    Hospital Course    Patient presented from home when his caregiver was found dead in his home on day of admission.  Patient is not able to care for himself and needs 24 hour supervision.  He was seen by psychiatry on 1/11/2020 and deemed incapacitated for medical decisions.       Interval Problem Update  The patient has stable blood pressure and has not required oxycodone for many days    Consultants/Specialty  Psych - s/o    Code Status  DNR    Disposition  Guardianship pending    Review of Systems  Review of Systems   Constitutional: Negative for malaise/fatigue.   Respiratory: Negative for shortness of breath.    Cardiovascular: Negative for palpitations.   Gastrointestinal: Negative for constipation and nausea.   Genitourinary: Negative for hematuria.   Neurological: Negative for headaches.   Psychiatric/Behavioral: The patient does not have insomnia.         Physical Exam  Temp:  [36.3 °C (97.3 °F)-36.7 °C (98 °F)] 36.7 °C (98 °F)  Pulse:  [45-80] 45  Resp:  [16-18] 18  BP: (127-146)/(70-89) 131/70  SpO2:  [92 %-97 %] 92 %    Physical Exam  Vitals signs and nursing note reviewed.   Constitutional:       General: He is not in acute distress.     Appearance: He is not ill-appearing.   HENT:      Mouth/Throat:      Pharynx: No oropharyngeal exudate.   Eyes:      General: No scleral icterus.     Conjunctiva/sclera: Conjunctivae normal.   Neck:      Musculoskeletal: Neck supple.   Cardiovascular:      Rate and Rhythm: Normal rate.      Heart sounds: Normal heart sounds.   Pulmonary:      Effort: No respiratory distress.      Breath sounds: Normal breath sounds.   Abdominal:      General: There is no distension.   Musculoskeletal:         General: No swelling or tenderness.   Lymphadenopathy:      Cervical: No cervical adenopathy.   Skin:     General: Skin is dry.       Findings: No erythema.   Neurological:      Mental Status: He is alert. Mental status is at baseline.      Comments: Oriented to self     Psychiatric:         Mood and Affect: Mood normal.         Behavior: Behavior normal.         Fluids    Intake/Output Summary (Last 24 hours) at 2/7/2020 1332  Last data filed at 2/7/2020 1026  Gross per 24 hour   Intake 120 ml   Output 800 ml   Net -680 ml       Laboratory                        Imaging  No orders to display        Assessment/Plan  * Requires supervision due to deficit in self-care  Assessment & Plan  Awaiting for guardianship    Agitation  Assessment & Plan   Continue seroquel at night      Chronic atrial fibrillation  Assessment & Plan  Rate controlled, heart rate low at 45 this morning, will monitor, patient is not on any heart rate lowering medication  Xarelto for anticoagulation    Severe protein-calorie malnutrition (HCC)  Assessment & Plan   Continue boost supplements    Hyperlipidemia- (present on admission)  Assessment & Plan  Atorvastatin    COPD (chronic obstructive pulmonary disease) ([facility])- (present on admission)  Assessment & Plan  Continue RT protocol    CKD (chronic kidney disease) stage 3, GFR 30-59 ml/min ([facility])- (present on admission)  Assessment & Plan  Baseline creatinine 1.9      Essential hypertension- (present on admission)  Assessment & Plan  Controlled on Norvasc and losartan.           VTE prophylaxis: xarelto

## 2020-02-07 NOTE — PROGRESS NOTES
"Pt now awake, states \"ok I'm ready for that medication now.\" Also agreeable to vital signs and repositioning.  "

## 2020-02-07 NOTE — CARE PLAN
Problem: Psychosocial Needs:  Goal: Level of anxiety will decrease  Outcome: PROGRESSING AS EXPECTED  Intervention: Identify and develop with patient strategies to cope with anxiety triggers  Note:   Pt slept majority of night. No issues or events.     Problem: Urinary Elimination:  Goal: Ability to reestablish a normal urinary elimination pattern will improve  Outcome: PROGRESSING AS EXPECTED  Note:   Pt uses urinal to void, voiding adequately.     Problem: Safety  Goal: Will remain free from falls  Note:   Call light and personal belongings within reach, bed in low locked position, treaded slipper socks in place, room free of clutter, bed alarm activated. Hourly rounding in place to ensure pt safety and needs are met.

## 2020-02-08 NOTE — PROGRESS NOTES
[facility] Medicine Daily Progress Note    Date of Service  2/8/2020    Chief Complaint  79 y.o. male admitted 12/27/2019 with inability to care for self.    Hospital Course    Patient presented from home when his caregiver was found dead in his home on day of admission.  Patient is not able to care for himself and needs 24 hour supervision.  He was seen by psychiatry on 1/11/2020 and deemed incapacitated for medical decisions.       Interval Problem Update  The patient complains of a headache this morning    Consultants/Specialty  Psych - s/o    Code Status  DNR    Disposition  Guardianship pending    Review of Systems  Review of Systems   Constitutional: Negative for fever and malaise/fatigue.   Respiratory: Negative for shortness of breath.    Cardiovascular: Negative for chest pain and palpitations.   Gastrointestinal: Negative for constipation and vomiting.   Genitourinary: Negative for hematuria.   Neurological: Positive for headaches. Negative for dizziness and tingling.   Psychiatric/Behavioral: The patient does not have insomnia.         Physical Exam  Temp:  [36.4 °C (97.6 °F)-36.8 °C (98.2 °F)] 36.8 °C (98.2 °F)  Pulse:  [67-74] 68  Resp:  [18-20] 18  BP: (124-155)/(65-75) 124/68  SpO2:  [96 %-99 %] 96 %    Physical Exam  Vitals signs and nursing note reviewed.   Constitutional:       Appearance: He is not ill-appearing or diaphoretic.   Eyes:      Extraocular Movements: Extraocular movements intact.      Conjunctiva/sclera: Conjunctivae normal.      Pupils: Pupils are equal, round, and reactive to light.   Neck:      Musculoskeletal: Neck supple.   Cardiovascular:      Rate and Rhythm: Normal rate.      Heart sounds: Normal heart sounds.   Pulmonary:      Effort: No respiratory distress.      Breath sounds: Normal breath sounds.   Abdominal:      General: There is no distension.      Tenderness: There is no tenderness.   Musculoskeletal:         General: No swelling or tenderness.   Lymphadenopathy:       Cervical: No cervical adenopathy.   Skin:     Coloration: Skin is not jaundiced.      Findings: No erythema.   Neurological:      Mental Status: He is alert. Mental status is at baseline.      Coordination: Coordination normal.      Comments: Oriented to self     Psychiatric:         Mood and Affect: Mood normal.         Behavior: Behavior normal.         Fluids    Intake/Output Summary (Last 24 hours) at 2/8/2020 1357  Last data filed at 2/8/2020 1200  Gross per 24 hour   Intake 1010 ml   Output 500 ml   Net 510 ml       Laboratory                        Imaging  No orders to display        Assessment/Plan  * Requires supervision due to deficit in self-care  Assessment & Plan  Awaiting for guardianship    Agitation  Assessment & Plan   Continue seroquel at night      Chronic atrial fibrillation  Assessment & Plan  Rate controlled, heart rate is low at times in the 40's but patient is asymptomatic  Xarelto for anticoagulation    Severe protein-calorie malnutrition (HCC)  Assessment & Plan   Continue boost supplements    Hyperlipidemia- (present on admission)  Assessment & Plan  Atorvastatin    COPD (chronic obstructive pulmonary disease) ([facility])- (present on admission)  Assessment & Plan  Continue RT protocol    CKD (chronic kidney disease) stage 3, GFR 30-59 ml/min ([facility])- (present on admission)  Assessment & Plan  Baseline creatinine 1.9      Essential hypertension- (present on admission)  Assessment & Plan  Controlled on Norvasc and losartan.       tylenol for headache, check am labs on blood pressure medications    VTE prophylaxis: xarelto

## 2020-02-09 NOTE — PROGRESS NOTES
Received report from day shift nurse.   Assumed pt care  Pt resting in bed.   Pt on r.a.. No signs of SOB/respiratory distress.   Labs noted, VSS.   Pt was willing to take his night time medications  Fall precautions in place.   Bed at lowest position. Bed alarm on   Call light and personal belongings within reach.   Continue to monitor

## 2020-02-09 NOTE — PROGRESS NOTES
0650:  Bedside report completed w/ Thu/BRENTON.  Assumed pt care. Patient asleep, in no apparent distress.  Safety precautions in place. Call light & personal belongings within reach.  Plan of care discussed.    4685:  Bedside report w/ Piedad/BRENTON

## 2020-02-09 NOTE — CARE PLAN
Problem: Communication  Goal: The ability to communicate needs accurately and effectively will improve  Outcome: PROGRESSING AS EXPECTED  Intervention: [place] patient and significant other/support system to call light to alert staff of needs  Flowsheets (Taken 1/17/2020 0926 by Jacques Ma R.N.)  Oriented to:: Call Light & Bedside Controls;Unit Routine  Note:   Patient oriented to surroundings and unit policies/routines.  Educated and understands the use of the call button.  Patient calls appropriately.   Intervention: Reorient patient to environment as needed  Flowsheets (Taken 1/17/2020 0926 by Jacques Ma R.N.)  Oriented to:: Call Light & Bedside Controls;Unit Routine  Note:   Patient is confused at times     Problem: Safety  Goal: Will remain free from falls  Outcome: PROGRESSING AS EXPECTED  Intervention: Implement fall precautions  Flowsheets  Taken 1/10/2020 1306 by Izaiah Srinivasan R.N.  Bed Alarm: Yes - Alarm On  Taken 2/9/2020 0700 by Jennifer Gilligan, R.N.  Environmental Precautions: Treaded Slipper Socks on Patient;Personal Belongings, Wastebasket, Call Bell etc. in Easy Reach;Report Given to Other Health Care Providers Regarding Fall Risk;Bed in Low Position;Communication Sign for Patients & Families  Taken 2/5/2020 0900 by Ina Aldridge RDOROTHY.  Bedrails: Bedrails Closest to Bathroom Down  Note:   Patient educated and understands the fall precautions in place to prevent falls.  Bed alarm is on, IV pole not in use, treaded slipper socks on, and bedrails closest to bathroom down.  Patient also educated and understands the use of the call button for any assistance with mobility.

## 2020-02-09 NOTE — CARE PLAN
Problem: Safety  Goal: Will remain free from injury  Outcome: PROGRESSING AS EXPECTED  Note:   Pt is injury-free at this moment   Fall precautions in place including: bed locked & at lowest position, call light & personal belongings within reach, x2 upper bed rails up  Bed alarm on - Will continue to monitor      Problem: Discharge Barriers/Planning  Goal: Patient's continuum of care needs will be met  Outcome: PROGRESSING AS EXPECTED  Note:   Guardianship in progress - pending placement

## 2020-02-10 NOTE — CARE PLAN
Problem: Safety  Goal: Will remain free from injury  Outcome: PROGRESSING AS EXPECTED  Goal: Will remain free from falls  Outcome: PROGRESSING AS EXPECTED     Problem: Infection  Goal: Will remain free from infection  Outcome: PROGRESSING AS EXPECTED     Problem: Bowel/Gastric:  Goal: Normal bowel function is maintained or improved  Outcome: PROGRESSING AS EXPECTED  Goal: Will not experience complications related to bowel motility  Outcome: PROGRESSING AS EXPECTED     Problem: Skin Integrity  Goal: Risk for impaired skin integrity will decrease  Outcome: PROGRESSING AS EXPECTED     Problem: Communication  Goal: The ability to communicate needs accurately and effectively will improve  Outcome: PROGRESSING SLOWER THAN EXPECTED  Note:   Struggles to communicate needs, does not use call light     Problem: Discharge Barriers/Planning  Goal: Patient's continuum of care needs will be met  Outcome: PROGRESSING SLOWER THAN EXPECTED  Note:   Continued guardianship/placement issues

## 2020-02-10 NOTE — PROGRESS NOTES
[facility] Medicine Daily Progress Note    Date of Service  2/9/2020    Chief Complaint  79 y.o. male admitted 12/27/2019 with inability to care for self.    Hospital Course    Patient presented from home when his caregiver was found dead in his home on day of admission.  Patient is not able to care for himself and needs 24 hour supervision.  He was seen by psychiatry on 1/11/2020 and deemed incapacitated for medical decisions.       Interval Problem Update  The patient has no headache today    Consultants/Specialty  Psych - s/o    Code Status  DNR    Disposition  Guardianship pending    Review of Systems  Review of Systems   Constitutional: Negative for chills and fever.   Respiratory: Negative for shortness of breath.    Cardiovascular: Negative for chest pain.   Gastrointestinal: Negative for constipation.   Genitourinary: Negative for hematuria.   Skin: Negative for itching.   Neurological: Negative for dizziness and headaches.   Psychiatric/Behavioral: The patient does not have insomnia.         Physical Exam  Temp:  [36.3 °C (97.4 °F)-37 °C (98.6 °F)] 36.3 °C (97.4 °F)  Pulse:  [64-85] 76  Resp:  [17-20] 17  BP: (108-128)/(68-77) 121/71  SpO2:  [93 %-97 %] 97 %    Physical Exam  Vitals signs and nursing note reviewed.   Constitutional:       General: He is not in acute distress.     Appearance: He is not diaphoretic.   Eyes:      General: No scleral icterus.     Conjunctiva/sclera: Conjunctivae normal.   Neck:      Musculoskeletal: Neck supple.   Cardiovascular:      Rate and Rhythm: Normal rate.      Heart sounds: Normal heart sounds.   Pulmonary:      Effort: Pulmonary effort is normal. No respiratory distress.      Breath sounds: Normal breath sounds.   Abdominal:      General: There is no distension.   Musculoskeletal:         General: No swelling or tenderness.   Lymphadenopathy:      Cervical: No cervical adenopathy.   Skin:     Coloration: Skin is not jaundiced.      Findings: No erythema.   Neurological:       Mental Status: He is alert. Mental status is at baseline.      Coordination: Coordination normal.      Comments: Oriented to self     Psychiatric:         Mood and Affect: Mood normal.         Fluids    Intake/Output Summary (Last 24 hours) at 2/9/2020 1642  Last data filed at 2/9/2020 1032  Gross per 24 hour   Intake 400 ml   Output 300 ml   Net 100 ml       Laboratory  Recent Labs     02/09/20  0323   WBC 7.4   RBC 3.09*   HEMOGLOBIN 9.3*   HEMATOCRIT 29.1*   MCV 94.2   MCH 30.1   MCHC 32.0*   RDW 45.7   PLATELETCT 293   MPV 9.7     Recent Labs     02/09/20  0323   SODIUM 138   POTASSIUM 4.4   CHLORIDE 105   CO2 22   GLUCOSE 89   BUN 55*   CREATININE 2.11*   CALCIUM 9.0                   Imaging  No orders to display        Assessment/Plan  * Requires supervision due to deficit in self-care  Assessment & Plan  Awaiting for guardianship    Agitation  Assessment & Plan   Continue seroquel at night      Chronic atrial fibrillation  Assessment & Plan  Rate controlled, bradycardia is resolved  Xarelto for anticoagulation    Severe protein-calorie malnutrition (HCC)  Assessment & Plan   Continue boost supplements    Hyperlipidemia- (present on admission)  Assessment & Plan  Atorvastatin    COPD (chronic obstructive pulmonary disease) ([facility])- (present on admission)  Assessment & Plan  Continue RT protocol    CKD (chronic kidney disease) stage 3, GFR 30-59 ml/min ([facility])- (present on admission)  Assessment & Plan  Creatinine remains at his baseline on labs done today  Anemia is stable      Essential hypertension- (present on admission)  Assessment & Plan  Controlled on Norvasc and losartan.          VTE prophylaxis: xarelto

## 2020-02-10 NOTE — CARE PLAN
Problem: Communication  Goal: The ability to communicate needs accurately and effectively will improve  Outcome: PROGRESSING AS EXPECTED  Note:   Plan of care discussed, patient verbalizes understanding      Problem: Safety  Goal: Will remain free from injury  Outcome: PROGRESSING AS EXPECTED  Note:   Treaded socks in place, call light within reach, bed locked in low position, room near nursing station, hourly rounding, bed alarm on      Problem: Bowel/Gastric:  Goal: Normal bowel function is maintained or improved  Outcome: PROGRESSING SLOWER THAN EXPECTED  Note:   Pt with no BM since 2/5, refusing additional interventions despite education

## 2020-02-10 NOTE — PROGRESS NOTES
Hospital Medicine Daily Progress Note    Date of Service  2/10/2020    Chief Complaint  79 y.o. male admitted 12/27/2019 with inability to care for self.    Hospital Course    Patient presented from home when his caregiver was found dead in his home on day of admission.  Patient is not able to care for himself and needs 24 hour supervision.  He was seen by psychiatry on 1/11/2020 and deemed incapacitated for medical decisions.       Interval Problem Update  The patient is eating well this morning    Consultants/Specialty  Psych - s/o    Code Status  DNR    Disposition  Guardianship pending    Review of Systems  Review of Systems   Constitutional: Negative for fever and malaise/fatigue.   Respiratory: Negative for shortness of breath and wheezing.    Cardiovascular: Negative for chest pain and palpitations.   Gastrointestinal: Negative for constipation, diarrhea and nausea.   Genitourinary: Negative for dysuria, hematuria and urgency.   Musculoskeletal: Negative for myalgias.   Neurological: Negative for dizziness and headaches.   Psychiatric/Behavioral: The patient does not have insomnia.         Physical Exam  Temp:  [36.3 °C (97.3 °F)-36.6 °C (97.9 °F)] 36.5 °C (97.7 °F)  Pulse:  [58-82] 77  Resp:  [17-20] 18  BP: (106-136)/(55-76) 125/70  SpO2:  [95 %-99 %] 96 %    Physical Exam  Vitals signs and nursing note reviewed.   Constitutional:       General: He is not in acute distress.     Appearance: He is not ill-appearing or diaphoretic.   HENT:      Mouth/Throat:      Mouth: Mucous membranes are moist.      Pharynx: No oropharyngeal exudate.   Eyes:      General: No scleral icterus.     Conjunctiva/sclera: Conjunctivae normal.   Neck:      Musculoskeletal: Neck supple.   Cardiovascular:      Rate and Rhythm: Normal rate.      Heart sounds: Normal heart sounds.   Pulmonary:      Effort: Pulmonary effort is normal. No respiratory distress.      Breath sounds: Normal breath sounds.   Abdominal:      General: Bowel  sounds are normal. There is no distension.      Palpations: There is no mass.   Musculoskeletal:         General: No swelling or tenderness.   Lymphadenopathy:      Cervical: No cervical adenopathy.   Skin:     Coloration: Skin is not jaundiced.   Neurological:      Mental Status: He is alert. Mental status is at baseline.      Coordination: Coordination normal.      Comments: Oriented to self           Fluids    Intake/Output Summary (Last 24 hours) at 2/10/2020 1400  Last data filed at 2/10/2020 1200  Gross per 24 hour   Intake 240 ml   Output 475 ml   Net -235 ml       Laboratory  Recent Labs     02/09/20  0323   WBC 7.4   RBC 3.09*   HEMOGLOBIN 9.3*   HEMATOCRIT 29.1*   MCV 94.2   MCH 30.1   MCHC 32.0*   RDW 45.7   PLATELETCT 293   MPV 9.7     Recent Labs     02/09/20  0323   SODIUM 138   POTASSIUM 4.4   CHLORIDE 105   CO2 22   GLUCOSE 89   BUN 55*   CREATININE 2.11*   CALCIUM 9.0                   Imaging  No orders to display        Assessment/Plan  * Requires supervision due to deficit in self-care  Assessment & Plan  Awaiting for guardianship    Agitation  Assessment & Plan   Continue seroquel at night      Chronic atrial fibrillation  Assessment & Plan  Rate controlled, bradycardia is resolved  Xarelto for anticoagulation    Severe protein-calorie malnutrition (HCC)  Assessment & Plan   Continue boost supplements    Hyperlipidemia- (present on admission)  Assessment & Plan  Atorvastatin    COPD (chronic obstructive pulmonary disease) ([facility])- (present on admission)  Assessment & Plan  Continue RT protocol    CKD (chronic kidney disease) stage 3, GFR 30-59 ml/min ([facility])- (present on admission)  Assessment & Plan  Creatinine remains at his baseline  Anemia is stable      Essential hypertension- (present on admission)  Assessment & Plan  Controlled on Norvasc and losartan.          VTE prophylaxis: xarelto

## 2020-02-10 NOTE — CARE PLAN
Problem: Nutritional:  Goal: Achieve adequate nutritional intake  Description  Patient will consume >25-50% of meals and supplements.   Outcome: PROGRESSING AS EXPECTED    PO has improved and is now % of most meals since 2/7/20. RD will continue to monitor.

## 2020-02-10 NOTE — PROGRESS NOTES
Pt oriented x3, denies pain at this time  Denies numbness or tingling  heels pink and blanching, floated on pillow, educated on turning self in bed often  Waffle matress in place, mepelex to sacrum pink blanching  Tolerating diet, pt refusing to get OOB at this time, will reattempt  Treaded socks on, bed alarm on, call light within reach, hourly rounding

## 2020-02-10 NOTE — PROGRESS NOTES
Report received from J Gilligan RN. Pt sleeping in bed, awakens easily to sound, oriented to self, irritable. Pt denies needs at this time. VSS. Bed alarm on. Will continue to monitor.

## 2020-02-10 NOTE — DISCHARGE PLANNING
Received phone call from [facility] Ora Wood. [facility] requesting a [phone] form. They will be in contact with Memo GRIDER.

## 2020-02-11 NOTE — PROGRESS NOTES
0700:  Bedside report completed.  Assumed pt care. Patient in bed, sleeping, in no apparent distress.  Safety precautions in place. Call light & personal belongings within reach.  Plan of care discussed.    1630:  Bedside report abby/ Cristobal/BRENTON

## 2020-02-11 NOTE — DISCHARGE PLANNING
HALW spoke with Ora from [facility] and confirmed she received LSW's fax form [phone] for pt. Ora stated she would call LSW back when social security calls and confirms that services have been set in place.

## 2020-02-11 NOTE — PROGRESS NOTES
Hospital Medicine Daily Progress Note    Date of Service  2/11/2020    Chief Complaint  79 y.o. male admitted 12/27/2019 with inability to care for self.    Hospital Course    Patient presented from home when his caregiver was found dead in his home on day of admission.  Patient is not able to care for himself and needs 24 hour supervision.  He was seen by psychiatry on 1/11/2020 and deemed incapacitated for medical decisions.       Interval Problem Update  2/11 Patient remains same, eats some foods but is picky with intake.  He requested all the peach yogurt smoothies be thrown away as he doesn't like them.     Consultants/Specialty  Psych - s/o    Code Status  DNR    Disposition  Guardianship pending    Review of Systems  Review of Systems   Constitutional: Negative for chills and fever.   HENT: Negative for congestion.    Eyes: Negative for blurred vision and photophobia.   Respiratory: Negative for cough and shortness of breath.    Cardiovascular: Negative for chest pain, claudication and leg swelling.   Gastrointestinal: Negative for abdominal pain, constipation, diarrhea, heartburn, nausea and vomiting.   Genitourinary: Negative for dysuria and hematuria.   Musculoskeletal: Negative for joint pain and myalgias.   Skin: Negative for itching and rash.   Neurological: Negative for dizziness, sensory change, speech change, weakness and headaches.   Psychiatric/Behavioral: Negative for depression. The patient is not nervous/anxious and does not have insomnia.         Physical Exam  Temp:  [36.6 °C (97.9 °F)-36.7 °C (98.1 °F)] 36.7 °C (98 °F)  Pulse:  [] 69  Resp:  [18-20] 18  BP: (120-139)/(65-75) 139/74  SpO2:  [90 %-98 %] 98 %    Physical Exam  Vitals signs and nursing note reviewed.   Constitutional:       General: He is not in acute distress.     Appearance: Normal appearance.   HENT:      Head: Normocephalic and atraumatic.   Eyes:      General: No scleral icterus.     Extraocular Movements: Extraocular  movements intact.   Neck:      Musculoskeletal: Normal range of motion and neck supple.   Cardiovascular:      Rate and Rhythm: Normal rate and regular rhythm.      Pulses: Normal pulses.      Heart sounds: Normal heart sounds. No murmur.   Pulmonary:      Effort: Pulmonary effort is normal. No respiratory distress.      Breath sounds: Normal breath sounds. No wheezing, rhonchi or rales.   Abdominal:      General: Abdomen is flat. Bowel sounds are normal. There is no distension.      Palpations: Abdomen is soft.      Tenderness: There is no rebound.   Musculoskeletal:         General: No swelling or tenderness.   Lymphadenopathy:      Cervical: No cervical adenopathy.   Skin:     Coloration: Skin is not jaundiced.      Findings: No erythema.   Neurological:      Mental Status: He is alert. Mental status is at baseline.      Cranial Nerves: No cranial nerve deficit.      Comments: Oriented to self     Psychiatric:         Mood and Affect: Mood normal.         Behavior: Behavior normal.         Fluids    Intake/Output Summary (Last 24 hours) at 2/11/2020 1248  Last data filed at 2/11/2020 0800  Gross per 24 hour   Intake 300 ml   Output --   Net 300 ml       Laboratory  Recent Labs     02/09/20  0323   WBC 7.4   RBC 3.09*   HEMOGLOBIN 9.3*   HEMATOCRIT 29.1*   MCV 94.2   MCH 30.1   MCHC 32.0*   RDW 45.7   PLATELETCT 293   MPV 9.7     Recent Labs     02/09/20  0323   SODIUM 138   POTASSIUM 4.4   CHLORIDE 105   CO2 22   GLUCOSE 89   BUN 55*   CREATININE 2.11*   CALCIUM 9.0                   Imaging  No orders to display        Assessment/Plan  * Requires supervision due to deficit in self-care  Assessment & Plan  Awaiting for guardianship    Agitation  Assessment & Plan   Continue seroquel at night.        Chronic atrial fibrillation  Assessment & Plan  Rate controlled, bradycardia resolved  Xarelto for anticoagulation    Severe protein-calorie malnutrition (HCC)  Assessment & Plan   Continue boost  supplements    Hyperlipidemia- (present on admission)  Assessment & Plan  Atorvastatin    COPD (chronic obstructive pulmonary disease) ([facility])- (present on admission)  Assessment & Plan  Continue RT protocol    CKD (chronic kidney disease) stage 3, GFR 30-59 ml/min ([facility])- (present on admission)  Assessment & Plan  Creatinine remains at his baseline  Anemia is stable      Essential hypertension- (present on admission)  Assessment & Plan  Controlled on Norvasc and losartan.           VTE prophylaxis: xarelto

## 2020-02-11 NOTE — CARE PLAN
Problem: Discharge Barriers/Planning  Goal: Patient's continuum of care needs will be met  Outcome: PROGRESSING AS EXPECTED  Intervention: Collaborate with Transitional Care Team and Interdisciplinary Team to meet discharge needs  Note:   Social work/Case Management continue to work on a plan for this patient.  At this time, he is medically cleared for discharge, however, there is an issue with POA and placement.     Problem: Skin Integrity  Goal: Risk for impaired skin integrity will decrease  Outcome: PROGRESSING AS EXPECTED  Intervention: Assess risk factors for impaired skin integrity and/or pressure ulcers  Note:   Patient's skin being assessed every shift for any changes, he is on a waffle mattress, up to chair, and is being turned q2 hours if he is not already turning himself.

## 2020-02-11 NOTE — PROGRESS NOTES
"      Spiritual Care Note    Patient Information     Patient's Name: Eulogio Jeronimo   MRN: [id number]    YOB: 1940   Age and Gender: 79 y.o. male   Service Area: GEN SURGERY [facility]   Room (and Bed): 2210/01   Ethnicity or Nationality:     Primary Language: English   Scientology/Spiritual preference: Non-Scientology   Place of Residence: [place], [place]   Family/Friends/Others Present: no   Clinical Team Present: Nurse   Medical Diagnosis(-es)/Procedure(s):  Requires supervision due to deficit in self-care   Code Status: DNAR/DNI    Date of Admission: 12/27/2019   Length of Stay: 0 days        Spiritual Care Provider Information:  Name of Spiritual Care Provider: Jocelin Fabian  Title of Spiritual Care Provider: Associate Chen  Phone Number: [phone]  E-mail: [email or web address]  Total time : 20 minutes    Spiritual Screen Results:    Gen Nursing  Spiritual Screen  Is your spiritual health or inner well-being important to you as you cope with your medical condition?: Yes  Would you like to receive a visit from our Spiritual Care team or your own Jewish or spiritual leader?: Yes  Was spiritual care education provided to the patient?: Yes     Palliative Care  PC Scientology/Spiritual Screening  Was spiritual care education provided to the patient?: Yes      Encounter/Request Information  Encounter/Request Type   Visited With: Patient, Health care provider  Nature of the Visit: Initial, On shift  Continue Visiting: (UPON REQUEST)  General Visit: Yes  Referral From/ Origin of Request: Epic nursing    Religous Needs/Values       Spiritual Assessment   Spiritual Care Encounters    Observations/Symptoms: (Pt alert, lying in bed, smiling disposition)    Interacton/Conversation:  introduced self to pt. Pt shared he was \"feeling very good at this time. I really like the ice cream someone sent down earlier - was that you?\"  denied that was them, but asked pt if he would like " "some ice cream. Pt responded \"Yes, that would be great, anything like that.\"  checked with nurse, and confirmed pt's diet ok for sherbert.  got orange sherbert for pt and delivered it, pt was very grateful.     Assessment: Need  Need: (Pt desired some comfort food)    Interventions: Compassionate Presence, Comfort Food     Outcomes: Ability to Communicate with Truth and Honesty, Value/Dignity/Respect    Plan: Visit Upon Request    Notes:            "

## 2020-02-12 NOTE — CARE PLAN
Problem: Communication  Goal: The ability to communicate needs accurately and effectively will improve  Outcome: PROGRESSING AS EXPECTED  Note:   Pt communicating needs well this shift, limited by confusion, fatigue, does not use call light      Problem: Safety  Goal: Will remain free from injury  Outcome: PROGRESSING AS EXPECTED  Goal: Will remain free from falls  Outcome: PROGRESSING AS EXPECTED     Problem: Infection  Goal: Will remain free from infection  Outcome: PROGRESSING AS EXPECTED     Problem: Venous Thromboembolism (VTW)/Deep Vein Thrombosis (DVT) Prevention:  Goal: Patient will participate in Venous Thrombosis (VTE)/Deep Vein Thrombosis (DVT)Prevention Measures  Outcome: PROGRESSING AS EXPECTED  Flowsheets (Taken 2/11/2020 2200)  Pharmacologic Prophylaxis Used: Rivaroxaban (Xarelto) - (ONLY for Total Knee and Total Hip Surgery)     Problem: Bowel/Gastric:  Goal: Normal bowel function is maintained or improved  Outcome: PROGRESSING AS EXPECTED  Goal: Will not experience complications related to bowel motility  Outcome: PROGRESSING AS EXPECTED     Problem: Knowledge Deficit  Goal: Knowledge of disease process/condition, treatment plan, diagnostic tests, and medications will improve  Outcome: PROGRESSING AS EXPECTED  Goal: Knowledge of the prescribed therapeutic regimen will improve  Outcome: PROGRESSING AS EXPECTED     Problem: Discharge Barriers/Planning  Goal: Patient's continuum of care needs will be met  Outcome: PROGRESSING AS EXPECTED     Problem: Respiratory:  Goal: Respiratory status will improve  Outcome: PROGRESSING AS EXPECTED     Problem: Skin Integrity  Goal: Risk for impaired skin integrity will decrease  Outcome: PROGRESSING AS EXPECTED     Problem: Urinary Elimination:  Goal: Ability to reestablish a normal urinary elimination pattern will improve  Outcome: PROGRESSING AS EXPECTED  Note:   Continued incontinence, baseline     Problem: Pain Management  Goal: Pain level will decrease to  patient's comfort goal  Outcome: PROGRESSING AS EXPECTED     Problem: Psychosocial Needs:  Goal: Level of anxiety will decrease  Outcome: PROGRESSING AS EXPECTED     Problem: Mobility  Goal: Risk for activity intolerance will decrease  Outcome: PROGRESSING AS EXPECTED  Note:   Unable to mobilize baseline     Problem: Medication  Goal: Compliance with prescribed medication will improve  Outcome: PROGRESSING AS EXPECTED

## 2020-02-12 NOTE — PROGRESS NOTES
Report received from J Gilligan RN. Pt resting in bed with eyes closed, chest rise and fall even and unlabored, no s/s distress. Per report, pt was very pleasant and cooperative all day today. Bed alarm on. VSS. Will continue to monitor.

## 2020-02-12 NOTE — PROGRESS NOTES
Hospital Medicine Daily Progress Note    Date of Service  2/12/2020    Chief Complaint  79 y.o. male admitted 12/27/2019 with inability to care for self.    Hospital Course    Patient presented from home when his caregiver was found dead in his home on day of admission.  Patient is not able to care for himself and needs 24 hour supervision.  He was seen by psychiatry on 1/11/2020 and deemed incapacitated for medical decisions.       Interval Problem Update  2/11 Patient remains same, eats some foods but is picky with intake.  He requested all the peach yogurt smoothies be thrown away as he doesn't like them.   2/12 Patient without complaint, requesting more hot coffee.    Consultants/Specialty  Psych - s/o    Code Status  DNR    Disposition  Guardianship pending    Review of Systems  Review of Systems   Constitutional: Negative for chills and fever.   HENT: Negative for congestion.    Eyes: Negative for blurred vision and photophobia.   Respiratory: Negative for cough and shortness of breath.    Cardiovascular: Negative for chest pain, claudication and leg swelling.   Gastrointestinal: Negative for abdominal pain, constipation, diarrhea, heartburn, nausea and vomiting.   Genitourinary: Negative for dysuria and hematuria.   Musculoskeletal: Negative for joint pain and myalgias.   Skin: Negative for itching and rash.   Neurological: Negative for dizziness, sensory change, speech change, weakness and headaches.   Psychiatric/Behavioral: Negative for depression. The patient is not nervous/anxious and does not have insomnia.         Physical Exam  Temp:  [36.4 °C (97.6 °F)-36.8 °C (98.2 °F)] 36.8 °C (98.2 °F)  Pulse:  [70-79] 79  Resp:  [18] 18  BP: (120-153)/(67-80) 142/67  SpO2:  [94 %-97 %] 94 %    Physical Exam  Vitals signs and nursing note reviewed.   Constitutional:       General: He is not in acute distress.     Appearance: Normal appearance.   HENT:      Head: Normocephalic and atraumatic.   Eyes:      General:  No scleral icterus.     Extraocular Movements: Extraocular movements intact.   Neck:      Musculoskeletal: Normal range of motion and neck supple.   Cardiovascular:      Rate and Rhythm: Normal rate and regular rhythm.      Pulses: Normal pulses.      Heart sounds: Normal heart sounds. No murmur.   Pulmonary:      Effort: Pulmonary effort is normal. No respiratory distress.      Breath sounds: Normal breath sounds. No wheezing, rhonchi or rales.   Abdominal:      General: Abdomen is flat. Bowel sounds are normal. There is no distension.      Palpations: Abdomen is soft.      Tenderness: There is no rebound.   Musculoskeletal:         General: No swelling or tenderness.   Lymphadenopathy:      Cervical: No cervical adenopathy.   Skin:     Coloration: Skin is not jaundiced.      Findings: No erythema.   Neurological:      Mental Status: He is alert. Mental status is at baseline.      Cranial Nerves: No cranial nerve deficit.      Comments: Oriented to self     Psychiatric:         Mood and Affect: Mood normal.         Behavior: Behavior normal.         Fluids    Intake/Output Summary (Last 24 hours) at 2/12/2020 1253  Last data filed at 2/12/2020 0500  Gross per 24 hour   Intake 600 ml   Output 200 ml   Net 400 ml       Laboratory                        Imaging  No orders to display        Assessment/Plan  * Requires supervision due to deficit in self-care  Assessment & Plan  Awaiting for guardianship    Agitation  Assessment & Plan   Continue seroquel at night.        Chronic atrial fibrillation  Assessment & Plan  Rate controlled, bradycardia resolved  Xarelto for anticoagulation    Severe protein-calorie malnutrition (HCC)  Assessment & Plan   Continue boost supplements    Hyperlipidemia- (present on admission)  Assessment & Plan  Atorvastatin    COPD (chronic obstructive pulmonary disease) (HCC)- (present on admission)  Assessment & Plan  Continue RT protocol    CKD (chronic kidney disease) stage 3, GFR 30-59  ml/min (HCC)- (present on admission)  Assessment & Plan  Creatinine remains at his baseline  Anemia is stable      Essential hypertension- (present on admission)  Assessment & Plan  Controlled on Norvasc and losartan.           VTE prophylaxis: xarelto

## 2020-02-13 NOTE — PROGRESS NOTES
"Pt's neighbor, Axel, stopped by pt's room to \"collect money to pay for the mobile home space.\" When questioned about being the manager or not, Axel stated he was \"just trying to help, so [facility] doesn't lose his spot. The owners are really strict.\" I contacted KATHE and GURWINDER, and found Axel in room with pt and pt's checkbook in hand trying to have him sign a check. I informed Axel that pt was not able to make financial decisions at this time, and took down Axel's contact info, as well as 's info. I gave this info to KATHE to investigate. Pt's checkbook and wallet collected and given to ER administration to lock up.  "

## 2020-02-13 NOTE — CARE PLAN
Problem: Venous Thromboembolism (VTW)/Deep Vein Thrombosis (DVT) Prevention:  Goal: Patient will participate in Venous Thrombosis (VTE)/Deep Vein Thrombosis (DVT)Prevention Measures  Outcome: PROGRESSING AS EXPECTED  Flowsheets (Taken 2/13/2020 0132)  Risk Assessment Score: 1  VTE RISK: Moderate  Mechanical Prophylaxis: SCDs, Sequential Compression Device  AV Foot Pumps: Refused  SCDs, Sequential Compression Device: Refused  Pharmacologic Prophylaxis Used: Rivaroxaban (Xarelto) - (ONLY for Total Knee and Total Hip Surgery)     Problem: Bowel/Gastric:  Goal: Normal bowel function is maintained or improved  Flowsheets  Taken 2/13/2020 0132 by Letty Castorena R.N.  Last BM: 02/12/20  Taken 2/12/2020 1757 by Cristobal Bermudez RMoniqueNMonique  Number of Times Stooled: 1     Problem: Bowel/Gastric:  Goal: Normal bowel function is maintained or improved  2/13/2020 0146 by Letty Castorena RMoniqueN.  Outcome: PROGRESSING AS EXPECTED  2/13/2020 0146 by MARIELA StarrNMonique  Flowsheets  Taken 2/13/2020 0132 by Letty Castorena R.N.  Last BM: 02/12/20  Taken 2/12/2020 1757 by Cristobal Bermudez RMoniqueNMonique  Number of Times Stooled: 1     Problem: Bowel/Gastric:  Goal: Will not experience complications related to bowel motility  Outcome: PROGRESSING AS EXPECTED

## 2020-02-13 NOTE — PROGRESS NOTES
Received report, assumed pt care. Pt assisted with breakfast-ate 50%. No further needs at this time. Will cont to monitor.

## 2020-02-13 NOTE — PROGRESS NOTES
Patient refused evening meds tonight despite education. Resting in bed comfortably. Turned as allows. Pending guardianship.

## 2020-02-13 NOTE — PROGRESS NOTES
Spiritual Care Note    Patient Information     Patient's Name: Eulogio Jeronimo   MRN: [id number]    YOB: 1940   Age and Gender: 79 y.o. male   Service Area: GEN SURGERY [facility]   Room (and Bed): 2210/01   Ethnicity or Nationality:     Primary Language: English   Sabianist/Spiritual preference: Non-Sabianist   Place of Residence: [place], [place]   Family/Friends/Others Present: no   Clinical Team Present: Nurse   Code Status: DNAR/DNI    Date of Admission: 12/27/2019   Length of Stay: 0 days        Spiritual Care Provider Information:  Name of Spiritual Care Provider: Jocelin Fabian  Title of Spiritual Care Provider: Associate   Phone Number: [phone]  E-mail: [email or web address]  Total time : 15 minutes    Spiritual Screen Results:    Gen Nursing  Spiritual Screen  Is your spiritual health or inner well-being important to you as you cope with your medical condition?: Yes  Would you like to receive a visit from our Spiritual Care team or your own Jehovah's witness or spiritual leader?: Yes  Was spiritual care education provided to the patient?: Yes     Palliative Care  PC Sabianist/Spiritual Screening  Was spiritual care education provided to the patient?: Yes      Encounter/Request Information  Encounter/Request Type   Visited With: Patient, Health care provider  Nature of the Visit: Follow-up, On shift  Continue Visiting: Yes  Next Follow-up Date: 02/17/20  General Visit: Yes  Referral From/ Origin of Request: Epic nursing    Religous Needs/Values       Spiritual Assessment   Spiritual Care Encounters    Observations/Symptoms: (Pt alert, watching tv)    Interacton/Conversation:  f/u with pt - Pt pleasant to talk with - pt asked if he could have more coffee and some ice cream. Nurse assisted  in getting pt's requests. Pt appreciated 's visit - denied spiritual care needs at this time.    Assessment: Need  Need: (Pt desired some comfort  food)    Interventions: Companionship, Conversation    Outcomes: Ability to Communicate with Truth and Honesty, Value/Dignity/Respect    Plan: (will continue to follow)    Notes:

## 2020-02-13 NOTE — PROGRESS NOTES
[facility] Medicine Daily Progress Note    Date of Service  2/13/2020    Chief Complaint  79 y.o. male admitted 12/27/2019 with inability to care for self.    Hospital Course    Patient presented from home when his caregiver was found dead in his home on day of admission.  Patient is not able to care for himself and needs 24 hour supervision.  He was seen by psychiatry on 1/11/2020 and deemed incapacitated for medical decisions.       Interval Problem Update  2/11 Patient remains same, eats some foods but is picky with intake.  He requested all the peach yogurt smoothies be thrown away as he doesn't like them.   2/12 Patient without complaint, requesting more hot coffee.  2/13 Patient doing same, no complaint.s    Consultants/Specialty  Psych - s/o    Code Status  DNR    Disposition  Guardianship pending    Review of Systems  Review of Systems   Constitutional: Negative for chills and fever.   HENT: Negative for congestion.    Eyes: Negative for blurred vision and photophobia.   Respiratory: Negative for cough and shortness of breath.    Cardiovascular: Negative for chest pain, claudication and leg swelling.   Gastrointestinal: Negative for abdominal pain, constipation, diarrhea, heartburn, nausea and vomiting.   Genitourinary: Negative for dysuria and hematuria.   Musculoskeletal: Negative for joint pain and myalgias.   Skin: Negative for itching and rash.   Neurological: Negative for dizziness, sensory change, speech change, weakness and headaches.   Psychiatric/Behavioral: Negative for depression. The patient is not nervous/anxious and does not have insomnia.         Physical Exam  Temp:  [36.7 °C (98 °F)-36.8 °C (98.2 °F)] 36.8 °C (98.2 °F)  Pulse:  [75-92] 92  Resp:  [18] 18  BP: (125-141)/(64-78) 141/78  SpO2:  [95 %-96 %] 96 %    Physical Exam  Vitals signs and nursing note reviewed.   Constitutional:       General: He is not in acute distress.     Appearance: Normal appearance.   HENT:      Head: Normocephalic  and atraumatic.   Eyes:      General: No scleral icterus.     Extraocular Movements: Extraocular movements intact.   Neck:      Musculoskeletal: Normal range of motion and neck supple.   Cardiovascular:      Rate and Rhythm: Normal rate and regular rhythm.      Pulses: Normal pulses.      Heart sounds: Normal heart sounds. No murmur.   Pulmonary:      Effort: Pulmonary effort is normal. No respiratory distress.      Breath sounds: Normal breath sounds. No wheezing, rhonchi or rales.   Abdominal:      General: Abdomen is flat. Bowel sounds are normal. There is no distension.      Palpations: Abdomen is soft.      Tenderness: There is no rebound.   Musculoskeletal:         General: No swelling or tenderness.   Lymphadenopathy:      Cervical: No cervical adenopathy.   Skin:     Coloration: Skin is not jaundiced.      Findings: No erythema.   Neurological:      Mental Status: He is alert. Mental status is at baseline.      Cranial Nerves: No cranial nerve deficit.      Comments: Oriented to self     Psychiatric:         Mood and Affect: Mood normal.         Behavior: Behavior normal.         Fluids    Intake/Output Summary (Last 24 hours) at 2/13/2020 1209  Last data filed at 2/13/2020 0400  Gross per 24 hour   Intake 480 ml   Output 250 ml   Net 230 ml       Laboratory                        Imaging  No orders to display        Assessment/Plan  * Requires supervision due to deficit in self-care  Assessment & Plan  Awaiting for guardianship    Agitation  Assessment & Plan   Continue seroquel at night.        Chronic atrial fibrillation  Assessment & Plan  Rate controlled, bradycardia resolved  Xarelto for anticoagulation    Severe protein-calorie malnutrition (HCC)  Assessment & Plan   Continue boost supplements    Hyperlipidemia- (present on admission)  Assessment & Plan  Atorvastatin    COPD (chronic obstructive pulmonary disease) (HCC)- (present on admission)  Assessment & Plan  Continue RT protocol    CKD (chronic  kidney disease) stage 3, GFR 30-59 ml/min ([facility])- (present on admission)  Assessment & Plan  Creatinine remains at his baseline  Anemia is stable      Essential hypertension- (present on admission)  Assessment & Plan  Controlled on Norvasc and losartan.           VTE prophylaxis: xarelto

## 2020-02-13 NOTE — PROGRESS NOTES
Assumed care of pt.  AAOx3.  Denies pain or discomfort at this time.  All needs met at this time.   Rounding in place.

## 2020-02-13 NOTE — DISCHARGE PLANNING
LSW spoke with pt's brother and informed him of the status of financial POA currently stands. LSW inquired about who would be able to come pick the pt up due to the pt's inability to safely fly alone and the pt's brother stated that Fela Jeronimo, grand-daughter, would be able to. LSW asked Fela to callback with whether she is willing to do so or not.

## 2020-02-14 NOTE — PROGRESS NOTES
Bedside report received from night RN. Assumed care of patient. Daily plan of care discussed. Pt resting comfortably in bed at this time with no signs of distress noted, breathing even and unlabored, bed alarm on. Hourly rounding in place.

## 2020-02-14 NOTE — CARE PLAN
Problem: Nutritional:  Goal: Achieve adequate nutritional intake  Description: Patient will consume >25-50% of meals and supplements.   Outcome: MET    PO % most meals, refusing meals occasionally. Supplements in place with meals. RD to follow weekly.

## 2020-02-14 NOTE — PROGRESS NOTES
Pt resting in bed, awakens easily to voice, refused evening meds, education provided, denies pain, VSS. Safety measures and hourly rounding in place.

## 2020-02-14 NOTE — CARE PLAN
Problem: Safety  Goal: Will remain free from falls  Outcome: PROGRESSING AS EXPECTED  Intervention: Implement fall precautions  Flowsheets  Taken 2/14/2020 1333  Bed Alarm: Yes - Alarm On  Taken 2/14/2020 0900  Environmental Precautions:   Treaded Slipper Socks on Patient   Personal Belongings, Wastebasket, [employer] etc. in Easy Reach   Report Given to Other Health Care Providers Regarding Fall Risk   Bed in Low Position   Communication Sign for Patients & Families   Mobility Assessed & Appropriate Sign Placed  Note: Environmental fall precautions and hourly rounding in place. Pt does not call appropriately for assistance. Bed alarm in place at all times. Hourly rounding in place to ensure needs are met.       Problem: Pain Management  Goal: Pain level will decrease to patient's comfort goal  Outcome: PROGRESSING AS EXPECTED  Intervention: Educate and implement non-pharmacologic comfort measures. Examples: relaxation, distration, play therapy, activity therapy, massage, etc.  Note: Pt denies pain this shift. Pt encouraged to utilize distraction, repositioning, food, and rest to alleviate pain. Pt verbalized understanding but learning requires reinforcement.

## 2020-02-14 NOTE — CARE PLAN
Problem: Communication  Goal: The ability to communicate needs accurately and effectively will improve  Outcome: PROGRESSING AS EXPECTED     Problem: Safety  Goal: Will remain free from injury  Outcome: PROGRESSING AS EXPECTED  Goal: Will remain free from falls  Outcome: PROGRESSING AS EXPECTED     Problem: Infection  Goal: Will remain free from infection  Outcome: PROGRESSING AS EXPECTED     Problem: Venous Thromboembolism (VTW)/Deep Vein Thrombosis (DVT) Prevention:  Goal: Patient will participate in Venous Thrombosis (VTE)/Deep Vein Thrombosis (DVT)Prevention Measures  Outcome: PROGRESSING AS EXPECTED     Problem: Bowel/Gastric:  Goal: Normal bowel function is maintained or improved  Outcome: PROGRESSING AS EXPECTED     Problem: Respiratory:  Goal: Respiratory status will improve  Outcome: PROGRESSING AS EXPECTED     Problem: Skin Integrity  Goal: Risk for impaired skin integrity will decrease  Outcome: PROGRESSING AS EXPECTED

## 2020-02-14 NOTE — DIETARY
Nutrition Services: Update   Day 48 of admit.  Eulogio Jeronimo is a 79 y.o. male with admitting DX of Requires supervision due to deficit in self-care    Pt is currently on Regular diet. PO % most meals, refusing meals occasionally. Wt unavailable, pt refusing.    Malnutrition Risk: Severe malnutrition per RD assessment on admit.    Recommendations/Plan:  1. Supplements with meals   2. Encourage intake of meals  3. Document intake of all meals as % taken in ADL's to provide interdisciplinary communication across all shifts.   4. Monitor weight.  5. Nutrition rep will continue to see patient for ongoing meal and snack preferences.      RD following weekly.

## 2020-02-14 NOTE — PROGRESS NOTES
Hospital Medicine Daily Progress Note    Date of Service  2/14/2020    Chief Complaint  79 y.o. male admitted 12/27/2019 with inability to care for self.    Hospital Course    Patient presented from home when his caregiver was found dead in his home on day of admission.  Patient is not able to care for himself and needs 24 hour supervision.  He was seen by psychiatry on 1/11/2020 and deemed incapacitated for medical decisions.       Interval Problem Update  2/11 Patient remains same, eats some foods but is picky with intake.  He requested all the peach yogurt smoothies be thrown away as he doesn't like them.   2/12 Patient without complaint, requesting more hot coffee.  2/13 Patient doing same, no complaint.s  2/14 Patient in good mood when initially evaluated but states he wants his wallet and check book back to his bedside as he doesn't trust that it is in safekeeping and would rather have it back.    Consultants/Specialty  Psych - s/o    Code Status  DNR    Disposition  Guardianship pending    Review of Systems  Review of Systems   Constitutional: Negative for chills and fever.   HENT: Negative for congestion.    Eyes: Negative for blurred vision and photophobia.   Respiratory: Negative for cough and shortness of breath.    Cardiovascular: Negative for chest pain, claudication and leg swelling.   Gastrointestinal: Negative for abdominal pain, constipation, diarrhea, heartburn, nausea and vomiting.   Genitourinary: Negative for dysuria and hematuria.   Musculoskeletal: Negative for joint pain and myalgias.   Skin: Negative for itching and rash.   Neurological: Negative for dizziness, sensory change, speech change, weakness and headaches.   Psychiatric/Behavioral: Negative for depression. The patient is not nervous/anxious and does not have insomnia.         Physical Exam  Temp:  [36.3 °C (97.4 °F)-36.5 °C (97.7 °F)] 36.3 °C (97.4 °F)  Pulse:  [] 73  Resp:  [18] 18  BP: (127-150)/(69-91) 140/91  SpO2:  [93  %-97 %] 97 %    Physical Exam  Vitals signs and nursing note reviewed.   Constitutional:       General: He is not in acute distress.     Appearance: Normal appearance.   HENT:      Head: Normocephalic and atraumatic.   Eyes:      General: No scleral icterus.     Extraocular Movements: Extraocular movements intact.   Neck:      Musculoskeletal: Normal range of motion and neck supple.   Cardiovascular:      Rate and Rhythm: Normal rate and regular rhythm.      Pulses: Normal pulses.      Heart sounds: Normal heart sounds. No murmur.   Pulmonary:      Effort: Pulmonary effort is normal. No respiratory distress.      Breath sounds: Normal breath sounds. No wheezing, rhonchi or rales.   Abdominal:      General: Abdomen is flat. Bowel sounds are normal. There is no distension.      Palpations: Abdomen is soft.      Tenderness: There is no rebound.   Musculoskeletal:         General: No swelling or tenderness.   Lymphadenopathy:      Cervical: No cervical adenopathy.   Skin:     Coloration: Skin is not jaundiced.      Findings: No erythema.   Neurological:      Mental Status: He is alert. Mental status is at baseline.      Cranial Nerves: No cranial nerve deficit.      Comments: Oriented to self     Psychiatric:         Mood and Affect: Mood normal.         Behavior: Behavior normal.         Fluids    Intake/Output Summary (Last 24 hours) at 2/14/2020 1123  Last data filed at 2/14/2020 0942  Gross per 24 hour   Intake --   Output 200 ml   Net -200 ml       Laboratory                        Imaging  No orders to display        Assessment/Plan  * Requires supervision due to deficit in self-care  Assessment & Plan  Awaiting for guardianship    Agitation  Assessment & Plan   Continue seroquel at night.        Chronic atrial fibrillation  Assessment & Plan  Rate controlled, bradycardia resolved  Xarelto for anticoagulation    Severe protein-calorie malnutrition (HCC)  Assessment & Plan   Continue boost  supplements    Hyperlipidemia- (present on admission)  Assessment & Plan  Atorvastatin    COPD (chronic obstructive pulmonary disease) ([facility])- (present on admission)  Assessment & Plan  Continue RT protocol    CKD (chronic kidney disease) stage 3, GFR 30-59 ml/min ([facility])- (present on admission)  Assessment & Plan  Creatinine remains at his baseline  Anemia is stable      Essential hypertension- (present on admission)  Assessment & Plan  Controlled on Norvasc and losartan.           VTE prophylaxis: xarelto

## 2020-02-15 NOTE — PROGRESS NOTES
Bedside report received from night RN. Assumed care of patient. Daily plan of care discussed. Pt resting comfortably in bed with no signs of distress noted. Waffle mattress and bed alarm remain in place. Hourly rounding in place.

## 2020-02-15 NOTE — CARE PLAN
Problem: Communication  Goal: The ability to communicate needs accurately and effectively will improve  Outcome: PROGRESSING AS EXPECTED     Problem: Safety  Goal: Will remain free from injury  Outcome: PROGRESSING AS EXPECTED  Goal: Will remain free from falls  Outcome: PROGRESSING AS EXPECTED     Problem: Infection  Goal: Will remain free from infection  Outcome: PROGRESSING AS EXPECTED     Problem: Venous Thromboembolism (VTW)/Deep Vein Thrombosis (DVT) Prevention:  Goal: Patient will participate in Venous Thrombosis (VTE)/Deep Vein Thrombosis (DVT)Prevention Measures  Outcome: PROGRESSING AS EXPECTED     Problem: Bowel/Gastric:  Goal: Will not experience complications related to bowel motility  Outcome: PROGRESSING AS EXPECTED     Problem: Skin Integrity  Goal: Risk for impaired skin integrity will decrease  Outcome: PROGRESSING AS EXPECTED

## 2020-02-15 NOTE — PROGRESS NOTES
Hospital Medicine Daily Progress Note    Date of Service  2/15/2020    Chief Complaint  79 y.o. male admitted 12/27/2019 with inability to care for self.    Hospital Course    Patient presented from home when his caregiver was found dead in his home on day of admission.  Patient is not able to care for himself and needs 24 hour supervision.  He was seen by psychiatry on 1/11/2020 and deemed incapacitated for medical decisions.       Interval Problem Update  2/11 Patient remains same, eats some foods but is picky with intake.  He requested all the peach yogurt smoothies be thrown away as he doesn't like them.   2/12 Patient without complaint, requesting more hot coffee.  2/13 Patient doing same, no complaint.s  2/14 Patient in good mood when initially evaluated but states he wants his wallet and check book back to his bedside as he doesn't trust that it is in safekeeping and would rather have it back.  2/15 Patient in pleasant mood, no complaints.    Consultants/Specialty  Psych - s/o    Code Status  DNR    Disposition  Guardianship pending    Review of Systems  Review of Systems   Constitutional: Negative for chills and fever.   HENT: Negative for congestion.    Eyes: Negative for blurred vision and photophobia.   Respiratory: Negative for cough and shortness of breath.    Cardiovascular: Negative for chest pain, claudication and leg swelling.   Gastrointestinal: Negative for abdominal pain, constipation, diarrhea, heartburn, nausea and vomiting.   Genitourinary: Negative for dysuria and hematuria.   Musculoskeletal: Negative for joint pain and myalgias.   Skin: Negative for itching and rash.   Neurological: Negative for dizziness, sensory change, speech change, weakness and headaches.   Psychiatric/Behavioral: Negative for depression. The patient is not nervous/anxious and does not have insomnia.         Physical Exam  Temp:  [36.4 °C (97.5 °F)-36.9 °C (98.4 °F)] 36.8 °C (98.2 °F)  Pulse:  [70-77] 70  Resp:  [18]  18  BP: (104-140)/(55-70) 116/63  SpO2:  [96 %-97 %] 97 %    Physical Exam  Vitals signs and nursing note reviewed.   Constitutional:       General: He is not in acute distress.     Appearance: Normal appearance.   HENT:      Head: Normocephalic and atraumatic.   Eyes:      General: No scleral icterus.     Extraocular Movements: Extraocular movements intact.   Neck:      Musculoskeletal: Normal range of motion and neck supple.   Cardiovascular:      Rate and Rhythm: Normal rate and regular rhythm.      Pulses: Normal pulses.      Heart sounds: Normal heart sounds. No murmur.   Pulmonary:      Effort: Pulmonary effort is normal. No respiratory distress.      Breath sounds: Normal breath sounds. No wheezing, rhonchi or rales.   Abdominal:      General: Abdomen is flat. Bowel sounds are normal. There is no distension.      Palpations: Abdomen is soft.      Tenderness: There is no rebound.   Musculoskeletal:         General: No swelling or tenderness.   Lymphadenopathy:      Cervical: No cervical adenopathy.   Skin:     Coloration: Skin is not jaundiced.      Findings: No erythema.   Neurological:      Mental Status: He is alert. Mental status is at baseline.      Cranial Nerves: No cranial nerve deficit.      Comments: Oriented to self     Psychiatric:         Mood and Affect: Mood normal.         Behavior: Behavior normal.         Fluids    Intake/Output Summary (Last 24 hours) at 2/15/2020 1131  Last data filed at 2/15/2020 0941  Gross per 24 hour   Intake 240 ml   Output 875 ml   Net -635 ml       Laboratory                        Imaging  No orders to display        Assessment/Plan  * Requires supervision due to deficit in self-care  Assessment & Plan  Awaiting for guardianship    Agitation  Assessment & Plan   Continue seroquel at night.        Chronic atrial fibrillation  Assessment & Plan  Rate controlled, bradycardia resolved  Xarelto for anticoagulation    Severe protein-calorie malnutrition (HCC)  Assessment  & Plan   Continue boost supplements    Hyperlipidemia- (present on admission)  Assessment & Plan  Atorvastatin    COPD (chronic obstructive pulmonary disease) ([facility])- (present on admission)  Assessment & Plan  Continue RT protocol    CKD (chronic kidney disease) stage 3, GFR 30-59 ml/min ([facility])- (present on admission)  Assessment & Plan  Creatinine remains at his baseline  Anemia is stable      Essential hypertension- (present on admission)  Assessment & Plan  Controlled on Norvasc and losartan.           VTE prophylaxis: xarelto

## 2020-02-15 NOTE — CARE PLAN
Problem: Knowledge Deficit  Goal: Knowledge of disease process/condition, treatment plan, diagnostic tests, and medications will improve  Outcome: PROGRESSING SLOWER THAN EXPECTED  Intervention: Explain information regarding disease process/condition, treatment plan, diagnostic tests, and medications and document in education  Note: Pt education given re: discharge planning and expected course of hospital stay. No learning evidence noted.     Problem: Urinary Elimination:  Goal: Ability to reestablish a normal urinary elimination pattern will improve  Outcome: PROGRESSING SLOWER THAN EXPECTED  Flowsheets (Taken 2/15/2020 0800)  Urinary Elimination: Incontinence  Note: Pt remains intermittently incontinent, requiring approximately one extra linen change per day. Pt attempts to use the urinal with occasional success.

## 2020-02-16 NOTE — PROGRESS NOTES
[facility] Medicine Daily Progress Note    Date of Service  2/16/2020    Chief Complaint  79 y.o. male admitted 12/27/2019 with inability to care for self.    Hospital Course    Patient presented from home when his caregiver was found dead in his home on day of admission.  Patient is not able to care for himself and needs 24 hour supervision.  He was seen by psychiatry on 1/11/2020 and deemed incapacitated for medical decisions.       Interval Problem Update  2/11 Patient remains same, eats some foods but is picky with intake.  He requested all the peach yogurt smoothies be thrown away as he doesn't like them.   2/12 Patient without complaint, requesting more hot coffee.  2/13 Patient doing same, no complaint.s  2/14 Patient in good mood when initially evaluated but states he wants his wallet and check book back to his bedside as he doesn't trust that it is in safekeeping and would rather have it back.  2/15 Patient in pleasant mood, no complaints.  2/16 Patient remains in good mood, no issues overnight.    Consultants/Specialty  Psych - s/o    Code Status  DNR    Disposition  Guardianship pending    Review of Systems  Review of Systems   Constitutional: Negative for chills and fever.   HENT: Negative for congestion.    Eyes: Negative for blurred vision and photophobia.   Respiratory: Negative for cough and shortness of breath.    Cardiovascular: Negative for chest pain, claudication and leg swelling.   Gastrointestinal: Negative for abdominal pain, constipation, diarrhea, heartburn, nausea and vomiting.   Genitourinary: Negative for dysuria and hematuria.   Musculoskeletal: Negative for joint pain and myalgias.   Skin: Negative for itching and rash.   Neurological: Negative for dizziness, sensory change, speech change, weakness and headaches.   Psychiatric/Behavioral: Negative for depression. The patient is not nervous/anxious and does not have insomnia.         Physical Exam  Temp:  [36.7 °C (98 °F)-36.8 °C (98.3  °F)] 36.7 °C (98 °F)  Pulse:  [73-74] 73  Resp:  [18] 18  BP: (125-135)/(73-86) 125/86  SpO2:  [92 %-98 %] 98 %    Physical Exam  Vitals signs and nursing note reviewed.   Constitutional:       General: He is not in acute distress.     Appearance: Normal appearance.   HENT:      Head: Normocephalic and atraumatic.   Eyes:      General: No scleral icterus.     Extraocular Movements: Extraocular movements intact.   Neck:      Musculoskeletal: Normal range of motion and neck supple.   Cardiovascular:      Rate and Rhythm: Normal rate and regular rhythm.      Pulses: Normal pulses.      Heart sounds: Normal heart sounds. No murmur.   Pulmonary:      Effort: Pulmonary effort is normal. No respiratory distress.      Breath sounds: Normal breath sounds. No wheezing, rhonchi or rales.   Abdominal:      General: Abdomen is flat. Bowel sounds are normal. There is no distension.      Palpations: Abdomen is soft.      Tenderness: There is no rebound.   Musculoskeletal:         General: No swelling or tenderness.   Lymphadenopathy:      Cervical: No cervical adenopathy.   Skin:     Coloration: Skin is not jaundiced.      Findings: No erythema.   Neurological:      Mental Status: He is alert. Mental status is at baseline.      Cranial Nerves: No cranial nerve deficit.      Comments: Oriented to self     Psychiatric:         Mood and Affect: Mood normal.         Behavior: Behavior normal.         Fluids    Intake/Output Summary (Last 24 hours) at 2/16/2020 1128  Last data filed at 2/16/2020 0500  Gross per 24 hour   Intake 360 ml   Output 100 ml   Net 260 ml       Laboratory                        Imaging  No orders to display        Assessment/Plan  * Requires supervision due to deficit in self-care  Assessment & Plan  Awaiting for guardianship    Agitation  Assessment & Plan   Continue seroquel at night.        Chronic atrial fibrillation  Assessment & Plan  Rate controlled, bradycardia resolved  Xarelto for  anticoagulation    Severe protein-calorie malnutrition (HCC)  Assessment & Plan   Continue boost supplements    Hyperlipidemia- (present on admission)  Assessment & Plan  Atorvastatin    COPD (chronic obstructive pulmonary disease) ([facility])- (present on admission)  Assessment & Plan  Continue RT protocol    CKD (chronic kidney disease) stage 3, GFR 30-59 ml/min ([facility])- (present on admission)  Assessment & Plan  Creatinine remains at his baseline  Anemia is stable      Essential hypertension- (present on admission)  Assessment & Plan  Controlled on Norvasc and losartan.           VTE prophylaxis: xarelto

## 2020-02-16 NOTE — CARE PLAN
Problem: Discharge Barriers/Planning  Goal: Patient's continuum of care needs will be met  Outcome: PROGRESSING SLOWER THAN EXPECTED     Problem: Mobility  Goal: Risk for activity intolerance will decrease  Outcome: PROGRESSING SLOWER THAN EXPECTED     Refusing activity and getting OOB at this time.  Awaiting guardianship for placement after the hospital.

## 2020-02-16 NOTE — CARE PLAN
Problem: Safety  Goal: Will remain free from injury  Outcome: PROGRESSING AS EXPECTED  Goal: Will remain free from falls  Outcome: PROGRESSING AS EXPECTED     Problem: Infection  Goal: Will remain free from infection  Outcome: PROGRESSING AS EXPECTED     Problem: Venous Thromboembolism (VTW)/Deep Vein Thrombosis (DVT) Prevention:  Goal: Patient will participate in Venous Thrombosis (VTE)/Deep Vein Thrombosis (DVT)Prevention Measures  Outcome: PROGRESSING AS EXPECTED     Problem: Bowel/Gastric:  Goal: Normal bowel function is maintained or improved  Outcome: PROGRESSING AS EXPECTED     Problem: Respiratory:  Goal: Respiratory status will improve  Outcome: PROGRESSING AS EXPECTED     Problem: Skin Integrity  Goal: Risk for impaired skin integrity will decrease  Outcome: PROGRESSING AS EXPECTED

## 2020-02-17 NOTE — PROGRESS NOTES
Received report from day shift nurse.   Assumed pt care  Pt sleeping comfortably and soundly in bed.   Pt on r.a.. No signs of SOB/respiratory distress.   Labs noted, VSS.   Will return for night time meds and assessment  Fall precautions in place.   Bed at lowest position.   Call light and personal belongings within reach.   Continue to monitor

## 2020-02-17 NOTE — PROGRESS NOTES
[facility] Medicine Daily Progress Note    Date of Service  2/17/2020    Chief Complaint  79 y.o. male admitted 12/27/2019 with inability to care for self.    Hospital Course    Patient presented from home when his caregiver was found dead in his home on day of admission.  Patient is not able to care for himself and needs 24 hour supervision.  He was seen by psychiatry on 1/11/2020 and deemed incapacitated for medical decisions.       Interval Problem Update  2/11 Patient remains same, eats some foods but is picky with intake.  He requested all the peach yogurt smoothies be thrown away as he doesn't like them.   2/12 Patient without complaint, requesting more hot coffee.  2/13 Patient doing same, no complaint.s  2/14 Patient in good mood when initially evaluated but states he wants his wallet and check book back to his bedside as he doesn't trust that it is in safekeeping and would rather have it back.  2/15 Patient in pleasant mood, no complaints.  2/16 Patient remains in good mood, no issues overnight.  2/17 Patient moved to new room yesterday, no new complaints.    Consultants/Specialty  Psych - s/o    Code Status  DNR    Disposition  Guardianship pending    Review of Systems  Review of Systems   Constitutional: Negative for chills and fever.   HENT: Negative for congestion.    Eyes: Negative for blurred vision and photophobia.   Respiratory: Negative for cough and shortness of breath.    Cardiovascular: Negative for chest pain, claudication and leg swelling.   Gastrointestinal: Negative for abdominal pain, constipation, diarrhea, heartburn, nausea and vomiting.   Genitourinary: Negative for dysuria and hematuria.   Musculoskeletal: Negative for joint pain and myalgias.   Skin: Negative for itching and rash.   Neurological: Negative for dizziness, sensory change, speech change, weakness and headaches.   Psychiatric/Behavioral: Negative for depression. The patient is not nervous/anxious and does not have insomnia.          Physical Exam  Temp:  [36.7 °C (98 °F)-36.8 °C (98.2 °F)] 36.8 °C (98.2 °F)  Pulse:  [72-77] 72  Resp:  [18] 18  BP: ()/(60-81) 131/67  SpO2:  [95 %-96 %] 95 %    Physical Exam  Vitals signs and nursing note reviewed.   Constitutional:       General: He is not in acute distress.     Appearance: Normal appearance.   HENT:      Head: Normocephalic and atraumatic.   Eyes:      General: No scleral icterus.     Extraocular Movements: Extraocular movements intact.   Neck:      Musculoskeletal: Normal range of motion and neck supple.   Cardiovascular:      Rate and Rhythm: Normal rate and regular rhythm.      Pulses: Normal pulses.      Heart sounds: Normal heart sounds. No murmur.   Pulmonary:      Effort: Pulmonary effort is normal. No respiratory distress.      Breath sounds: Normal breath sounds. No wheezing, rhonchi or rales.   Abdominal:      General: Abdomen is flat. Bowel sounds are normal. There is no distension.      Palpations: Abdomen is soft.      Tenderness: There is no rebound.   Musculoskeletal:         General: No swelling or tenderness.   Lymphadenopathy:      Cervical: No cervical adenopathy.   Skin:     Coloration: Skin is not jaundiced.      Findings: No erythema.   Neurological:      Mental Status: He is alert. Mental status is at baseline.      Cranial Nerves: No cranial nerve deficit.      Comments: Oriented to self     Psychiatric:         Mood and Affect: Mood normal.         Behavior: Behavior normal.         Fluids    Intake/Output Summary (Last 24 hours) at 2/17/2020 1048  Last data filed at 2/16/2020 1700  Gross per 24 hour   Intake 420 ml   Output --   Net 420 ml       Laboratory                        Imaging  No orders to display        Assessment/Plan  * Requires supervision due to deficit in self-care  Assessment & Plan  Awaiting for guardianship    Agitation  Assessment & Plan   Continue seroquel at night.        Chronic atrial fibrillation  Assessment & Plan  Rate  controlled, bradycardia resolved  Xarelto for anticoagulation    Severe protein-calorie malnutrition (HCC)  Assessment & Plan   Continue boost supplements    Hyperlipidemia- (present on admission)  Assessment & Plan  Atorvastatin    COPD (chronic obstructive pulmonary disease) ([facility])- (present on admission)  Assessment & Plan  Continue RT protocol    CKD (chronic kidney disease) stage 3, GFR 30-59 ml/min ([facility])- (present on admission)  Assessment & Plan  Creatinine remains at his baseline  Anemia is stable      Essential hypertension- (present on admission)  Assessment & Plan  Controlled on Norvasc and losartan.           VTE prophylaxis: xarelto

## 2020-02-17 NOTE — PROGRESS NOTES
No change from morning assessment except foods encouraged . Refuses to get OOB except took a shower with assist.

## 2020-02-17 NOTE — CARE PLAN
Problem: Safety  Goal: Will remain free from injury  Outcome: PROGRESSING AS EXPECTED  Note: Pt is injury-free at this moment.   Fall precautions in place including: bed locked & at lowest position, call light & personal belongings within reach, x2 upper bed rails up, bed alarm on  Hourly rounding in process     Problem: Discharge Barriers/Planning  Goal: Patient's continuum of care needs will be met  Outcome: PROGRESSING AS EXPECTED  Note: Pending guardianship

## 2020-02-17 NOTE — CARE PLAN
Problem: Skin Integrity  Goal: Risk for impaired skin integrity will decrease  Outcome: PROGRESSING AS EXPECTED  Intervention: Assess and monitor skin integrity, appearance and/or temperature  Note: Pt turn Q2H, waffle cushion placed.      Problem: Communication  Goal: The ability to communicate needs accurately and effectively will improve  Intervention: Reorient patient to environment as needed  Flowsheets (Taken 2/17/2020 1038)  Oriented to::   Call Light & Bedside Controls   Unit Routine  Note: Pt encouraged to use call light for needs.

## 2020-02-18 NOTE — PROGRESS NOTES
0700: Bedside report from Alondra MACEDO RN. Pt wakes to voice. No needs at this time. Pending Guardianship.

## 2020-02-18 NOTE — PROGRESS NOTES
Assumed care of patient at 1900.  Patient is alert and oriented, sleeping in bed, and appears to be in no distress.  Bed alarm remains on. Hourly rounding continues.

## 2020-02-18 NOTE — PROGRESS NOTES
[facility] Medicine Daily Progress Note    Date of Service  2/18/2020    Chief Complaint  79 y.o. male admitted 12/27/2019 with inability to care for self.    Hospital Course    Patient presented from home when his caregiver was found dead in his home on day of admission.  Patient is not able to care for himself and needs 24 hour supervision.  He was seen by psychiatry on 1/11/2020 and deemed incapacitated for medical decisions.       Interval Problem Update  2/11 Patient remains same, eats some foods but is picky with intake.  He requested all the peach yogurt smoothies be thrown away as he doesn't like them.   2/12 Patient without complaint, requesting more hot coffee.  2/13 Patient doing same, no complaint.s  2/14 Patient in good mood when initially evaluated but states he wants his wallet and check book back to his bedside as he doesn't trust that it is in safekeeping and would rather have it back.  2/15 Patient in pleasant mood, no complaints.  2/16 Patient remains in good mood, no issues overnight.  2/17 Patient moved to new room yesterday, no new complaints.  2/18 Seen and examined. Chart reviewed, including labs and any pertinent imaging. No new complaints.     Consultants/Specialty  Psych - s/o    Code Status  DNR    Disposition  Guardianship pending    Review of Systems  Review of Systems   Constitutional: Negative for chills and fever.   HENT: Negative for congestion.    Eyes: Negative for blurred vision and photophobia.   Respiratory: Negative for cough and shortness of breath.    Cardiovascular: Negative for chest pain, claudication and leg swelling.   Gastrointestinal: Negative for abdominal pain, constipation, diarrhea, heartburn, nausea and vomiting.   Genitourinary: Negative for dysuria and hematuria.   Musculoskeletal: Negative for joint pain and myalgias.   Skin: Negative for itching and rash.   Neurological: Negative for dizziness, sensory change, speech change, weakness and headaches.    Psychiatric/Behavioral: Negative for depression. The patient is not nervous/anxious and does not have insomnia.         Physical Exam  Temp:  [37 °C (98.6 °F)-37.1 °C (98.8 °F)] 37 °C (98.6 °F)  Pulse:  [77-79] 79  Resp:  [18] 18  BP: (101-105)/(61-70) 105/61  SpO2:  [94 %-96 %] 94 %    Physical Exam  Vitals signs and nursing note reviewed.   Constitutional:       General: He is not in acute distress.     Appearance: Normal appearance.   HENT:      Head: Normocephalic and atraumatic.   Eyes:      General: No scleral icterus.     Extraocular Movements: Extraocular movements intact.   Neck:      Musculoskeletal: Normal range of motion and neck supple.   Cardiovascular:      Rate and Rhythm: Normal rate and regular rhythm.      Pulses: Normal pulses.      Heart sounds: Normal heart sounds. No murmur.   Pulmonary:      Effort: Pulmonary effort is normal. No respiratory distress.      Breath sounds: Normal breath sounds. No wheezing, rhonchi or rales.   Abdominal:      General: Abdomen is flat. Bowel sounds are normal. There is no distension.      Palpations: Abdomen is soft.      Tenderness: There is no rebound.   Musculoskeletal:         General: No swelling or tenderness.   Lymphadenopathy:      Cervical: No cervical adenopathy.   Skin:     Coloration: Skin is not jaundiced.      Findings: No erythema.   Neurological:      Mental Status: He is alert. Mental status is at baseline.      Cranial Nerves: No cranial nerve deficit.      Comments: Oriented to self     Psychiatric:         Mood and Affect: Mood normal.         Behavior: Behavior normal.         Fluids    Intake/Output Summary (Last 24 hours) at 2/18/2020 1449  Last data filed at 2/18/2020 1200  Gross per 24 hour   Intake 600 ml   Output 200 ml   Net 400 ml       Laboratory                        Imaging  No orders to display        Assessment/Plan  * Requires supervision due to deficit in self-care  Assessment & Plan  Awaiting for  guardianship    Agitation  Assessment & Plan   Continue seroquel at night.        Chronic atrial fibrillation  Assessment & Plan  Rate controlled, bradycardia resolved  Xarelto for anticoagulation    Severe protein-calorie malnutrition (HCC)  Assessment & Plan   Continue boost supplements    Hyperlipidemia- (present on admission)  Assessment & Plan  Atorvastatin    COPD (chronic obstructive pulmonary disease) ([facility])- (present on admission)  Assessment & Plan  Continue RT protocol    CKD (chronic kidney disease) stage 3, GFR 30-59 ml/min ([facility])- (present on admission)  Assessment & Plan  Creatinine remains at his baseline  Anemia is stable      Essential hypertension- (present on admission)  Assessment & Plan  Controlled on Norvasc and losartan.         VTE prophylaxis: xarelto

## 2020-02-18 NOTE — CARE PLAN
Problem: Safety  Goal: Will remain free from injury  Outcome: PROGRESSING AS EXPECTED  Note: Bed in low and locked position.  Side rails up X2.  Call light remains in reach.  Bed alarm remains on.  Hourly rounding continues.     Problem: Pain Management  Goal: Pain level will decrease to patient's comfort goal  Outcome: PROGRESSING AS EXPECTED  Note: Patient denies pain.

## 2020-02-19 NOTE — CARE PLAN
Problem: Bowel/Gastric:  Goal: Normal bowel function is maintained or improved  Outcome: PROGRESSING AS EXPECTED  Note: Pt has a small smear BM yesterday      Problem: Discharge Barriers/Planning  Goal: Patient's continuum of care needs will be met  Outcome: PROGRESSING AS EXPECTED  Note: Pending guardianship      Problem: Skin Integrity  Goal: Risk for impaired skin integrity will decrease  Outcome: PROGRESSING AS EXPECTED  Note: Pt shows bony prominence; at high risk for skin breakdown/pressure ulcer  Pt is currently on waffle mattress, Mepilex to sacrum; heels are floating

## 2020-02-19 NOTE — DISCHARGE PLANNING
HALW spoke with pt's brother and was given permission to send a referral to memory cares locally and was given the brothers e-mail [email or web address] to exchange paperwork related information.

## 2020-02-19 NOTE — PROGRESS NOTES
"[facility] Medicine Daily Progress Note    Date of Service  2/19/2020    Chief Complaint  79 y.o. male admitted 12/27/2019 with inability to care for self.    Hospital Course    Patient presented from home when his caregiver was found dead in his home on day of admission.  Patient is not able to care for himself and needs 24 hour supervision.  He was seen by psychiatry on 1/11/2020 and deemed incapacitated for medical decisions.       Interval Problem Update  2/17 Patient moved to new room yesterday, no new complaints.  2/18 Seen and examined. Chart reviewed, including labs and any pertinent imaging. No new complaints.     2/19 Seen and examined. Chart reviewed, including labs and any pertinent imaging. \"I just need to get some money and I'll be all set that's what I'm working on today\". He denies CP or SOB. NAEON.     Consultants/Specialty  Psych - s/o    Code Status  DNR    Disposition  Guardianship pending    Review of Systems  Review of Systems   Constitutional: Negative for chills and fever.   HENT: Negative for congestion.    Eyes: Negative for blurred vision and photophobia.   Respiratory: Negative for cough and shortness of breath.    Cardiovascular: Negative for chest pain, claudication and leg swelling.   Gastrointestinal: Negative for abdominal pain, constipation, diarrhea, heartburn, nausea and vomiting.   Genitourinary: Negative for dysuria and hematuria.   Musculoskeletal: Negative for joint pain and myalgias.   Skin: Negative for itching and rash.   Neurological: Negative for dizziness, sensory change, speech change, weakness and headaches.   Psychiatric/Behavioral: Negative for depression. The patient is not nervous/anxious and does not have insomnia.         Physical Exam  Temp:  [37 °C (98.6 °F)-37.1 °C (98.8 °F)] 37 °C (98.6 °F)  Pulse:  [72-78] 72  Resp:  [18] 18  BP: (112-115)/(54-61) 115/61  SpO2:  [95 %-96 %] 95 %    Physical Exam  Vitals signs and nursing note reviewed.   Constitutional:       " General: He is not in acute distress.     Appearance: Normal appearance.   HENT:      Head: Normocephalic and atraumatic.   Eyes:      General: No scleral icterus.     Extraocular Movements: Extraocular movements intact.   Neck:      Musculoskeletal: Normal range of motion and neck supple.   Cardiovascular:      Rate and Rhythm: Normal rate and regular rhythm.      Pulses: Normal pulses.      Heart sounds: Normal heart sounds. No murmur.   Pulmonary:      Effort: Pulmonary effort is normal. No respiratory distress.      Breath sounds: Normal breath sounds. No wheezing, rhonchi or rales.   Abdominal:      General: Abdomen is flat. Bowel sounds are normal. There is no distension.      Palpations: Abdomen is soft.      Tenderness: There is no rebound.   Musculoskeletal:         General: No swelling or tenderness.   Lymphadenopathy:      Cervical: No cervical adenopathy.   Skin:     Coloration: Skin is not jaundiced.      Findings: No erythema.   Neurological:      Mental Status: He is alert. Mental status is at baseline.      Cranial Nerves: No cranial nerve deficit.      Comments: Oriented to self     Psychiatric:         Mood and Affect: Mood normal.         Behavior: Behavior normal.         Fluids    Intake/Output Summary (Last 24 hours) at 2/19/2020 1559  Last data filed at 2/19/2020 1200  Gross per 24 hour   Intake 460 ml   Output 200 ml   Net 260 ml       Laboratory                        Imaging  No orders to display        Assessment/Plan  * Requires supervision due to deficit in self-care  Assessment & Plan  Awaiting for guardianship    Agitation  Assessment & Plan   Continue seroquel at night.        Chronic atrial fibrillation  Assessment & Plan  Rate controlled, bradycardia resolved  Xarelto for anticoagulation    Severe protein-calorie malnutrition (HCC)  Assessment & Plan   Continue boost supplements    Hyperlipidemia- (present on admission)  Assessment & Plan  Atorvastatin    COPD (chronic obstructive  pulmonary disease) (HCC)- (present on admission)  Assessment & Plan  Continue RT protocol    CKD (chronic kidney disease) stage 3, GFR 30-59 ml/min (HCC)- (present on admission)  Assessment & Plan  Creatinine remains at his baseline  Anemia is stable      Essential hypertension- (present on admission)  Assessment & Plan  Controlled on Norvasc and losartan.         VTE prophylaxis: xarelto

## 2020-02-19 NOTE — CARE PLAN
Problem: Communication  Goal: The ability to communicate needs accurately and effectively will improve  Intervention: Reorient patient to environment as needed  Flowsheets (Taken 2/19/2020 1208)  Oriented to::   Unit Routine   Call Light & Bedside Controls  Note: Pt reoriented to date & environment. Pt encouraged to use call light for needs.     Problem: Skin Integrity  Goal: Risk for impaired skin integrity will decrease  Intervention: Implement precautions to protect skin integrity in collaboration with the interdisciplinary team  Flowsheets (Taken 2/19/2020 1208)  Skin Preventative Measures:   Pillows in Use to Float Heels   Waffle Cushion  Note: Pt turn Q2H. Pt encouraged to eat meals, but refusing boost and smoothies. Pt is eating magic cups.

## 2020-02-19 NOTE — PROGRESS NOTES
Received report from day shift nurse.   Assumed pt care  Pt sleeping comfortably and soundly in bed.   Pt on r.a.. No signs of SOB/respiratory distress. VSS  Assessment completed  Fall precautions in place.   Bed at lowest position. Bed alarm on  Call light and personal belongings within reach.   Continue to monitor

## 2020-02-20 NOTE — CARE PLAN
Problem: Mobility  Goal: Risk for activity intolerance will decrease  Outcome: PROGRESSING AS EXPECTED  Note: Educated the pt on the importance of turning and repositioning q2hrs and PRN to prevent pressure ulcers.      Problem: Medication  Goal: Compliance with prescribed medication will improve  Outcome: PROGRESSING AS EXPECTED  Note: Educated pt on pharmacologic and non pharmacologic interventions for pain management.

## 2020-02-20 NOTE — PROGRESS NOTES
[facility] Medicine Daily Progress Note    Date of Service  2/20/2020    Chief Complaint  79 y.o. male admitted 12/27/2019 with inability to care for self.    Hospital Course    Patient presented from home when his caregiver was found dead in his home on day of admission.  Patient is not able to care for himself and needs 24 hour supervision.  He was seen by psychiatry on 1/11/2020 and deemed incapacitated for medical decisions.       Interval Problem Update  Seen and examined. Chart reviewed, including labs and any pertinent imaging.  He denies CP or SOB. NAEON.     Consultants/Specialty  Psych - s/o    Code Status  DNR    Disposition  Guardianship pending    Review of Systems  Review of Systems   Constitutional: Negative for chills and fever.   HENT: Negative for congestion.    Eyes: Negative for blurred vision and photophobia.   Respiratory: Negative for cough and shortness of breath.    Cardiovascular: Negative for chest pain, claudication and leg swelling.   Gastrointestinal: Negative for abdominal pain, constipation, diarrhea, heartburn, nausea and vomiting.   Genitourinary: Negative for dysuria and hematuria.   Musculoskeletal: Negative for joint pain and myalgias.   Skin: Negative for itching and rash.   Neurological: Negative for dizziness, sensory change, speech change, weakness and headaches.   Psychiatric/Behavioral: Negative for depression. The patient is not nervous/anxious and does not have insomnia.         Physical Exam  Temp:  [36.7 °C (98 °F)] 36.7 °C (98 °F)  Pulse:  [77-84] 77  Resp:  [14-16] 16  BP: (102-132)/(61-76) 102/68  SpO2:  [93 %-99 %] 93 %    Physical Exam  Vitals signs and nursing note reviewed.   Constitutional:       General: He is not in acute distress.     Appearance: Normal appearance.   HENT:      Head: Normocephalic and atraumatic.   Eyes:      General: No scleral icterus.     Extraocular Movements: Extraocular movements intact.   Neck:      Musculoskeletal: Normal range of  motion and neck supple.   Cardiovascular:      Rate and Rhythm: Normal rate and regular rhythm.      Pulses: Normal pulses.      Heart sounds: Normal heart sounds. No murmur.   Pulmonary:      Effort: Pulmonary effort is normal. No respiratory distress.      Breath sounds: Normal breath sounds. No wheezing, rhonchi or rales.   Abdominal:      General: Abdomen is flat. Bowel sounds are normal. There is no distension.      Palpations: Abdomen is soft.      Tenderness: There is no rebound.   Musculoskeletal:         General: No swelling or tenderness.   Lymphadenopathy:      Cervical: No cervical adenopathy.   Skin:     Coloration: Skin is not jaundiced.      Findings: No erythema.   Neurological:      Mental Status: He is alert. Mental status is at baseline.      Cranial Nerves: No cranial nerve deficit.      Comments: Oriented to self     Psychiatric:         Mood and Affect: Mood normal.         Behavior: Behavior normal.         Fluids    Intake/Output Summary (Last 24 hours) at 2/20/2020 1411  Last data filed at 2/20/2020 1200  Gross per 24 hour   Intake 120 ml   Output --   Net 120 ml       Laboratory                        Imaging  No orders to display        Assessment/Plan  * Requires supervision due to deficit in self-care  Assessment & Plan  Awaiting for guardianship    Agitation  Assessment & Plan   Continue seroquel at night.        Chronic atrial fibrillation  Assessment & Plan  Rate controlled, bradycardia resolved  Xarelto for anticoagulation    Severe protein-calorie malnutrition (HCC)  Assessment & Plan   Continue boost supplements    Hyperlipidemia- (present on admission)  Assessment & Plan  Atorvastatin    COPD (chronic obstructive pulmonary disease) ([facility])- (present on admission)  Assessment & Plan  Continue RT protocol    CKD (chronic kidney disease) stage 3, GFR 30-59 ml/min ([facility])- (present on admission)  Assessment & Plan  Creatinine remains at his baseline  Anemia is stable      Essential  hypertension- (present on admission)  Assessment & Plan  Controlled on Norvasc and losartan.         VTE prophylaxis: xarelto

## 2020-02-20 NOTE — CARE PLAN
Problem: Communication  Goal: The ability to communicate needs accurately and effectively will improve  Outcome: PROGRESSING SLOWER THAN EXPECTED     Problem: Safety  Goal: Will remain free from injury  Outcome: PROGRESSING AS EXPECTED     Problem: Discharge Barriers/Planning  Goal: Patient's continuum of care needs will be met  Outcome: PROGRESSING AS EXPECTED       Problem: Safety  Goal: Will remain free from falls  Outcome: PROGRESSING AS EXPECTED

## 2020-02-20 NOTE — PROGRESS NOTES
2 RN skin check completed with Sheila FARRIS.     Coccyx pink but blanchable, ppx mepilex CDI. Artemio heels pink/boggy but blanchable. Offloading heels with pillows. Repositioning pt q2hrs and PRN. Tolerating well. Pt also shifts self in bed. Frequent incontinent checks made. Skin intact otherwise.

## 2020-02-20 NOTE — DISCHARGE PLANNING
Received Choice form at 0970  Agency/Facility Name: [facility]  Referral sent per Choice form @ [phone]

## 2020-02-20 NOTE — DISCHARGE PLANNING
Agency/Facility Name: [facility]  Spoke To: Admissions   Outcome: [facility] will be out to see pt at 1330 today for an evaluation.

## 2020-02-21 NOTE — PROGRESS NOTES
Patient is A&Ox3, incontinent of bladder, tolerating diet, -N/V, decreased appetite, pressure points with redness but blanching.  Heel mepilex to sacrum and R heel.

## 2020-02-21 NOTE — PROGRESS NOTES
"[facility] Medicine Daily Progress Note    Date of Service  2/21/2020    Chief Complaint  79 y.o. male admitted 12/27/2019 with inability to care for self.    Hospital Course    Patient presented from home when his caregiver was found dead in his home on day of admission.  Patient is not able to care for himself and needs 24 hour supervision.  He was seen by psychiatry on 1/11/2020 and deemed incapacitated for medical decisions.       Interval Problem Update  Seen and examined. Chart reviewed, including labs and any pertinent imaging.  He denies CP or SOB. NAEON.   Patient was declining working with PT, discussed with patient importance of mobilization  He is open to working with PT more getting into wheelchair \"tomorrow\"    Consultants/Specialty  Psych - s/o    Code Status  DNR    Disposition  Guardianship pending    Review of Systems  Review of Systems   Constitutional: Negative for chills and fever.   HENT: Negative for congestion.    Eyes: Negative for blurred vision and photophobia.   Respiratory: Negative for cough and shortness of breath.    Cardiovascular: Negative for chest pain, claudication and leg swelling.   Gastrointestinal: Negative for abdominal pain, constipation, diarrhea, heartburn, nausea and vomiting.   Genitourinary: Negative for dysuria and hematuria.   Musculoskeletal: Negative for joint pain and myalgias.   Skin: Negative for itching and rash.   Neurological: Negative for dizziness, sensory change, speech change, weakness and headaches.   Psychiatric/Behavioral: Negative for depression. The patient is not nervous/anxious and does not have insomnia.         Physical Exam  Temp:  [36.6 °C (97.9 °F)-36.7 °C (98 °F)] 36.6 °C (97.9 °F)  Pulse:  [71-73] 73  Resp:  [16] 16  BP: ()/(63-69) 134/69  SpO2:  [95 %-97 %] 97 %    Physical Exam  Vitals signs and nursing note reviewed.   Constitutional:       General: He is not in acute distress.     Appearance: Normal appearance.   HENT:      Head: " Normocephalic and atraumatic.   Eyes:      General: No scleral icterus.     Extraocular Movements: Extraocular movements intact.   Neck:      Musculoskeletal: Normal range of motion and neck supple.   Cardiovascular:      Rate and Rhythm: Normal rate and regular rhythm.      Pulses: Normal pulses.      Heart sounds: Normal heart sounds. No murmur.   Pulmonary:      Effort: Pulmonary effort is normal. No respiratory distress.      Breath sounds: Normal breath sounds. No wheezing, rhonchi or rales.   Abdominal:      General: Abdomen is flat. Bowel sounds are normal. There is no distension.      Palpations: Abdomen is soft.      Tenderness: There is no rebound.   Musculoskeletal:         General: No swelling or tenderness.   Lymphadenopathy:      Cervical: No cervical adenopathy.   Skin:     Coloration: Skin is not jaundiced.      Findings: No erythema.   Neurological:      Mental Status: He is alert. Mental status is at baseline.      Cranial Nerves: No cranial nerve deficit.      Comments: Oriented to self     Psychiatric:         Mood and Affect: Mood normal.         Behavior: Behavior normal.         Fluids    Intake/Output Summary (Last 24 hours) at 2/21/2020 1534  Last data filed at 2/21/2020 0600  Gross per 24 hour   Intake --   Output 500 ml   Net -500 ml       Laboratory                        Imaging  No orders to display        Assessment/Plan  * Requires supervision due to deficit in self-care  Assessment & Plan  Awaiting for guardianship    Agitation  Assessment & Plan   Continue seroquel at night.        Chronic atrial fibrillation  Assessment & Plan  Rate controlled, bradycardia resolved  Xarelto for anticoagulation    Severe protein-calorie malnutrition (HCC)  Assessment & Plan   Continue boost supplements    Hyperlipidemia- (present on admission)  Assessment & Plan  Atorvastatin    COPD (chronic obstructive pulmonary disease) (HCC)- (present on admission)  Assessment & Plan  Continue RT protocol    CKD  (chronic kidney disease) stage 3, GFR 30-59 ml/min ([facility])- (present on admission)  Assessment & Plan  Creatinine remains at his baseline  Anemia is stable      Essential hypertension- (present on admission)  Assessment & Plan  Controlled on Norvasc and losartan.         VTE prophylaxis: xarelto

## 2020-02-21 NOTE — THERAPY
"Occupational Therapy Evaluation completed.   Functional Status:  Pt is an 78 y/o male, order for re- eval to determine current function and needs. Pt is being referred for a memory care facility , due to advanced dementia and inability to care for himself. Pt presents with generalized weakness, has poor balance in sitting and in standing, is at the Min A level for UB self care, Mod to Max A for LB, Max A to maintain balance and to complete functional transfers. Pt has a h/o being resistant to therapy, will continue to benefit from Acute OT as he is able to participate.  Plan of Care: Will benefit from Occupational Therapy 2 times per week  Discharge Recommendations:  Equipment: Will Continue to Assess for Equipment Needs. Post-acute therapy Discharge to a transitional care facility for continued skilled therapy services.    See \"Rehab Therapy-Acute\" Patient Summary Report for complete documentation.    "

## 2020-02-21 NOTE — THERAPY
"Physical Therapy Evaluation completed.   Bed Mobility:  Supine to Sit: Minimal Assist  Transfers: Sit to Stand: Moderate Assist(x2)  Gait: Level Of Assist: Unable to Participate with Front-Wheel Walker       Plan of Care: Will benefit from Physical Therapy 2 times per week  Discharge Recommendations: Equipment: Will Continue to Assess for Equipment Needs. Recommend post-acute placement for continued physical therapy services prior to discharge home.       See \"Rehab Therapy-Acute\" Patient Summary Report for complete documentation.     Pt was requested to see patient for re-evaluation. Pt presented with impaired balance, impaired coordination, weakness, poor sequencing, confusion, and poor motor planning. Pt was able to demonstrate Min A for bed mobility, Mod A x2 for standing. Pt refused any other mobility and refused transfer to w/c for w/c mobility. Pt continues to present with poor participation with thearpy and poor insight. Will attempt to see patient for 2x/wk at this time. Will recommend post acute therapy prior to d/c home or 24/7 supervision assist. Anticipate pt to tolerate w/c mobility at this time for functional mobility.   " Size Of Lesion: 9mm Detail Level: Detailed Morphology Per Location (Optional): Irregular brown macule Morphology Per Location (Optional): Brown macule Size Of Lesion: 6mm

## 2020-02-21 NOTE — CARE PLAN
Problem: Communication  Goal: The ability to communicate needs accurately and effectively will improve  Outcome: PROGRESSING AS EXPECTED     Problem: Safety  Goal: Will remain free from injury  Outcome: PROGRESSING AS EXPECTED     Problem: Discharge Barriers/Planning  Goal: Patient's continuum of care needs will be met  Outcome: PROGRESSING AS EXPECTED

## 2020-02-21 NOTE — PROGRESS NOTES
Pt A/O to self and place. VSS. Denies nausea/pain. Bedbound, Turning and repositioning q2hrs and PRN. Coccyx pink but blanchable, mppx mepilex in place. Frequent incontinent checks for stool and urine. LBM 2/20. Does use urinal at times. Poor appetite, encouraging PO intake. Pt awaiting placement to memory care unit, CM and SW following closely. All safety maintained.

## 2020-02-22 NOTE — CARE PLAN
Problem: Communication  Goal: The ability to communicate needs accurately and effectively will improve  Outcome: PROGRESSING AS EXPECTED     Problem: Safety  Goal: Will remain free from injury  Outcome: PROGRESSING AS EXPECTED     Problem: Bowel/Gastric:  Goal: Normal bowel function is maintained or improved  Outcome: PROGRESSING AS EXPECTED     Problem: Discharge Barriers/Planning  Goal: Patient's continuum of care needs will be met  Outcome: PROGRESSING SLOWER THAN EXPECTED

## 2020-02-22 NOTE — PROGRESS NOTES
Pt is AAO x2. Only recalls name and place.  Denies pain or discomfort at this time.   Fatigued, but easily aroused.  VS WNL.  No IV , MD aware.   Resting comfortably in bed at this time.   POC discussed.  All needs met at this time.  Bed in low position.  Call light within reach.  Rounding in place.

## 2020-02-22 NOTE — CARE PLAN
Problem: Skin Integrity  Goal: Risk for impaired skin integrity will decrease  Note: Pt understands the plan is to receive shower today. All other skin precautions in place.      Problem: Mobility  Goal: Risk for activity intolerance will decrease  Note: Will attempt to get in to chair.

## 2020-02-22 NOTE — PROGRESS NOTES
Hospital Medicine Daily Progress Note    Date of Service  2/22/2020    Chief Complaint  79 y.o. male admitted 12/27/2019 with inability to care for self.    Hospital Course    Patient presented from home when his caregiver was found dead in his home on day of admission.  Patient is not able to care for himself and needs 24 hour supervision.  He was seen by psychiatry on 1/11/2020 and deemed incapacitated for medical decisions.       Interval Problem Update  Seen and examined. Chart reviewed, including labs and any pertinent imaging.  He denies CP or SOB. NAEON.   Hopefully shower today  Therapies work with patient to get into chair    Consultants/Specialty  Psych - s/o    Code Status  DNR    Disposition  Guardianship pending    Review of Systems  Review of Systems   Constitutional: Negative for chills and fever.   Respiratory: Negative for cough and shortness of breath.    Cardiovascular: Negative for chest pain, claudication and leg swelling.   Genitourinary: Negative for dysuria and hematuria.   Musculoskeletal: Negative for joint pain and myalgias.   Skin: Negative for itching and rash.   Neurological: Positive for weakness. Negative for dizziness and headaches.   Psychiatric/Behavioral: Negative for depression. The patient is not nervous/anxious and does not have insomnia.         Physical Exam  Temp:  [36.8 °C (98.2 °F)] 36.8 °C (98.2 °F)  Pulse:  [73-78] 78  Resp:  [16-18] 18  BP: (107-127)/(73-76) 107/76  SpO2:  [96 %-97 %] 97 %    Physical Exam  Vitals signs and nursing note reviewed.   Constitutional:       General: He is not in acute distress.     Appearance: Normal appearance.   HENT:      Head: Normocephalic and atraumatic.   Eyes:      Extraocular Movements: Extraocular movements intact.   Neck:      Musculoskeletal: Normal range of motion and neck supple.   Cardiovascular:      Rate and Rhythm: Normal rate and regular rhythm.   Pulmonary:      Effort: Pulmonary effort is normal. No respiratory  distress.      Breath sounds: No wheezing.   Abdominal:      General: Abdomen is flat. Bowel sounds are normal. There is no distension.      Palpations: Abdomen is soft.   Musculoskeletal:         General: No swelling or tenderness.   Skin:     Coloration: Skin is not jaundiced.      Findings: No erythema.   Neurological:      Mental Status: He is alert. Mental status is at baseline.      Comments: Oriented to self     Psychiatric:         Mood and Affect: Mood normal.         Behavior: Behavior normal.         Fluids    Intake/Output Summary (Last 24 hours) at 2/22/2020 1208  Last data filed at 2/22/2020 0600  Gross per 24 hour   Intake 350 ml   Output 475 ml   Net -125 ml       Laboratory                        Imaging  No orders to display        Assessment/Plan  * Requires supervision due to deficit in self-care  Assessment & Plan  Awaiting for guardianship    Agitation  Assessment & Plan   Continue seroquel at night.        Chronic atrial fibrillation  Assessment & Plan  Rate controlled, bradycardia resolved  Xarelto for anticoagulation    Severe protein-calorie malnutrition (HCC)  Assessment & Plan   Continue boost supplements    Hyperlipidemia- (present on admission)  Assessment & Plan  Atorvastatin    COPD (chronic obstructive pulmonary disease) ([facility])- (present on admission)  Assessment & Plan  Continue RT protocol    CKD (chronic kidney disease) stage 3, GFR 30-59 ml/min ([facility])- (present on admission)  Assessment & Plan  Creatinine remains at his baseline  Anemia is stable      Essential hypertension- (present on admission)  Assessment & Plan  Controlled on Norvasc and losartan.         VTE prophylaxis: xarelto

## 2020-02-23 NOTE — PROGRESS NOTES
0700: Bedside report completed w/ Bear/BRENTON.  Assumed pt care. Patient in bed, sleeping, in no apparent distress.  Safety precautions in place. Call light & personal belongings within reach.  Plan of care discussed.    1900:  Bedside report w/ Mariposa/BRENTON

## 2020-02-23 NOTE — PROGRESS NOTES
[facility] Medicine Daily Progress Note    Date of Service  2/23/2020    Chief Complaint  79 y.o. male admitted 12/27/2019 with inability to care for self.    Hospital Course    Patient presented from home when his caregiver was found dead in his home on day of admission.  Patient is not able to care for himself and needs 24 hour supervision.  He was seen by psychiatry on 1/11/2020 and deemed incapacitated for medical decisions.       Interval Problem Update  Seen and examined. Chart reviewed, including labs and any pertinent imaging.  He denies CP or SOB. NAEON.     Consultants/Specialty  Psych - s/o    Code Status  DNR    Disposition  Guardianship pending    Review of Systems  Review of Systems   Constitutional: Negative for chills and fever.   Respiratory: Negative for cough and shortness of breath.    Cardiovascular: Negative for chest pain, claudication and leg swelling.   Genitourinary: Negative for dysuria and hematuria.   Musculoskeletal: Negative for joint pain and myalgias.   Skin: Negative for itching and rash.   Neurological: Positive for weakness. Negative for dizziness and headaches.   Psychiatric/Behavioral: Negative for depression. The patient is not nervous/anxious and does not have insomnia.         Physical Exam  Temp:  [36.9 °C (98.4 °F)] 36.9 °C (98.4 °F)  Pulse:  [69-91] 91  Resp:  [18] 18  BP: (118-137)/(64-79) 137/79  SpO2:  [93 %-95 %] 95 %    Physical Exam  Vitals signs and nursing note reviewed.   Constitutional:       General: He is not in acute distress.     Appearance: Normal appearance.   HENT:      Head: Normocephalic and atraumatic.   Eyes:      Extraocular Movements: Extraocular movements intact.   Neck:      Musculoskeletal: Normal range of motion and neck supple.   Cardiovascular:      Rate and Rhythm: Normal rate and regular rhythm.   Pulmonary:      Effort: Pulmonary effort is normal. No respiratory distress.      Breath sounds: No wheezing.   Abdominal:      General: Abdomen is  flat. Bowel sounds are normal. There is no distension.      Palpations: Abdomen is soft.   Musculoskeletal:         General: No swelling or tenderness.   Skin:     Coloration: Skin is not jaundiced.      Findings: No erythema.   Neurological:      Mental Status: He is alert. Mental status is at baseline.      Comments: Oriented to self     Psychiatric:         Mood and Affect: Mood normal.         Behavior: Behavior normal.         Fluids    Intake/Output Summary (Last 24 hours) at 2/23/2020 1239  Last data filed at 2/23/2020 0800  Gross per 24 hour   Intake 720 ml   Output 250 ml   Net 470 ml       Laboratory                        Imaging  No orders to display        Assessment/Plan  * Requires supervision due to deficit in self-care  Assessment & Plan  Awaiting for guardianship    Agitation  Assessment & Plan   Continue seroquel at night.        Chronic atrial fibrillation  Assessment & Plan  Rate controlled, bradycardia resolved  Xarelto for anticoagulation    Severe protein-calorie malnutrition (HCC)  Assessment & Plan   Continue boost supplements    Hyperlipidemia- (present on admission)  Assessment & Plan  Atorvastatin    COPD (chronic obstructive pulmonary disease) ([facility])- (present on admission)  Assessment & Plan  Continue RT protocol    CKD (chronic kidney disease) stage 3, GFR 30-59 ml/min ([facility])- (present on admission)  Assessment & Plan  Creatinine remains at his baseline  Anemia is stable      Essential hypertension- (present on admission)  Assessment & Plan  Controlled on Norvasc and losartan.         VTE prophylaxis: xarelto

## 2020-02-23 NOTE — CARE PLAN
Problem: Communication  Goal: The ability to communicate needs accurately and effectively will improve  Outcome: PROGRESSING AS EXPECTED     Problem: Safety  Goal: Will remain free from injury  Outcome: PROGRESSING AS EXPECTED     Problem: Bowel/Gastric:  Goal: Will not experience complications related to bowel motility  Outcome: PROGRESSING AS EXPECTED     Problem: Discharge Barriers/Planning  Goal: Patient's continuum of care needs will be met  Outcome: PROGRESSING SLOWER THAN EXPECTED

## 2020-02-23 NOTE — CARE PLAN
Problem: Discharge Barriers/Planning  Goal: Patient's continuum of care needs will be met  Outcome: PROGRESSING SLOWER THAN EXPECTED  Intervention: Collaborate with Transitional Care Team and Interdisciplinary Team to meet discharge needs  Note: Patient needs are being met here in the hospital, all disciplines are currently involved in the patient's discharge planning, which is being delayed r/t family being [place] of [place].

## 2020-02-24 NOTE — DISCHARGE PLANNING
Received Choice form at 1031  Agency/Facility Name: [facility]  Referral sent per Choice form @ [phone]

## 2020-02-24 NOTE — CARE PLAN
Problem: Skin Integrity  Goal: Risk for impaired skin integrity will decrease  Outcome: PROGRESSING AS EXPECTED  Intervention: Assess risk factors for impaired skin integrity and/or pressure ulcers  Note: Patient is on a waffle cushion, he is able to turn himself, when o2 in use, pads are on ears, heels are being floated on pillows, patient reminded to keep them floated when possible.     Problem: Knowledge Deficit  Goal: Knowledge of disease process/condition, treatment plan, diagnostic tests, and medications will improve  Outcome: PROGRESSING SLOWER THAN EXPECTED  Intervention: Explain information regarding disease process/condition, treatment plan, diagnostic tests, and medications and document in education  Note: Patient continually reoriented and reeducated regarding his situation.

## 2020-02-24 NOTE — CARE PLAN
Problem: Safety  Goal: Will remain free from falls  Note: Call light and personal belongings within reach, bed in low locked position, treaded slipper socks in place, room free of clutter, bed alarm activated. Hourly rounding in place to ensure pt safety and needs are met.        Problem: Bowel/Gastric:  Goal: Normal bowel function is maintained or improved  Outcome: PROGRESSING SLOWER THAN EXPECTED  Note: Pt had a bowel movement yesterday. Stool softeners per MAR.    Pt ate 0% of dinner. Encouraged magic cups, pt was willing to eat 100% of a magic cup. Marinol administered per MAR for appetite stimulation.

## 2020-02-24 NOTE — PROGRESS NOTES
Hospital Medicine Daily Progress Note    Date of Service  2/24/2020    Chief Complaint  79 y.o. male admitted 12/27/2019 with inability to care for self.    Hospital Course    Patient presented from home when his caregiver was found dead in his home on day of admission.  Patient is not able to care for himself and needs 24 hour supervision.  He was seen by psychiatry on 1/11/2020 and deemed incapacitated for medical decisions.       Interval Problem Update  Seen and examined. Chart reviewed, including labs and any pertinent imaging.  He denies CP or SOB. NAEON.     Consultants/Specialty  Psych - s/o    Code Status  DNR    Disposition  Guardianship pending    Review of Systems  Review of Systems   Constitutional: Negative for chills and fever.   Respiratory: Negative for cough and shortness of breath.    Cardiovascular: Negative for chest pain, claudication and leg swelling.   Genitourinary: Negative for dysuria and hematuria.   Musculoskeletal: Negative for joint pain and myalgias.   Skin: Negative for itching and rash.   Neurological: Positive for weakness. Negative for dizziness and headaches.   Psychiatric/Behavioral: Negative for depression. The patient is not nervous/anxious and does not have insomnia.         Physical Exam  Temp:  [36.6 °C (97.9 °F)-36.8 °C (98.2 °F)] 36.6 °C (97.9 °F)  Pulse:  [74-77] 77  Resp:  [18] 18  BP: (115-136)/(62-66) 115/62  SpO2:  [93 %-97 %] 93 %    Physical Exam  Vitals signs and nursing note reviewed.   Constitutional:       General: He is not in acute distress.     Appearance: Normal appearance.   HENT:      Head: Normocephalic and atraumatic.   Eyes:      Extraocular Movements: Extraocular movements intact.   Neck:      Musculoskeletal: Normal range of motion and neck supple.   Cardiovascular:      Rate and Rhythm: Normal rate and regular rhythm.   Pulmonary:      Effort: Pulmonary effort is normal. No respiratory distress.      Breath sounds: No wheezing.   Abdominal:       General: Abdomen is flat. Bowel sounds are normal. There is no distension.      Palpations: Abdomen is soft.   Musculoskeletal:         General: No swelling or tenderness.   Skin:     Coloration: Skin is not jaundiced.      Findings: No erythema.   Neurological:      Mental Status: He is alert. Mental status is at baseline.      Comments: Oriented to self     Psychiatric:         Mood and Affect: Mood normal.         Behavior: Behavior normal.         Fluids    Intake/Output Summary (Last 24 hours) at 2/24/2020 1457  Last data filed at 2/24/2020 0800  Gross per 24 hour   Intake 460 ml   Output 100 ml   Net 360 ml       Laboratory                        Imaging  No orders to display        Assessment/Plan  * Requires supervision due to deficit in self-care  Assessment & Plan  Awaiting for guardianship    Agitation  Assessment & Plan   Continue seroquel at night.        Chronic atrial fibrillation  Assessment & Plan  Rate controlled, bradycardia resolved  Xarelto for anticoagulation    Severe protein-calorie malnutrition (HCC)  Assessment & Plan   Continue boost supplements    Hyperlipidemia- (present on admission)  Assessment & Plan  Atorvastatin    COPD (chronic obstructive pulmonary disease) ([facility])- (present on admission)  Assessment & Plan  Continue RT protocol    CKD (chronic kidney disease) stage 3, GFR 30-59 ml/min ([facility])- (present on admission)  Assessment & Plan  Creatinine remains at his baseline  Anemia is stable      Essential hypertension- (present on admission)  Assessment & Plan  Controlled on Norvasc and losartan.         VTE prophylaxis: xarelto

## 2020-02-24 NOTE — PROGRESS NOTES
0715: Bedside report completed w/ Mariposa/BRENTON.  Assumed pt care. Patient in bed, sleeping, in no apparent distress.  Safety precautions in place. Call light & personal belongings within reach.  Plan of care discussed.    1915:  Bedside report w/ Olayinka/BRENTON

## 2020-02-25 NOTE — PROGRESS NOTES
Hospital Medicine Daily Progress Note    Date of Service  2/25/2020    Chief Complaint  79 y.o. male admitted 12/27/2019 with inability to care for self.    Hospital Course    Patient presented from home when his caregiver was found dead in his home on day of admission.  He is not able to care for himself and needs 24 hour supervision.  He was seen by psychiatry on 1/11/2020 and deemed incapacitated for medical decisions. He has since been awaiting placement.       Interval Problem Update  2/26- no events. Pleasant. No complaints.     Consultants/Specialty  Psych - s/o    Code Status  DNR    Disposition  Guardianship pending- cleared for placement.     Review of Systems  Review of Systems   Unable to perform ROS: Dementia        Physical Exam  Temp:  [36.6 °C (97.8 °F)] 36.6 °C (97.8 °F)  Pulse:  [71-75] 71  Resp:  [18] 18  BP: (112-140)/(65-71) 140/71  SpO2:  [94 %] 94 %    Physical Exam  Vitals signs and nursing note reviewed.   Constitutional:       General: He is not in acute distress.     Appearance: He is well-developed. He is not diaphoretic.      Comments: Patient seen and examined at the bedside. Plan discussed at bedside with the RN.    HENT:      Right Ear: External ear normal.      Left Ear: External ear normal.      Nose: Nose normal.   Eyes:      General: No scleral icterus.        Right eye: No discharge.         Left eye: No discharge.   Neck:      Vascular: No JVD.      Trachea: No tracheal deviation.   Cardiovascular:      Rate and Rhythm: Normal rate.      Heart sounds: Normal heart sounds. No murmur.      Comments: Left upper chest pacer in place  Pulmonary:      Effort: Pulmonary effort is normal. No respiratory distress.      Breath sounds: Normal breath sounds. No wheezing or rales.   Abdominal:      General: Bowel sounds are normal. There is no distension.      Palpations: Abdomen is soft.      Tenderness: There is no abdominal tenderness. There is no guarding.   Musculoskeletal:          General: No tenderness.   Skin:     General: Skin is warm and dry.      Coloration: Skin is pale.      Findings: No erythema.   Neurological:      Mental Status: He is alert. He is disoriented.   Psychiatric:         Behavior: Behavior normal.         Fluids    Intake/Output Summary (Last 24 hours) at 2/25/2020 0814  Last data filed at 2/24/2020 1200  Gross per 24 hour   Intake 120 ml   Output --   Net 120 ml       Laboratory                        Imaging  No orders to display        Assessment/Plan  * Requires supervision due to deficit in self-care  Assessment & Plan  Awaiting for guardianship    Agitation  Assessment & Plan   Continue seroquel at night.        Chronic atrial fibrillation  Assessment & Plan  Rate controlled, bradycardia resolved  Xarelto for anticoagulation    Severe protein-calorie malnutrition (HCC)  Assessment & Plan   Continue boost supplements    Hyperlipidemia- (present on admission)  Assessment & Plan  Atorvastatin    COPD (chronic obstructive pulmonary disease) ([facility])- (present on admission)  Assessment & Plan  Continue RT protocol    CKD (chronic kidney disease) stage 3, GFR 30-59 ml/min ([facility])- (present on admission)  Assessment & Plan  Creatinine remains at his baseline  Anemia is stable      Essential hypertension- (present on admission)  Assessment & Plan  Controlled on Norvasc and losartan.         VTE prophylaxis: xarelto

## 2020-02-25 NOTE — PROGRESS NOTES
Received report from Dary FARRIS, assumed pt care. Pt A&Ox2, resting comfortably in bed. Pt pleasant and no needs at this time. VSS, all lines/drains patent. Pt taught to inform nurse if experiencing pain above comfort goal, SOB, chest pain, ambulating out of bed, or for any further assistance. Fall precautions in place, call light within reach. All questions answered at this time. Will continue with care.

## 2020-02-25 NOTE — CARE PLAN
Problem: Venous Thromboembolism (VTW)/Deep Vein Thrombosis (DVT) Prevention:  Goal: Patient will participate in Venous Thrombosis (VTE)/Deep Vein Thrombosis (DVT)Prevention Measures  Outcome: PROGRESSING AS EXPECTED     Problem: Bowel/Gastric:  Goal: Normal bowel function is maintained or improved  Outcome: PROGRESSING SLOWER THAN EXPECTED

## 2020-02-25 NOTE — CARE PLAN
Problem: Safety  Goal: Will remain free from falls  Outcome: PROGRESSING AS EXPECTED  Note: Bed in low position, bed alarm set, call light in reach. Pt instructed to call nurse for any further needs, pt verbalizes understanding. Hourly rounding in place.     Problem: Psychosocial Needs:  Goal: Level of anxiety will decrease  Outcome: PROGRESSING AS EXPECTED  Note: Sat with pt and watched TV with him after dinner, pt appreciated RN sitting with him.

## 2020-02-25 NOTE — PROGRESS NOTES
0700: Bedside report completed w/ Olayinka/BRENTON.  Assumed pt care. Patient in bed, sleeping, in no apparent distress.  Safety precautions in place. Call light & personal belongings within reach.  Plan of care discussed.    1850:  Bedside report w/ Mery/BRENTON

## 2020-02-26 NOTE — PROGRESS NOTES
Pt refusing to get to chair for breakfast, states he will get up for dinner like he did last night. Multiple attempts by RN and CNA and education p[provided

## 2020-02-26 NOTE — PROGRESS NOTES
Pt calmly resting in bed. VSS, denies pain, nausea, SOB, or dizziness. POC discussed, denies further needs. Safety measures and hourly rounding in place.

## 2020-02-26 NOTE — CARE PLAN
Problem: Safety  Goal: Will remain free from injury  Outcome: PROGRESSING AS EXPECTED  Note: Treaded socks in place, call light within reach, bed locked in low position, room near nursing station, hourly rounding, bed alarm on      Problem: Pain Management  Goal: Pain level will decrease to patient's comfort goal  Outcome: PROGRESSING AS EXPECTED  Note: Patient denies pain at this time, hourly rounding     Problem: Mobility  Goal: Risk for activity intolerance will decrease  Outcome: PROGRESSING SLOWER THAN EXPECTED  Note: Pt educated on importance of getting out of bed for meals, pt declines at this time, will re attempt

## 2020-02-26 NOTE — CARE PLAN
Problem: Communication  Goal: The ability to communicate needs accurately and effectively will improve  Outcome: PROGRESSING AS EXPECTED     Problem: Safety  Goal: Will remain free from injury  Outcome: PROGRESSING AS EXPECTED  Goal: Will remain free from falls  Outcome: PROGRESSING AS EXPECTED     Problem: Infection  Goal: Will remain free from infection  Outcome: PROGRESSING AS EXPECTED     Problem: Venous Thromboembolism (VTW)/Deep Vein Thrombosis (DVT) Prevention:  Goal: Patient will participate in Venous Thrombosis (VTE)/Deep Vein Thrombosis (DVT)Prevention Measures  Outcome: PROGRESSING AS EXPECTED     Problem: Bowel/Gastric:  Goal: Normal bowel function is maintained or improved  Outcome: PROGRESSING AS EXPECTED     Problem: Knowledge Deficit  Goal: Knowledge of the prescribed therapeutic regimen will improve  Outcome: PROGRESSING AS EXPECTED     Problem: Skin Integrity  Goal: Risk for impaired skin integrity will decrease  Outcome: PROGRESSING AS EXPECTED

## 2020-02-26 NOTE — PROGRESS NOTES
Hospital Medicine Daily Progress Note    Date of Service  2/26/2020    Chief Complaint  79 y.o. male admitted 12/27/2019 with inability to care for self.    Hospital Course    Patient presented from home when his caregiver was found dead in his home on day of admission.  He is not able to care for himself and needs 24 hour supervision.  He was seen by psychiatry on 1/11/2020 and deemed incapacitated for medical decisions. He has since been awaiting placement.       Interval Problem Update  2/25- no events. Pleasant. No complaints.   2/26- I discussed getting him up more with RN. He is reluctant but will do it if encouraged.   Consultants/Specialty  Psych - s/o    Code Status  DNR    Disposition  Guardianship pending- cleared for placement.     Review of Systems  Review of Systems   Unable to perform ROS: Dementia        Physical Exam  Temp:  [36.7 °C (98.1 °F)-36.8 °C (98.3 °F)] 36.8 °C (98.3 °F)  Pulse:  [70-74] 70  Resp:  [18] 18  BP: (147-148)/(77-78) 148/77  SpO2:  [92 %-96 %] 96 %    Physical Exam  Vitals signs and nursing note reviewed.   Constitutional:       General: He is not in acute distress.     Appearance: He is well-developed. He is not diaphoretic.      Comments: Patient seen and examined at the bedside. Plan discussed at bedside with the RN.    HENT:      Right Ear: External ear normal.      Left Ear: External ear normal.      Nose: Nose normal.   Eyes:      General: No scleral icterus.        Right eye: No discharge.         Left eye: No discharge.   Neck:      Vascular: No JVD.      Trachea: No tracheal deviation.   Cardiovascular:      Rate and Rhythm: Normal rate.      Heart sounds: Normal heart sounds. No murmur.      Comments: Left upper chest pacer in place  Pulmonary:      Effort: Pulmonary effort is normal. No respiratory distress.      Breath sounds: Normal breath sounds. No wheezing or rales.   Abdominal:      General: Bowel sounds are normal. There is no distension.      Palpations:  Abdomen is soft.      Tenderness: There is no abdominal tenderness. There is no guarding.   Musculoskeletal:         General: No tenderness.   Skin:     General: Skin is warm and dry.      Coloration: Skin is pale.      Findings: No erythema.   Neurological:      Mental Status: He is alert. He is disoriented.   Psychiatric:         Behavior: Behavior normal.         Fluids    Intake/Output Summary (Last 24 hours) at 2/26/2020 0846  Last data filed at 2/26/2020 0530  Gross per 24 hour   Intake 480 ml   Output 400 ml   Net 80 ml       Laboratory                        Imaging  No orders to display        Assessment/Plan  * Requires supervision due to deficit in self-care  Assessment & Plan  Awaiting for guardianship    Agitation  Assessment & Plan   Continue seroquel at night.        Chronic atrial fibrillation  Assessment & Plan  Rate controlled, bradycardia resolved  Xarelto for anticoagulation    Severe protein-calorie malnutrition (HCC)  Assessment & Plan   Continue boost supplements    Hyperlipidemia- (present on admission)  Assessment & Plan  Atorvastatin    COPD (chronic obstructive pulmonary disease) ([facility])- (present on admission)  Assessment & Plan  Continue RT protocol    CKD (chronic kidney disease) stage 3, GFR 30-59 ml/min ([facility])- (present on admission)  Assessment & Plan  Creatinine remains at his baseline  Anemia is stable      Essential hypertension- (present on admission)  Assessment & Plan  Controlled on Norvasc and losartan.         VTE prophylaxis: xarelto

## 2020-02-26 NOTE — PROGRESS NOTES
Pt oriented to self and place somewhat (hospital), denies pain, numbness or tingling  Pt educate don importance of getting OOB for meals, denies eating at this time, wants to sleep, will reattempt  Pt turns self in bed, heels floated on pillows, pink and blanching  Oral intake encouraged, magic cups in use  No IV access  Safety precautions in place, hourly rounding

## 2020-02-26 NOTE — DISCHARGE PLANNING
Per Ratna LSW request [facility] manually faxed referral to [facility] @ [phone].

## 2020-02-27 NOTE — PROGRESS NOTES
Pt oriented x2, up to chair for breakfast x 2 assist, weak  Heel mepilexes applied to both heels, pink and blanching  Incontinent at times/urinal at times  Crusty eyes cleaned with warm water and soap, sty to R eye  Treaded socks on, bed alarm on, call light within reach, hourly rounding

## 2020-02-27 NOTE — CARE PLAN
Problem: Communication  Goal: The ability to communicate needs accurately and effectively will improve  Outcome: PROGRESSING AS EXPECTED  Note: Pt educated on importance of getting up to chair for meals, pt agreeable and up to chair for breakfast     Problem: Safety  Goal: Will remain free from injury  Outcome: PROGRESSING AS EXPECTED  Note: Treaded socks on, bed alarm on, call light within reach, hourly rounding     Problem: Skin Integrity  Goal: Risk for impaired skin integrity will decrease  Outcome: PROGRESSING SLOWER THAN EXPECTED  Note: Mepelexes applied to heels pink and blanching, pillows to float heels, waffle cushion in place, pt turns self side to side in bed

## 2020-02-27 NOTE — CARE PLAN
Problem: Communication  Goal: The ability to communicate needs accurately and effectively will improve  Outcome: PROGRESSING AS EXPECTED     Problem: Safety  Goal: Will remain free from injury  Outcome: PROGRESSING AS EXPECTED     Problem: Knowledge Deficit  Goal: Knowledge of disease process/condition, treatment plan, diagnostic tests, and medications will improve  Outcome: PROGRESSING SLOWER THAN EXPECTED

## 2020-02-27 NOTE — DISCHARGE PLANNING
Agency/Facility Name: [facility]  Spoke To: technician   Outcome: referral pending. Admissions will call [facility] back.

## 2020-02-27 NOTE — PROGRESS NOTES
[facility] Medicine Daily Progress Note    Date of Service  2/27/2020    Chief Complaint  79 y.o. male admitted 12/27/2019 with inability to care for self.    Hospital Course    Patient presented from home when his caregiver was found dead in his home on day of admission.  He is not able to care for himself and needs 24 hour supervision.  He was seen by psychiatry on 1/11/2020 and deemed incapacitated for medical decisions. He has since been awaiting placement.       Interval Problem Update  2/25- no events. Pleasant. No complaints.   2/26- I discussed getting him up more with RN. He is reluctant but will do it if encouraged.   2/27- getting up more. No complaints.   Consultants/Specialty  Psych - s/o    Code Status  DNR    Disposition  Guardianship pending- cleared for placement.     Review of Systems  Review of Systems   Unable to perform ROS: Dementia        Physical Exam  Temp:  [37 °C (98.6 °F)-37.1 °C (98.7 °F)] 37.1 °C (98.7 °F)  Pulse:  [75-76] 76  Resp:  [18] 18  BP: (121-144)/(67-79) 121/67  SpO2:  [95 %-96 %] 95 %    Physical Exam  Vitals signs and nursing note reviewed.   Constitutional:       General: He is not in acute distress.     Appearance: He is well-developed. He is not diaphoretic.      Comments: Patient seen and examined at the bedside. Plan discussed at bedside with the RN.    HENT:      Right Ear: External ear normal.      Left Ear: External ear normal.      Nose: Nose normal.   Eyes:      General: No scleral icterus.        Right eye: No discharge.         Left eye: No discharge.   Neck:      Vascular: No JVD.      Trachea: No tracheal deviation.   Cardiovascular:      Rate and Rhythm: Normal rate.      Heart sounds: Normal heart sounds. No murmur.      Comments: Left upper chest pacer in place  Pulmonary:      Effort: Pulmonary effort is normal. No respiratory distress.      Breath sounds: Normal breath sounds. No wheezing or rales.   Abdominal:      General: Bowel sounds are normal. There is  no distension.      Palpations: Abdomen is soft.      Tenderness: There is no abdominal tenderness. There is no guarding.   Musculoskeletal:         General: No tenderness.   Skin:     General: Skin is warm and dry.      Coloration: Skin is pale.      Findings: No erythema.   Neurological:      Mental Status: He is alert. He is disoriented.   Psychiatric:         Behavior: Behavior normal.         Fluids    Intake/Output Summary (Last 24 hours) at 2/27/2020 0821  Last data filed at 2/27/2020 0555  Gross per 24 hour   Intake 180 ml   Output 550 ml   Net -370 ml       Laboratory                        Imaging  No orders to display        Assessment/Plan  * Requires supervision due to deficit in self-care  Assessment & Plan  Awaiting for guardianship    Agitation  Assessment & Plan   Continue seroquel at night.        Chronic atrial fibrillation  Assessment & Plan  Rate controlled, bradycardia resolved  Xarelto for anticoagulation    Severe protein-calorie malnutrition (HCC)  Assessment & Plan   Continue boost supplements    Hyperlipidemia- (present on admission)  Assessment & Plan  Atorvastatin    COPD (chronic obstructive pulmonary disease) ([facility])- (present on admission)  Assessment & Plan  Continue RT protocol    CKD (chronic kidney disease) stage 3, GFR 30-59 ml/min ([facility])- (present on admission)  Assessment & Plan  Creatinine remains at his baseline  Anemia is stable      Essential hypertension- (present on admission)  Assessment & Plan  Controlled on Norvasc and losartan.         VTE prophylaxis: xarelto

## 2020-02-28 NOTE — DIETARY
Nutrition Services: Update   Day 0 of admit.  Eulogio Jeronimo is a 79 y.o. male with admitting DX of Requires supervision due to deficit in self-care    Pt is currently on regular, dysphagia 3 diet with Boost Plus with lunch, Magic Cup with lunch and dinner and Chobani Smoothie with breakfast. Overall PO intake is poor with an avg of 27% noted since 2/24. Per nurse, pt ate all of his breakfast this morning. She said that he may have eaten better because he was out of bed. Preferences obtained from pt and nurse. He does not like Chobani smoothies, Boost Plus or yogurt. He likes the Magic Cups and chocolate brownies (per nurse). He agreed to addition of snacks TID (cottaged cheese and fruit, peanut butter and rony crackers and brownie and milk). Menu modified based on pt's preferences.    Pt does not have a recent weight for assessment of weight trend. RD discussed with nurse. Nurse agreed to arrange for new weight.     Pt continues to receive Marinol as an appetite stimulant.    Malnutrition Risk: pt with report of severe malnutrition based on RD progress note upon admission.     Recommendations/Plan:  1. Add snacks TID. Discontinue Boost Plus and Chobani Smoothie. Continue with Magic Cup BID.    2. Encourage intake of meals  3. Document intake of all meals as % taken in ADL's to provide interdisciplinary communication across all shifts.   4. Monitor weight.  5. Nutrition rep will continue to see patient for ongoing meal and snack preferences.    RD following

## 2020-02-28 NOTE — CARE PLAN
Problem: Nutritional:  Goal: Achieve adequate nutritional intake  Description: Patient will consume >50% of meals, snacks and supplements.   Outcome: NOT MET

## 2020-02-28 NOTE — PROGRESS NOTES
[facility] Medicine Daily Progress Note    Date of Service  2/28/2020    Chief Complaint  79 y.o. male admitted 12/27/2019 with inability to care for self.    Hospital Course    Patient presented from home when his caregiver was found dead in his home on day of admission.  He is not able to care for himself and needs 24 hour supervision.  He was seen by psychiatry on 1/11/2020 and deemed incapacitated for medical decisions. He has since been awaiting placement.       Interval Problem Update  2/25- no events. Pleasant. No complaints.   2/26- I discussed getting him up more with RN. He is reluctant but will do it if encouraged.   2/27- getting up more. No complaints.   2/28- pt/ot recheck confirming need for placement. Approval still pending.       Consultants/Specialty  Psych - s/o    Code Status  DNR    Disposition  Guardianship pending- cleared for placement.     Review of Systems  Review of Systems   Unable to perform ROS: Dementia        Physical Exam  Temp:  [36.6 °C (97.8 °F)-37.1 °C (98.7 °F)] 36.6 °C (97.8 °F)  Pulse:  [] 77  Resp:  [18-20] 20  BP: (100-136)/(47-75) 136/75  SpO2:  [96 %-97 %] 97 %    Physical Exam  Vitals signs and nursing note reviewed.   Constitutional:       General: He is not in acute distress.     Appearance: He is well-developed. He is not diaphoretic.      Comments: Patient seen and examined at the bedside. Plan discussed at bedside with the RN.    HENT:      Right Ear: External ear normal.      Left Ear: External ear normal.      Nose: Nose normal.   Eyes:      General: No scleral icterus.        Right eye: No discharge.         Left eye: No discharge.   Neck:      Vascular: No JVD.      Trachea: No tracheal deviation.   Cardiovascular:      Rate and Rhythm: Normal rate.      Heart sounds: Normal heart sounds. No murmur.      Comments: Left upper chest pacer in place  Pulmonary:      Effort: Pulmonary effort is normal. No respiratory distress.      Breath sounds: Normal breath  sounds. No wheezing or rales.   Abdominal:      General: Bowel sounds are normal. There is no distension.      Palpations: Abdomen is soft.      Tenderness: There is no abdominal tenderness. There is no guarding.   Musculoskeletal:         General: No tenderness.   Skin:     General: Skin is warm and dry.      Coloration: Skin is pale.      Findings: No erythema.   Neurological:      Mental Status: He is alert. He is disoriented.   Psychiatric:         Behavior: Behavior normal.         Fluids  No intake or output data in the 24 hours ending 02/28/20 0834    Laboratory                        Imaging  No orders to display        Assessment/Plan  * Requires supervision due to deficit in self-care  Assessment & Plan  Awaiting for guardianship    Agitation  Assessment & Plan   Continue seroquel at night.        Chronic atrial fibrillation  Assessment & Plan  Rate controlled, bradycardia resolved  Xarelto for anticoagulation    Severe protein-calorie malnutrition (HCC)  Assessment & Plan   Continue boost supplements    Hyperlipidemia- (present on admission)  Assessment & Plan  Atorvastatin    COPD (chronic obstructive pulmonary disease) ([facility])- (present on admission)  Assessment & Plan  Continue RT protocol    CKD (chronic kidney disease) stage 3, GFR 30-59 ml/min ([facility])- (present on admission)  Assessment & Plan  Creatinine remains at his baseline  Anemia is stable      Essential hypertension- (present on admission)  Assessment & Plan  Controlled on Norvasc and losartan.         VTE prophylaxis: xarelto

## 2020-02-28 NOTE — THERAPY
"Occupational Therapy Treatment completed with focus on ADLs, ADL transfers and patient education.  Functional Status:  Pt was in bed at start of session. Pleasant and willing to work with therapy. Pt with an increase in functional mobility, was able to complete supine to EOB transfer with Min A , Able to go from sit to stand with Mod A and take several steps before wanting to sit down. Pt was able to self propel WC with vc, Grooming and hygiene from WC level with set up and vc. Will continue to benefit from Acute OT services.   Plan of Care: Will benefit from Occupational Therapy 2 times per week  Discharge Recommendations:  Equipment Will Continue to Assess for Equipment Needs. Post-acute therapy Discharge to a transitional care facility for continued skilled therapy services.    See \"Rehab Therapy-Acute\" Patient Summary Report for complete documentation.   "

## 2020-02-28 NOTE — THERAPY
"Physical Therapy Treatment completed.   Bed Mobility:  Supine to Sit: Stand by Assist  Transfers: Sit to Stand: Moderate Assist  Gait: Level Of Assist: Moderate Assist with Front-Wheel Walker       Plan of Care: Will benefit from Physical Therapy 2 times per week  Discharge Recommendations: Equipment: Front-Wheel Walker. Recommend post-acute placement for continued physical therapy services prior to discharge home.       See \"Rehab Therapy-Acute\" Patient Summary Report for complete documentation.     Pt is progressing well with mobility. However, continues to have poor participation at times. Pt able to demonstrate slightly improved participation today as he was agreeable to attempt ambulation and using w/c for mobility. Pt demonstrated with SBA for bed mobility, Mod A for sit<>stand, Mod A for tranfers, and ambulation with FWW use. Pt was able to ambulate about 5ft, after a few feet pt adamantly wanted to be seated. Pt agreeable to attempt using w/c. Pt was able to mobilize in w/c with BUE and BLE for about 120ft. Pt provided with cues, faciliation, and education for appropirate use of w/c, however, pt yells back at therapist saying, \"I know!\". Pt presents with poor participation for skilled therapy services given behavior and cognition. Pt will be followed for one more visit, however, if participation continues to be an issue pt will be signed off and be only on d/c needs.   "

## 2020-02-28 NOTE — PROGRESS NOTES
Up to chair for breakfast. Ate everything. Back to bed. Calm and cooperative. No signs of distress noted.

## 2020-02-29 NOTE — PROGRESS NOTES
Received patient, patient oriented x2, incontinent at times,   safety precautions in place, bed alarm on, hourly rounding in place.

## 2020-02-29 NOTE — PROGRESS NOTES
Hospital Medicine Daily Progress Note    Date of Service  2/29/2020    Chief Complaint  79 y.o. male admitted 12/27/2019 with inability to care for self.    Hospital Course    Patient presented from home when his caregiver was found dead in his home on day of admission.  He is not able to care for himself and needs 24 hour supervision.  He was seen by psychiatry on 1/11/2020 and deemed incapacitated for medical decisions. He has since been awaiting placement.       Interval Problem Update  2/25- no events. Pleasant. No complaints.   2/26- I discussed getting him up more with RN. He is reluctant but will do it if encouraged.   2/27- getting up more. No complaints.   2/28- pt/ot recheck confirming need for placement. Approval still pending.   2/29- he is agitated today and says he does not want to get up or eat. RN working with him.     Consultants/Specialty  Psych - s/o    Code Status  DNR    Disposition  Guardianship pending- cleared for placement.     Review of Systems  Review of Systems   Unable to perform ROS: Dementia        Physical Exam  Temp:  [36.6 °C (97.8 °F)-36.7 °C (98 °F)] 36.6 °C (97.9 °F)  Pulse:  [] 79  Resp:  [18-20] 18  BP: (112-124)/(64-72) 113/64  SpO2:  [90 %-94 %] 93 %    Physical Exam  Vitals signs and nursing note reviewed.   Constitutional:       General: He is not in acute distress.     Appearance: He is well-developed. He is not diaphoretic.      Comments: Patient seen and examined at the bedside. Plan discussed at bedside with the RN.    HENT:      Right Ear: External ear normal.      Left Ear: External ear normal.      Nose: Nose normal.   Eyes:      General: No scleral icterus.        Right eye: No discharge.         Left eye: No discharge.   Neck:      Vascular: No JVD.      Trachea: No tracheal deviation.   Cardiovascular:      Rate and Rhythm: Normal rate.      Heart sounds: Normal heart sounds. No murmur.      Comments: Left upper chest pacer in place  Pulmonary:      Effort:  Pulmonary effort is normal. No respiratory distress.      Breath sounds: Normal breath sounds. No wheezing or rales.   Abdominal:      General: Bowel sounds are normal. There is no distension.      Palpations: Abdomen is soft.      Tenderness: There is no abdominal tenderness. There is no guarding.   Musculoskeletal:         General: No tenderness.   Skin:     General: Skin is warm and dry.      Coloration: Skin is pale.      Findings: No erythema.   Neurological:      Mental Status: He is alert. He is disoriented.   Psychiatric:         Behavior: Behavior normal.         Fluids    Intake/Output Summary (Last 24 hours) at 2/29/2020 0835  Last data filed at 2/29/2020 0100  Gross per 24 hour   Intake 720 ml   Output 850 ml   Net -130 ml       Laboratory                        Imaging  No orders to display        Assessment/Plan  * Requires supervision due to deficit in self-care  Assessment & Plan  Awaiting for guardianship    Agitation  Assessment & Plan   Continue seroquel at night.        Chronic atrial fibrillation  Assessment & Plan  Rate controlled, bradycardia resolved  Xarelto for anticoagulation    Severe protein-calorie malnutrition (HCC)  Assessment & Plan   Continue boost supplements    Hyperlipidemia- (present on admission)  Assessment & Plan  Atorvastatin    COPD (chronic obstructive pulmonary disease) ([facility])- (present on admission)  Assessment & Plan  Continue RT protocol    CKD (chronic kidney disease) stage 3, GFR 30-59 ml/min ([facility])- (present on admission)  Assessment & Plan  Creatinine remains at his baseline  Anemia is stable      Essential hypertension- (present on admission)  Assessment & Plan  Controlled on Norvasc and losartan.         VTE prophylaxis: xarelto

## 2020-02-29 NOTE — CARE PLAN
"  Problem: Skin Integrity  Goal: Risk for impaired skin integrity will decrease  Note: All skin precautions in place. And will continue to encourage mobility.      Problem: Mobility  Goal: Risk for activity intolerance will decrease  Note: Pt understands the importance of mobility. Agrees to get into chair for bfast and \"will think about getting into chair for other meals.\"     "

## 2020-02-29 NOTE — PROGRESS NOTES
Pt is AAO x4.  Denies pain or discomfort at this time.   Fatigued, but easily aroused.  VS WNL.  No IV , MD aware.   Resting comfortably in bed at this time.   POC discussed.  All needs met at this time.  Bed in low position.  Call light within reach.  Rounding in place.

## 2020-03-01 NOTE — PROGRESS NOTES
[facility] Medicine Daily Progress Note    Date of Service  3/1/2020    Chief Complaint  79 y.o. male admitted 12/27/2019 with inability to care for self.    Hospital Course    Patient presented from home when his caregiver was found dead in his home on day of admission.  He is not able to care for himself and needs 24 hour supervision.  He was seen by psychiatry on 1/11/2020 and deemed incapacitated for medical decisions. He has since been awaiting placement.       Interval Problem Update  2/25- no events. Pleasant. No complaints.   2/26- I discussed getting him up more with RN. He is reluctant but will do it if encouraged.   2/27- getting up more. No complaints.   2/28- pt/ot recheck confirming need for placement. Approval still pending.   2/29- he is agitated today and says he does not want to get up or eat. RN working with him.   3/1- no events. Feeling well. More cooperative.     Consultants/Specialty  Psych - s/o    Code Status  DNR    Disposition  Guardianship pending- cleared for placement.     Review of Systems  Review of Systems   Unable to perform ROS: Dementia        Physical Exam  Temp:  [36.6 °C (97.8 °F)-36.6 °C (97.9 °F)] 36.6 °C (97.9 °F)  Pulse:  [74-80] 80  Resp:  [18] 18  BP: (114-120)/(58-60) 114/58  SpO2:  [94 %-95 %] 95 %    Physical Exam  Vitals signs and nursing note reviewed.   Constitutional:       General: He is not in acute distress.     Appearance: He is well-developed. He is not diaphoretic.      Comments: Patient seen and examined at the bedside. Plan discussed at bedside with the RN.    HENT:      Right Ear: External ear normal.      Left Ear: External ear normal.      Nose: Nose normal.   Eyes:      General: No scleral icterus.        Right eye: No discharge.         Left eye: No discharge.   Neck:      Vascular: No JVD.      Trachea: No tracheal deviation.   Cardiovascular:      Rate and Rhythm: Normal rate.      Heart sounds: Normal heart sounds. No murmur.      Comments: Left upper  chest pacer in place  Pulmonary:      Effort: Pulmonary effort is normal. No respiratory distress.      Breath sounds: Normal breath sounds. No wheezing or rales.   Abdominal:      General: Bowel sounds are normal. There is no distension.      Palpations: Abdomen is soft.      Tenderness: There is no abdominal tenderness. There is no guarding.   Musculoskeletal:         General: No tenderness.   Skin:     General: Skin is warm and dry.      Coloration: Skin is pale.      Findings: No erythema.   Neurological:      Mental Status: He is alert. He is disoriented.   Psychiatric:         Behavior: Behavior normal.         Fluids    Intake/Output Summary (Last 24 hours) at 3/1/2020 0803  Last data filed at 3/1/2020 0516  Gross per 24 hour   Intake 360 ml   Output 500 ml   Net -140 ml       Laboratory                        Imaging  No orders to display        Assessment/Plan  * Requires supervision due to deficit in self-care  Assessment & Plan  Awaiting for guardianship    Agitation  Assessment & Plan   Continue seroquel at night.        Chronic atrial fibrillation  Assessment & Plan  Rate controlled, bradycardia resolved  Xarelto for anticoagulation    Severe protein-calorie malnutrition (HCC)  Assessment & Plan   Continue boost supplements    Hyperlipidemia- (present on admission)  Assessment & Plan  Atorvastatin    COPD (chronic obstructive pulmonary disease) ([facility])- (present on admission)  Assessment & Plan  Continue RT protocol    CKD (chronic kidney disease) stage 3, GFR 30-59 ml/min ([facility])- (present on admission)  Assessment & Plan  Creatinine remains at his baseline  Anemia is stable      Essential hypertension- (present on admission)  Assessment & Plan  Controlled on Norvasc and losartan.         VTE prophylaxis: xarelto

## 2020-03-01 NOTE — PROGRESS NOTES
Patient sleeping soundly and awoken for scheduled med.  Patient pleasant and cooperative.  No needs at this time.

## 2020-03-01 NOTE — PROGRESS NOTES
0645:  Bedside report completed w/ Alondra/BRENTON.  Assumed pt care. Patient in bed, sleeping, in no apparent distress.  Safety precautions in place. Call light & personal belongings within reach.  Plan of care discussed.    1900:  Bedside report w/ Alondra/BRENTON

## 2020-03-01 NOTE — PROGRESS NOTES
Assumed care of patient at 1900.  Patient is alert and appropriate.  Bedside report complete and patient listened and approved, Q2H turn included in report.   Bed alarm remains on.  Side rails up X2.  Patient denied needs.  Hourly rounding continues.

## 2020-03-01 NOTE — CARE PLAN
Problem: Skin Integrity  Goal: Risk for impaired skin integrity will decrease  Outcome: PROGRESSING AS EXPECTED  Intervention: Assess risk factors for impaired skin integrity and/or pressure ulcers  Note: Skin assessed every shift, there is redness present on sacrum and heels, but both are still blanching, patient is being turned every 2 hours.  Intervention: Implement precautions to protect skin integrity in collaboration with the interdisciplinary team  Flowsheets  Taken 3/1/2020 0800  Skin Preventative Measures:   Pillows in Use to Float Heels   Pillows in Use for Support / Positioning   Silicone Oxygen Tubing in Use   Seat Cushion in Use on Chair when Out of Bed   Waffle Cushion  Bed Types: Pressure Redistribution Mattress (Atmosair)  Friction Interventions: Draw Sheet / Pad Used for Repositioning  Patient Turns / Repositioning: Left Side  PT / OT Involved in Care:   Physical Therapy Involved   Occupational Therapy Involved  Moisturizers:   Barrier Cream   Barrier Paste   Moisturizer  Patient is Receiving Nutrition: Oral Intake Adequate  Vitamin Therapy in Use: No  Activity: Bed  Nutrition Consult Ordered: Yes, Consult has been Placed  Assistance / Tolerance for Turning/Repositioning: Assistance of One  Taken 3/1/2020 0846  Protocols: Incontinence Associated Dermatitis Protocol in Place     Problem: Urinary Elimination:  Goal: Ability to reestablish a normal urinary elimination pattern will improve  Outcome: PROGRESSING SLOWER THAN EXPECTED  Flowsheets (Taken 3/1/2020 0800)  Urinary Elimination: Incontinence  Note: Patient is incontinent at times and then uses the urinal when awake and alert.  Most bouts of incontinence are when the patient is sleeping.

## 2020-03-01 NOTE — CARE PLAN
Problem: Safety  Goal: Will remain free from injury  Outcome: PROGRESSING AS EXPECTED  Note: Bed in low and locked position.  Side rails up X2.  Bed alarm remains on.  Hourly rounding continues.     Problem: Pain Management  Goal: Pain level will decrease to patient's comfort goal  Outcome: PROGRESSING AS EXPECTED  Note: Patient continues to deny pain.

## 2020-03-02 NOTE — PROGRESS NOTES
Assumed care of patient at 1900.  Patient is sleeping quietly in bed and appears to be in no distress.  Bed alarm remains on and side rails up X2.  Hourly rounding continues.

## 2020-03-02 NOTE — DISCHARGE PLANNING
Agency/Facility Name: [facility]  Spoke To:    Outcome:  stated she did not know anything and will find someone to call back.    Since there has been no response from [facility] faxed referral to [facility] per [facility] request.

## 2020-03-02 NOTE — PROGRESS NOTES
"0705:  Bedside report completed w/ Alondra/BRENTON.  Assumed pt care. Patient in bed, sleeping, in no apparent distress.  Safety precautions in place. Call light & personal belongings within reach.  Plan of care discussed.    1030:  Discussed taking a shower with the patient today, he refused and said \"no, I will do it tomorrow\"    Bedside report w/ Renny/BRENTON  1900    "

## 2020-03-02 NOTE — CARE PLAN
Problem: Communication  Goal: The ability to communicate needs accurately and effectively will improve  Outcome: PROGRESSING SLOWER THAN EXPECTED  Intervention: Reorient patient to environment as needed  Flowsheets (Taken 2/19/2020 1208 by Jacques Ma R.N.)  Oriented to::   Unit Routine   Call Light & Bedside Controls     Problem: Mobility  Goal: Risk for activity intolerance will decrease  Outcome: PROGRESSING SLOWER THAN EXPECTED  Intervention: Encourage patient to increase activity level in collaboration with Interdisciplinary Team  Note: Patient is currently unwilling to work with PT/OT, he is refusing to get up to the chair for meals, he is also currently refusing to be bathed or turned.

## 2020-03-02 NOTE — PROGRESS NOTES
Hospital Medicine Daily Progress Note    Date of Service  3/2/2020    Chief Complaint  79 y.o. male admitted 12/27/2019 with inability to care for self.    Hospital Course    Patient presented from home when his caregiver was found dead in his home on day of admission.  He is not able to care for himself and needs 24 hour supervision.  He was seen by psychiatry on 1/11/2020 and deemed incapacitated for medical decisions. He has since been awaiting placement.       Interval Problem Update  2/25- no events. Pleasant. No complaints.   2/26- I discussed getting him up more with RN. He is reluctant but will do it if encouraged.   2/27- getting up more. No complaints.   2/28- pt/ot recheck confirming need for placement. Approval still pending.   2/29- he is agitated today and says he does not want to get up or eat. RN working with him.   3/1- no events. Feeling well. More cooperative.   3/2- comfortable. Still reluctant to get up much. Went for a bath today.     Consultants/Specialty  Psych - s/o    Code Status  DNR    Disposition  Guardianship pending- cleared for placement.     Review of Systems  Review of Systems   Unable to perform ROS: Dementia        Physical Exam  Temp:  [36.7 °C (98 °F)-36.9 °C (98.5 °F)] 36.7 °C (98 °F)  Pulse:  [54-77] 77  Resp:  [18] 18  BP: (109-134)/(50-82) 134/82  SpO2:  [92 %-99 %] 95 %    Physical Exam  Vitals signs and nursing note reviewed.   Constitutional:       General: He is not in acute distress.     Appearance: He is well-developed. He is not diaphoretic.      Comments: Patient seen and examined at the bedside. Plan discussed at bedside with the RN.    HENT:      Right Ear: External ear normal.      Left Ear: External ear normal.      Nose: Nose normal.   Eyes:      General: No scleral icterus.        Right eye: No discharge.         Left eye: No discharge.   Neck:      Vascular: No JVD.      Trachea: No tracheal deviation.   Cardiovascular:      Rate and Rhythm: Normal rate.       Heart sounds: Normal heart sounds. No murmur.      Comments: Left upper chest pacer in place  Pulmonary:      Effort: Pulmonary effort is normal. No respiratory distress.      Breath sounds: Normal breath sounds. No wheezing or rales.   Abdominal:      General: Bowel sounds are normal. There is no distension.      Palpations: Abdomen is soft.      Tenderness: There is no abdominal tenderness. There is no guarding.   Musculoskeletal:         General: No tenderness.   Skin:     General: Skin is warm and dry.      Coloration: Skin is pale.      Findings: No erythema.   Neurological:      Mental Status: He is alert. He is disoriented.   Psychiatric:         Behavior: Behavior normal.         Fluids    Intake/Output Summary (Last 24 hours) at 3/2/2020 0821  Last data filed at 3/2/2020 0500  Gross per 24 hour   Intake 780 ml   Output 700 ml   Net 80 ml       Laboratory                        Imaging  No orders to display        Assessment/Plan  * Requires supervision due to deficit in self-care  Assessment & Plan  Awaiting for guardianship    Agitation  Assessment & Plan   Continue seroquel at night.        Chronic atrial fibrillation  Assessment & Plan  Rate controlled, bradycardia resolved  Xarelto for anticoagulation    Severe protein-calorie malnutrition (HCC)  Assessment & Plan   Continue boost supplements    Hyperlipidemia- (present on admission)  Assessment & Plan  Atorvastatin    COPD (chronic obstructive pulmonary disease) ([facility])- (present on admission)  Assessment & Plan  Continue RT protocol    CKD (chronic kidney disease) stage 3, GFR 30-59 ml/min ([facility])- (present on admission)  Assessment & Plan  Creatinine remains at his baseline  Anemia is stable      Essential hypertension- (present on admission)  Assessment & Plan  Controlled on Norvasc and losartan.         VTE prophylaxis: xarelto

## 2020-03-02 NOTE — CARE PLAN
Problem: Infection  Goal: Will remain free from infection  Outcome: PROGRESSING AS EXPECTED  Intervention: Assess signs and symptoms of infection  Note: Patient has no s/s infection.     Problem: Venous Thromboembolism (VTW)/Deep Vein Thrombosis (DVT) Prevention:  Goal: Patient will participate in Venous Thrombosis (VTE)/Deep Vein Thrombosis (DVT)Prevention Measures  Outcome: PROGRESSING AS EXPECTED     Problem: Urinary Elimination:  Goal: Ability to reestablish a normal urinary elimination pattern will improve  Outcome: PROGRESSING AS EXPECTED  Intervention: Assess and monitor for signs and symptoms of urinary retention  Note: Patient is voiding without difficulty but remains incontinent.

## 2020-03-03 NOTE — THERAPY
Discussed case with RN; Pt is not approrpiate for acute skilled therapy at this time, as the patient continues to be confused and is very intermittent with participation. Pt presents with poor carry over and learning at this time which is no appropriate for skilled therapy services in this setting. RN encouarged to mobilize pt while in house to prevent further deconditioning. Per RN pt has been mobilizing to chair and taking showers. Pt will be kept on d/c needs only for questions or equipments prior to d/c from acute care setting.

## 2020-03-03 NOTE — CARE PLAN
Problem: Infection  Goal: Will remain free from infection  Outcome: PROGRESSING AS EXPECTED  Intervention: Assess signs and symptoms of infection  Note: Patient currently has no s/s of infection     Problem: Bowel/Gastric:  Goal: Normal bowel function is maintained or improved  Flowsheets (Taken 3/3/2020 0800)  Last BM: 03/03/20  Number of Times Stooled: 1  Intervention: Educate patient and significant other/support system about signs and symptoms of constipation and interventions to implement  Note: Patient is not currently experiencing any issues with bowel motility other than being incontinent.

## 2020-03-03 NOTE — PROGRESS NOTES
0645: Bedside report completed.  Assumed pt care. Patient in bed, sleeping, in no apparent distress.  Safety precautions in place. Call light & personal belongings within reach.  Plan of care discussed.    1900:  Bedside report abby/ Renny/BRENTON

## 2020-03-03 NOTE — CARE PLAN
Problem: Nutritional:  Goal: Achieve adequate nutritional intake  Description: Patient will consume >50% of meals, snacks and supplements.   Outcome: PROGRESSING SLOWER THAN EXPECTED   Recorded PO intake 0% to %, but often <50% at most meals.  Pt does take supplements and states he likes the magic cups and his snacks.  Encouraged good intake.

## 2020-03-03 NOTE — PROGRESS NOTES
[facility] Medicine Daily Progress Note    Date of Service  3/3/2020    Chief Complaint  79 y.o. male admitted 12/27/2019 with inability to care for self.    Hospital Course    Patient presented from home when his caregiver was found dead in his home on day of admission.  He is not able to care for himself and needs 24 hour supervision.  He was seen by psychiatry on 1/11/2020 and deemed incapacitated for medical decisions. He has since been awaiting placement.       Interval Problem Update  2/25- no events. Pleasant. No complaints.   2/26- I discussed getting him up more with RN. He is reluctant but will do it if encouraged.   2/27- getting up more. No complaints.   2/28- pt/ot recheck confirming need for placement. Approval still pending.   2/29- he is agitated today and says he does not want to get up or eat. RN working with him.   3/1- no events. Feeling well. More cooperative.   3/2- comfortable. Still reluctant to get up much. Went for a bath today.   3/3- no response from placement facilities per case management.     Consultants/Specialty  Psych - s/o    Code Status  DNR    Disposition  Guardianship pending- cleared for placement.     Review of Systems  Review of Systems   Unable to perform ROS: Dementia        Physical Exam  Temp:  [36.6 °C (97.9 °F)-36.9 °C (98.4 °F)] 36.8 °C (98.2 °F)  Pulse:  [60-71] 60  Resp:  [18] 18  BP: (115-128)/(60-78) 115/78  SpO2:  [90 %-96 %] 90 %    Physical Exam  Vitals signs and nursing note reviewed.   Constitutional:       General: He is not in acute distress.     Appearance: He is well-developed. He is not diaphoretic.      Comments: Patient seen and examined at the bedside. Plan discussed at bedside with the RN.    HENT:      Right Ear: External ear normal.      Left Ear: External ear normal.      Nose: Nose normal.   Eyes:      General: No scleral icterus.        Right eye: No discharge.         Left eye: No discharge.   Neck:      Vascular: No JVD.      Trachea: No tracheal  deviation.   Cardiovascular:      Rate and Rhythm: Normal rate.      Heart sounds: Normal heart sounds. No murmur.      Comments: Left upper chest pacer in place  Pulmonary:      Effort: Pulmonary effort is normal. No respiratory distress.      Breath sounds: Normal breath sounds. No wheezing or rales.   Abdominal:      General: Bowel sounds are normal. There is no distension.      Palpations: Abdomen is soft.      Tenderness: There is no abdominal tenderness. There is no guarding.   Musculoskeletal:         General: No tenderness.   Skin:     General: Skin is warm and dry.      Coloration: Skin is pale.      Findings: No erythema.   Neurological:      Mental Status: He is alert. He is disoriented.   Psychiatric:         Behavior: Behavior normal.         Fluids    Intake/Output Summary (Last 24 hours) at 3/3/2020 0821  Last data filed at 3/2/2020 1616  Gross per 24 hour   Intake 240 ml   Output 450 ml   Net -210 ml       Laboratory                        Imaging  No orders to display        Assessment/Plan  * Requires supervision due to deficit in self-care  Assessment & Plan  Awaiting for guardianship    Agitation  Assessment & Plan   Continue seroquel at night.        Chronic atrial fibrillation  Assessment & Plan  Rate controlled, bradycardia resolved  Xarelto for anticoagulation    Severe protein-calorie malnutrition (HCC)  Assessment & Plan   Continue boost supplements    Hyperlipidemia- (present on admission)  Assessment & Plan  Atorvastatin    COPD (chronic obstructive pulmonary disease) ([facility])- (present on admission)  Assessment & Plan  Continue RT protocol    CKD (chronic kidney disease) stage 3, GFR 30-59 ml/min ([facility])- (present on admission)  Assessment & Plan  Creatinine remains at his baseline  Anemia is stable      Essential hypertension- (present on admission)  Assessment & Plan  Controlled on Norvasc and losartan.         VTE prophylaxis: xarelto

## 2020-03-04 NOTE — CARE PLAN
Problem: Venous Thromboembolism (VTW)/Deep Vein Thrombosis (DVT) Prevention:  Goal: Patient will participate in Venous Thrombosis (VTE)/Deep Vein Thrombosis (DVT)Prevention Measures  Outcome: PROGRESSING AS EXPECTED  Intervention: Assess and monitor for anticoagulation complications  Note: Patient is not showing any signs of coagulation issues at this time.  He is refusing SCDs for comfort reasons, but is receiving Xarelto.

## 2020-03-04 NOTE — DISCHARGE PLANNING
Agency/Facility Name: [facility]   Spoke To: Ana Cristina  Outcome: pt declined. Care exceeds capacity

## 2020-03-04 NOTE — PROGRESS NOTES
[facility] Medicine Daily Progress Note    Date of Service  3/4/2020    Chief Complaint  79 y.o. male admitted 12/27/2019 with inability to care for self.    Hospital Course    Patient presented from home when his caregiver was found dead in his home on day of admission.  He is not able to care for himself and needs 24 hour supervision.  He was seen by psychiatry on 1/11/2020 and deemed incapacitated for medical decisions. He has since been awaiting placement.       Interval Problem Update  2/25- no events. Pleasant. No complaints.   2/26- I discussed getting him up more with RN. He is reluctant but will do it if encouraged.   2/27- getting up more. No complaints.   2/28- pt/ot recheck confirming need for placement. Approval still pending.   2/29- he is agitated today and says he does not want to get up or eat. RN working with him.   3/1- no events. Feeling well. More cooperative.   3/2- comfortable. Still reluctant to get up much. Went for a bath today.   3/3- no response from placement facilities per case management.   3/4- denied at [facility] as out of days.     Consultants/Specialty  Psych - s/o    Code Status  DNR    Disposition  Guardianship pending- cleared for placement.     Review of Systems  Review of Systems   Unable to perform ROS: Dementia        Physical Exam  Temp:  [36.6 °C (97.8 °F)-36.8 °C (98.2 °F)] 36.6 °C (97.9 °F)  Pulse:  [73-81] 78  Resp:  [18] 18  BP: (122-146)/(59-78) 146/78  SpO2:  [91 %-92 %] 91 %    Physical Exam  Vitals signs and nursing note reviewed.   Constitutional:       General: He is not in acute distress.     Appearance: He is well-developed. He is not diaphoretic.      Comments: Patient seen and examined at the bedside. Plan discussed at bedside with the RN.    HENT:      Right Ear: External ear normal.      Left Ear: External ear normal.      Nose: Nose normal.   Eyes:      General: No scleral icterus.        Right eye: No discharge.         Left eye: No discharge.   Neck:       Vascular: No JVD.      Trachea: No tracheal deviation.   Cardiovascular:      Rate and Rhythm: Normal rate.      Heart sounds: Normal heart sounds. No murmur.      Comments: Left upper chest pacer in place  Pulmonary:      Effort: Pulmonary effort is normal. No respiratory distress.      Breath sounds: Normal breath sounds. No wheezing or rales.   Abdominal:      General: Bowel sounds are normal. There is no distension.      Palpations: Abdomen is soft.      Tenderness: There is no abdominal tenderness. There is no guarding.   Musculoskeletal:         General: No tenderness.   Skin:     General: Skin is warm and dry.      Coloration: Skin is pale.      Findings: No erythema.   Neurological:      Mental Status: He is alert. He is disoriented.   Psychiatric:         Behavior: Behavior normal.         Fluids    Intake/Output Summary (Last 24 hours) at 3/4/2020 0747  Last data filed at 3/4/2020 0600  Gross per 24 hour   Intake 480 ml   Output --   Net 480 ml       Laboratory                        Imaging  No orders to display        Assessment/Plan  * Requires supervision due to deficit in self-care  Assessment & Plan  Awaiting for guardianship    Agitation  Assessment & Plan   Continue seroquel at night.        Chronic atrial fibrillation  Assessment & Plan  Rate controlled, bradycardia resolved  Xarelto for anticoagulation    Severe protein-calorie malnutrition (HCC)  Assessment & Plan   Continue boost supplements    Hyperlipidemia- (present on admission)  Assessment & Plan  Atorvastatin    COPD (chronic obstructive pulmonary disease) ([facility])- (present on admission)  Assessment & Plan  Continue RT protocol    CKD (chronic kidney disease) stage 3, GFR 30-59 ml/min ([facility])- (present on admission)  Assessment & Plan  Creatinine remains at his baseline  Anemia is stable      Essential hypertension- (present on admission)  Assessment & Plan  Controlled on Norvasc and losartan.         VTE prophylaxis: xarelto

## 2020-03-04 NOTE — PROGRESS NOTES
0700: Bedside report completed w/ Renny/BRENTON.  Assumed pt care. Patient in bed, sleeping, in no apparent distress.  Safety precautions in place. Call light & personal belongings within reach. Plan of care discussed.    1505:  Handoff report w/ Jayne/BRENTON

## 2020-03-05 NOTE — CARE PLAN
Problem: Communication  Goal: The ability to communicate needs accurately and effectively will improve  Outcome: PROGRESSING AS EXPECTED  Pt able to communicate needs  Calls appropriately.    Problem: Safety  Goal: Will remain free from injury  Outcome: PROGRESSING AS EXPECTED  Call light in reach.  Bed alarm on  Hourly rounding

## 2020-03-05 NOTE — PROGRESS NOTES
Report received from Liliana FARRIS around 1500, care assumed. Patient medicated with evening meds, taken whole with water without difficulty. Dinner at bedside patient states he wants to wait to eat. Requested urinal, denies other needs at this time.

## 2020-03-05 NOTE — PROGRESS NOTES
Patient refused to answer orientation questions and refused RN to assess coccyx. Patient only allowed RN to assess heels, which are red and blanching. Patient refusing turns so far as well. Patient awaiting placement will monitor.

## 2020-03-05 NOTE — CARE PLAN
Problem: Communication  Goal: The ability to communicate needs accurately and effectively will improve  Outcome: PROGRESSING AS EXPECTED     Problem: Safety  Goal: Will remain free from injury  Outcome: PROGRESSING AS EXPECTED  Goal: Will remain free from falls  Outcome: PROGRESSING AS EXPECTED   Patient remains free from falls at this time.   Problem: Infection  Goal: Will remain free from infection  Outcome: PROGRESSING AS EXPECTED     Problem: Venous Thromboembolism (VTW)/Deep Vein Thrombosis (DVT) Prevention:  Goal: Patient will participate in Venous Thrombosis (VTE)/Deep Vein Thrombosis (DVT)Prevention Measures  Flowsheets (Taken 3/4/2020 2008)  Risk Assessment Score: 1  VTE RISK: Moderate  Mechanical Prophylaxis: SCDs, Sequential Compression Device  AV Foot Pumps: Refused  SCDs, Sequential Compression Device: Refused  Pharmacologic Prophylaxis Used: Rivaroxaban (Xarelto) - (ONLY for Total Knee and Total Hip Surgery)

## 2020-03-05 NOTE — PROGRESS NOTES
Hospital Medicine Daily Progress Note    Date of Service  3/5/2020    Chief Complaint  79 y.o. male admitted 12/27/2019 with inability to care for self.    Hospital Course    Patient presented from home when his caregiver was found dead in his home on day of admission.  He is not able to care for himself and needs 24 hour supervision.  He was seen by psychiatry on 1/11/2020 and deemed incapacitated for medical decisions. He has since been awaiting placement.       Interval Problem Update  The patient continues to eat his boost supplements    Consultants/Specialty  Psychiatry    Code Status  DNR    Disposition  Guardianship pending, placement pending    Review of Systems  Review of Systems   Unable to perform ROS: Dementia        Physical Exam  Temp:  [36.6 °C (97.8 °F)-36.7 °C (98 °F)] 36.7 °C (98 °F)  Pulse:  [64-86] 86  Resp:  [18] 18  BP: ()/(61-69) 96/61  SpO2:  [90 %-94 %] 94 %    Physical Exam  Vitals signs and nursing note reviewed.   Constitutional:       General: He is not in acute distress.     Appearance: He is well-developed.      Comments: Patient seen and examined at the bedside. Plan discussed at bedside with the RN.    HENT:      Nose: No rhinorrhea.      Mouth/Throat:      Mouth: Mucous membranes are moist.   Eyes:      General:         Right eye: No discharge.         Left eye: No discharge.   Neck:      Vascular: No JVD.      Trachea: No tracheal deviation.   Cardiovascular:      Rate and Rhythm: Normal rate.      Heart sounds: Normal heart sounds. No murmur.   Pulmonary:      Effort: Pulmonary effort is normal.      Breath sounds: No wheezing or rales.   Abdominal:      General: There is no distension.      Tenderness: There is no abdominal tenderness.   Musculoskeletal:         General: No swelling or tenderness.   Skin:     General: Skin is dry.      Coloration: Skin is pale.      Findings: No erythema.   Neurological:      Mental Status: He is alert. Mental status is at baseline. He is  disoriented.   Psychiatric:         Behavior: Behavior normal.         Fluids    Intake/Output Summary (Last 24 hours) at 3/5/2020 0743  Last data filed at 3/4/2020 1100  Gross per 24 hour   Intake 120 ml   Output --   Net 120 ml       Laboratory                        Imaging  No orders to display        Assessment/Plan  * Requires supervision due to deficit in self-care  Assessment & Plan  Awaiting for guardianship    Agitation  Assessment & Plan  Controlled on seroquel at night.        Chronic atrial fibrillation  Assessment & Plan  Rate controlled  Xarelto for anticoagulation    Severe protein-calorie malnutrition (HCC)  Assessment & Plan   Continue boost supplements    Hyperlipidemia- (present on admission)  Assessment & Plan  Atorvastatin    COPD (chronic obstructive pulmonary disease) ([facility])- (present on admission)  Assessment & Plan  Continue RT protocol    CKD (chronic kidney disease) stage 3, GFR 30-59 ml/min ([facility])- (present on admission)  Assessment & Plan  Creatinine remains at his baseline  Anemia is stable  Will repeat labs as has been almost 4 weeks since last draw      Essential hypertension- (present on admission)  Assessment & Plan  Controlled on Norvasc and losartan.         VTE prophylaxis: xarelto

## 2020-03-06 NOTE — PROGRESS NOTES
Pt refused dinner. Encouraged snacks or magic cup but pt refusing. Pt drowsy, denies any needs at this time. Pericare provided and pt turned. Q2 turns in place.

## 2020-03-06 NOTE — CARE PLAN
Problem: Safety  Goal: Will remain free from injury  Outcome: PROGRESSING AS EXPECTED  Goal: Will remain free from falls  Outcome: PROGRESSING AS EXPECTED     Problem: Knowledge Deficit  Goal: Knowledge of disease process/condition, treatment plan, diagnostic tests, and medications will improve  Outcome: PROGRESSING AS EXPECTED  Goal: Knowledge of the prescribed therapeutic regimen will improve  Outcome: PROGRESSING AS EXPECTED     Problem: Skin Integrity  Goal: Risk for impaired skin integrity will decrease  Outcome: PROGRESSING AS EXPECTED

## 2020-03-06 NOTE — PROGRESS NOTES
Hospital Medicine Daily Progress Note    Date of Service  3/6/2020    Chief Complaint  79 y.o. male admitted 12/27/2019 with inability to care for self.    Hospital Course    Patient presented from home when his caregiver was found dead in his home on day of admission.  He is not able to care for himself and needs 24 hour supervision.  He was seen by psychiatry on 1/11/2020 and deemed incapacitated for medical decisions. He has since been awaiting placement.       Interval Problem Update  The patient has variable oral intake of fluids and nutrition  Labs show rising potassium level  Systolic blood pressure hovers around 130    Consultants/Specialty  Psychiatry    Code Status  DNR    Disposition  Guardianship pending, placement pending    Review of Systems  Review of Systems   Unable to perform ROS: Dementia        Physical Exam  Temp:  [36.8 °C (98.2 °F)-37 °C (98.6 °F)] 37 °C (98.6 °F)  Pulse:  [68-80] 77  Resp:  [18-20] 20  BP: (122-133)/(64-70) 131/64  SpO2:  [94 %-98 %] 94 %    Physical Exam  Vitals signs and nursing note reviewed.   Constitutional:       General: He is not in acute distress.     Appearance: He is well-developed. He is not ill-appearing.   HENT:      Head: Atraumatic.      Nose: No congestion or rhinorrhea.   Eyes:      General: No scleral icterus.        Right eye: No discharge.         Left eye: No discharge.      Conjunctiva/sclera: Conjunctivae normal.   Neck:      Vascular: No JVD.      Trachea: No tracheal deviation.   Cardiovascular:      Rate and Rhythm: Normal rate and regular rhythm.      Heart sounds: Normal heart sounds. No friction rub.   Pulmonary:      Effort: Pulmonary effort is normal.      Breath sounds: No wheezing or rales.   Abdominal:      General: There is no distension.      Palpations: There is no mass.   Musculoskeletal:         General: No swelling or tenderness.   Skin:     General: Skin is dry.      Coloration: Skin is pale. Skin is not jaundiced.      Findings: No  erythema.   Neurological:      Mental Status: He is alert. Mental status is at baseline. He is disoriented.      Motor: No weakness.   Psychiatric:         Mood and Affect: Mood normal.         Behavior: Behavior normal.         Fluids  No intake or output data in the 24 hours ending 03/06/20 0744    Laboratory  Recent Labs     03/06/20  0431   WBC 8.0   RBC 3.30*   HEMOGLOBIN 9.8*   HEMATOCRIT 30.7*   MCV 93.0   MCH 29.7   MCHC 31.9*   RDW 43.8   PLATELETCT 444   MPV 9.2     Recent Labs     03/06/20  0431   SODIUM 140   POTASSIUM 5.3   CHLORIDE 105   CO2 16*   GLUCOSE 57*   BUN 63*   CREATININE 2.20*   CALCIUM 10.1                   Imaging  No orders to display        Assessment/Plan  * Requires supervision due to deficit in self-care  Assessment & Plan  Awaiting for guardianship    Agitation  Assessment & Plan  Controlled on seroquel at night.        Chronic atrial fibrillation  Assessment & Plan  Rate controlled  Xarelto for anticoagulation    Severe protein-calorie malnutrition (HCC)  Assessment & Plan   Continue boost supplements    Hyperlipidemia- (present on admission)  Assessment & Plan  Atorvastatin    COPD (chronic obstructive pulmonary disease) ([facility])- (present on admission)  Assessment & Plan  Continue RT protocol    CKD (chronic kidney disease) stage 3, GFR 30-59 ml/min ([facility])- (present on admission)  Assessment & Plan  Creatinine is near his baseline  Anemia is stable  Potassium level is more elevated, will encourage fluid intake orally and repeat labs      Essential hypertension- (present on admission)  Assessment & Plan  Controlled on Norvasc and losartan.  Monitor electrolytes on losartan         VTE prophylaxis: xarelto

## 2020-03-06 NOTE — DIETARY
Nutrition Services: Update   Day 69 of admit.  Eulogio Jeronimo is a 79 y.o. male with admitting DX of Requires supervision due to deficit in self-care.    Pt is currently on Regular, Dysphagia 3 diet. Recorded PO intake remains variable from 0% to 50-75%.  Pt ate 25-50% at breakfast today.  Pt disliked boost, but does eat magic cups.  Offering snacks between meals.  Pt remains on Marinol for appetite.    Wt 3/5: 39.4 kg via bed scale - stable from last weight.  BMI =14.    Malnutrition Risk: Severe malnutrition per admission assessment.    Recommendations/Plan:  1. Continue magic cups BID and snacks TID.  2. Encourage intake of meals, supplements, and snacks.  3. Document intake of all meals, supplements, and snacks as % taken in ADL's to provide interdisciplinary communication across all shifts.   4. Monitor weight.  5. Nutrition rep will continue to see patient for ongoing meal and snack preferences.    RD following

## 2020-03-06 NOTE — CARE PLAN
Problem: Nutritional:  Goal: Achieve adequate nutritional intake  Description: Patient will consume >50% of meals, snacks and supplements.   Outcome: PROGRESSING SLOWER THAN EXPECTED

## 2020-03-06 NOTE — PROGRESS NOTES
Pt resting in bed. Took morning medication. Encouraged oral fluids. Pt drank full cup of water. Sheets changed. Will continue to monitor.

## 2020-03-07 NOTE — PROGRESS NOTES
Hospital Medicine Daily Progress Note    Date of Service  3/7/2020    Chief Complaint  79 y.o. male admitted 12/27/2019 with inability to care for self.    Hospital Course    Patient presented from home when his caregiver was found dead in his home on day of admission.  He is not able to care for himself and needs 24 hour supervision.  He was seen by psychiatry on 1/11/2020 and deemed incapacitated for medical decisions. He has since been awaiting placement.       Interval Problem Update  The patient is taking more fluids in orally when encouraged    Consultants/Specialty  Psychiatry    Code Status  DNR    Disposition  Guardianship pending, placement pending    Review of Systems  Review of Systems   Unable to perform ROS: Dementia        Physical Exam  Temp:  [36.8 °C (98.3 °F)-37 °C (98.6 °F)] 36.8 °C (98.3 °F)  Pulse:  [76-78] 78  Resp:  [18-20] 20  BP: (126-141)/(62-73) 141/73  SpO2:  [95 %-99 %] 95 %    Physical Exam  Vitals signs and nursing note reviewed.   Constitutional:       General: He is sleeping.      Appearance: He is well-developed. He is not ill-appearing.   HENT:      Head: Atraumatic.      Nose: No rhinorrhea.   Eyes:      General:         Right eye: No discharge.         Left eye: No discharge.   Neck:      Vascular: No JVD.      Trachea: No tracheal deviation.   Cardiovascular:      Rate and Rhythm: Normal rate.      Heart sounds: Normal heart sounds.   Pulmonary:      Effort: Pulmonary effort is normal.      Breath sounds: No wheezing.   Abdominal:      General: There is no distension.   Musculoskeletal:         General: No swelling or tenderness.   Skin:     General: Skin is dry.      Coloration: Skin is pale.      Findings: No rash.   Neurological:      Mental Status: Mental status is at baseline.   Psychiatric:         Mood and Affect: Mood normal.         Behavior: Behavior normal.         Fluids    Intake/Output Summary (Last 24 hours) at 3/7/2020 0952  Last data filed at 3/7/2020  0800  Gross per 24 hour   Intake 540 ml   Output 200 ml   Net 340 ml       Laboratory  Recent Labs     03/06/20  0431   WBC 8.0   RBC 3.30*   HEMOGLOBIN 9.8*   HEMATOCRIT 30.7*   MCV 93.0   MCH 29.7   MCHC 31.9*   RDW 43.8   PLATELETCT 444   MPV 9.2     Recent Labs     03/06/20  0431 03/07/20  0405   SODIUM 140 137   POTASSIUM 5.3 4.5   CHLORIDE 105 106   CO2 16* 17*   GLUCOSE 57* 83   BUN 63* 66*   CREATININE 2.20* 2.40*   CALCIUM 10.1 9.7                   Imaging  No orders to display        Assessment/Plan  * Requires supervision due to deficit in self-care  Assessment & Plan  Awaiting for guardianship    Agitation  Assessment & Plan  Controlled on seroquel at night.        Chronic atrial fibrillation  Assessment & Plan  Rate controlled  Xarelto for anticoagulation    Severe protein-calorie malnutrition (HCC)  Assessment & Plan   Continue boost supplements    Hyperlipidemia- (present on admission)  Assessment & Plan  Atorvastatin    COPD (chronic obstructive pulmonary disease) ([facility])- (present on admission)  Assessment & Plan  Continue RT protocol    CKD (chronic kidney disease) stage 3, GFR 30-59 ml/min ([facility])- (present on admission)  Assessment & Plan  Creatinine is near his baseline  Anemia is stable  Potassium level improved, will monitor creatinine every few days      Essential hypertension- (present on admission)  Assessment & Plan  Controlled on Norvasc and losartan.  Monitor electrolytes on losartan periodically         VTE prophylaxis: xarelto

## 2020-03-07 NOTE — PROGRESS NOTES
Pt resting in bed. Encouraged to drink fluids. Pt agreed, drank cup of water and an orange juice. Pt refused to take shower today. Agreeable to bed bath later today.

## 2020-03-07 NOTE — CARE PLAN
"  Problem: Skin Integrity  Goal: Risk for impaired skin integrity will decrease  Outcome: PROGRESSING SLOWER THAN EXPECTED  Note: Q2 turns, soiled linens promptly changed,      Problem: Medication  Goal: Compliance with prescribed medication will improve  Outcome: PROGRESSING SLOWER THAN EXPECTED  Intervention: Assess and address patient's barriers to compliance with prescribed medication regimen  Note: Pt refused scheduled Lipitor tonight as he was sleeping and didn't want to be woken for it, stating \"I'll take it later.\" At 2:00 AM pt woke up and was willing to take at that time.     "

## 2020-03-07 NOTE — CARE PLAN
Problem: Safety  Goal: Will remain free from injury  Outcome: PROGRESSING AS EXPECTED  Goal: Will remain free from falls  Outcome: PROGRESSING AS EXPECTED     Problem: Knowledge Deficit  Goal: Knowledge of disease process/condition, treatment plan, diagnostic tests, and medications will improve  Outcome: PROGRESSING AS EXPECTED  Goal: Knowledge of the prescribed therapeutic regimen will improve  Outcome: PROGRESSING AS EXPECTED     Problem: Skin Integrity  Goal: Risk for impaired skin integrity will decrease  Outcome: PROGRESSING AS EXPECTED     Problem: Psychosocial Needs:  Goal: Level of anxiety will decrease  Outcome: PROGRESSING AS EXPECTED

## 2020-03-08 NOTE — CARE PLAN
Problem: Safety  Goal: Will remain free from injury  Outcome: PROGRESSING AS EXPECTED     Problem: Skin Integrity  Goal: Risk for impaired skin integrity will decrease  Outcome: PROGRESSING AS EXPECTED     Problem: Urinary Elimination:  Goal: Ability to reestablish a normal urinary elimination pattern will improve  Outcome: PROGRESSING AS EXPECTED

## 2020-03-08 NOTE — PROGRESS NOTES
Bedside report was received from day RN. Pt reports no pain level. Assessment done POC discussed.All needs met at this time.Bed in low position.Call light within reach.Rounding in place.

## 2020-03-08 NOTE — PROGRESS NOTES
Report received from night shift RN. Assume care. Pt. AAOx4 pt is sleeping but easily to awaken bed,  Assessment completed. VSS. Denies pain,n/v,  Pt was update for the care for the day. White board updated, All question answered. Pt has call light within reach,  bed is in the lowest position. Pt has no other needs at this time.

## 2020-03-08 NOTE — PROGRESS NOTES
Hospital Medicine Daily Progress Note    Date of Service  3/8/2020    Chief Complaint  79 y.o. male admitted 12/27/2019 with inability to care for self.    Hospital Course    Patient presented from home when his caregiver was found dead in his home on day of admission.  He is not able to care for himself and needs 24 hour supervision.  He was seen by psychiatry on 1/11/2020 and deemed incapacitated for medical decisions. He has since been awaiting placement.       Interval Problem Update  The patient is afebrile, he eats variably and requires encouragement to eat or drink more    Consultants/Specialty  Psychiatry    Code Status  DNR    Disposition  Guardianship pending, placement pending    Review of Systems  Review of Systems   Unable to perform ROS: Dementia        Physical Exam  Temp:  [37.1 °C (98.7 °F)] 37.1 °C (98.7 °F)  Pulse:  [71-73] 73  Resp:  [18-20] 20  BP: (114-121)/(58-59) 114/58  SpO2:  [94 %-96 %] 96 %    Physical Exam  Vitals signs and nursing note reviewed.   Constitutional:       General: He is not in acute distress.     Appearance: He is well-developed.   HENT:      Nose: No congestion or rhinorrhea.   Eyes:      Conjunctiva/sclera: Conjunctivae normal.   Neck:      Musculoskeletal: Neck supple.      Vascular: No JVD.      Trachea: No tracheal deviation.   Cardiovascular:      Rate and Rhythm: Normal rate.      Heart sounds: Normal heart sounds.   Pulmonary:      Effort: Pulmonary effort is normal.      Breath sounds: No stridor.   Abdominal:      General: There is no distension.      Tenderness: There is no abdominal tenderness.   Musculoskeletal:         General: No swelling or tenderness.   Skin:     Coloration: Skin is pale. Skin is not jaundiced.      Findings: No rash.   Neurological:      Mental Status: Mental status is at baseline.   Psychiatric:         Mood and Affect: Mood normal.         Behavior: Behavior normal.         Fluids    Intake/Output Summary (Last 24 hours) at 3/8/2020  1148  Last data filed at 3/7/2020 2013  Gross per 24 hour   Intake 240 ml   Output --   Net 240 ml       Laboratory  Recent Labs     03/06/20  0431   WBC 8.0   RBC 3.30*   HEMOGLOBIN 9.8*   HEMATOCRIT 30.7*   MCV 93.0   MCH 29.7   MCHC 31.9*   RDW 43.8   PLATELETCT 444   MPV 9.2     Recent Labs     03/06/20  0431 03/07/20  0405   SODIUM 140 137   POTASSIUM 5.3 4.5   CHLORIDE 105 106   CO2 16* 17*   GLUCOSE 57* 83   BUN 63* 66*   CREATININE 2.20* 2.40*   CALCIUM 10.1 9.7                   Imaging  No orders to display        Assessment/Plan  * Requires supervision due to deficit in self-care  Assessment & Plan  Awaiting for guardianship    Agitation  Assessment & Plan  Controlled on seroquel at night.        Chronic atrial fibrillation  Assessment & Plan  Rate controlled  Xarelto for anticoagulation    Severe protein-calorie malnutrition (HCC)  Assessment & Plan   Continue boost supplements    Hyperlipidemia- (present on admission)  Assessment & Plan  Atorvastatin    COPD (chronic obstructive pulmonary disease) ([facility])- (present on admission)  Assessment & Plan  Continue RT protocol    CKD (chronic kidney disease) stage 3, GFR 30-59 ml/min ([facility])- (present on admission)  Assessment & Plan  Creatinine is near his baseline  Anemia is stable        Essential hypertension- (present on admission)  Assessment & Plan  Controlled on Norvasc and losartan.           VTE prophylaxis: xarelto

## 2020-03-09 PROBLEM — H04.129 EYE DRYNESS: Status: ACTIVE | Noted: 2020-01-01

## 2020-03-09 NOTE — PROGRESS NOTES
Assist with changing pt, there was dry feces on pts coccyx. Washed with soap and water, pat dry.

## 2020-03-09 NOTE — PROGRESS NOTES
Hospital Medicine Daily Progress Note    Date of Service  3/9/2020    Chief Complaint  79 y.o. male admitted 12/27/2019 with inability to care for self.    Hospital Course    Patient presented from home when his caregiver was found dead in his home on day of admission.  He is not able to care for himself and needs 24 hour supervision.  He was seen by psychiatry on 1/11/2020 and deemed incapacitated for medical decisions. He has since been awaiting placement.       Interval Problem Update  The patient has eye irritation today, he is frustrated that he has dropped some of his fruit for lunch in his lap    Consultants/Specialty  Psychiatry    Code Status  DNR    Disposition  Guardianship pending, placement pending    Review of Systems  Review of Systems   Unable to perform ROS: Dementia   Eyes: Positive for blurred vision and discharge.        Physical Exam  Temp:  [36.7 °C (98.1 °F)-36.9 °C (98.5 °F)] 36.7 °C (98.1 °F)  Pulse:  [75-90] 75  Resp:  [17-20] 17  BP: (108-123)/(58-72) 116/67  SpO2:  [93 %-94 %] 93 %    Physical Exam  Vitals signs and nursing note reviewed.   Constitutional:       General: He is not in acute distress.     Appearance: He is cachectic.   HENT:      Mouth/Throat:      Mouth: Mucous membranes are moist.      Pharynx: Oropharynx is clear.   Eyes:      General: No scleral icterus.        Right eye: No discharge.         Left eye: Discharge present.     Conjunctiva/sclera:      Right eye: Right conjunctiva is injected.      Left eye: Left conjunctiva is injected.   Neck:      Musculoskeletal: Neck supple.      Vascular: No JVD.      Trachea: No tracheal deviation.   Cardiovascular:      Rate and Rhythm: Normal rate.      Heart sounds: Normal heart sounds.   Pulmonary:      Effort: Pulmonary effort is normal.      Breath sounds: No stridor.   Abdominal:      General: There is no distension.      Palpations: Abdomen is soft. There is no mass.      Tenderness: There is no abdominal tenderness.    Musculoskeletal:         General: No swelling or tenderness.   Skin:     Coloration: Skin is pale. Skin is not jaundiced.      Findings: No rash.   Neurological:      Mental Status: Mental status is at baseline.      Coordination: Coordination normal.   Psychiatric:         Mood and Affect: Mood normal.         Behavior: Behavior normal.         Fluids    Intake/Output Summary (Last 24 hours) at 3/9/2020 1246  Last data filed at 3/9/2020 1200  Gross per 24 hour   Intake 480 ml   Output 100 ml   Net 380 ml       Laboratory      Recent Labs     03/07/20  0405   SODIUM 137   POTASSIUM 4.5   CHLORIDE 106   CO2 17*   GLUCOSE 83   BUN 66*   CREATININE 2.40*   CALCIUM 9.7                   Imaging  No orders to display        Assessment/Plan  * Requires supervision due to deficit in self-care  Assessment & Plan  Awaiting for guardianship    Eye dryness  Assessment & Plan  Patient with dry eyes and excessive tearing of left eye with conjunctiva irritation  Will order artificial tear ointment    Agitation  Assessment & Plan  Controlled on seroquel at night.        Chronic atrial fibrillation  Assessment & Plan  Rate controlled  Xarelto for anticoagulation    Severe protein-calorie malnutrition (HCC)  Assessment & Plan   Continue boost supplements  Encourage oral intake    Hyperlipidemia- (present on admission)  Assessment & Plan  Atorvastatin    COPD (chronic obstructive pulmonary disease) ([facility])- (present on admission)  Assessment & Plan  Continue RT protocol    CKD (chronic kidney disease) stage 3, GFR 30-59 ml/min ([facility])- (present on admission)  Assessment & Plan  Creatinine is near his baseline  Anemia is stable        Essential hypertension- (present on admission)  Assessment & Plan  Controlled on Norvasc and losartan.           VTE prophylaxis: xarelto

## 2020-03-09 NOTE — CARE PLAN
Problem: Communication  Goal: The ability to communicate needs accurately and effectively will improve  Outcome: PROGRESSING AS EXPECTED     Problem: Safety  Goal: Will remain free from injury  Outcome: PROGRESSING AS EXPECTED     Problem: Infection  Goal: Will remain free from infection  Outcome: PROGRESSING AS EXPECTED     Problem: Bowel/Gastric:  Goal: Normal bowel function is maintained or improved  Outcome: PROGRESSING AS EXPECTED     Problem: Knowledge Deficit  Goal: Knowledge of disease process/condition, treatment plan, diagnostic tests, and medications will improve  Outcome: PROGRESSING AS EXPECTED     Problem: Skin Integrity  Goal: Risk for impaired skin integrity will decrease  Outcome: PROGRESSING AS EXPECTED     Problem: Pain Management  Goal: Pain level will decrease to patient's comfort goal  Outcome: PROGRESSING AS EXPECTED     Problem: Mobility  Goal: Risk for activity intolerance will decrease  Outcome: PROGRESSING AS EXPECTED

## 2020-03-09 NOTE — CARE PLAN
Problem: Communication  Goal: The ability to communicate needs accurately and effectively will improve  Outcome: PROGRESSING AS EXPECTED     Problem: Safety  Goal: Will remain free from injury  Outcome: PROGRESSING AS EXPECTED     Problem: Bowel/Gastric:  Goal: Normal bowel function is maintained or improved  Outcome: PROGRESSING AS EXPECTED     Problem: Respiratory:  Goal: Respiratory status will improve  Outcome: PROGRESSING AS EXPECTED     Problem: Urinary Elimination:  Goal: Ability to reestablish a normal urinary elimination pattern will improve  Outcome: PROGRESSING AS EXPECTED

## 2020-03-09 NOTE — PROGRESS NOTES
Report received from night shift RN. Assume care. Pt. AAOx4 pt is in bed asleep, easily awake.  Assessment completed. VSS. Denies pain,n/v,   Pt was update for the care for the day. White board updated, All question answered. Pt has call light within reach,  bed is in the lowest position, bed alarm on. Pt has no other needs at this time.

## 2020-03-09 NOTE — PROGRESS NOTES
Received report from day shift nurse.   Assumed pt care  Pt is sleepingrespiratory distress.  Will returns for night time meds and assessment.   Fall precautions in place.   Bed at lowest position.   Call light and personal belongings within reach.   Continue to monitor

## 2020-03-10 NOTE — PROGRESS NOTES
Report received from night shift RN. Assume care. Pt. AAOx2 pt is in bed asleep but able to awaken for assessment and back to asleep.  VSS. Denies pain, Pt was update for the care for the day. White board updated, All question answered. Pt has call light within reach,  bed is in the lowest position with bed alarm on Pt has no other needs at this time.

## 2020-03-10 NOTE — PROGRESS NOTES
[facility] Medicine Daily Progress Note    Date of Service  3/10/2020    Chief Complaint  79 y.o. male admitted 12/27/2019 with inability to care for self.    Hospital Course    Patient presented from home when his caregiver was found dead in his home on day of admission.  He is not able to care for himself and needs 24 hour supervision.  He was seen by psychiatry on 1/11/2020 and deemed incapacitated for medical decisions. He has since been awaiting placement.       Interval Problem Update  3/10- no events. Doing well. Awaiting placement.     Consultants/Specialty  Psychiatry    Code Status  DNR    Disposition  Guardianship pending, placement pending    Review of Systems  Review of Systems   Constitutional: Negative for chills and fever.   HENT: Negative for sore throat.    Eyes: Negative for blurred vision and pain.   Respiratory: Negative for cough and shortness of breath.    Cardiovascular: Negative for chest pain and palpitations.   Gastrointestinal: Negative for abdominal pain, nausea and vomiting.   Genitourinary: Negative for dysuria and urgency.   Musculoskeletal: Negative for back pain and neck pain.   Skin: Negative for itching and rash.   Neurological: Negative for dizziness, tingling and headaches.   Psychiatric/Behavioral: Negative for depression. The patient does not have insomnia.    All other systems reviewed and are negative.       Physical Exam  Temp:  [36.2 °C (97.2 °F)] 36.2 °C (97.2 °F)  Pulse:  [65-75] 65  Resp:  [18] 18  BP: (120)/(60-68) 120/60  SpO2:  [100 %] 100 %    Physical Exam  Vitals signs and nursing note reviewed.   Constitutional:       General: He is not in acute distress.     Appearance: He is well-developed. He is not diaphoretic.      Comments: Patient seen and examined at the bedside. Plan discussed at bedside with the RN.    HENT:      Head:      Comments: Right lower lateral eye sty      Right Ear: External ear normal.      Left Ear: External ear normal.      Nose: Nose  normal.   Eyes:      General: No scleral icterus.        Right eye: No discharge.         Left eye: No discharge.   Neck:      Vascular: No JVD.      Trachea: No tracheal deviation.   Cardiovascular:      Rate and Rhythm: Normal rate.      Heart sounds: Normal heart sounds. No murmur.   Pulmonary:      Effort: Pulmonary effort is normal. No respiratory distress.      Breath sounds: Normal breath sounds. No wheezing or rales.   Abdominal:      General: Bowel sounds are normal. There is no distension.      Palpations: Abdomen is soft.      Tenderness: There is no abdominal tenderness. There is no guarding.   Musculoskeletal:         General: No tenderness.   Skin:     General: Skin is warm and dry.      Findings: No erythema.   Neurological:      Mental Status: He is alert and oriented to person, place, and time.   Psychiatric:         Behavior: Behavior normal.         Fluids    Intake/Output Summary (Last 24 hours) at 3/10/2020 0844  Last data filed at 3/9/2020 1200  Gross per 24 hour   Intake 120 ml   Output --   Net 120 ml       Laboratory                        Imaging  No orders to display        Assessment/Plan  * Requires supervision due to deficit in self-care  Assessment & Plan  Awaiting for guardianship    Eye dryness  Assessment & Plan  Patient with dry eyes and excessive tearing of left eye with conjunctiva irritation  Will order artificial tear ointment    Agitation  Assessment & Plan  Controlled on seroquel at night.        Chronic atrial fibrillation  Assessment & Plan  Rate controlled  Xarelto for anticoagulation    Severe protein-calorie malnutrition (HCC)  Assessment & Plan   Continue boost supplements  Encourage oral intake    Hyperlipidemia- (present on admission)  Assessment & Plan  Atorvastatin    COPD (chronic obstructive pulmonary disease) ([facility])- (present on admission)  Assessment & Plan  Continue RT protocol    CKD (chronic kidney disease) stage 3, GFR 30-59 ml/min ([facility])- (present on  admission)  Assessment & Plan  Creatinine is near his baseline  Anemia is stable        Essential hypertension- (present on admission)  Assessment & Plan  Controlled on Norvasc and losartan.           VTE prophylaxis: xarelto

## 2020-03-10 NOTE — CARE PLAN
Problem: Communication  Goal: The ability to communicate needs accurately and effectively will improve  Outcome: PROGRESSING AS EXPECTED     Problem: Safety  Goal: Will remain free from injury  Outcome: PROGRESSING AS EXPECTED  Goal: Will remain free from falls  Outcome: PROGRESSING AS EXPECTED     Problem: Venous Thromboembolism (VTW)/Deep Vein Thrombosis (DVT) Prevention:  Goal: Patient will participate in Venous Thrombosis (VTE)/Deep Vein Thrombosis (DVT)Prevention Measures  Outcome: PROGRESSING AS EXPECTED     Problem: Knowledge Deficit  Goal: Knowledge of disease process/condition, treatment plan, diagnostic tests, and medications will improve  Outcome: PROGRESSING AS EXPECTED     Problem: Skin Integrity  Goal: Risk for impaired skin integrity will decrease  Outcome: PROGRESSING AS EXPECTED

## 2020-03-10 NOTE — CARE PLAN
Problem: Nutritional:  Goal: Achieve adequate nutritional intake  Description: Patient will consume >50% of meals, snacks and supplements.   Outcome: PROGRESSING SLOWER THAN EXPECTED    PO intake is not adequate with refusal noted of most meals. Pt reports that he likes the Magic Cups. He does not like Boost Plus supplements but is agreeable to trying Boost Breeze which has been added to his breakfast. Pt also said that he likes egg, tuna and chicken salad. A scoop of this was added to his afternoon snack and pudding was added to his bedtime snack. Nurse said that pt has been sleeping a lot. She is also aware of his last BM taking place on 3/7/20. RD will continue to follow.

## 2020-03-10 NOTE — PROGRESS NOTES
Assumed care of patient at 1900.  Patient is sleeping quietly in bed and bed alarm remains on.  Hourly rounding continues.

## 2020-03-11 NOTE — CARE PLAN
Problem: Communication  Goal: The ability to communicate needs accurately and effectively will improve  Outcome: PROGRESSING AS EXPECTED  Intervention: Reorient patient to environment as needed  Note: Pt reoriented to date & location. Pt encouraged to use call light for needs.     Problem: Skin Integrity  Goal: Risk for impaired skin integrity will decrease  Outcome: PROGRESSING AS EXPECTED  Intervention: Implement precautions to protect skin integrity in collaboration with the interdisciplinary team  Flowsheets (Taken 3/11/2020 4959)  Skin Preventative Measures:   Pillows in Use to Float Heels   Waffle Cushion  Note: Pt turn Q2H, waffle cushion in place, checked frequently due to bowel and occasional bladder incontinence. Pericare performed.

## 2020-03-11 NOTE — PROGRESS NOTES
[facility] Medicine Daily Progress Note    Date of Service  3/11/2020    Chief Complaint  79 y.o. male admitted 12/27/2019 with inability to care for self.    Hospital Course    Patient presented from home when his caregiver was found dead in his home on day of admission.  He is not able to care for himself and needs 24 hour supervision.  He was seen by psychiatry on 1/11/2020 and deemed incapacitated for medical decisions. He has since been awaiting placement.       Interval Problem Update  3/10- no events. Doing well. Awaiting placement.   3/11- no complaints or events.   Consultants/Specialty  Psychiatry    Code Status  DNR    Disposition  Guardianship pending, placement pending    Review of Systems  Review of Systems   Constitutional: Negative for chills and fever.   HENT: Negative for sore throat.    Eyes: Negative for blurred vision and pain.   Respiratory: Negative for cough and shortness of breath.    Cardiovascular: Negative for chest pain and palpitations.   Gastrointestinal: Negative for abdominal pain, nausea and vomiting.   Genitourinary: Negative for dysuria and urgency.   Musculoskeletal: Negative for back pain and neck pain.   Skin: Negative for itching and rash.   Neurological: Negative for dizziness, tingling and headaches.   Psychiatric/Behavioral: Negative for depression. The patient does not have insomnia.    All other systems reviewed and are negative.       Physical Exam  Temp:  [36.2 °C (97.2 °F)-36.4 °C (97.6 °F)] 36.4 °C (97.6 °F)  Pulse:  [69-80] 80  Resp:  [18] 18  BP: (115-120)/() 120/85  SpO2:  [93 %-94 %] 93 %    Physical Exam  Vitals signs and nursing note reviewed.   Constitutional:       General: He is not in acute distress.     Appearance: He is well-developed. He is not diaphoretic.      Comments: Patient seen and examined at the bedside. Plan discussed at bedside with the RN.    HENT:      Head:      Comments: Right lower lateral eye sty      Right Ear: External ear normal.       Left Ear: External ear normal.      Nose: Nose normal.   Eyes:      General: No scleral icterus.        Right eye: No discharge.         Left eye: No discharge.   Neck:      Vascular: No JVD.      Trachea: No tracheal deviation.   Cardiovascular:      Rate and Rhythm: Normal rate.      Heart sounds: Normal heart sounds. No murmur.   Pulmonary:      Effort: Pulmonary effort is normal. No respiratory distress.      Breath sounds: Normal breath sounds. No wheezing or rales.   Abdominal:      General: Bowel sounds are normal. There is no distension.      Palpations: Abdomen is soft.      Tenderness: There is no abdominal tenderness. There is no guarding.   Musculoskeletal:         General: No tenderness.   Skin:     General: Skin is warm and dry.      Findings: No erythema.   Neurological:      Mental Status: He is alert and oriented to person, place, and time.   Psychiatric:         Behavior: Behavior normal.         Fluids  No intake or output data in the 24 hours ending 03/11/20 0823    Laboratory                        Imaging  No orders to display        Assessment/Plan  * Requires supervision due to deficit in self-care  Assessment & Plan  Awaiting for guardianship    Eye dryness  Assessment & Plan  Patient with dry eyes and excessive tearing of left eye with conjunctiva irritation  Will order artificial tear ointment    Agitation  Assessment & Plan  Controlled on seroquel at night.        Chronic atrial fibrillation  Assessment & Plan  Rate controlled  Xarelto for anticoagulation    Severe protein-calorie malnutrition (HCC)  Assessment & Plan   Continue boost supplements  Encourage oral intake    Hyperlipidemia- (present on admission)  Assessment & Plan  Atorvastatin    COPD (chronic obstructive pulmonary disease) ([facility])- (present on admission)  Assessment & Plan  Continue RT protocol    CKD (chronic kidney disease) stage 3, GFR 30-59 ml/min ([facility])- (present on admission)  Assessment & Plan  Creatinine is near  his baseline  Anemia is stable        Essential hypertension- (present on admission)  Assessment & Plan  Controlled on Norvasc and losartan.           VTE prophylaxis: xarelto

## 2020-03-12 NOTE — PROGRESS NOTES
[facility] Medicine Daily Progress Note    Date of Service  3/12/2020    Chief Complaint  79 y.o. male admitted 12/27/2019 with inability to care for self.    Hospital Course    Patient presented from home when his caregiver was found dead in his home on day of admission.  He is not able to care for himself and needs 24 hour supervision.  He was seen by psychiatry on 1/11/2020 and deemed incapacitated for medical decisions. He has since been awaiting placement.       Interval Problem Update  3/10- no events. Doing well. Awaiting placement.   3/11- no complaints or events.   3/12- no events or complaints.       Consultants/Specialty  Psychiatry    Code Status  DNR    Disposition  Guardianship pending, placement pending    Review of Systems  Review of Systems   Constitutional: Negative for chills and fever.   HENT: Negative for sore throat.    Eyes: Negative for blurred vision and pain.   Respiratory: Negative for cough and shortness of breath.    Cardiovascular: Negative for chest pain and palpitations.   Gastrointestinal: Negative for abdominal pain and nausea.   Genitourinary: Negative for dysuria and urgency.   Musculoskeletal: Negative for back pain and neck pain.   Skin: Negative for itching and rash.   Neurological: Negative for dizziness and tingling.   Psychiatric/Behavioral: Negative for depression. The patient does not have insomnia.    All other systems reviewed and are negative.       Physical Exam  Temp:  [36.6 °C (97.8 °F)] 36.6 °C (97.8 °F)  Pulse:  [71-81] 71  Resp:  [18] 18  BP: (108-111)/(57-67) 108/57  SpO2:  [90 %-93 %] 93 %    Physical Exam  Vitals signs and nursing note reviewed.   Constitutional:       General: He is not in acute distress.     Appearance: He is well-developed. He is not diaphoretic.      Comments: Patient seen and examined at the bedside. Plan discussed at bedside with the RN.    HENT:      Head:      Comments: Right lower lateral eye sty      Right Ear: External ear normal.       Left Ear: External ear normal.      Nose: Nose normal.   Eyes:      General: No scleral icterus.        Right eye: No discharge.         Left eye: No discharge.   Neck:      Vascular: No JVD.      Trachea: No tracheal deviation.   Cardiovascular:      Rate and Rhythm: Normal rate.      Heart sounds: Normal heart sounds. No murmur.   Pulmonary:      Effort: Pulmonary effort is normal. No respiratory distress.      Breath sounds: Normal breath sounds. No wheezing or rales.   Abdominal:      General: Bowel sounds are normal. There is no distension.      Palpations: Abdomen is soft.      Tenderness: There is no abdominal tenderness. There is no guarding.   Musculoskeletal:         General: No tenderness.   Skin:     General: Skin is warm and dry.      Findings: No erythema.   Neurological:      Mental Status: He is alert and oriented to person, place, and time.   Psychiatric:         Behavior: Behavior normal.         Fluids    Intake/Output Summary (Last 24 hours) at 3/12/2020 0850  Last data filed at 3/11/2020 2015  Gross per 24 hour   Intake 240 ml   Output --   Net 240 ml       Laboratory                        Imaging  No orders to display        Assessment/Plan  * Requires supervision due to deficit in self-care  Assessment & Plan  Awaiting for guardianship    Eye dryness  Assessment & Plan  Patient with dry eyes and excessive tearing of left eye with conjunctiva irritation  Will order artificial tear ointment    Agitation  Assessment & Plan  Controlled on seroquel at night.        Chronic atrial fibrillation  Assessment & Plan  Rate controlled  Xarelto for anticoagulation    Severe protein-calorie malnutrition (HCC)  Assessment & Plan   Continue boost supplements  Encourage oral intake    Hyperlipidemia- (present on admission)  Assessment & Plan  Atorvastatin    COPD (chronic obstructive pulmonary disease) (HCC)- (present on admission)  Assessment & Plan  Continue RT protocol    CKD (chronic kidney disease) stage  3, GFR 30-59 ml/min (HCC)- (present on admission)  Assessment & Plan  Creatinine is near his baseline  Anemia is stable        Essential hypertension- (present on admission)  Assessment & Plan  Controlled on Norvasc and losartan.           VTE prophylaxis: xarelto

## 2020-03-12 NOTE — DIETARY
Nutrition Services: Update   Day 0 of admit.  Eulogio Jeronimo is a 79 y.o. male with admitting DX of Requires supervision due to deficit in self-care    Pt is currently on regular, dysphagia 3 ([facility] soft) diet. PO intake continues to be very poor with many refusals noted. Per nurse, pt is only eating his Magic Cups. He is not eating the egg salad, tuna salad or chicken salad added to his snack or drinking the Boost Breeze added to breakfast. Pt also is refusing Boost supplements. Additional Magic Cups added with plan for 4 daily which will provide 1160 kcals and 36 gm of protein.  Magic Cup QID will meet estimated kcal needs for weight maintenance of ~1144 kcals/day (29 kcals/kg) but will not meet estimated protein needs of 47 gm per day (1.2 gm/kg).     No new weight since 3/5/20. Request made for new weight.     Meds: Marinol ordered to stimulate appetite. It does not appear to be effective. RD discussed poor PO intake with MD. MD agreed to modify order for appetite stimulant by adding Remeron. He also agreed to order MVI + minerals for additional micronutrients because Magic Cup will not provide adequate micronutrients.     GI: LBM on 3/12/20    Malnutrition Risk: (from RD note dated 12/28/20. This still applies with poor PO intake also noted). Pt with severe malnutrition related to multiple comorbidities evident by severe fat and severe muscle loss noted on nutrition based physical exam.    Recommendations/Plan:  1. Increase Magic Cup to QID.   2. Modification of order for appetite stimulant with addition of Remeron per MD order.   3. MVI + minerals per MD order.    4. Encourage intake of meals  5. Document intake of all meals as % taken in ADL's to provide interdisciplinary communication across all shifts.   6. Monitor weight.  7. Nutrition rep will continue to see patient for ongoing meal and snack preferences.    RD following

## 2020-03-12 NOTE — CARE PLAN
Problem: Communication  Goal: The ability to communicate needs accurately and effectively will improve  Outcome: PROGRESSING AS EXPECTED     Problem: Safety  Goal: Will remain free from injury  Outcome: PROGRESSING AS EXPECTED  Goal: Will remain free from falls  Outcome: PROGRESSING AS EXPECTED  Note: Patient bed alarm in place, non skid socks on, patient with belongings in reach.      Problem: Infection  Goal: Will remain free from infection  Outcome: PROGRESSING AS EXPECTED  Note: No signs ans symptoms of infection noted at this time.      Problem: Venous Thromboembolism (VTW)/Deep Vein Thrombosis (DVT) Prevention:  Goal: Patient will participate in Venous Thrombosis (VTE)/Deep Vein Thrombosis (DVT)Prevention Measures  Outcome: PROGRESSING AS EXPECTED  Flowsheets (Taken 3/11/2020 1610)  Risk Assessment Score: 1  VTE RISK: Moderate  Mechanical Prophylaxis: SCDs, Sequential Compression Device  AV Foot Pumps: Refused  SCDs, Sequential Compression Device: Refused  Pharmacologic Prophylaxis Used: Rivaroxaban (Xarelto) - (ONLY for Total Knee and Total Hip Surgery)

## 2020-03-12 NOTE — PROGRESS NOTES
Patient oriented to self, denies pain. Patient is able to turn self but does not do so often and skin is very prone to PU as patient is very thin. Patient educated on PU and refuses turns at times. Patient refused RN to assess coccyx. Heels very red, boggy, and blanching. Heels elevated at this time on pillow. Patient with very poor intake and refused entire dinner. Will monitor.

## 2020-03-13 NOTE — CARE PLAN
Problem: Safety  Goal: Will remain free from injury  Outcome: PROGRESSING AS EXPECTED  Goal: Will remain free from falls  Outcome: PROGRESSING AS EXPECTED     Problem: Knowledge Deficit  Goal: Knowledge of disease process/condition, treatment plan, diagnostic tests, and medications will improve  Outcome: PROGRESSING AS EXPECTED  Goal: Knowledge of the prescribed therapeutic regimen will improve  Outcome: PROGRESSING AS EXPECTED     Problem: Skin Integrity  Goal: Risk for impaired skin integrity will decrease  Outcome: PROGRESSING AS EXPECTED  Intervention: Assess risk factors for impaired skin integrity and/or pressure ulcers  Note: Q2 hour turns, sacral and heel mepilex in place.     Problem: Mobility  Goal: Risk for activity intolerance will decrease  Outcome: PROGRESSING SLOWER THAN EXPECTED  Intervention: Assess and monitor signs of activity intolerance  Note: Pt refusing to mobilize at this time.

## 2020-03-13 NOTE — PROGRESS NOTES
[facility] Medicine Daily Progress Note    Date of Service  3/13/2020    Chief Complaint  79 y.o. male admitted 12/27/2019 with inability to care for self.    Hospital Course    Patient presented from home when his caregiver was found dead in his home on day of admission.  He is not able to care for himself and needs 24 hour supervision.  He was seen by psychiatry on 1/11/2020 and deemed incapacitated for medical decisions. He has since been awaiting placement.       Interval Problem Update  3/10- no events. Doing well. Awaiting placement.   3/11- no complaints or events.   3/12- no events or complaints.     3/13  No acute issues overnight, patient comfortable.       Consultants/Specialty  Psychiatry    Code Status  DNR    Disposition  Guardianship pending    Review of Systems  Review of Systems   Unable to perform ROS: Mental acuity   All other systems reviewed and are negative.       Physical Exam  Temp:  [36.8 °C (98.2 °F)-37 °C (98.6 °F)] 36.8 °C (98.2 °F)  Pulse:  [66-75] 75  Resp:  [18] 18  BP: (108-119)/(58-72) 119/72  SpO2:  [94 %-96 %] 96 %    Physical Exam  Vitals signs and nursing note reviewed.   Constitutional:       General: He is not in acute distress.     Appearance: He is well-developed. He is not diaphoretic.   HENT:      Head: Normocephalic and atraumatic.      Comments: Right lower lateral eye sty      Right Ear: External ear normal.      Left Ear: External ear normal.      Nose: Nose normal. No rhinorrhea.      Mouth/Throat:      Pharynx: No posterior oropharyngeal erythema.   Eyes:      General: No scleral icterus.        Right eye: No discharge.         Left eye: No discharge.   Neck:      Vascular: No JVD.      Trachea: No tracheal deviation.   Cardiovascular:      Rate and Rhythm: Normal rate and regular rhythm.      Heart sounds: Normal heart sounds. No murmur.   Pulmonary:      Effort: Pulmonary effort is normal. No respiratory distress.      Breath sounds: Normal breath sounds. No stridor.  No wheezing, rhonchi or rales.   Abdominal:      General: Bowel sounds are normal. There is no distension.      Palpations: Abdomen is soft. There is no mass.      Tenderness: There is no abdominal tenderness. There is no guarding.      Hernia: No hernia is present.   Musculoskeletal:         General: No swelling, tenderness or deformity.   Skin:     General: Skin is warm and dry.      Capillary Refill: Capillary refill takes less than 2 seconds.      Coloration: Skin is not jaundiced.      Findings: No erythema.   Neurological:      General: No focal deficit present.      Mental Status: He is alert. Mental status is at baseline. He is disoriented.      Cranial Nerves: No cranial nerve deficit.      Sensory: No sensory deficit.      Motor: No weakness.   Psychiatric:         Behavior: Behavior normal.         Fluids    Intake/Output Summary (Last 24 hours) at 3/13/2020 0801  Last data filed at 3/12/2020 2200  Gross per 24 hour   Intake 240 ml   Output 200 ml   Net 40 ml       Laboratory                        Imaging  No orders to display        Assessment/Plan  * Requires supervision due to deficit in self-care  Assessment & Plan  Awaiting for guardianship  No changes    Eye dryness  Assessment & Plan  Continue with artifical eye dropsr artificial tear ointment    Agitation  Assessment & Plan  controlled  Resume Seroquel at this time    Chronic atrial fibrillation  Assessment & Plan  Rate controlled  Resume Xarelto for anticoagulation    Severe protein-calorie malnutrition (HCC)  Assessment & Plan  Nutrition following  Boost TID    Hyperlipidemia- (present on admission)  Assessment & Plan  Resume lipitor    COPD (chronic obstructive pulmonary disease) ([facility])- (present on admission)  Assessment & Plan  Not in exacerbation  Continue RT protocol, duo nebs, Pep therapy if warranted, and incentive spirometry.     CKD (chronic kidney disease) stage 3, GFR 30-59 ml/min ([facility])- (present on admission)  Assessment & Plan  At  baseline, mild elevation in creatinine.   CTM, avoid nephrotoxins and NSAIDs      Essential hypertension- (present on admission)  Assessment & Plan  Controlled on Norvasc and losartan.    Patient plan of care discussed at multidisplinary team rounds and with patient and R.N at beside.       VTE prophylaxis: xarelto

## 2020-03-13 NOTE — PROGRESS NOTES
Report received from NOC RN, assumed care of pt.   Pt encouraged to drink fluids and eat breakfast at this time. Pt drank 120ml of water and is currently eating pancakes for breakfast.  Denies pain, SOB, or dizziness at this time.   Safety measures in place.  Hourly rounding in place.

## 2020-03-13 NOTE — CARE PLAN
Problem: Safety  Goal: Will remain free from falls  Intervention: Implement fall precautions  Note: Call light and personal belongings within reach, bed in low locked position, treaded slipper socks in place, room free of clutter. Pt asked to call for assistance prior to getting up and verbalizes/demonstrates understanding of all fall precautions. Hourly rounding in place to ensure pt safety and needs are met.        Problem: Skin Integrity  Goal: Risk for impaired skin integrity will decrease  Outcome: PROGRESSING SLOWER THAN EXPECTED  Intervention: Implement precautions to protect skin integrity in collaboration with the interdisciplinary team  Note: Heels floated on pillows, waffle mattress overlay, barrier cream, q2 turns.

## 2020-03-13 NOTE — PROGRESS NOTES
Pt sleeping, easily aroused for medications but quickly falls back asleep. Pt refusing to eat, ate 0% of dinner, refuses magic cups.

## 2020-03-14 NOTE — PROGRESS NOTES
Hospital Medicine Daily Progress Note    Date of Service  3/14/2020    Chief Complaint  79 y.o. male admitted 12/27/2019 with inability to care for self.    Hospital Course    Patient presented from home when his caregiver was found dead in his home on day of admission.  He is not able to care for himself and needs 24 hour supervision.  He was seen by psychiatry on 1/11/2020 and deemed incapacitated for medical decisions. He has since been awaiting placement.       Interval Problem Update  3/10- no events. Doing well. Awaiting placement.   3/11- no complaints or events.   3/12- no events or complaints.     3/13  No acute issues overnight, patient comfortable.     3/14  Stable overnight, no complaints. Patient is a poor historian    Consultants/Specialty  Psychiatry    Code Status  DNR    Disposition  Guardianship pending    Review of Systems  Review of Systems   Unable to perform ROS: Mental acuity   All other systems reviewed and are negative.       Physical Exam  Temp:  [36.5 °C (97.7 °F)-36.9 °C (98.4 °F)] 36.7 °C (98.1 °F)  Pulse:  [72-76] 73  Resp:  [16-18] 16  BP: (116-120)/(68-84) 116/68  SpO2:  [96 %-98 %] 98 %    Physical Exam  Vitals signs and nursing note reviewed.   Constitutional:       General: He is not in acute distress.     Appearance: He is well-developed. He is not diaphoretic.   HENT:      Head: Normocephalic and atraumatic.      Comments: Right lower lateral eye sty      Right Ear: External ear normal.      Left Ear: External ear normal.      Nose: Nose normal. No rhinorrhea.      Mouth/Throat:      Mouth: Mucous membranes are moist.      Pharynx: Oropharynx is clear. No posterior oropharyngeal erythema.   Eyes:      General: No scleral icterus.        Right eye: No discharge.         Left eye: No discharge.   Neck:      Musculoskeletal: Normal range of motion and neck supple. No neck rigidity or muscular tenderness.      Vascular: No carotid bruit or JVD.      Trachea: No tracheal deviation.    Cardiovascular:      Rate and Rhythm: Normal rate and regular rhythm.      Heart sounds: Normal heart sounds. No murmur.   Pulmonary:      Effort: Pulmonary effort is normal. No respiratory distress.      Breath sounds: Normal breath sounds. No stridor. No wheezing, rhonchi or rales.   Abdominal:      General: Bowel sounds are normal. There is no distension.      Palpations: Abdomen is soft. There is no mass.      Tenderness: There is no abdominal tenderness. There is no guarding.      Hernia: No hernia is present.   Musculoskeletal:         General: No swelling, tenderness or deformity.   Skin:     General: Skin is warm and dry.      Capillary Refill: Capillary refill takes less than 2 seconds.      Coloration: Skin is not jaundiced.      Findings: No erythema.   Neurological:      General: No focal deficit present.      Mental Status: He is alert. Mental status is at baseline. He is disoriented.      Cranial Nerves: No cranial nerve deficit.      Sensory: No sensory deficit.      Motor: No weakness.   Psychiatric:         Mood and Affect: Mood normal.         Behavior: Behavior normal.         Fluids    Intake/Output Summary (Last 24 hours) at 3/14/2020 0759  Last data filed at 3/14/2020 0700  Gross per 24 hour   Intake 480 ml   Output 250 ml   Net 230 ml       Laboratory                        Imaging  No orders to display        Assessment/Plan  * Requires supervision due to deficit in self-care  Assessment & Plan  Awaiting for guardianship  No changes    Eye dryness  Assessment & Plan  Continue with artifical eye dropsr artificial tear ointment    Agitation  Assessment & Plan  controlled  Resume Seroquel at this time    Chronic atrial fibrillation  Assessment & Plan  Rate controlled  Resume Xarelto for anticoagulation    Severe protein-calorie malnutrition (HCC)  Assessment & Plan  Nutrition following  Boost TID    Hyperlipidemia- (present on admission)  Assessment & Plan  Resume lipitor    COPD (chronic  obstructive pulmonary disease) ([facility])- (present on admission)  Assessment & Plan  Not in exacerbation  Continue RT protocol, duo nebs, Pep therapy if warranted, and incentive spirometry.     CKD (chronic kidney disease) stage 3, GFR 30-59 ml/min ([facility])- (present on admission)  Assessment & Plan  At baseline, mild elevation in creatinine.   CTM, avoid nephrotoxins and NSAIDs      Essential hypertension- (present on admission)  Assessment & Plan  Controlled on Norvasc and losartan.    Patient plan of care discussed at multidisplinary team rounds and with patient and R.N at [facility].     I have performed a physical exam and reviewed and updated ROS as of today.  In review of yesterday's note dated  3/13/2020, there are no changes except as documented above.    VTE prophylaxis: xarelto

## 2020-03-14 NOTE — CARE PLAN
Problem: Nutritional:  Goal: Achieve adequate nutritional intake  Description: Patient will consume >50% of meals, snacks and supplements.   Outcome: PROGRESSING SLOWER THAN EXPECTED     Poor PO/weight loss continues, supplement QID (variable intake of supplement 0-100%). Remeron and MVI with minerals added. RD following.

## 2020-03-14 NOTE — PROGRESS NOTES
Report received from NOC RN, assumed care of pt.   Denies pain, SOB, or dizziness at this time.   Safety measures in place.  Hourly rounding in place.

## 2020-03-14 NOTE — CARE PLAN
Problem: Safety  Goal: Will remain free from injury  Outcome: PROGRESSING AS EXPECTED  Goal: Will remain free from falls  Outcome: PROGRESSING AS EXPECTED     Problem: Venous Thromboembolism (VTW)/Deep Vein Thrombosis (DVT) Prevention:  Goal: Patient will participate in Venous Thrombosis (VTE)/Deep Vein Thrombosis (DVT)Prevention Measures  Outcome: PROGRESSING AS EXPECTED     Problem: Skin Integrity  Goal: Risk for impaired skin integrity will decrease  Outcome: PROGRESSING AS EXPECTED     Problem: Psychosocial Needs:  Goal: Level of anxiety will decrease  Outcome: PROGRESSING AS EXPECTED

## 2020-03-15 NOTE — PROGRESS NOTES
Report received from NOC RN, assumed care of pt.   Pt resting in bed at this time. Refusing to shave and shower until later today.  Denies pain, SOB, or dizziness at this time.   Safety measures in place.  Hourly rounding in place.

## 2020-03-15 NOTE — CARE PLAN
Problem: Discharge Barriers/Planning  Goal: Patient's continuum of care needs will be met  Outcome: PROGRESSING AS EXPECTED  Note: Pending placement - had been declined by most facilities due to exceed care      Problem: Skin Integrity  Goal: Risk for impaired skin integrity will decrease  Outcome: PROGRESSING AS EXPECTED  Note: Perineal care provided - sacrum is intact   Pt on waffle mattress - Q2 turn   Pt stated that he is hungry but refused to most of offering food. Attempted to given pt a boost plus but he dose not want to drink it

## 2020-03-15 NOTE — PROGRESS NOTES
Received report from day shift nurse.   Assumed pt care    Pt is resting comfortably in bed.   Pt on r.a.. No signs of SOB/respiratory distress.   Pt c/o no pain at this moment.   Assessment completed; perineal care provided   Fall precautions in place.   Bed locked & at lowest position.   Call light and personal belongings within reach.   Continue to monitor

## 2020-03-15 NOTE — PROGRESS NOTES
Hospital Medicine Daily Progress Note    Date of Service  3/15/2020    Chief Complaint  79 y.o. male admitted 12/27/2019 with inability to care for self.    Hospital Course    Patient presented from home when his caregiver was found dead in his home on day of admission.  He is not able to care for himself and needs 24 hour supervision.  He was seen by psychiatry on 1/11/2020 and deemed incapacitated for medical decisions. He has since been awaiting placement.       Interval Problem Update  3/10- no events. Doing well. Awaiting placement.   3/11- no complaints or events.   3/12- no events or complaints.   3/13  No acute issues overnight, patient comfortable.   3/14  Stable overnight, no complaints. Patient is a poor historian  3/15 patient comfortable, no needs.    Consultants/Specialty  Psychiatry    Code Status  DNR    Disposition  Guardianship pending    Review of Systems  Review of Systems   Unable to perform ROS: Mental acuity   All other systems reviewed and are negative.       Physical Exam  Temp:  [36.7 °C (98.1 °F)] 36.7 °C (98.1 °F)  Pulse:  [75] 75  Resp:  [18] 18  BP: (125)/(71) 125/71  SpO2:  [97 %] 97 %    Physical Exam  Vitals signs and nursing note reviewed.   Constitutional:       General: He is not in acute distress.     Appearance: He is well-developed. He is not diaphoretic.   HENT:      Head: Normocephalic and atraumatic.      Comments: Right lower lateral eye sty      Right Ear: External ear normal.      Left Ear: External ear normal.      Nose: Nose normal. No rhinorrhea.      Mouth/Throat:      Mouth: Mucous membranes are moist.      Pharynx: Oropharynx is clear. No posterior oropharyngeal erythema.   Eyes:      General: No scleral icterus.        Right eye: No discharge.         Left eye: No discharge.   Neck:      Musculoskeletal: Normal range of motion and neck supple. No neck rigidity or muscular tenderness.      Vascular: No carotid bruit or JVD.      Trachea: No tracheal deviation.    Cardiovascular:      Rate and Rhythm: Normal rate and regular rhythm.      Heart sounds: Normal heart sounds. No murmur.   Pulmonary:      Effort: Pulmonary effort is normal. No respiratory distress.      Breath sounds: Normal breath sounds. No stridor. No wheezing, rhonchi or rales.   Abdominal:      General: Bowel sounds are normal. There is no distension.      Palpations: Abdomen is soft. There is no mass.      Tenderness: There is no abdominal tenderness. There is no guarding.      Hernia: No hernia is present.   Musculoskeletal:         General: No swelling, tenderness or deformity.   Skin:     General: Skin is warm and dry.      Capillary Refill: Capillary refill takes less than 2 seconds.      Coloration: Skin is not jaundiced.      Findings: No erythema.   Neurological:      General: No focal deficit present.      Mental Status: He is alert. Mental status is at baseline. He is disoriented.      Cranial Nerves: No cranial nerve deficit.      Sensory: No sensory deficit.      Motor: No weakness.   Psychiatric:         Mood and Affect: Mood normal.         Behavior: Behavior normal.         Fluids    Intake/Output Summary (Last 24 hours) at 3/15/2020 0750  Last data filed at 3/15/2020 0745  Gross per 24 hour   Intake 360 ml   Output 150 ml   Net 210 ml       Laboratory                        Imaging  No orders to display        Assessment/Plan  * Requires supervision due to deficit in self-care  Assessment & Plan  Awaiting for guardianship  No changes    Eye dryness  Assessment & Plan  Continue with artifical eye dropsr artificial tear ointment    Agitation  Assessment & Plan  controlled  Resume Seroquel at this time    Chronic atrial fibrillation  Assessment & Plan  Rate controlled  Resume Xarelto for anticoagulation    Severe protein-calorie malnutrition (HCC)  Assessment & Plan  Nutrition following  Boost TID    Hyperlipidemia- (present on admission)  Assessment & Plan  Resume lipitor    COPD (chronic  obstructive pulmonary disease) ([facility])- (present on admission)  Assessment & Plan  Not in exacerbation  Continue RT protocol, duo nebs, Pep therapy if warranted, and incentive spirometry.     CKD (chronic kidney disease) stage 3, GFR 30-59 ml/min ([facility])- (present on admission)  Assessment & Plan  At baseline, mild elevation in creatinine.   CTM, avoid nephrotoxins and NSAIDs      Essential hypertension- (present on admission)  Assessment & Plan  Controlled on Norvasc and losartan.    Patient plan of care discussed at multidisplinary team rounds and with patient and R.N at [facility].     I have performed a physical exam and reviewed and updated ROS as of today.  In review of yesterday's note dated  3/14/2020, there are no changes except as documented above.    VTE prophylaxis: xarelto

## 2020-03-15 NOTE — CARE PLAN
Problem: Safety  Goal: Will remain free from injury  Outcome: PROGRESSING AS EXPECTED  Goal: Will remain free from falls  Outcome: PROGRESSING AS EXPECTED     Problem: Knowledge Deficit  Goal: Knowledge of disease process/condition, treatment plan, diagnostic tests, and medications will improve  Outcome: PROGRESSING AS EXPECTED  Goal: Knowledge of the prescribed therapeutic regimen will improve  Outcome: PROGRESSING AS EXPECTED     Problem: Skin Integrity  Goal: Risk for impaired skin integrity will decrease  Outcome: PROGRESSING AS EXPECTED     Problem: Mobility  Goal: Risk for activity intolerance will decrease  Outcome: PROGRESSING SLOWER THAN EXPECTED

## 2020-03-16 NOTE — PROGRESS NOTES
[facility] Medicine Daily Progress Note    Date of Service  3/16/2020    Chief Complaint  79 y.o. male admitted 12/27/2019 with inability to care for self.    Hospital Course    Patient presented from home when his caregiver was found dead in his home on day of admission.  He is not able to care for himself and needs 24 hour supervision.  He was seen by psychiatry on 1/11/2020 and deemed incapacitated for medical decisions. He has since been awaiting placement.       Interval Problem Update  3/10- no events. Doing well. Awaiting placement.   3/11- no complaints or events.   3/12- no events or complaints.   3/13  No acute issues overnight, patient comfortable.   3/14  Stable overnight, no complaints. Patient is a poor historian  3/15 patient comfortable, no needs.  3/16 resting comfortable, no acute needs, denies pain.       Consultants/Specialty  Psychiatry    Code Status  DNR    Disposition  Guardianship pending    Review of Systems  Review of Systems   Unable to perform ROS: Mental acuity   All other systems reviewed and are negative.       Physical Exam  Temp:  [36.6 °C (97.9 °F)-36.7 °C (98 °F)] 36.6 °C (97.9 °F)  Pulse:  [75-99] 99  Resp:  [18] 18  BP: (102-129)/(66-98) 125/98  SpO2:  [94 %-96 %] 95 %    Physical Exam  Vitals signs and nursing note reviewed.   Constitutional:       General: He is not in acute distress.     Appearance: Normal appearance. He is well-developed. He is not diaphoretic.   HENT:      Head: Normocephalic and atraumatic.      Comments: Right lower lateral eye sty      Right Ear: External ear normal.      Left Ear: External ear normal.      Nose: Nose normal. No rhinorrhea.      Mouth/Throat:      Mouth: Mucous membranes are moist.      Pharynx: Oropharynx is clear. No posterior oropharyngeal erythema.   Eyes:      General: No scleral icterus.        Right eye: No discharge.         Left eye: No discharge.   Neck:      Musculoskeletal: Normal range of motion and neck supple. No neck  rigidity or muscular tenderness.      Vascular: No carotid bruit or JVD.      Trachea: No tracheal deviation.   Cardiovascular:      Rate and Rhythm: Normal rate and regular rhythm.      Heart sounds: Normal heart sounds. No murmur.   Pulmonary:      Effort: Pulmonary effort is normal. No respiratory distress.      Breath sounds: Normal breath sounds. No stridor. No wheezing, rhonchi or rales.   Abdominal:      General: Bowel sounds are normal. There is no distension.      Palpations: Abdomen is soft. There is no mass.      Tenderness: There is no abdominal tenderness. There is no guarding.      Hernia: No hernia is present.   Musculoskeletal:         General: No swelling, tenderness or deformity.   Skin:     General: Skin is warm and dry.      Capillary Refill: Capillary refill takes less than 2 seconds.      Coloration: Skin is not jaundiced.      Findings: No erythema.   Neurological:      General: No focal deficit present.      Mental Status: He is alert. Mental status is at baseline. He is disoriented.      Cranial Nerves: No cranial nerve deficit.      Sensory: No sensory deficit.      Motor: No weakness.      Coordination: Coordination normal.   Psychiatric:         Mood and Affect: Mood normal.         Behavior: Behavior normal.         Fluids    Intake/Output Summary (Last 24 hours) at 3/16/2020 0809  Last data filed at 3/15/2020 1944  Gross per 24 hour   Intake 120 ml   Output --   Net 120 ml       Laboratory                        Imaging  No orders to display        Assessment/Plan  * Requires supervision due to deficit in self-care  Assessment & Plan  Awaiting for guardianship      Eye dryness  Assessment & Plan  Continue with artifical eye dropsr artificial tear ointment      Agitation  Assessment & Plan  controlled  Resume Seroquel at this time      Chronic atrial fibrillation  Assessment & Plan  Rate controlled  Resume Xarelto for anticoagulation    Severe protein-calorie malnutrition  ([facility])  Assessment & Plan  Nutrition following  Boost TID    Hyperlipidemia- (present on admission)  Assessment & Plan  Resume lipitor    COPD (chronic obstructive pulmonary disease) ([facility])- (present on admission)  Assessment & Plan  Not in exacerbation  Continue RT protocol, duo nebs, Pep therapy if warranted, and incentive spirometry.       CKD (chronic kidney disease) stage 3, GFR 30-59 ml/min ([facility])- (present on admission)  Assessment & Plan  At baseline, mild elevation in creatinine.   CTM, avoid nephrotoxins and NSAIDs        Essential hypertension- (present on admission)  Assessment & Plan  Controlled on Norvasc and losartan.            Patient plan of care discussed at multidisplinary team rounds and with patient and R.N at [facility].     I have performed a physical exam and reviewed and updated ROS as of today.  In review of yesterday's note dated  3/15/2020, there are no changes except as documented above.    VTE prophylaxis: xarelto

## 2020-03-17 NOTE — CARE PLAN
Problem: Safety  Goal: Will remain free from injury  Outcome: PROGRESSING AS EXPECTED  Call light in reach.hourly rounding    Problem: Psychosocial Needs:  Goal: Level of anxiety will decrease  Outcome: PROGRESSING AS EXPECTED  Pt calm and relaxed  Does not appear anxious

## 2020-03-17 NOTE — PROGRESS NOTES
[facility] Medicine Daily Progress Note    Date of Service  3/17/2020    Chief Complaint  79 y.o. male admitted 12/27/2019 with inability to care for self.    Hospital Course    Patient presented from home when his caregiver was found dead in his home on day of admission.  He is not able to care for himself and needs 24 hour supervision.  He was seen by psychiatry on 1/11/2020 and deemed incapacitated for medical decisions. He has since been awaiting placement.       Interval Problem Update  3/10- no events. Doing well. Awaiting placement.   3/11- no complaints or events.   3/12- no events or complaints.   3/13  No acute issues overnight, patient comfortable.   3/14  Stable overnight, no complaints. Patient is a poor historian  3/15 patient comfortable, no needs.  3/16 resting comfortable, no acute needs, denies pain.   3/17.  Patient is relatively comfortable.  No significant adverse event. Patient otherwise denies fever, chills, nausea, vomiting, adb pain, SOB, CP, headache, constipation, diarrhea, cough, or sputum.      Consultants/Specialty  Psychiatry    Code Status  DNR    Disposition  Guardianship pending    Review of Systems  Review of Systems   Unable to perform ROS: Mental acuity   All other systems reviewed and are negative.       Physical Exam  Temp:  [37 °C (98.6 °F)] 37 °C (98.6 °F)  Pulse:  [71-81] 71  Resp:  [18] 18  BP: (107-124)/(63-64) 124/64  SpO2:  [94 %-97 %] 97 %    Physical Exam  Vitals signs and nursing note reviewed.   Constitutional:       Appearance: Normal appearance. He is well-developed. He is ill-appearing. He is not diaphoretic.   HENT:      Head: Normocephalic and atraumatic.      Comments: Right lower lateral eye sty      Right Ear: External ear normal.      Left Ear: External ear normal.      Nose: Nose normal. No congestion.      Mouth/Throat:      Mouth: Mucous membranes are moist.      Pharynx: Oropharynx is clear. No posterior oropharyngeal erythema.   Eyes:      General: No  scleral icterus.        Right eye: No discharge.         Left eye: No discharge.   Neck:      Musculoskeletal: Normal range of motion and neck supple. No neck rigidity or muscular tenderness.      Vascular: No carotid bruit or JVD.      Trachea: No tracheal deviation.   Cardiovascular:      Rate and Rhythm: Normal rate and regular rhythm.      Heart sounds: Normal heart sounds. No murmur. No friction rub.   Pulmonary:      Effort: Pulmonary effort is normal. No respiratory distress.      Breath sounds: Normal breath sounds. No wheezing, rhonchi or rales.   Abdominal:      General: Bowel sounds are normal. There is no distension.      Palpations: Abdomen is soft. There is no mass.      Tenderness: There is no abdominal tenderness. There is no guarding or rebound.   Musculoskeletal:         General: No swelling, tenderness or deformity.   Skin:     General: Skin is warm and dry.      Capillary Refill: Capillary refill takes less than 2 seconds.      Coloration: Skin is not jaundiced.      Findings: No erythema.   Neurological:      General: No focal deficit present.      Mental Status: He is alert. Mental status is at baseline. He is disoriented.      Cranial Nerves: No cranial nerve deficit.      Sensory: No sensory deficit.      Motor: No weakness.      Coordination: Coordination normal.   Psychiatric:         Mood and Affect: Mood normal.         Behavior: Behavior normal.         Fluids    Intake/Output Summary (Last 24 hours) at 3/17/2020 1341  Last data filed at 3/17/2020 0600  Gross per 24 hour   Intake 200 ml   Output 400 ml   Net -200 ml       Laboratory                        Imaging  No orders to display        Assessment/Plan  * Requires supervision due to deficit in self-care  Assessment & Plan  Awaiting for guardianship   to help    Eye dryness  Assessment & Plan  Continue with artifical eye dropsr artificial tear ointment  Currently stable comfort measures    Agitation  Assessment &  Plan  controlled  Resume Seroquel at this time  Looks stable.    Chronic atrial fibrillation  Assessment & Plan  Rate controlled  Resume Xarelto for anticoagulation    Severe protein-calorie malnutrition (HCC)  Assessment & Plan  Nutrition following  Boost TID    Hyperlipidemia- (present on admission)  Assessment & Plan  Resume lipitor    COPD (chronic obstructive pulmonary disease) ([facility])- (present on admission)  Assessment & Plan  Not in exacerbation  Continue RT protocol, duo nebs, Pep therapy if warranted, and incentive spirometry.       CKD (chronic kidney disease) stage 3, GFR 30-59 ml/min ([facility])- (present on admission)  Assessment & Plan  At baseline, mild elevation in creatinine.   CTM, avoid nephrotoxins and NSAIDs        Essential hypertension- (present on admission)  Assessment & Plan  Controlled on Norvasc and losartan.            Patient plan of care discussed at multidisplinary team rounds and with patient and R.N at [facility].      VTE prophylaxis: xarelto    I have seen and examined patient on 3/17/2020. I have reviewed vitals, new labs and imaging. I have discussed POC with RN. There are no changes from (3/16/2020) except for what is mentioned above.       Current Facility-Administered Medications:   •  mirtazapine (REMERON) orally disintegrating tab 15 mg, 15 mg, Oral, QHS, Ernesto Harvey M.D., 15 mg at 03/16/20 2114  •  therapeutic multivitamin-minerals (THERAGRAN-M) tablet 1 Tab, 1 Tab, Oral, DAILY, Ernesto Harvey M.D., 1 Tab at 03/17/20 0546  •  artificial tears (EYE LUBRICANT) ophth ointment 1 Application, 1 Application, Both Eyes, Q8HRS, Elizabeth De La Paz M.D., Stopped at 03/17/20 0600  •  acetaminophen (TYLENOL) tablet 500 mg, 500 mg, Oral, Q6HRS PRN, Elizabeth De La Paz M.D., 500 mg at 03/03/20 1810  •  bisacodyl (DULCOLAX) suppository 10 mg, 10 mg, Rectal, QDAY PRN, Mery Gutierrez, D.O.  •  atorvastatin (LIPITOR) tablet 10 mg, 10 mg, Oral, Nightly, Mery Gutierrez, D.O., 10 mg at 03/16/20 2114  •   haloperidol (HALDOL) tablet 5 mg, 5 mg, Oral, Q6HRS PRN, DELONTE De Santiago.O.  •  magnesium hydroxide (MILK OF MAGNESIA) suspension 30 mL, 30 mL, Oral, QDAY PRN, Mery Gutierrez D.O., 30 mL at 03/01/20 1734  •  polyethylene glycol/lytes (MIRALAX) PACKET 1 Packet, 1 Packet, Oral, QDAY PRN, Mery Gutierrez D.O., 1 Packet at 03/07/20 0603  •  QUEtiapine (SEROQUEL) tablet 50 mg, 50 mg, Oral, Q EVENING, Meyr Gutierrez D.O., 50 mg at 03/16/20 1737  •  rivaroxaban (XARELTO) tablet 15 mg, 15 mg, Oral, PM MEAL, Mery Gutierrez D.O., 15 mg at 03/16/20 1737  •  tamsulosin (FLOMAX) capsule 0.4 mg, 0.4 mg, Oral, Q EVENING, Mery Gutierrez D.O., 0.4 mg at 03/16/20 1737  •  amLODIPine (NORVASC) tablet 10 mg, 10 mg, Oral, DAILY, Mery Gutierrez D.O., 10 mg at 03/17/20 0547  •  lidocaine (LIDODERM) 5 % 1 Patch, 1 Patch, Transdermal, Q24HRS, DELONTE De Santiago.O., Stopped at 03/12/20 0600  •  losartan (COZAAR) tablet 100 mg, 100 mg, Oral, DAILY, Mery Gutierrez D.O., 100 mg at 03/17/20 0546  •  vitamin D (Ergocalciferol) (DRISDOL) capsule 50,000 Units, 50,000 Units, Oral, Q FRIDAY, Elizabeth De La Paz M.D., 50,000 Units at 03/13/20 0746

## 2020-03-17 NOTE — CARE PLAN
Problem: Safety  Goal: Will remain free from injury  Outcome: PROGRESSING AS EXPECTED  Note: Bed alarm in place.      Problem: Skin Integrity  Goal: Risk for impaired skin integrity will decrease  Outcome: PROGRESSING AS EXPECTED  Note: Pt on waffle mattress. Pt refuses to turn.

## 2020-03-17 NOTE — THERAPY
Occupational Therapy-  Discussed with Nurse, pt is no longer appropriate for skilled OT services in house.  His participation in basic self cares is inconsistent and limited.  What he wants to do and can do, nursing can assist him with.  OT will discontinue skilled therapy services at this time.

## 2020-03-17 NOTE — PROGRESS NOTES
Pt attempted to get oob.fell to rt side.was incont of stool.unwitnessed.aa&o x 2  Pt was placed in sitting position and 2 staff members lifted pt to his feet and then placed in bed   Does not appear to have any injuries.  Call placed to hospitalist.no new orders at this time

## 2020-03-18 NOTE — CARE PLAN
Problem: Safety  Goal: Will remain free from injury  Outcome: PROGRESSING SLOWER THAN EXPECTED  Pt fell 3/17-reinforced using the call light.  Bed alarm on,hourly rounding in place.    Problem: Mobility  Goal: Risk for activity intolerance will decrease  Outcome: PROGRESSING SLOWER THAN EXPECTED  Pt refuses to get oob.  Educated pt on the importance of mobilty  Will continue to try to get pt up

## 2020-03-18 NOTE — PROGRESS NOTES
[facility] Medicine Daily Progress Note    Date of Service  3/18/2020    Chief Complaint  79 y.o. male admitted 12/27/2019 with inability to care for self.    Hospital Course    Patient presented from home when his caregiver was found dead in his home on day of admission.  He is not able to care for himself and needs 24 hour supervision.  He was seen by psychiatry on 1/11/2020 and deemed incapacitated for medical decisions. He has since been awaiting placement.       Interval Problem Update  3/10- no events. Doing well. Awaiting placement.   3/11- no complaints or events.   3/12- no events or complaints.   3/13  No acute issues overnight, patient comfortable.   3/14  Stable overnight, no complaints. Patient is a poor historian  3/15 patient comfortable, no needs.  3/16 resting comfortable, no acute needs, denies pain.   3/17.  Patient is relatively comfortable.  No significant adverse event. Patient otherwise denies fever, chills, nausea, vomiting, adb pain, SOB, CP, headache, constipation, diarrhea, cough, or sputum.  3/18.  Patient still feels weak.  No good appetite.  Decreased oral intake.  DNR status.  Pending placement and guardianship.  Complains of general body achiness. Patient's pain is 2-3/10, intermittent and does not radiate to other location, sharp and with some tingling. Can be controlled by pain meds.    Consultants/Specialty  Psychiatry    Code Status  DNR    Disposition  Guardianship pending    Review of Systems  Review of Systems   Unable to perform ROS: Mental acuity   All other systems reviewed and are negative.       Physical Exam  Temp:  [37 °C (98.6 °F)] 37 °C (98.6 °F)  Pulse:  [74-81] 74  Resp:  [16-18] 16  BP: (114-117)/(55-65) 117/55  SpO2:  [96 %-97 %] 96 %    Physical Exam  Vitals signs and nursing note reviewed.   Constitutional:       General: He is not in acute distress.     Appearance: Normal appearance. He is well-developed. He is not ill-appearing or diaphoretic.   HENT:      Head:  Normocephalic and atraumatic.      Comments: Right lower lateral eye sty      Right Ear: External ear normal.      Left Ear: External ear normal.      Nose: Nose normal. No congestion.      Mouth/Throat:      Mouth: Mucous membranes are moist.      Pharynx: Oropharynx is clear. No posterior oropharyngeal erythema.   Eyes:      General: No scleral icterus.        Right eye: No discharge.         Left eye: No discharge.   Neck:      Musculoskeletal: Normal range of motion and neck supple. No neck rigidity or muscular tenderness.      Vascular: No carotid bruit or JVD.      Trachea: No tracheal deviation.   Cardiovascular:      Rate and Rhythm: Normal rate and regular rhythm.      Heart sounds: Normal heart sounds. No friction rub. No gallop.    Pulmonary:      Effort: Pulmonary effort is normal. No respiratory distress.      Breath sounds: Normal breath sounds. No rhonchi or rales.   Abdominal:      General: Bowel sounds are normal. There is no distension.      Palpations: Abdomen is soft. There is no mass.      Tenderness: There is no abdominal tenderness. There is no guarding or rebound.   Musculoskeletal:         General: No swelling or tenderness.      Right lower leg: No edema.   Skin:     General: Skin is warm and dry.      Capillary Refill: Capillary refill takes less than 2 seconds.      Coloration: Skin is not jaundiced.      Findings: No erythema.   Neurological:      General: No focal deficit present.      Mental Status: He is alert. Mental status is at baseline. He is disoriented.      Cranial Nerves: No cranial nerve deficit.      Sensory: No sensory deficit.      Motor: No weakness.      Coordination: Coordination normal.   Psychiatric:         Mood and Affect: Mood normal.         Behavior: Behavior normal.         Fluids    Intake/Output Summary (Last 24 hours) at 3/18/2020 1139  Last data filed at 3/18/2020 0000  Gross per 24 hour   Intake 120 ml   Output --   Net 120 ml       Laboratory                         Imaging  No orders to display        Assessment/Plan  * Requires supervision due to deficit in self-care  Assessment & Plan  Awaiting for guardianship   to help  Pending placement    Eye dryness  Assessment & Plan  Continue with artifical eye dropsr artificial tear ointment  Currently stable comfort measures    Agitation  Assessment & Plan  controlled  Resume Seroquel at this time  Looks stable.    Chronic atrial fibrillation  Assessment & Plan  Rate controlled  Resume Xarelto for anticoagulation    Severe protein-calorie malnutrition (HCC)  Assessment & Plan  Nutrition following  Boost TID  Poor oral intake, not a good candidate for tube feeding since patient is not interested.    Hyperlipidemia- (present on admission)  Assessment & Plan  Resume lipitor    COPD (chronic obstructive pulmonary disease) ([facility])- (present on admission)  Assessment & Plan  Not in exacerbation  Continue RT protocol, duo nebs, Pep therapy if warranted, and incentive spirometry.       CKD (chronic kidney disease) stage 3, GFR 30-59 ml/min ([facility])- (present on admission)  Assessment & Plan  At baseline, mild elevation in creatinine.   CTM, avoid nephrotoxins and NSAIDs        Essential hypertension- (present on admission)  Assessment & Plan  Controlled on Norvasc and losartan.            Patient plan of care discussed at multidisplinary team rounds and with patient and R.N at [facility].      VTE prophylaxis: xarelto    I have seen and examined patient on 3/18/2020. I have reviewed vitals, new labs and imaging. I have discussed POC with RN. There are no changes from (3/17/2020) except for what is mentioned above.       Current Facility-Administered Medications:   •  mirtazapine (REMERON) orally disintegrating tab 15 mg, 15 mg, Oral, QHS, Ernesto Harvey M.D., 15 mg at 03/17/20 1920  •  therapeutic multivitamin-minerals (THERAGRAN-M) tablet 1 Tab, 1 Tab, Oral, DAILY, Ernesto Harvey M.D., 1 Tab at 03/18/20 0617  •  artificial  tears (EYE LUBRICANT) ophth ointment 1 Application, 1 Application, Both Eyes, Q8HRS, Elizabeth De La Paz M.D., Stopped at 03/17/20 0600  •  acetaminophen (TYLENOL) tablet 500 mg, 500 mg, Oral, Q6HRS PRN, Elizabeth De La Paz M.D., 500 mg at 03/03/20 1810  •  bisacodyl (DULCOLAX) suppository 10 mg, 10 mg, Rectal, QDAY PRN, Mery Gutierrez D.O.  •  atorvastatin (LIPITOR) tablet 10 mg, 10 mg, Oral, Nightly, Mery Gutierrez D.O., 10 mg at 03/17/20 1918  •  haloperidol (HALDOL) tablet 5 mg, 5 mg, Oral, Q6HRS PRN, Mery Gutierrez D.O.  •  magnesium hydroxide (MILK OF MAGNESIA) suspension 30 mL, 30 mL, Oral, QDAY PRN, Mery Gutierrez D.O., 30 mL at 03/01/20 1734  •  polyethylene glycol/lytes (MIRALAX) PACKET 1 Packet, 1 Packet, Oral, QDAY PRN, Mery Gutierrez D.O., 1 Packet at 03/07/20 0603  •  QUEtiapine (SEROQUEL) tablet 50 mg, 50 mg, Oral, Q EVENING, Mery Gutierrez D.O., 50 mg at 03/17/20 1720  •  rivaroxaban (XARELTO) tablet 15 mg, 15 mg, Oral, PM MEAL, Mery Gutierrez D.O., 15 mg at 03/17/20 1720  •  tamsulosin (FLOMAX) capsule 0.4 mg, 0.4 mg, Oral, Q EVENING, Mery Gutierrez D.O., 0.4 mg at 03/17/20 1720  •  amLODIPine (NORVASC) tablet 10 mg, 10 mg, Oral, DAILY, Mery Gutierrez D.O., 10 mg at 03/18/20 0617  •  lidocaine (LIDODERM) 5 % 1 Patch, 1 Patch, Transdermal, Q24HRS, Mery Gutierrez D.O., Stopped at 03/12/20 0600  •  losartan (COZAAR) tablet 100 mg, 100 mg, Oral, DAILY, Mery Gutierrez D.O., 100 mg at 03/18/20 0617  •  vitamin D (Ergocalciferol) (DRISDOL) capsule 50,000 Units, 50,000 Units, Oral, Q FRIDAY, Elizabeth De La Paz M.D., 50,000 Units at 03/13/20 0746

## 2020-03-18 NOTE — DIETARY
Nutrition Services: Update   Day 81 of admit.  Eulogio Jeronimo is a 79 y.o. male with admitting DX of Requires supervision due to deficit in self-care    Pt is currently on regular, dysphagia 3/level 6-soft & bite-sized diet with thin liquids. PO intake is poor with refusal of most meals noted. Pt is receiving Magic Cup QID with mixed PO intake noted of refusal x 1 and % x 1. Last week, MD d/c'd Marinol and ordered Remeron since Marinol was not working. At this point Remeron is also not encouraging appetite. MD did not want to initiate tube feed.     Wt 3/18/20: 39.4 kg via bed scale - New wt taken today. This weight is better than wt taken on 3/13/20 of 36 kg with a BMI of 12.82. Current weight taken today is exactly the same as pt's weight on 3/5/20. BMI of 14.03 indicates the pt is underweight.     Malnutrition Risk: (from RD note dated 12/28/20. This still applies with poor PO intake also noted). Pt with severe malnutrition related to multiple comorbidities evident by severe fat and severe muscle loss noted on nutrition based physical exam.    Recommendations/Plan:  1. Continue with current diet, supplement and snack order.   2. Encourage intake of meals  3. Document intake of all meals as % taken in ADL's to provide interdisciplinary communication across all shifts.   4. Monitor weight.  5. Nutrition rep will continue to see patient for ongoing meal and snack preferences.  6. Order for appetite stimulant per MD.     RD following

## 2020-03-18 NOTE — CARE PLAN
Problem: Safety  Goal: Will remain free from injury  Outcome: PROGRESSING AS EXPECTED  Note: Bed in low and locked position.  Side rails up x3.  Bed alarm remains on.  Hourly rounding continues.     Problem: Urinary Elimination:  Goal: Ability to reestablish a normal urinary elimination pattern will improve  Outcome: PROGRESSING AS EXPECTED  Note: Patient uses urinal at times and also is incontinent at times.

## 2020-03-18 NOTE — PROGRESS NOTES
Pt incont.of urine.  Refuses lunch at this time just wants coffee.  Mild generalized pain.refuses to turn  Awake,alert pleasant and cooperative.

## 2020-03-18 NOTE — PROGRESS NOTES
Patient awake and pleasant.  Denies needs and denies pain.  Refusing to be turned this time.  Bed alarm remain on.

## 2020-03-18 NOTE — PROGRESS NOTES
Assumed care of patient at 1900.  Patient is awake and alert, resting in bed with TV on.  Patient is working on his dinner tray.  Bed alarm is on and side rails up X3.

## 2020-03-19 NOTE — PROGRESS NOTES
[facility] Medicine Daily Progress Note    Date of Service  3/19/2020    Chief Complaint  79 y.o. male admitted 12/27/2019 with inability to care for self.    Hospital Course    Patient presented from home when his caregiver was found dead in his home on day of admission.  He is not able to care for himself and needs 24 hour supervision.  He was seen by psychiatry on 1/11/2020 and deemed incapacitated for medical decisions. He has since been awaiting placement.       Interval Problem Update  3/10- no events. Doing well. Awaiting placement.   3/11- no complaints or events.   3/12- no events or complaints.   3/13  No acute issues overnight, patient comfortable.   3/14  Stable overnight, no complaints. Patient is a poor historian  3/15 patient comfortable, no needs.  3/16 resting comfortable, no acute needs, denies pain.   3/17.  Patient is relatively comfortable.  No significant adverse event. Patient otherwise denies fever, chills, nausea, vomiting, adb pain, SOB, CP, headache, constipation, diarrhea, cough, or sputum.  3/18.  Patient still feels weak.  No good appetite.  Decreased oral intake.  DNR status.  Pending placement and guardianship.  Complains of general body achiness. Patient's pain is 2-3/10, intermittent and does not radiate to other location, sharp and with some tingling. Can be controlled by pain meds.  3/19.  Patient has been relatively stable overnight.  Still poor appetite.  Otherwise comfortable. Patient otherwise denies fever, chills, nausea, vomiting, adb pain, SOB, CP, headache, constipation, diarrhea, cough, or sputum.      Consultants/Specialty  Psychiatry    Code Status  DNR    Disposition  Guardianship pending    Review of Systems  Review of Systems   Unable to perform ROS: Mental acuity   All other systems reviewed and are negative.       Physical Exam  Temp:  [37.2 °C (98.9 °F)-37.3 °C (99.2 °F)] 37.2 °C (98.9 °F)  Pulse:  [60-75] 75  Resp:  [16-18] 18  BP: ()/(64) 92/64  SpO2:  [91  %-97 %] 91 %    Physical Exam  Vitals signs and nursing note reviewed.   Constitutional:       Appearance: Normal appearance. He is well-developed. He is not diaphoretic.   HENT:      Head: Normocephalic and atraumatic.      Comments: Right lower lateral eye sty      Right Ear: External ear normal.      Left Ear: External ear normal.      Nose: Nose normal. No congestion or rhinorrhea.      Mouth/Throat:      Mouth: Mucous membranes are moist.      Pharynx: Oropharynx is clear. No posterior oropharyngeal erythema.   Eyes:      General: No scleral icterus.        Right eye: No discharge.         Left eye: No discharge.   Neck:      Musculoskeletal: Normal range of motion and neck supple. No muscular tenderness.      Vascular: No carotid bruit or JVD.      Trachea: No tracheal deviation.   Cardiovascular:      Rate and Rhythm: Normal rate and regular rhythm.      Heart sounds: Normal heart sounds. No murmur. No friction rub.   Pulmonary:      Effort: Pulmonary effort is normal. No respiratory distress.      Breath sounds: Normal breath sounds. No rhonchi or rales.   Abdominal:      General: Bowel sounds are normal. There is no distension.      Palpations: Abdomen is soft. There is no mass.      Tenderness: There is no abdominal tenderness. There is no guarding or rebound.   Musculoskeletal:         General: No swelling or tenderness.      Right lower leg: No edema.   Skin:     General: Skin is warm and dry.      Capillary Refill: Capillary refill takes less than 2 seconds.      Coloration: Skin is not jaundiced.      Findings: No bruising.   Neurological:      General: No focal deficit present.      Mental Status: He is alert. Mental status is at baseline. He is disoriented.      Cranial Nerves: No cranial nerve deficit.      Sensory: No sensory deficit.      Motor: No weakness.      Coordination: Coordination normal.   Psychiatric:         Mood and Affect: Mood normal.         Behavior: Behavior normal.          Fluids    Intake/Output Summary (Last 24 hours) at 3/19/2020 1132  Last data filed at 3/19/2020 0400  Gross per 24 hour   Intake 360 ml   Output 200 ml   Net 160 ml       Laboratory                        Imaging  No orders to display        Assessment/Plan  * Requires supervision due to deficit in self-care  Assessment & Plan  Awaiting for guardianship   to help  Very likely patient will need placement    Eye dryness  Assessment & Plan  Treated with artificial tears to continue    Agitation  Assessment & Plan  controlled  Resume Seroquel at this time  Looks stable.    Chronic atrial fibrillation  Assessment & Plan  Rate controlled  Resume Xarelto for anticoagulation    Severe protein-calorie malnutrition (HCC)  Assessment & Plan  Nutrition following  Boost TID  Poor oral intake, not a good candidate for tube feeding since patient is not interested.    Hyperlipidemia- (present on admission)  Assessment & Plan  Resume lipitor    COPD (chronic obstructive pulmonary disease) ([facility])- (present on admission)  Assessment & Plan  Not in exacerbation  Continue RT protocol, duo nebs, Pep therapy if warranted, and incentive spirometry.   Currently stable      CKD (chronic kidney disease) stage 3, GFR 30-59 ml/min ([facility])- (present on admission)  Assessment & Plan  At baseline, mild elevation in creatinine.   CTM, avoid nephrotoxins and NSAIDs        Essential hypertension- (present on admission)  Assessment & Plan  Controlled on Norvasc and losartan.            Patient plan of care discussed at multidisplinary team rounds and with patient and R.N at beside.      VTE prophylaxis: xarelto    I have seen and examined patient on 3/19/2020. I have reviewed vitals, new labs and imaging. I have discussed POC with RN. There are no changes from (3/18/2020) except for what is mentioned above.       Current Facility-Administered Medications:   •  mirtazapine (REMERON) orally disintegrating tab 15 mg, 15 mg, Oral, QHS,  Ernesto Harvey M.D., 15 mg at 03/18/20 2038  •  therapeutic multivitamin-minerals (THERAGRAN-M) tablet 1 Tab, 1 Tab, Oral, DAILY, Ernesto Harvey M.D., 1 Tab at 03/19/20 0611  •  artificial tears (EYE LUBRICANT) ophth ointment 1 Application, 1 Application, Both Eyes, Q8HRS, Elizabeth De La Paz M.D., Stopped at 03/17/20 0600  •  acetaminophen (TYLENOL) tablet 500 mg, 500 mg, Oral, Q6HRS PRN, Elizabeth De La Paz M.D., 500 mg at 03/03/20 1810  •  bisacodyl (DULCOLAX) suppository 10 mg, 10 mg, Rectal, QDAY PRN, Mery Gutierrez, D.O.  •  atorvastatin (LIPITOR) tablet 10 mg, 10 mg, Oral, Nightly, Mery Gutierrez D.O., 10 mg at 03/18/20 2038  •  haloperidol (HALDOL) tablet 5 mg, 5 mg, Oral, Q6HRS PRN, Mery Gutierrez D.O.  •  magnesium hydroxide (MILK OF MAGNESIA) suspension 30 mL, 30 mL, Oral, QDAY PRN, Mery Gutierrez D.O., 30 mL at 03/01/20 1734  •  polyethylene glycol/lytes (MIRALAX) PACKET 1 Packet, 1 Packet, Oral, QDAY PRN, Mery Gutierrez, D.O., 1 Packet at 03/07/20 0603  •  QUEtiapine (SEROQUEL) tablet 50 mg, 50 mg, Oral, Q EVENING, Mery Gutierrez, D.O., 50 mg at 03/18/20 1754  •  rivaroxaban (XARELTO) tablet 15 mg, 15 mg, Oral, PM MEAL, Mery Gutierrez D.O., 15 mg at 03/18/20 1754  •  tamsulosin (FLOMAX) capsule 0.4 mg, 0.4 mg, Oral, Q EVENING, Mery Gutierrez, D.O., 0.4 mg at 03/18/20 1754  •  amLODIPine (NORVASC) tablet 10 mg, 10 mg, Oral, DAILY, Mery Gutierrez D.O., 10 mg at 03/19/20 0610  •  lidocaine (LIDODERM) 5 % 1 Patch, 1 Patch, Transdermal, Q24HRS, Mery Gutierrez D.O., Stopped at 03/12/20 0600  •  losartan (COZAAR) tablet 100 mg, 100 mg, Oral, DAILY, Mery Gutierrez D.O., 100 mg at 03/19/20 0610  •  vitamin D (Ergocalciferol) (DRISDOL) capsule 50,000 Units, 50,000 Units, Oral, Q FRIDAY, Elizabeth De La Paz M.D., 50,000 Units at 03/13/20 0746

## 2020-03-19 NOTE — PROGRESS NOTES
Assumed care of patient at 1900.  Patient is sleeping in bed with side rails up X3 and bed alarm on.  Patient appears to be in no distress.  Hourly rounding continues.

## 2020-03-20 NOTE — CARE PLAN
Problem: Communication  Goal: The ability to communicate needs accurately and effectively will improve  Outcome: PROGRESSING SLOWER THAN EXPECTED     Problem: Safety  Goal: Will remain free from injury  Outcome: PROGRESSING SLOWER THAN EXPECTED  Goal: Will remain free from falls  Outcome: PROGRESSING SLOWER THAN EXPECTED     Problem: Infection  Goal: Will remain free from infection  Outcome: PROGRESSING SLOWER THAN EXPECTED     Problem: Venous Thromboembolism (VTW)/Deep Vein Thrombosis (DVT) Prevention:  Goal: Patient will participate in Venous Thrombosis (VTE)/Deep Vein Thrombosis (DVT)Prevention Measures  Outcome: PROGRESSING SLOWER THAN EXPECTED     Problem: Bowel/Gastric:  Goal: Normal bowel function is maintained or improved  Outcome: PROGRESSING SLOWER THAN EXPECTED  Goal: Will not experience complications related to bowel motility  Outcome: PROGRESSING SLOWER THAN EXPECTED

## 2020-03-20 NOTE — PROGRESS NOTES
[facility] Medicine Daily Progress Note    Date of Service  3/20/2020    Chief Complaint  79 y.o. male admitted 12/27/2019 with inability to care for self.    Hospital Course    Patient presented from home when his caregiver was found dead in his home on day of admission.  He is not able to care for himself and needs 24 hour supervision.  He was seen by psychiatry on 1/11/2020 and deemed incapacitated for medical decisions. He has since been awaiting placement.       Interval Problem Update  3/10- no events. Doing well. Awaiting placement.   3/11- no complaints or events.   3/12- no events or complaints.   3/13  No acute issues overnight, patient comfortable.   3/14  Stable overnight, no complaints. Patient is a poor historian  3/15 patient comfortable, no needs.  3/16 resting comfortable, no acute needs, denies pain.   3/17.  Patient is relatively comfortable.  No significant adverse event. Patient otherwise denies fever, chills, nausea, vomiting, adb pain, SOB, CP, headache, constipation, diarrhea, cough, or sputum.  3/18.  Patient still feels weak.  No good appetite.  Decreased oral intake.  DNR status.  Pending placement and guardianship.  Complains of general body achiness. Patient's pain is 2-3/10, intermittent and does not radiate to other location, sharp and with some tingling. Can be controlled by pain meds.  3/19.  Patient has been relatively stable overnight.  Still poor appetite.  Otherwise comfortable. Patient otherwise denies fever, chills, nausea, vomiting, adb pain, SOB, CP, headache, constipation, diarrhea, cough, or sputum.  3/20.  Patient still does not have significant appetite.  Failed multiple pharmacological medication for appetite stimulant.  Patient wants to be left alone.  Pending guardianship.    Consultants/Specialty  Psychiatry    Code Status  DNR    Disposition  Guardianship pending    Review of Systems  Review of Systems   Unable to perform ROS: Mental acuity   All other systems reviewed  and are negative.       Physical Exam  Temp:  [36.9 °C (98.4 °F)-37.3 °C (99.2 °F)] 37.3 °C (99.2 °F)  Pulse:  [64-68] 68  Resp:  [18-20] 18  BP: ()/(50-61) 94/53  SpO2:  [93 %-96 %] 93 %    Physical Exam  Vitals signs and nursing note reviewed.   Constitutional:       General: He is not in acute distress.     Appearance: He is well-developed. He is ill-appearing.   HENT:      Head: Normocephalic and atraumatic.      Comments: Right lower lateral eye sty      Right Ear: External ear normal.      Left Ear: External ear normal.      Nose: Nose normal. No congestion or rhinorrhea.      Mouth/Throat:      Mouth: Mucous membranes are moist.      Pharynx: Oropharynx is clear.   Eyes:      General: No scleral icterus.        Right eye: No discharge.         Left eye: No discharge.   Neck:      Musculoskeletal: Normal range of motion and neck supple. No muscular tenderness.      Vascular: No carotid bruit or JVD.      Trachea: No tracheal deviation.   Cardiovascular:      Rate and Rhythm: Normal rate and regular rhythm.      Heart sounds: Normal heart sounds. No murmur. No friction rub.   Pulmonary:      Effort: Pulmonary effort is normal. No respiratory distress.      Breath sounds: Normal breath sounds. No rhonchi or rales.   Abdominal:      General: Bowel sounds are normal. There is no distension.      Palpations: Abdomen is soft. There is no mass.      Tenderness: There is no abdominal tenderness. There is no guarding or rebound.   Musculoskeletal:         General: No swelling or tenderness.      Right lower leg: No edema.   Skin:     General: Skin is warm and dry.      Capillary Refill: Capillary refill takes less than 2 seconds.      Coloration: Skin is not jaundiced.      Findings: No bruising.   Neurological:      General: No focal deficit present.      Mental Status: He is alert. Mental status is at baseline. He is disoriented.      Cranial Nerves: No cranial nerve deficit.      Motor: No weakness.       Coordination: Coordination normal.   Psychiatric:         Mood and Affect: Mood normal.         Behavior: Behavior normal.         Fluids    Intake/Output Summary (Last 24 hours) at 3/20/2020 1228  Last data filed at 3/19/2020 2225  Gross per 24 hour   Intake 240 ml   Output 300 ml   Net -60 ml       Laboratory                        Imaging  No orders to display        Assessment/Plan  * Requires supervision due to deficit in self-care  Assessment & Plan  Awaiting for guardianship   to help  Very likely patient will need placement  Patient has poor appetite, failed multiple previous pharmacological appetite stimulant.  Continue encourage patient to improve oral intake    Eye dryness  Assessment & Plan  Treated with artificial tears to continue    Agitation  Assessment & Plan  controlled  Resume Seroquel at this time  Looks stable.    Chronic atrial fibrillation  Assessment & Plan  Rate controlled  Resume Xarelto for anticoagulation  Continue monitor    Severe protein-calorie malnutrition (HCC)  Assessment & Plan  Nutrition following  Boost TID  Poor oral intake, not a good candidate for tube feeding since patient is not interested.  Patient has poor appetite, failed multiple previous pharmacological appetite stimulant.  Continue encourage patient to improve oral intake    Hyperlipidemia- (present on admission)  Assessment & Plan  Resume lipitor    COPD (chronic obstructive pulmonary disease) ([facility])- (present on admission)  Assessment & Plan  Not in exacerbation  Continue RT protocol, duo nebs, Pep therapy if warranted, and incentive spirometry.   Currently stable      CKD (chronic kidney disease) stage 3, GFR 30-59 ml/min ([facility])- (present on admission)  Assessment & Plan  At baseline, mild elevation in creatinine.   CTM, avoid nephrotoxins and NSAIDs        Essential hypertension- (present on admission)  Assessment & Plan  Controlled on Norvasc and losartan.            Patient plan of care discussed at  multidisplinary team rounds and with patient and R.N at [facility].      VTE prophylaxis: xarelto    I have seen and examined patient on 3/20/2020. I have reviewed vitals, new labs and imaging. I have discussed POC with RN. There are no changes from (3/19/2020) except for what is mentioned above.       Current Facility-Administered Medications:   •  [START ON 3/21/2020] losartan (COZAAR) tablet 50 mg, 50 mg, Oral, DAILY, Adriana Sam M.D.  •  mirtazapine (REMERON) orally disintegrating tab 15 mg, 15 mg, Oral, QHS, Ernesto Harvey M.D., 15 mg at 03/19/20 2034  •  therapeutic multivitamin-minerals (THERAGRAN-M) tablet 1 Tab, 1 Tab, Oral, DAILY, Ernesto Harvey M.D., 1 Tab at 03/20/20 0521  •  artificial tears (EYE LUBRICANT) ophth ointment 1 Application, 1 Application, Both Eyes, Q8HRS, Elizabeth De La Paz M.D., 1 Application at 03/19/20 2200  •  acetaminophen (TYLENOL) tablet 500 mg, 500 mg, Oral, Q6HRS PRN, Elizabeth De La Paz M.D., 500 mg at 03/03/20 1810  •  bisacodyl (DULCOLAX) suppository 10 mg, 10 mg, Rectal, QDAY PRN, Mery Gutierrez, D.O.  •  atorvastatin (LIPITOR) tablet 10 mg, 10 mg, Oral, Nightly, Mery Gutierrez, D.O., 10 mg at 03/19/20 2034  •  haloperidol (HALDOL) tablet 5 mg, 5 mg, Oral, Q6HRS PRN, Merysherie Gutierrez, D.O.  •  magnesium hydroxide (MILK OF MAGNESIA) suspension 30 mL, 30 mL, Oral, QDAY PRN, Mery Gutierrez D.O., 30 mL at 03/01/20 1734  •  polyethylene glycol/lytes (MIRALAX) PACKET 1 Packet, 1 Packet, Oral, QDAY PRN, Mery Gutierrez, D.O., 1 Packet at 03/07/20 0603  •  QUEtiapine (SEROQUEL) tablet 50 mg, 50 mg, Oral, Q EVENING, Mery Gutierrez D.O., 50 mg at 03/19/20 1803  •  rivaroxaban (XARELTO) tablet 15 mg, 15 mg, Oral, PM MEAL, Mery Gutierrez D.O., 15 mg at 03/19/20 1803  •  tamsulosin (FLOMAX) capsule 0.4 mg, 0.4 mg, Oral, Q EVENING, Mery Gutierrez D.O., 0.4 mg at 03/19/20 1803  •  amLODIPine (NORVASC) tablet 10 mg, 10 mg, Oral, DAILY, Mery Gutierrez D.O., Stopped at 03/20/20 0521  •  lidocaine (LIDODERM) 5  % 1 Patch, 1 Patch, Transdermal, Q24HRS, Mery Gutierrez D.O., Stopped at 03/12/20 0600  •  vitamin D (Ergocalciferol) (DRISDOL) capsule 50,000 Units, 50,000 Units, Oral, Q FRIDAY, Elizabeth De La Paz M.D., 50,000 Units at 03/20/20 0916

## 2020-03-20 NOTE — CARE PLAN
Problem: Communication  Goal: The ability to communicate needs accurately and effectively will improve  Outcome: PROGRESSING AS EXPECTED     Problem: Safety  Goal: Will remain free from injury  Outcome: PROGRESSING AS EXPECTED     Problem: Infection  Goal: Will remain free from infection  Outcome: PROGRESSING AS EXPECTED     Problem: Bowel/Gastric:  Goal: Will not experience complications related to bowel motility  Outcome: PROGRESSING AS EXPECTED

## 2020-03-20 NOTE — PROGRESS NOTES
Patient confused but agreeable with most care.  VSS on RA, denies pain.  Refused skin care and turning for most of shift.  Little PO intake.  No acute changes or concerns at this time.

## 2020-03-21 NOTE — PROGRESS NOTES
"Pt awake in bed, pleasant and talkative at this time. Incontinent of a large bowel movement; pt cleaned up and full linen change completed. Attempted to shower pt but pt refusing, states \"I promised that other girl I'd do it tomorrow.\" Offered shave but pt again refuses. Safety precautions in place.  "

## 2020-03-21 NOTE — PROGRESS NOTES
Assumed pt care. Pt in bed, sleeping, in no apparent distress. Safety precautions in place. Call light and personal belongings in place and within reach. No verbal or nonverbal cues of pain. Will continue to monitor.

## 2020-03-21 NOTE — PROGRESS NOTES
Report to Jeanie FARRIS, who will assume care of the pt at this time. Pt sleeping, non-labored respirations.

## 2020-03-21 NOTE — PROGRESS NOTES
[facility] Medicine Daily Progress Note    Date of Service  3/21/2020    Chief Complaint  79 y.o. male admitted 12/27/2019 with inability to care for self.    Hospital Course    Patient presented from home when his caregiver was found dead in his home on day of admission.  He is not able to care for himself and needs 24 hour supervision.  He was seen by psychiatry on 1/11/2020 and deemed incapacitated for medical decisions. He has since been awaiting placement.       Interval Problem Update  3/10- no events. Doing well. Awaiting placement.   3/11- no complaints or events.   3/12- no events or complaints.   3/13  No acute issues overnight, patient comfortable.   3/14  Stable overnight, no complaints. Patient is a poor historian  3/15 patient comfortable, no needs.  3/16 resting comfortable, no acute needs, denies pain.   3/17.  Patient is relatively comfortable.  No significant adverse event. Patient otherwise denies fever, chills, nausea, vomiting, adb pain, SOB, CP, headache, constipation, diarrhea, cough, or sputum.  3/18.  Patient still feels weak.  No good appetite.  Decreased oral intake.  DNR status.  Pending placement and guardianship.  Complains of general body achiness. Patient's pain is 2-3/10, intermittent and does not radiate to other location, sharp and with some tingling. Can be controlled by pain meds.  3/19.  Patient has been relatively stable overnight.  Still poor appetite.  Otherwise comfortable. Patient otherwise denies fever, chills, nausea, vomiting, adb pain, SOB, CP, headache, constipation, diarrhea, cough, or sputum.  3/20.  Patient still does not have significant appetite.  Failed multiple pharmacological medication for appetite stimulant.  Patient wants to be left alone.  Pending guardianship.  3/21.  Patient has been stable.  Lethargic.  Arousable.  Not eating very much still.  Otherwise no adverse event.    Consultants/Specialty  Psychiatry    Code Status  DNR    Disposition  Guardianship  pending    Review of Systems  Review of Systems   Unable to perform ROS: Mental acuity   All other systems reviewed and are negative.       Physical Exam  Temp:  [36.4 °C (97.5 °F)-36.5 °C (97.7 °F)] 36.5 °C (97.7 °F)  Pulse:  [] 70  Resp:  [18] 18  BP: (115-117)/(68-71) 117/71  SpO2:  [95 %-96 %] 95 %    Physical Exam  Vitals signs and nursing note reviewed.   Constitutional:       General: He is not in acute distress.     Appearance: He is well-developed. He is ill-appearing.   HENT:      Head: Normocephalic and atraumatic.      Comments: Right lower lateral eye sty      Right Ear: Tympanic membrane normal.      Left Ear: Tympanic membrane normal.      Nose: Nose normal. No congestion or rhinorrhea.      Mouth/Throat:      Mouth: Mucous membranes are moist.      Pharynx: Oropharynx is clear.   Eyes:      General: No scleral icterus.        Right eye: No discharge.         Left eye: No discharge.   Neck:      Musculoskeletal: Normal range of motion and neck supple. No muscular tenderness.      Vascular: No JVD.      Trachea: No tracheal deviation.   Cardiovascular:      Rate and Rhythm: Normal rate and regular rhythm.      Heart sounds: Normal heart sounds. No murmur. No friction rub.   Pulmonary:      Effort: Pulmonary effort is normal. No respiratory distress.      Breath sounds: Normal breath sounds. No stridor. No rales.   Abdominal:      General: Bowel sounds are normal.      Palpations: Abdomen is soft. There is no mass.      Tenderness: There is no abdominal tenderness. There is no guarding or rebound.   Musculoskeletal:         General: No swelling or tenderness.      Right lower leg: No edema.   Lymphadenopathy:      Cervical: No cervical adenopathy.   Skin:     General: Skin is warm and dry.      Capillary Refill: Capillary refill takes less than 2 seconds.      Coloration: Skin is not jaundiced.      Findings: No bruising.   Neurological:      General: No focal deficit present.      Mental Status:  He is alert. Mental status is at baseline. He is disoriented.      Cranial Nerves: No cranial nerve deficit.      Motor: No weakness.      Coordination: Coordination normal.   Psychiatric:         Mood and Affect: Mood normal.         Behavior: Behavior normal.         Fluids    Intake/Output Summary (Last 24 hours) at 3/21/2020 1131  Last data filed at 3/21/2020 0746  Gross per 24 hour   Intake 290 ml   Output 450 ml   Net -160 ml       Laboratory  Recent Labs     03/21/20  0316   WBC 7.2   RBC 2.80*   HEMOGLOBIN 8.1*   HEMATOCRIT 25.9*   MCV 92.5   MCH 28.9   MCHC 31.3*   RDW 46.0   PLATELETCT 331   MPV 9.5     Recent Labs     03/21/20  0316   SODIUM 136   POTASSIUM 5.4   CHLORIDE 106   CO2 18*   GLUCOSE 104*   BUN 73*   CREATININE 2.94*   CALCIUM 11.2*                   Imaging  No orders to display        Assessment/Plan  * Requires supervision due to deficit in self-care  Assessment & Plan  Awaiting for guardianship   to help  Very likely patient will need placement  Patient has poor appetite, failed multiple previous pharmacological appetite stimulant.  Continue encourage patient to improve oral intake  Trying to encourage patient to have more oral intake.    Eye dryness  Assessment & Plan  Treated with artificial tears to continue    Agitation  Assessment & Plan  controlled  Resume Seroquel at this time  Mood stable    Chronic atrial fibrillation  Assessment & Plan  Rate controlled  Resume Xarelto for anticoagulation  Continue monitor    Severe protein-calorie malnutrition (HCC)  Assessment & Plan  Nutrition following  Boost TID  Poor oral intake, not a good candidate for tube feeding since patient is not interested.  Patient has poor appetite, failed multiple previous pharmacological appetite stimulant.    Continue to encourage patient to eat    Hyperlipidemia- (present on admission)  Assessment & Plan  Resume lipitor    COPD (chronic obstructive pulmonary disease) (HCC)- (present on  admission)  Assessment & Plan  Not in exacerbation  Continue RT protocol, duo nebs, Pep therapy if warranted, and incentive spirometry.   Currently stable      CKD (chronic kidney disease) stage 3, GFR 30-59 ml/min ([facility])- (present on admission)  Assessment & Plan  At baseline, mild elevation in creatinine.   CTM, avoid nephrotoxins and NSAIDs        Essential hypertension- (present on admission)  Assessment & Plan  Controlled on Norvasc and losartan.            Patient plan of care discussed at multidisplinary team rounds and with patient and R.N at [facility].      VTE prophylaxis: xarelto    I have seen and examined patient on 3/21/2020. I have reviewed vitals, new labs and imaging. I have discussed POC with RN. There are no changes from (3/20/2020) except for what is mentioned above.       Current Facility-Administered Medications:   •  [START ON 3/22/2020] amLODIPine (NORVASC) tablet 5 mg, 5 mg, Oral, DAILY, Adriana Sam M.D.  •  losartan (COZAAR) tablet 50 mg, 50 mg, Oral, DAILY, Adriana Sam M.D., 50 mg at 03/21/20 0511  •  mirtazapine (REMERON) orally disintegrating tab 15 mg, 15 mg, Oral, QHS, Ernesto Harvey M.D., 15 mg at 03/20/20 2026  •  therapeutic multivitamin-minerals (THERAGRAN-M) tablet 1 Tab, 1 Tab, Oral, DAILY, Ernesto Harvey M.D., 1 Tab at 03/21/20 0511  •  artificial tears (EYE LUBRICANT) ophth ointment 1 Application, 1 Application, Both Eyes, Q8HRS, Elizabeth De La Paz M.D., 1 Application at 03/19/20 2200  •  acetaminophen (TYLENOL) tablet 500 mg, 500 mg, Oral, Q6HRS PRN, Elizabeth De La Paz M.D., 500 mg at 03/03/20 1810  •  bisacodyl (DULCOLAX) suppository 10 mg, 10 mg, Rectal, QDAY PRN, Mery Gutierrez, D.O.  •  atorvastatin (LIPITOR) tablet 10 mg, 10 mg, Oral, Nightly, Mery Gutierrez, D.O., 10 mg at 03/20/20 2026  •  haloperidol (HALDOL) tablet 5 mg, 5 mg, Oral, Q6HRS PRN, Mery Gutierrez D.O.  •  magnesium hydroxide (MILK OF MAGNESIA) suspension 30 mL, 30 mL, Oral, QDAY PRN, VIRAJ De SantiagoO., 30 mL at  03/01/20 1734  •  polyethylene glycol/lytes (MIRALAX) PACKET 1 Packet, 1 Packet, Oral, QDAY PRN, VIRAJ De SantiagoO., 1 Packet at 03/07/20 0603  •  QUEtiapine (SEROQUEL) tablet 50 mg, 50 mg, Oral, Q EVENING, DELONTE De Santiago.O., 50 mg at 03/20/20 1720  •  rivaroxaban (XARELTO) tablet 15 mg, 15 mg, Oral, PM MEAL, DELONTE De Santiago.O., 15 mg at 03/20/20 1721  •  tamsulosin (FLOMAX) capsule 0.4 mg, 0.4 mg, Oral, Q EVENING, VIRAJ De SantiagoO., 0.4 mg at 03/20/20 1721  •  lidocaine (LIDODERM) 5 % 1 Patch, 1 Patch, Transdermal, Q24HRS, VIRAJ De SantiagoO., Stopped at 03/12/20 0600  •  vitamin D (Ergocalciferol) (DRISDOL) capsule 50,000 Units, 50,000 Units, Oral, Q FRIDAY, Elizabeth De La Paz M.D., 50,000 Units at 03/20/20 0916

## 2020-03-21 NOTE — PROGRESS NOTES
Received report from Jeanie FARRIS. Assumed care. This pt is AOx2, disoriented to time and event, reports mild pain, will medicate per MAR. Patient and RN discussed plan of care including pain management, skin care, placement and guardianship: questions answered. Chart reviewed. Call light in place, fall precautions in place, patient educated on importance of calling for assistance. No additional needs at this time.

## 2020-03-21 NOTE — PROGRESS NOTES
Patient alert to self, moderately confused at times.  VSS, refuses most turns today.  Ate minimal for breakfast and lunch but moderate for dinner.  No acute changes or concerns at this time.

## 2020-03-21 NOTE — PROGRESS NOTES
Pt had no c/o pain this shift, verbal or nonverbal. Pt slept throughout the night. Pt stated that he really enjoyed the mac and cheese snack last night.

## 2020-03-21 NOTE — CARE PLAN
Problem: Inadequate nutrient intake  Goal: Patient to consume greater than or equal to 50% of meals  Outcome: PROGRESSING SLOWER THAN EXPECTED  Note: Pt's appetite slightly improved tonight. Reportedly ate 50% of dinner and later ate 25% of a mac and cheese lean cuisine.      Problem: Skin Integrity  Goal: Risk for impaired skin integrity will decrease  Outcome: PROGRESSING AS EXPECTED  Note: Waffle mattress in place. Heels erythematous but blanching. Heels floated on pillow and heel Mepilex placed bilaterally. Lotion applied to skin. Incontinence care provided and linens changed. Pt offered shower but refuses, states he will tomorrow. Refused shave.

## 2020-03-22 NOTE — PROGRESS NOTES
[facility] Medicine Daily Progress Note    Date of Service  3/22/2020    Chief Complaint  79 y.o. male admitted 12/27/2019 with inability to care for self.    Hospital Course    Patient presented from home when his caregiver was found dead in his home on day of admission.  He is not able to care for himself and needs 24 hour supervision.  He was seen by psychiatry on 1/11/2020 and deemed incapacitated for medical decisions. He has since been awaiting placement.       Interval Problem Update  3/10- no events. Doing well. Awaiting placement.   3/11- no complaints or events.   3/12- no events or complaints.   3/13  No acute issues overnight, patient comfortable.   3/14  Stable overnight, no complaints. Patient is a poor historian  3/15 patient comfortable, no needs.  3/16 resting comfortable, no acute needs, denies pain.   3/17.  Patient is relatively comfortable.  No significant adverse event. Patient otherwise denies fever, chills, nausea, vomiting, adb pain, SOB, CP, headache, constipation, diarrhea, cough, or sputum.  3/18.  Patient still feels weak.  No good appetite.  Decreased oral intake.  DNR status.  Pending placement and guardianship.  Complains of general body achiness. Patient's pain is 2-3/10, intermittent and does not radiate to other location, sharp and with some tingling. Can be controlled by pain meds.  3/19.  Patient has been relatively stable overnight.  Still poor appetite.  Otherwise comfortable. Patient otherwise denies fever, chills, nausea, vomiting, adb pain, SOB, CP, headache, constipation, diarrhea, cough, or sputum.  3/20.  Patient still does not have significant appetite.  Failed multiple pharmacological medication for appetite stimulant.  Patient wants to be left alone.  Pending guardianship.  3/21.  Patient has been stable.  Lethargic.  Arousable.  Not eating very much still.  Otherwise no adverse event.  3/22.  No adverse event overnight.  Relatively stable and not agitated in the  morning.  Continue supportive care.  Awaiting for guardianship    Consultants/Specialty  Psychiatry    Code Status  DNR    Disposition  Guardianship pending    Review of Systems  Review of Systems   Unable to perform ROS: Mental acuity   All other systems reviewed and are negative.       Physical Exam  Temp:  [36.6 °C (97.9 °F)-37.2 °C (99 °F)] 37.2 °C (99 °F)  Pulse:  [70-74] 74  Resp:  [17-18] 18  BP: ()/(55-61) 101/57  SpO2:  [93 %-95 %] 95 %    Physical Exam  Vitals signs and nursing note reviewed.   Constitutional:       Appearance: He is well-developed. He is ill-appearing. He is not toxic-appearing.   HENT:      Head: Normocephalic and atraumatic.      Comments: Right lower lateral eye sty      Right Ear: Tympanic membrane normal.      Left Ear: Tympanic membrane normal.      Nose: Nose normal. No congestion or rhinorrhea.      Mouth/Throat:      Mouth: Mucous membranes are moist.      Pharynx: Oropharynx is clear.   Eyes:      General: No scleral icterus.        Right eye: No discharge.         Left eye: No discharge.   Neck:      Musculoskeletal: Normal range of motion and neck supple. No neck rigidity or muscular tenderness.      Vascular: No JVD.      Trachea: No tracheal deviation.   Cardiovascular:      Rate and Rhythm: Normal rate and regular rhythm.      Heart sounds: Normal heart sounds. No friction rub. No gallop.    Pulmonary:      Effort: Pulmonary effort is normal. No respiratory distress.      Breath sounds: Normal breath sounds. No stridor. No rales.   Abdominal:      General: Bowel sounds are normal. There is no distension.      Palpations: Abdomen is soft. There is no mass.      Tenderness: There is no guarding or rebound.   Musculoskeletal:         General: No swelling or tenderness.      Right lower leg: No edema.   Skin:     General: Skin is warm and dry.      Capillary Refill: Capillary refill takes less than 2 seconds.      Coloration: Skin is not jaundiced.      Findings: No  bruising.   Neurological:      General: No focal deficit present.      Mental Status: He is alert. Mental status is at baseline. He is disoriented.      Cranial Nerves: No cranial nerve deficit.      Motor: No weakness.      Coordination: Coordination normal.   Psychiatric:         Mood and Affect: Mood normal.         Behavior: Behavior normal.         Fluids    Intake/Output Summary (Last 24 hours) at 3/22/2020 1151  Last data filed at 3/22/2020 1015  Gross per 24 hour   Intake 1140 ml   Output 380 ml   Net 760 ml       Laboratory  Recent Labs     03/21/20  0316   WBC 7.2   RBC 2.80*   HEMOGLOBIN 8.1*   HEMATOCRIT 25.9*   MCV 92.5   MCH 28.9   MCHC 31.3*   RDW 46.0   PLATELETCT 331   MPV 9.5     Recent Labs     03/21/20  0316   SODIUM 136   POTASSIUM 5.4   CHLORIDE 106   CO2 18*   GLUCOSE 104*   BUN 73*   CREATININE 2.94*   CALCIUM 11.2*                   Imaging  No orders to display        Assessment/Plan  * Requires supervision due to deficit in self-care  Assessment & Plan  Awaiting for guardianship   to help  Very likely patient will need placement  Patient has poor appetite, failed multiple previous pharmacological appetite stimulant.  Trying to encourage patient to have more oral intake.    Eye dryness  Assessment & Plan  Treated with artificial tears to continue  Currently symptom improved    Agitation  Assessment & Plan  controlled  Resume Seroquel at this time  No agitation noticed    Chronic atrial fibrillation  Assessment & Plan  Rate controlled  Resume Xarelto for anticoagulation  Continue monitor    Severe protein-calorie malnutrition (HCC)  Assessment & Plan  Nutrition following  Boost TID  Poor oral intake, not a good candidate for tube feeding since patient is not interested.  Patient has poor appetite, failed multiple previous pharmacological appetite stimulant.    Continue to encourage patient to eat    Hyperlipidemia- (present on admission)  Assessment & Plan  Resume  lipitor    COPD (chronic obstructive pulmonary disease) ([facility])- (present on admission)  Assessment & Plan  Not in exacerbation  Continue RT protocol, duo nebs, Pep therapy if warranted, and incentive spirometry.   Currently stable      CKD (chronic kidney disease) stage 3, GFR 30-59 ml/min ([facility])- (present on admission)  Assessment & Plan  At baseline, mild elevation in creatinine.   CTM, avoid nephrotoxins and NSAIDs        Essential hypertension- (present on admission)  Assessment & Plan  Controlled on Norvasc and losartan.            Patient plan of care discussed at multidisplinary team rounds and with patient and R.N at [facility].      VTE prophylaxis: xarelto    I have seen and examined patient on 3/22/2020. I have reviewed vitals, new labs and imaging. I have discussed POC with RN. There are no changes from (3/21/2020) except for what is mentioned above.       Current Facility-Administered Medications:   •  amLODIPine (NORVASC) tablet 5 mg, 5 mg, Oral, DAILY, Adriana Sam M.D., 5 mg at 03/22/20 0543  •  losartan (COZAAR) tablet 50 mg, 50 mg, Oral, DAILY, Adriana Sam M.D., 50 mg at 03/22/20 0543  •  mirtazapine (REMERON) orally disintegrating tab 15 mg, 15 mg, Oral, QHS, Ernesto Harvey M.D., 15 mg at 03/21/20 2226  •  therapeutic multivitamin-minerals (THERAGRAN-M) tablet 1 Tab, 1 Tab, Oral, DAILY, Ernesto Harvey M.D., 1 Tab at 03/22/20 0543  •  artificial tears (EYE LUBRICANT) ophth ointment 1 Application, 1 Application, Both Eyes, Q8HRS, Elizabeth De La Paz M.D., 1 Application at 03/21/20 2200  •  acetaminophen (TYLENOL) tablet 500 mg, 500 mg, Oral, Q6HRS PRN, Elizabeth De La Paz M.D., 500 mg at 03/03/20 1810  •  bisacodyl (DULCOLAX) suppository 10 mg, 10 mg, Rectal, QDAY PRN, Mery Gutierrez D.O.  •  atorvastatin (LIPITOR) tablet 10 mg, 10 mg, Oral, Nightly, Mery Gutierrez D.O., 10 mg at 03/21/20 2226  •  haloperidol (HALDOL) tablet 5 mg, 5 mg, Oral, Q6HRS PRN, Mery Gutierrez D.O.  •  magnesium hydroxide (MILK OF  MAGNESIA) suspension 30 mL, 30 mL, Oral, QDAY PRN, Mery Gutierrez D.O., 30 mL at 03/01/20 1734  •  polyethylene glycol/lytes (MIRALAX) PACKET 1 Packet, 1 Packet, Oral, QDAY PRN, DELONTE De Santiago.O., 1 Packet at 03/07/20 0603  •  QUEtiapine (SEROQUEL) tablet 50 mg, 50 mg, Oral, Q EVENING, DELONTE De Santiago.O., 50 mg at 03/21/20 1651  •  rivaroxaban (XARELTO) tablet 15 mg, 15 mg, Oral, PM MEAL, DELONTE De Santiago.O., 15 mg at 03/21/20 1651  •  tamsulosin (FLOMAX) capsule 0.4 mg, 0.4 mg, Oral, Q EVENING, DELONTE De Santiago.O., 0.4 mg at 03/21/20 1652  •  lidocaine (LIDODERM) 5 % 1 Patch, 1 Patch, Transdermal, Q24HRS, DELONTE De Santiago.O., Stopped at 03/12/20 0600  •  vitamin D (Ergocalciferol) (DRISDOL) capsule 50,000 Units, 50,000 Units, Oral, Q FRIDAY, Elizabeth De La Paz M.D., 50,000 Units at 03/20/20 0916

## 2020-03-22 NOTE — CARE PLAN
Problem: Safety  Goal: Will remain free from injury  Outcome: PROGRESSING AS EXPECTED  Intervention: Provide assistance with mobility  Flowsheets (Taken 3/21/2020 0800 by Steve Castelan R.N.)  Assistance: Assistance of Two or More  Goal: Will remain free from falls  Outcome: PROGRESSING AS EXPECTED  Intervention: Assess risk factors for falls  Flowsheets  Taken 3/21/2020 1953  Pt Calls for Assistance: No  Mobility Status Assessment: 2-2 Healthcare Providers Required for Assistance with Ambulation & Transfer  Taken 3/22/2020 0419  Risk for Injury-Any positive answers results in the pt being at high risk for fall related injury: Not Applicable  Intervention: Implement fall precautions  Flowsheets (Taken 3/21/2020 1953)  Environmental Precautions:   Treaded Slipper Socks on Patient   Personal Belongings, Wastebasket, Call Bell etc. in Easy Reach   Report Given to Other Health Care Providers Regarding Fall Risk   Bed in Low Position   Communication Sign for Patients & Families   Mobility Assessed & Appropriate Sign Placed  Chair/Bed Strip Alarm: Yes - Alarm On     Problem: Pain Management  Goal: Pain level will decrease to patient's comfort goal  Outcome: PROGRESSING AS EXPECTED

## 2020-03-23 NOTE — CARE PLAN
Problem: Communication  Goal: The ability to communicate needs accurately and effectively will improve  Outcome: PROGRESSING AS EXPECTED  Note: Pt uses call light intermittently, hourly rounding in place, able to communicate needs to staff     Problem: Safety  Goal: Will remain free from injury  Outcome: PROGRESSING AS EXPECTED  Intervention: Provide assistance with mobility  Flowsheets (Taken 3/22/2020 2332)  Assistance: Assistance of Two or More  Ambulation Tolerance:   Tires Quickly   General Weakness     Problem: Infection  Goal: Will remain free from infection  Outcome: PROGRESSING AS EXPECTED  Note: Afebrile, no s/s infection     Problem: Bowel/Gastric:  Goal: Normal bowel function is maintained or improved  Outcome: PROGRESSING AS EXPECTED  Flowsheets  Taken 3/22/2020 2300 by Kacey Daly  Last BM: 03/22/20  Taken 3/22/2020 2300 by Piedad Neff [email or web address]  Number of Times Stooled: 1

## 2020-03-23 NOTE — PROGRESS NOTES
"Report received from MOHAN Bowen RN. Pt awake, alert, oriented to self, pleasant and making jokes. Pt denies needs at this time. VSS. /68   Pulse 92   Temp 37.1 °C (98.8 °F) (Oral)   Resp 18   Ht 1.676 m (5' 5.98\")   Wt 39.4 kg (86 lb 13.8 oz)   SpO2 95%   BMI 14.03 kg/m²  Per report pt was able to eat a majority of lunch, ate approx 50% of dinner and 100% of magic cup. Pt denies wanting any snacks at this time. Good PO intake of fluids if coffee remains present. Will continue to monitor and offer snacks and encourage oral intake when awake.  "

## 2020-03-23 NOTE — CARE PLAN
Problem: Nutritional:  Goal: Achieve adequate nutritional intake  Description: Patient will consume >50% of meals, snacks and supplements.   Outcome: PROGRESSING AS EXPECTED    Pt's PO intake has improved with average intake since 3/20 of 52%. Pt continues to receive Magic Cup QID as a supplement. He is also receiving Remeron which may be encouraging appetite and MVI + minerals. RD will continue to monitor.

## 2020-03-23 NOTE — PROGRESS NOTES
[facility] Medicine Daily Progress Note    Date of Service  3/23/2020    Chief Complaint  79 y.o. male admitted 12/27/2019 with inability to care for self.    Hospital Course    Patient presented from home when his caregiver was found dead in his home on day of admission.  He is not able to care for himself and needs 24 hour supervision.  He was seen by psychiatry on 1/11/2020 and deemed incapacitated for medical decisions. He has since been awaiting placement.       Interval Problem Update  3/10- no events. Doing well. Awaiting placement.   3/11- no complaints or events.   3/12- no events or complaints.   3/13  No acute issues overnight, patient comfortable.   3/14  Stable overnight, no complaints. Patient is a poor historian  3/15 patient comfortable, no needs.  3/16 resting comfortable, no acute needs, denies pain.   3/17.  Patient is relatively comfortable.  No significant adverse event. Patient otherwise denies fever, chills, nausea, vomiting, adb pain, SOB, CP, headache, constipation, diarrhea, cough, or sputum.  3/18.  Patient still feels weak.  No good appetite.  Decreased oral intake.  DNR status.  Pending placement and guardianship.  Complains of general body achiness. Patient's pain is 2-3/10, intermittent and does not radiate to other location, sharp and with some tingling. Can be controlled by pain meds.  3/19.  Patient has been relatively stable overnight.  Still poor appetite.  Otherwise comfortable. Patient otherwise denies fever, chills, nausea, vomiting, adb pain, SOB, CP, headache, constipation, diarrhea, cough, or sputum.  3/20.  Patient still does not have significant appetite.  Failed multiple pharmacological medication for appetite stimulant.  Patient wants to be left alone.  Pending guardianship.  3/21.  Patient has been stable.  Lethargic.  Arousable.  Not eating very much still.  Otherwise no adverse event.  3/22.  No adverse event overnight.  Relatively stable and not agitated in the  morning.  Continue supportive care.  Awaiting for guardianship  3/23.  Patient resting comfortably overnight.  No adverse event.  Continue pending guardianship.  Continue to tolerate oral intake. Patient otherwise denies fever, chills, nausea, vomiting, adb pain, SOB, CP, headache, constipation, diarrhea, cough, or sputum.      Consultants/Specialty  Psychiatry    Code Status  DNR    Disposition  Guardianship pending    Review of Systems  Review of Systems   Unable to perform ROS: Mental acuity   All other systems reviewed and are negative.       Physical Exam  Temp:  [37.1 °C (98.8 °F)-37.2 °C (99 °F)] 37.1 °C (98.8 °F)  Pulse:  [60-92] 60  Resp:  [18] 18  BP: (101-122)/(57-68) 108/66  SpO2:  [91 %-95 %] 91 %    Physical Exam  Vitals signs and nursing note reviewed.   Constitutional:       General: He is not in acute distress.     Appearance: He is well-developed. He is ill-appearing. He is not toxic-appearing.   HENT:      Head: Normocephalic and atraumatic.      Comments: Right lower lateral eye sty      Right Ear: Ear canal normal.      Left Ear: Ear canal normal.      Nose: Nose normal. No congestion or rhinorrhea.      Mouth/Throat:      Mouth: Mucous membranes are moist.      Pharynx: Oropharynx is clear.   Eyes:      General: No scleral icterus.        Right eye: No discharge.         Left eye: No discharge.   Neck:      Musculoskeletal: Normal range of motion and neck supple.      Vascular: No carotid bruit or JVD.      Trachea: No tracheal deviation.   Cardiovascular:      Rate and Rhythm: Normal rate and regular rhythm.      Heart sounds: Normal heart sounds. No friction rub. No gallop.    Pulmonary:      Effort: Pulmonary effort is normal. No respiratory distress.      Breath sounds: Normal breath sounds. No rhonchi or rales.   Abdominal:      General: Bowel sounds are normal. There is no distension.      Palpations: Abdomen is soft. There is no mass.      Tenderness: There is no guarding or rebound.    Musculoskeletal:         General: No swelling or tenderness.      Right lower leg: No edema.   Lymphadenopathy:      Cervical: No cervical adenopathy.   Skin:     General: Skin is warm and dry.      Capillary Refill: Capillary refill takes less than 2 seconds.      Coloration: Skin is not jaundiced.      Findings: No bruising.   Neurological:      General: No focal deficit present.      Mental Status: He is alert. Mental status is at baseline. He is disoriented.      Motor: No weakness.      Coordination: Coordination normal.   Psychiatric:         Mood and Affect: Mood normal.         Behavior: Behavior normal.         Fluids    Intake/Output Summary (Last 24 hours) at 3/23/2020 0951  Last data filed at 3/23/2020 0800  Gross per 24 hour   Intake 460 ml   Output 500 ml   Net -40 ml       Laboratory  Recent Labs     03/21/20  0316   WBC 7.2   RBC 2.80*   HEMOGLOBIN 8.1*   HEMATOCRIT 25.9*   MCV 92.5   MCH 28.9   MCHC 31.3*   RDW 46.0   PLATELETCT 331   MPV 9.5     Recent Labs     03/21/20  0316   SODIUM 136   POTASSIUM 5.4   CHLORIDE 106   CO2 18*   GLUCOSE 104*   BUN 73*   CREATININE 2.94*   CALCIUM 11.2*                   Imaging  No orders to display        Assessment/Plan  * Requires supervision due to deficit in self-care  Assessment & Plan  Awaiting for guardianship   to help  Very likely patient will need placement  Patient has poor appetite, failed multiple previous pharmacological appetite stimulant.  Encourage patient to increase oral intake  Nutrition supplement ordered    Eye dryness  Assessment & Plan  Continue with artifical eye dropsr artificial tear ointment      Agitation  Assessment & Plan  controlled  Resume Seroquel at this time      Chronic atrial fibrillation  Assessment & Plan  Rate controlled  Resume Xarelto for anticoagulation    Severe protein-calorie malnutrition (HCC)  Assessment & Plan  Nutrition following  Boost TID    Patient has poor appetite, failed multiple previous  pharmacological appetite stimulant.  Encourage patient to increase oral intake  Nutrition supplement ordered    Hyperlipidemia- (present on admission)  Assessment & Plan  Resume lipitor    COPD (chronic obstructive pulmonary disease) (HCC)- (present on admission)  Assessment & Plan  Not in exacerbation  Continue RT protocol, duo nebs, Pep therapy if warranted, and incentive spirometry.       CKD (chronic kidney disease) stage 3, GFR 30-59 ml/min ([facility])- (present on admission)  Assessment & Plan  At baseline, mild elevation in creatinine.   CTM, avoid nephrotoxins and NSAIDs        Essential hypertension- (present on admission)  Assessment & Plan  Blood pressure controlled, due to poor oral intake decrease losartan and amlodipine dosage.            VTE prophylaxis: xarelto    I have seen and examined patient on 3/23/2020. I have reviewed vitals, new labs and imaging. I have discussed POC with RN. There are no changes from (3/22/2020) except for what is mentioned above.       Current Facility-Administered Medications:   •  amLODIPine (NORVASC) tablet 5 mg, 5 mg, Oral, DAILY, Adriana Sam M.D., 5 mg at 03/23/20 0534  •  losartan (COZAAR) tablet 50 mg, 50 mg, Oral, DAILY, Adriana Sam M.D., 50 mg at 03/23/20 0534  •  mirtazapine (REMERON) orally disintegrating tab 15 mg, 15 mg, Oral, QHS, Ernesto Harvey M.D., 15 mg at 03/1940  •  therapeutic multivitamin-minerals (THERAGRAN-M) tablet 1 Tab, 1 Tab, Oral, DAILY, Ernesto Harvey M.D., 1 Tab at 03/23/20 0534  •  artificial tears (EYE LUBRICANT) ophth ointment 1 Application, 1 Application, Both Eyes, Q8HRS, Elizabeth De La Paz M.D., 1 Application at 03/23/20 0600  •  acetaminophen (TYLENOL) tablet 500 mg, 500 mg, Oral, Q6HRS PRN, Elizabeth De La Paz M.D., 500 mg at 03/03/20 1810  •  bisacodyl (DULCOLAX) suppository 10 mg, 10 mg, Rectal, QDAY PRN, Mery Gutierrez D.O.  •  atorvastatin (LIPITOR) tablet 10 mg, 10 mg, Oral, Nightly, Mery Gutierrez D.O., 10 mg at 03/1940  •   haloperidol (HALDOL) tablet 5 mg, 5 mg, Oral, Q6HRS PRN, DELONTE De Santiago.O.  •  magnesium hydroxide (MILK OF MAGNESIA) suspension 30 mL, 30 mL, Oral, QDAY PRN, DELONTE De Santiago.O., 30 mL at 03/01/20 1734  •  polyethylene glycol/lytes (MIRALAX) PACKET 1 Packet, 1 Packet, Oral, QDAY PRN, DELONTE De Santiago.O., 1 Packet at 03/07/20 0603  •  QUEtiapine (SEROQUEL) tablet 50 mg, 50 mg, Oral, Q EVENING, DELONTE De Santiago.O., 50 mg at 03/22/20 1717  •  rivaroxaban (XARELTO) tablet 15 mg, 15 mg, Oral, PM MEAL, DELONTE De Santiago.O., 15 mg at 03/22/20 1717  •  tamsulosin (FLOMAX) capsule 0.4 mg, 0.4 mg, Oral, Q EVENING, DELONTE De Santiago.O., 0.4 mg at 03/22/20 1717  •  lidocaine (LIDODERM) 5 % 1 Patch, 1 Patch, Transdermal, Q24HRS, DELONTE De Santiago.O., Stopped at 03/12/20 0600  •  vitamin D (Ergocalciferol) (DRISDOL) capsule 50,000 Units, 50,000 Units, Oral, Q FRIDAY, Elizabeth De La Paz M.D., 50,000 Units at 03/20/20 0916

## 2020-03-23 NOTE — PROGRESS NOTES
Patient alert to self, cooperative with most care.  VSS.  Ate 50% of dinner tray.  No acute changes or concerns at this time.

## 2020-03-23 NOTE — CARE PLAN
Problem: Communication  Goal: The ability to communicate needs accurately and effectively will improve  Outcome: PROGRESSING AS EXPECTED     Problem: Safety  Goal: Will remain free from injury  Outcome: PROGRESSING AS EXPECTED     Problem: Infection  Goal: Will remain free from infection  Outcome: PROGRESSING AS EXPECTED     Problem: Venous Thromboembolism (VTW)/Deep Vein Thrombosis (DVT) Prevention:  Goal: Patient will participate in Venous Thrombosis (VTE)/Deep Vein Thrombosis (DVT)Prevention Measures  Outcome: PROGRESSING AS EXPECTED     Problem: Bowel/Gastric:  Goal: Normal bowel function is maintained or improved  Outcome: PROGRESSING AS EXPECTED  Goal: Will not experience complications related to bowel motility  Outcome: PROGRESSING AS EXPECTED

## 2020-03-24 NOTE — PROGRESS NOTES
[facility] Medicine Daily Progress Note    Date of Service  3/24/2020    Chief Complaint  79 y.o. male admitted 12/27/2019 with inability to care for self.    Hospital Course    Patient presented from home when his caregiver was found dead in his home on day of admission.  Patient is not able to care for himself and needs 24 hour supervision.  He was seen by psychiatry on 1/11/2020 and deemed incapacitated for medical decisions.       Interval Problem Update  3/24 Patient remains comfortable, no complaints.     Consultants/Specialty  Psych - s/o    Code Status  DNR    Disposition  Placement pending  Brother is POA, assisting with decisions.    Review of Systems  Review of Systems   Constitutional: Negative for chills and fever.   HENT: Negative for congestion.    Eyes: Negative for blurred vision and photophobia.   Respiratory: Negative for cough and shortness of breath.    Cardiovascular: Negative for chest pain, claudication and leg swelling.   Gastrointestinal: Negative for abdominal pain, constipation, diarrhea, heartburn, nausea and vomiting.   Genitourinary: Negative for dysuria and hematuria.   Musculoskeletal: Negative for joint pain and myalgias.   Skin: Negative for itching and rash.   Neurological: Negative for dizziness, sensory change, speech change, weakness and headaches.   Psychiatric/Behavioral: Negative for depression. The patient is not nervous/anxious and does not have insomnia.         Physical Exam  Temp:  [36.6 °C (97.8 °F)-37 °C (98.6 °F)] 37 °C (98.6 °F)  Pulse:  [73-74] 74  Resp:  [17-18] 17  BP: (102-110)/(60-61) 110/60  SpO2:  [94 %-98 %] 98 %    Physical Exam  Vitals signs and nursing note reviewed.   Constitutional:       General: He is not in acute distress.     Appearance: Normal appearance.   HENT:      Head: Normocephalic and atraumatic.   Eyes:      General: No scleral icterus.     Extraocular Movements: Extraocular movements intact.   Neck:      Musculoskeletal: Normal range of  motion and neck supple.   Cardiovascular:      Rate and Rhythm: Normal rate and regular rhythm.      Pulses: Normal pulses.      Heart sounds: Normal heart sounds. No murmur.   Pulmonary:      Effort: Pulmonary effort is normal. No respiratory distress.      Breath sounds: Normal breath sounds. No wheezing, rhonchi or rales.   Abdominal:      General: Abdomen is flat. Bowel sounds are normal. There is no distension.      Palpations: Abdomen is soft.      Tenderness: There is no rebound.   Musculoskeletal:         General: No swelling or tenderness.   Lymphadenopathy:      Cervical: No cervical adenopathy.   Skin:     Coloration: Skin is not jaundiced.      Findings: No erythema.   Neurological:      Mental Status: He is alert. Mental status is at baseline.      Cranial Nerves: No cranial nerve deficit.      Comments: Oriented to self     Psychiatric:         Mood and Affect: Mood normal.         Behavior: Behavior normal.         Fluids    Intake/Output Summary (Last 24 hours) at 3/24/2020 0954  Last data filed at 3/24/2020 0500  Gross per 24 hour   Intake --   Output 615 ml   Net -615 ml       Laboratory                        Imaging  No orders to display        Assessment/Plan  * Requires supervision due to deficit in self-care  Assessment & Plan  Awaiting for guardianship   to help  Very likely patient will need placement  Patient has poor appetite, failed multiple previous pharmacological appetite stimulant.  Encourage patient to increase oral intake  Nutrition supplement ordered    Eye dryness  Assessment & Plan  Continue with artifical eye dropsr artificial tear ointment      Agitation  Assessment & Plan  controlled  Resume Seroquel at this time      Chronic atrial fibrillation  Assessment & Plan  Rate controlled  Resume Xarelto for anticoagulation    Severe protein-calorie malnutrition (HCC)  Assessment & Plan  Nutrition following  Boost TID    Patient has poor appetite, failed multiple previous  pharmacological appetite stimulant.  Encourage patient to increase oral intake  Nutrition supplement ordered    Hyperlipidemia- (present on admission)  Assessment & Plan  Resume lipitor    COPD (chronic obstructive pulmonary disease) ([facility])- (present on admission)  Assessment & Plan  Not in exacerbation  Continue RT protocol, duo nebs, Pep therapy if warranted, and incentive spirometry.       CKD (chronic kidney disease) stage 3, GFR 30-59 ml/min ([facility])- (present on admission)  Assessment & Plan  At baseline, mild elevation in creatinine.   CTM, avoid nephrotoxins and NSAIDs        Essential hypertension- (present on admission)  Assessment & Plan  Blood pressure controlled, due to poor oral intake decrease losartan and amlodipine dosage.         VTE prophylaxis: xarelto

## 2020-03-24 NOTE — DISCHARGE PLANNING
Called and spoke to pts brother (CHEO), Hilaria. Hilaria stated due to his age he would not be able to come to [place] to assist with placement. He stated that he does not know anything about patients finances and since he can not come to [place], he does not know what will happen to the patient house.     [facility] RN CM explained that KATHE Segundo has been working his case so RN CM could not give 100% of the details but as far as BRENTON ROMERO was aware, we could not get access to his . Hilaria stated that he was discharged out of [facility] and they should have his records. RN CM stated it would be researched. BRENTON ROMERO confirmed with Hilaria that he is giving consent for RN CM to attempt to access patients  records and Hilaria agreed.     BRENTON ROMERO explained that pt has been declining since admission. BRENTON ROMERO asked hilaria if he would be open to Hospice care. Hilaria stated he would be open to hospice are if appropriate.       BRENTON ROMERO called Amalia at the [facility]. Amalia stated that the patient is not service connected. BRENTON ROMERO asked Amalia how to go about obtaining patients . Amalia stated that a request could be made online.       RN CM submitted for a replacement  on the [employer] web site. Pt must sign request and request must be mailed to [facility].

## 2020-03-24 NOTE — PROGRESS NOTES
Patient agitated this morning but cooperative and pleasant as the day progressed.  VSS on RA, declines pain at this time.  No acute changes or concerns at this time.

## 2020-03-25 NOTE — PROGRESS NOTES
Pt oriented to self and place, denies pain  Incontinent of BM, cleaned  Heels pink and blanching, mepelexes applied, barrier cream to sacrum  Voiding in urinal  Tolerating breakfast  Waffle mattress in place, pt agreeable to shower and shave today  Bed alarm on, hourly rounding, call light within reach, bed locked in low position

## 2020-03-25 NOTE — DISCHARGE PLANNING
Received phone call from pts brother/POA, David Cox stating that he has contacted the  that elizabeth up the pts POA paperwork and were advised they will need two physicians notes stating the pt is unable to care for himself and incapacitated to make medical decisions. Pts  name is Mic Wright - [phone].    RN CM escalated case to supervisor Leilani. Leilani will we contacting risk management and [facility] .

## 2020-03-25 NOTE — CARE PLAN
Problem: Safety  Goal: Will remain free from injury  Note: Call light and personal belongings within reach, bed in low locked position, room free of clutter, bed alarm activated. Hourly rounding in place to ensure pt safety and needs are met.        Problem: Inadequate nutrient intake  Goal: Patient to consume greater than or equal to 50% of meals  Outcome: PROGRESSING AS EXPECTED  Note: Pt was reporting feeling hungry tonight. Brought additional dinner tray, pt ate 50% of a salad and sandwich.

## 2020-03-25 NOTE — CARE PLAN
Problem: Communication  Goal: The ability to communicate needs accurately and effectively will improve  Outcome: PROGRESSING AS EXPECTED  Note: Plan of care discussed, patient verbalizes understanding      Problem: Safety  Goal: Will remain free from injury  Outcome: PROGRESSING AS EXPECTED  Note: Bed alarm on, call light within reach, bed locked in low position, room near nursing station, hourly rounding      Problem: Skin Integrity  Goal: Risk for impaired skin integrity will decrease  Outcome: PROGRESSING SLOWER THAN EXPECTED  Note: Heels pink blanching, mepelex applied, barrier cream to sacrum, waffle mattress in place     Problem: Psychosocial Needs:  Goal: Level of anxiety will decrease  Outcome: PROGRESSING AS EXPECTED  Note: Denies pain at this time

## 2020-03-25 NOTE — PROGRESS NOTES
Hospital Medicine Daily Progress Note    Date of Service  3/25/2020    Chief Complaint  79 y.o. male admitted 12/27/2019 with inability to care for self.    Hospital Course    Patient presented from home when his caregiver was found dead in his home on day of admission.  Patient is not able to care for himself and needs 24 hour supervision.  He was seen by psychiatry on 1/11/2020 and deemed incapacitated for medical decisions.       Interval Problem Update  3/24 Patient remains comfortable, no complaints.   3/25 Patient feeling okay, no updates on placement.    Consultants/Specialty  Psych - s/o    Code Status  DNR    Disposition  Placement pending  Brother is POA, assisting with decisions.    Review of Systems  Review of Systems   Constitutional: Negative for chills and fever.   HENT: Negative for congestion.    Eyes: Negative for blurred vision and photophobia.   Respiratory: Negative for cough and shortness of breath.    Cardiovascular: Negative for chest pain, claudication and leg swelling.   Gastrointestinal: Negative for abdominal pain, constipation, diarrhea, heartburn, nausea and vomiting.   Genitourinary: Negative for dysuria and hematuria.   Musculoskeletal: Negative for joint pain and myalgias.   Skin: Negative for itching and rash.   Neurological: Negative for dizziness, sensory change, speech change, weakness and headaches.   Psychiatric/Behavioral: Negative for depression. The patient is not nervous/anxious and does not have insomnia.         Physical Exam  Temp:  [36.7 °C (98 °F)-37 °C (98.6 °F)] 36.7 °C (98 °F)  Pulse:  [64-69] 64  Resp:  [18] 18  BP: (107-112)/(61-64) 112/64  SpO2:  [90 %] 90 %    Physical Exam  Vitals signs and nursing note reviewed.   Constitutional:       General: He is not in acute distress.     Appearance: Normal appearance.   HENT:      Head: Normocephalic and atraumatic.   Eyes:      General: No scleral icterus.     Extraocular Movements: Extraocular movements intact.   Neck:       Musculoskeletal: Normal range of motion and neck supple.   Cardiovascular:      Rate and Rhythm: Normal rate and regular rhythm.      Pulses: Normal pulses.      Heart sounds: Normal heart sounds. No murmur.   Pulmonary:      Effort: Pulmonary effort is normal. No respiratory distress.      Breath sounds: Normal breath sounds. No wheezing, rhonchi or rales.   Abdominal:      General: Abdomen is flat. Bowel sounds are normal. There is no distension.      Palpations: Abdomen is soft.      Tenderness: There is no rebound.   Musculoskeletal:         General: No swelling or tenderness.   Lymphadenopathy:      Cervical: No cervical adenopathy.   Skin:     Coloration: Skin is not jaundiced.      Findings: No erythema.   Neurological:      Mental Status: He is alert. Mental status is at baseline.      Cranial Nerves: No cranial nerve deficit.      Comments: Oriented to self     Psychiatric:         Mood and Affect: Mood normal.         Behavior: Behavior normal.         Fluids    Intake/Output Summary (Last 24 hours) at 3/25/2020 0951  Last data filed at 3/25/2020 0900  Gross per 24 hour   Intake 850 ml   Output 550 ml   Net 300 ml       Laboratory                        Imaging  No orders to display        Assessment/Plan  * Requires supervision due to deficit in self-care  Assessment & Plan  Awaiting for guardianship   to help  Very likely patient will need placement  Patient has poor appetite, failed multiple previous pharmacological appetite stimulant.  Encourage patient to increase oral intake  Nutrition supplement ordered    Eye dryness  Assessment & Plan  Continue with artifical eye dropsr artificial tear ointment      Agitation  Assessment & Plan  controlled  Resume Seroquel at this time      Chronic atrial fibrillation  Assessment & Plan  Rate controlled  Resume Xarelto for anticoagulation    Severe protein-calorie malnutrition (HCC)  Assessment & Plan  Nutrition following  Boost TID    Patient  has poor appetite, failed multiple previous pharmacological appetite stimulant.  Encourage patient to increase oral intake  Nutrition supplement ordered    Hyperlipidemia- (present on admission)  Assessment & Plan  Resume lipitor    COPD (chronic obstructive pulmonary disease) ([facility])- (present on admission)  Assessment & Plan  Not in exacerbation  Continue RT protocol, duo nebs, Pep therapy if warranted, and incentive spirometry.       CKD (chronic kidney disease) stage 3, GFR 30-59 ml/min ([facility])- (present on admission)  Assessment & Plan  At baseline, mild elevation in creatinine.   CTM, avoid nephrotoxins and NSAIDs        Essential hypertension- (present on admission)  Assessment & Plan  Blood pressure controlled, due to poor oral intake decrease losartan and amlodipine dosage.         VTE prophylaxis: xarelto

## 2020-03-26 NOTE — PROGRESS NOTES
Hospital Medicine Daily Progress Note    Date of Service  3/26/2020    Chief Complaint  79 y.o. male admitted 12/27/2019 with inability to care for self.    Hospital Course    Patient presented from home when his caregiver was found dead in his home on day of admission.  Patient is not able to care for himself and needs 24 hour supervision.  He was seen by psychiatry on 1/11/2020 and deemed incapacitated for medical decisions.       Interval Problem Update  3/24 Patient remains comfortable, no complaints.   3/25 Patient feeling okay, no updates on placement.  3/26 Patient in good spirits, cooperative with care.    Consultants/Specialty  Psych - s/o    Code Status  DNR    Disposition  Placement pending  Brother is POA, assisting with decisions.    Review of Systems  Review of Systems   Constitutional: Negative for chills and fever.   HENT: Negative for congestion.    Eyes: Negative for blurred vision and photophobia.   Respiratory: Negative for cough and shortness of breath.    Cardiovascular: Negative for chest pain, claudication and leg swelling.   Gastrointestinal: Negative for abdominal pain, constipation, diarrhea, heartburn, nausea and vomiting.   Genitourinary: Negative for dysuria and hematuria.   Musculoskeletal: Negative for joint pain and myalgias.   Skin: Negative for itching and rash.   Neurological: Negative for dizziness, sensory change, speech change, weakness and headaches.   Psychiatric/Behavioral: Negative for depression. The patient is not nervous/anxious and does not have insomnia.         Physical Exam  Temp:  [36.7 °C (98 °F)-37 °C (98.6 °F)] 36.7 °C (98 °F)  Pulse:  [76] 76  Resp:  [18] 18  BP: (100-114)/(60-71) 100/60  SpO2:  [95 %-96 %] 96 %    Physical Exam  Vitals signs and nursing note reviewed.   Constitutional:       General: He is not in acute distress.     Appearance: Normal appearance.   HENT:      Head: Normocephalic and atraumatic.   Eyes:      General: No scleral icterus.      Extraocular Movements: Extraocular movements intact.   Neck:      Musculoskeletal: Normal range of motion and neck supple.   Cardiovascular:      Rate and Rhythm: Normal rate and regular rhythm.      Pulses: Normal pulses.      Heart sounds: Normal heart sounds. No murmur.   Pulmonary:      Effort: Pulmonary effort is normal. No respiratory distress.      Breath sounds: Normal breath sounds. No wheezing, rhonchi or rales.   Abdominal:      General: Abdomen is flat. Bowel sounds are normal. There is no distension.      Palpations: Abdomen is soft.      Tenderness: There is no rebound.   Musculoskeletal:         General: No swelling or tenderness.   Lymphadenopathy:      Cervical: No cervical adenopathy.   Skin:     Coloration: Skin is not jaundiced.      Findings: No erythema.   Neurological:      Mental Status: He is alert. Mental status is at baseline.      Cranial Nerves: No cranial nerve deficit.      Comments: Oriented to self     Psychiatric:         Mood and Affect: Mood normal.         Behavior: Behavior normal.         Fluids    Intake/Output Summary (Last 24 hours) at 3/26/2020 0832  Last data filed at 3/26/2020 0800  Gross per 24 hour   Intake 945 ml   Output 400 ml   Net 545 ml       Laboratory                        Imaging  No orders to display        Assessment/Plan  * Requires supervision due to deficit in self-care  Assessment & Plan  Awaiting for guardianship   to help  Very likely patient will need placement  Patient has poor appetite, failed multiple previous pharmacological appetite stimulant.  Encourage patient to increase oral intake  Nutrition supplement ordered    Eye dryness  Assessment & Plan  Continue with artifical eye dropsr artificial tear ointment      Agitation  Assessment & Plan  controlled  Resume Seroquel at this time      Chronic atrial fibrillation  Assessment & Plan  Rate controlled  Resume Xarelto for anticoagulation    Severe protein-calorie malnutrition  ([facility])  Assessment & Plan  Nutrition following  Boost TID    Patient has poor appetite, failed multiple previous pharmacological appetite stimulant.  Encourage patient to increase oral intake  Nutrition supplement ordered    Hyperlipidemia- (present on admission)  Assessment & Plan  Resume lipitor    COPD (chronic obstructive pulmonary disease) ([facility])- (present on admission)  Assessment & Plan  Not in exacerbation  Continue RT protocol, duo nebs, Pep therapy if warranted, and incentive spirometry.       CKD (chronic kidney disease) stage 3, GFR 30-59 ml/min ([facility])- (present on admission)  Assessment & Plan  At baseline, mild elevation in creatinine.   CTM, avoid nephrotoxins and NSAIDs        Essential hypertension- (present on admission)  Assessment & Plan  Blood pressure controlled, due to poor oral intake decrease losartan and amlodipine dosage.         VTE prophylaxis: xarelto

## 2020-03-26 NOTE — PROGRESS NOTES
Pt oriented to self and place  Denies pain  Refusing to get out of bed for breakfast, education provided  Waffle mattress in place, barrier cream in use, heels and sacrum pink and blanching  Bed alarm on, treaded socks on, call light within reach, hourly rounding  Pt with poor PO intake, food and snacks encouraged

## 2020-03-26 NOTE — CARE PLAN
Problem: Skin Integrity  Goal: Risk for impaired skin integrity will decrease  Outcome: PROGRESSING AS EXPECTED  Note: Erythema to the spine, blanchable. Barrier cream applied. Pt showered and shaved.     Problem: Bowel/Gastric:  Goal: Normal bowel function is maintained or improved  Outcome: PROGRESSING AS EXPECTED  Note: Pt had a large bowel movement tonight.

## 2020-03-26 NOTE — DISCHARGE PLANNING
RN CM called pts brother, Memo, to give update. Memo is requesting a phone call from bedside RN tomorrow around 1000 for update on patient.

## 2020-03-26 NOTE — DISCHARGE PLANNING
"Minna, from risk management, contacted  supervisor about letter family is requesting. Daniel stated, \"This is a routine request when these types of situations occur.\"  supervisor has approved letter.     RN CM spoke to Dr Gutierrez about letter. Dr Gutierrez is okay with signing letter stating that patient is currently incapacitated.     Called pts Mic regarding letter. No answer - left VM requesting phone call back.   "

## 2020-03-26 NOTE — CARE PLAN
Problem: Communication  Goal: The ability to communicate needs accurately and effectively will improve  Outcome: PROGRESSING AS EXPECTED  Note: Plan of care discussed, patient verbalizes understanding      Problem: Safety  Goal: Will remain free from injury  Outcome: PROGRESSING AS EXPECTED  Note: Treaded socks in place, call light within reach, bed locked in low position, room near nursing station, hourly rounding, bed alarm on      Problem: Skin Integrity  Goal: Risk for impaired skin integrity will decrease  Outcome: PROGRESSING AS EXPECTED  Note: Waffle mattress in place, barrier cream in use, pt turns self in bed, kept dry

## 2020-03-27 NOTE — CARE PLAN
Problem: Nutritional:  Goal: Achieve adequate nutritional intake  Description: Patient will consume >50% of meals, snacks and supplements.   Outcome: MET    PO intake of meals diminished some to an average of 36%. Pt is eating Magic Cups at times and reports liking them. Magic Cup QID is providing 1160 kcals and 36 gm of protein. RD encouraged pt to eat 1/2 of his meals and all of his Magic Cups. Pt expressed understanding. Pt continues to receive Remeron and MVI + minerals. RD will follow weekly or PRN.

## 2020-03-27 NOTE — PROGRESS NOTES
[facility] Hospital Medicine Daily Progress Note    Date of Service  3/27/2020    Chief Complaint  79 y.o. male admitted 12/27/2019 with inability to care for self.    Hospital Course    Patient presented from home when his caregiver was found dead in his home on day of admission.  Patient is not able to care for himself and needs 24 hour supervision.  He was seen by psychiatry on 1/11/2020 and deemed incapacitated for medical decisions.       Interval Problem Update  3/24 Patient remains comfortable, no complaints.   3/25 Patient feeling okay, no updates on placement.  3/26 Patient in good spirits, cooperative with care.  3/27 Patient sleeping soundly, no overnight events, will sign letter for patient's brother confirming patient lacking capacity for decision making.    Consultants/Specialty  Psych - s/o    Code Status  DNR    Disposition  Placement pending  Brother is POA, assisting with decisions.    Review of Systems  Review of Systems   Constitutional: Negative for chills and fever.   HENT: Negative for congestion.    Eyes: Negative for blurred vision and photophobia.   Respiratory: Negative for cough and shortness of breath.    Cardiovascular: Negative for chest pain, claudication and leg swelling.   Gastrointestinal: Negative for abdominal pain, constipation, diarrhea, heartburn, nausea and vomiting.   Genitourinary: Negative for dysuria and hematuria.   Musculoskeletal: Negative for joint pain and myalgias.   Skin: Negative for itching and rash.   Neurological: Negative for dizziness, sensory change, speech change, weakness and headaches.   Psychiatric/Behavioral: Negative for depression. The patient is not nervous/anxious and does not have insomnia.         Physical Exam  Temp:  [36.6 °C (97.9 °F)-36.8 °C (98.2 °F)] 36.6 °C (97.9 °F)  Pulse:  [64-76] 64  Resp:  [18] 18  BP: (112-124)/(58-68) 112/58  SpO2:  [94 %-97 %] 94 %    Physical Exam  Vitals signs and nursing note reviewed.   Constitutional:       General: He is  not in acute distress.     Appearance: Normal appearance.   HENT:      Head: Normocephalic and atraumatic.   Eyes:      General: No scleral icterus.     Extraocular Movements: Extraocular movements intact.   Neck:      Musculoskeletal: Normal range of motion and neck supple.   Cardiovascular:      Rate and Rhythm: Normal rate and regular rhythm.      Pulses: Normal pulses.      Heart sounds: Normal heart sounds. No murmur.   Pulmonary:      Effort: Pulmonary effort is normal. No respiratory distress.      Breath sounds: Normal breath sounds. No wheezing, rhonchi or rales.   Abdominal:      General: Abdomen is flat. Bowel sounds are normal. There is no distension.      Palpations: Abdomen is soft.      Tenderness: There is no rebound.   Musculoskeletal:         General: No swelling or tenderness.   Lymphadenopathy:      Cervical: No cervical adenopathy.   Skin:     Coloration: Skin is not jaundiced.      Findings: No erythema.   Neurological:      Mental Status: He is alert. Mental status is at baseline.      Cranial Nerves: No cranial nerve deficit.      Comments: Oriented to self     Psychiatric:         Mood and Affect: Mood normal.         Behavior: Behavior normal.         Fluids    Intake/Output Summary (Last 24 hours) at 3/27/2020 0940  Last data filed at 3/27/2020 0600  Gross per 24 hour   Intake 480 ml   Output 450 ml   Net 30 ml       Laboratory                        Imaging  No orders to display        Assessment/Plan  * Requires supervision due to deficit in self-care  Assessment & Plan  Awaiting for guardianship   to help  Very likely patient will need placement  Patient has poor appetite, failed multiple previous pharmacological appetite stimulant.  Encourage patient to increase oral intake  Nutrition supplement ordered    Eye dryness  Assessment & Plan  Continue with artifical eye dropsr artificial tear ointment      Agitation  Assessment & Plan  controlled  Resume Seroquel at this  time      Chronic atrial fibrillation  Assessment & Plan  Rate controlled  Resume Xarelto for anticoagulation    Severe protein-calorie malnutrition (HCC)  Assessment & Plan  Nutrition following  Boost TID    Patient has poor appetite, failed multiple previous pharmacological appetite stimulant.  Encourage patient to increase oral intake  Nutrition supplement ordered    Hyperlipidemia- (present on admission)  Assessment & Plan  Resume lipitor    COPD (chronic obstructive pulmonary disease) ([facility])- (present on admission)  Assessment & Plan  Not in exacerbation  Continue RT protocol, duo nebs, Pep therapy if warranted, and incentive spirometry.       CKD (chronic kidney disease) stage 3, GFR 30-59 ml/min ([facility])- (present on admission)  Assessment & Plan  At baseline, mild elevation in creatinine.   CTM, avoid nephrotoxins and NSAIDs        Essential hypertension- (present on admission)  Assessment & Plan  Blood pressure controlled, due to poor oral intake decrease losartan and amlodipine dosage.         VTE prophylaxis: xarelto

## 2020-03-27 NOTE — PROGRESS NOTES
Bedside report received from night RN. Assumed care of patient. Daily plan of care discussed. Pt resting comfortably in bed at this time with no signs of distress noted. Hourly rounding in place.

## 2020-03-27 NOTE — PROGRESS NOTES
Received report from night shift. Assumed care of patient. Patient in bed sleeping. Call light within reach.

## 2020-03-27 NOTE — PROGRESS NOTES
Spoke with patients brother Memo and updated Memo about patients POC. Memo wants nursing staff to call him if there are any life threatening needs and to continue to update him on POC. Nurse asked for Memo's email as requested by case management. email is [email or web address]

## 2020-03-27 NOTE — PROGRESS NOTES
Offered assistance with repositioning. Patient refused. Education provided. Will encourage repositioning again.

## 2020-03-27 NOTE — CARE PLAN
Problem: Communication  Goal: The ability to communicate needs accurately and effectively will improve  Outcome: PROGRESSING AS EXPECTED  Intervention: [facility] patient and significant other/support system to call light to alert staff of needs  Note: Continue to reorient patient about using the call light for assistance.     Problem: Safety  Goal: Will remain free from injury  Outcome: PROGRESSING AS EXPECTED  Note: Will encourage patient to ask for assistance.     Problem: Skin Integrity  Goal: Risk for impaired skin integrity will decrease  Outcome: PROGRESSING AS EXPECTED  Intervention: Implement precautions to protect skin integrity in collaboration with the interdisciplinary team  Note: Will float heels with pillows     Problem: Inadequate nutrient intake  Goal: Patient to consume greater than or equal to 50% of meals  Outcome: PROGRESSING SLOWER THAN EXPECTED  Note: Will encourage consuming foods and snacks throughout the day.

## 2020-03-27 NOTE — DISCHARGE PLANNING
Two physicians notes deeming patient incapacitated emailed to Memo GRIDER, at [email or web address].

## 2020-03-28 NOTE — PROGRESS NOTES
Spoke with Deborah, speech pathology, and she agrees to keep liquids thickened for patient. Will continue with a modified diet. Will also continue to monitor.

## 2020-03-28 NOTE — PROGRESS NOTES
[facility] Medicine Daily Progress Note    Date of Service  3/28/2020    Chief Complaint  79 y.o. male admitted 12/27/2019 with inability to care for self.    Hospital Course    Patient presented from home when his caregiver was found dead in his home on day of admission.  Patient is not able to care for himself and needs 24 hour supervision.  He was seen by psychiatry on 1/11/2020 and deemed incapacitated for medical decisions.       Interval Problem Update  3/24 Patient remains comfortable, no complaints.   3/25 Patient feeling okay, no updates on placement.  3/26 Patient in good spirits, cooperative with care.  3/27 Patient sleeping soundly, no overnight events, will sign letter for patient's brother confirming patient lacking capacity for decision making.  3/28 Patient without significant change.  RN reports concern of coughing with thin liquids, SLP to re-eval diet texture for PO safety.    Consultants/Specialty  Psych - s/o    Code Status  DNR    Disposition  Placement pending  Brother is POA, assisting with decisions.    Review of Systems  Review of Systems   Constitutional: Negative for chills and fever.   HENT: Negative for congestion.    Eyes: Negative for blurred vision and photophobia.   Respiratory: Negative for cough and shortness of breath.    Cardiovascular: Negative for chest pain, claudication and leg swelling.   Gastrointestinal: Negative for abdominal pain, constipation, diarrhea, heartburn, nausea and vomiting.   Genitourinary: Negative for dysuria and hematuria.   Musculoskeletal: Negative for joint pain and myalgias.   Skin: Negative for itching and rash.   Neurological: Negative for dizziness, sensory change, speech change, weakness and headaches.   Psychiatric/Behavioral: Negative for depression. The patient is not nervous/anxious and does not have insomnia.         Physical Exam  Temp:  [36.4 °C (97.6 °F)-36.7 °C (98 °F)] 36.4 °C (97.6 °F)  Pulse:  [68-86] 68  Resp:  [18-20] 20  BP:  (115-117)/(63-76) 115/63  SpO2:  [92 %] 92 %    Physical Exam  Vitals signs and nursing note reviewed.   Constitutional:       General: He is not in acute distress.     Appearance: Normal appearance.   HENT:      Head: Normocephalic and atraumatic.   Eyes:      General: No scleral icterus.     Extraocular Movements: Extraocular movements intact.   Neck:      Musculoskeletal: Normal range of motion and neck supple.   Cardiovascular:      Rate and Rhythm: Normal rate and regular rhythm.      Pulses: Normal pulses.      Heart sounds: Normal heart sounds. No murmur.   Pulmonary:      Effort: Pulmonary effort is normal. No respiratory distress.      Breath sounds: Normal breath sounds. No wheezing, rhonchi or rales.   Abdominal:      General: Abdomen is flat. Bowel sounds are normal. There is no distension.      Palpations: Abdomen is soft.      Tenderness: There is no rebound.   Musculoskeletal:         General: No swelling or tenderness.   Lymphadenopathy:      Cervical: No cervical adenopathy.   Skin:     Coloration: Skin is not jaundiced.      Findings: No erythema.   Neurological:      Mental Status: He is alert. Mental status is at baseline.      Cranial Nerves: No cranial nerve deficit.      Comments: Oriented to self     Psychiatric:         Mood and Affect: Mood normal.         Behavior: Behavior normal.         Fluids    Intake/Output Summary (Last 24 hours) at 3/28/2020 1017  Last data filed at 3/28/2020 0800  Gross per 24 hour   Intake 600 ml   Output 400 ml   Net 200 ml       Laboratory                        Imaging  No orders to display        Assessment/Plan  * Requires supervision due to deficit in self-care  Assessment & Plan  Awaiting for guardianship   to help  Very likely patient will need placement  Patient has poor appetite, failed multiple previous pharmacological appetite stimulant.  Encourage patient to increase oral intake  Nutrition supplement ordered    Eye dryness  Assessment &  Plan  Continue with artifical eye dropsr artificial tear ointment      Agitation  Assessment & Plan  controlled  Resume Seroquel at this time      Chronic atrial fibrillation  Assessment & Plan  Rate controlled  Resume Xarelto for anticoagulation    Severe protein-calorie malnutrition (HCC)  Assessment & Plan  Nutrition following  Boost TID    Patient has poor appetite, failed multiple previous pharmacological appetite stimulant.  Encourage patient to increase oral intake  Nutrition supplement ordered    Hyperlipidemia- (present on admission)  Assessment & Plan  Resume lipitor    COPD (chronic obstructive pulmonary disease) ([facility])- (present on admission)  Assessment & Plan  Not in exacerbation  Continue RT protocol, duo nebs, Pep therapy if warranted, and incentive spirometry.       CKD (chronic kidney disease) stage 3, GFR 30-59 ml/min ([facility])- (present on admission)  Assessment & Plan  At baseline, mild elevation in creatinine.   CTM, avoid nephrotoxins and NSAIDs        Essential hypertension- (present on admission)  Assessment & Plan  Blood pressure controlled, due to poor oral intake decrease losartan and amlodipine dosage.         VTE prophylaxis: xarelto

## 2020-03-28 NOTE — CARE PLAN
Problem: Safety  Goal: Will remain free from injury  Outcome: PROGRESSING AS EXPECTED  Note: Hourly rounding in place. Bed alarm on. Provided 2 person assistance with repositioning. Encouraged use of call light. Call light within reach.     Problem: Knowledge Deficit  Goal: Knowledge of disease process/condition, treatment plan, diagnostic tests, and medications will improve  Outcome: PROGRESSING AS EXPECTED  Note: Provided education about safety and POC for the day.     Problem: Respiratory:  Goal: Respiratory status will improve  Outcome: PROGRESSING AS EXPECTED  Intervention: Educate and encourage coughing and deep breathing  Note: Encouraged coughing and deep breathing. Assisted patient to lay on their side. Monitoring fluid intake and changed to thickened liquids.

## 2020-03-28 NOTE — THERAPY
"Speech Language Therapy Clinical Swallow Evaluation completed.    Functional Status: Patient was seen on this date for a clinical swallow evaluation. Patient AAO to self, hospital, and year only. Oral motor examination only partially completed due to increased irritability with SLP prompting. No gross asymmetry noted but mild generalized weakness of oral structures noted. PO trials were administered and consisted of ice chips, MTL, puree, soft solids, and thin liquids. Onset of pharyngeal swallow mildly delayed and laryngeal elevation complete but sluggish to palpation. Patient had no overt s/sx of aspiration with trials of MTL or soft solids. Wet vocal quality immediately following ice chips x1 and thin liquids and delayed wet vocal quality x1 following purees. A second swallow resulted in clear vocal quality. Immediate and persistent coughing occurred with 3rd trial of thin liquids from cup and concerning for penetration/aspiration. Mastication reduced but appeared functional for mechanical textures.     Recommendations - Diet: Continue a Level 6 (soft & bite size)/Level 2 (mildly thick liquid) diet given close monitoring. STOP PO with ongoing concerns for aspiration. Consider a diagnostic swallow study early next week to further assess oropharyngeal function and rule out aspiration. RN to obtain order for modified barium swallow study (MBSS).                             Strategies: Close monitoring during meals, No Straws and Head of Bed at 90 Degrees                            Medication Administration: Whole with MTL or as tolerated    Plan of Care: Will benefit from Speech Therapy 3 times per week    Post-Acute Therapy: Recommend inpatient transitional care services for continued speech therapy services.      See \"Rehab Therapy-Acute\" Patient Summary Report for complete documentation. Thank you for the consult.       "

## 2020-03-28 NOTE — PROGRESS NOTES
Spoke with Dr. Gutierrez to have SLP evaluate patient due to patient coughing with fluids. For now, Dr. Gutierrez is ok with giving patient thickened/nectar thick liquids. Will continue to monitor.

## 2020-03-28 NOTE — CARE PLAN
Problem: Safety  Goal: Will remain free from falls  Outcome: PROGRESSING AS EXPECTED     Problem: Infection  Goal: Will remain free from infection  Outcome: PROGRESSING AS EXPECTED     Problem: Venous Thromboembolism (VTW)/Deep Vein Thrombosis (DVT) Prevention:  Goal: Patient will participate in Venous Thrombosis (VTE)/Deep Vein Thrombosis (DVT)Prevention Measures  Outcome: PROGRESSING AS EXPECTED     Problem: Bowel/Gastric:  Goal: Normal bowel function is maintained or improved  Outcome: PROGRESSING AS EXPECTED     Problem: Respiratory:  Goal: Respiratory status will improve  Outcome: PROGRESSING AS EXPECTED     Problem: Skin Integrity  Goal: Risk for impaired skin integrity will decrease  Outcome: PROGRESSING AS EXPECTED

## 2020-03-28 NOTE — PROGRESS NOTES
Speech pathologist called. She reported that she will check on patient sometime after 4 for swallow eval. Will continue to monitor patients swallowing.

## 2020-03-28 NOTE — PROGRESS NOTES
Pt resting in bed, VSS, denies pain, SOB, dizziness, or nausea. POC discussed, denies further needs. Safety measures and hourly rounding in place.

## 2020-03-28 NOTE — PROGRESS NOTES
Received report from night shift. Assumed care of patient. Patient in bed eating breakfast. No further needs at this time.

## 2020-03-29 NOTE — PROGRESS NOTES
Hospital Medicine Daily Progress Note    Date of Service  3/29/2020    Chief Complaint  79 y.o. male admitted 12/27/2019 with inability to care for self.    Hospital Course    Patient presented from home when his caregiver was found dead in his home on day of admission.  Patient is not able to care for himself and needs 24 hour supervision.  He was seen by psychiatry on 1/11/2020 and deemed incapacitated for medical decisions.       Interval Problem Update  3/24 Patient remains comfortable, no complaints.   3/25 Patient feeling okay, no updates on placement.  3/26 Patient in good spirits, cooperative with care.  3/27 Patient sleeping soundly, no overnight events, will sign letter for patient's brother confirming patient lacking capacity for decision making.  3/28 Patient without significant change.  RN reports concern of coughing with thin liquids, SLP to re-eval diet texture for PO safety.  3/29 Patient without complaint, had SLP evaluation again yesterday, same diet texture recommended as patient tolerated it well.    Consultants/Specialty  Psych - s/o    Code Status  DNR    Disposition  Placement pending  Brother is POA, assisting with decisions.    Review of Systems  Review of Systems   Constitutional: Negative for chills and fever.   HENT: Negative for congestion.    Eyes: Negative for blurred vision and photophobia.   Respiratory: Negative for cough and shortness of breath.    Cardiovascular: Negative for chest pain, claudication and leg swelling.   Gastrointestinal: Negative for abdominal pain, constipation, diarrhea, heartburn, nausea and vomiting.   Genitourinary: Negative for dysuria and hematuria.   Musculoskeletal: Negative for joint pain and myalgias.   Skin: Negative for itching and rash.   Neurological: Negative for dizziness, sensory change, speech change, weakness and headaches.   Psychiatric/Behavioral: Negative for depression. The patient is not nervous/anxious and does not have insomnia.          Physical Exam  Temp:  [36.6 °C (97.9 °F)-36.7 °C (98 °F)] 36.6 °C (97.9 °F)  Pulse:  [74-79] 74  Resp:  [18] 18  BP: (121-123)/(72-74) 121/72  SpO2:  [97 %-98 %] 97 %    Physical Exam  Vitals signs and nursing note reviewed.   Constitutional:       General: He is not in acute distress.     Appearance: Normal appearance.   HENT:      Head: Normocephalic and atraumatic.   Eyes:      General: No scleral icterus.     Extraocular Movements: Extraocular movements intact.   Neck:      Musculoskeletal: Normal range of motion and neck supple.   Cardiovascular:      Rate and Rhythm: Normal rate and regular rhythm.      Pulses: Normal pulses.      Heart sounds: Normal heart sounds. No murmur.   Pulmonary:      Effort: Pulmonary effort is normal. No respiratory distress.      Breath sounds: Normal breath sounds. No wheezing, rhonchi or rales.   Abdominal:      General: Abdomen is flat. Bowel sounds are normal. There is no distension.      Palpations: Abdomen is soft.      Tenderness: There is no rebound.   Musculoskeletal:         General: No swelling or tenderness.   Lymphadenopathy:      Cervical: No cervical adenopathy.   Skin:     Coloration: Skin is not jaundiced.      Findings: No erythema.   Neurological:      Mental Status: He is alert. Mental status is at baseline.      Cranial Nerves: No cranial nerve deficit.      Comments: Oriented to self     Psychiatric:         Mood and Affect: Mood normal.         Behavior: Behavior normal.         Fluids    Intake/Output Summary (Last 24 hours) at 3/29/2020 0932  Last data filed at 3/29/2020 0612  Gross per 24 hour   Intake 360 ml   Output 250 ml   Net 110 ml       Laboratory                        Imaging  DX-ESOPHAGUS - [id number]    (Results Pending)        Assessment/Plan  * Requires supervision due to deficit in self-care  Assessment & Plan  Awaiting for guardianship   to help  Very likely patient will need placement  Patient has poor appetite, failed  multiple previous pharmacological appetite stimulant.  Encourage patient to increase oral intake  Nutrition supplement ordered    Eye dryness  Assessment & Plan  Continue with artifical eye dropsr artificial tear ointment      Agitation  Assessment & Plan  controlled  Resume Seroquel at this time      Chronic atrial fibrillation (HCC)  Assessment & Plan  Rate controlled  Resume Xarelto for anticoagulation    Severe protein-calorie malnutrition (HCC)  Assessment & Plan  Nutrition following  Boost TID    Patient has poor appetite, failed multiple previous pharmacological appetite stimulant.  Encourage patient to increase oral intake  Nutrition supplement ordered    Hyperlipidemia- (present on admission)  Assessment & Plan  Resume lipitor    COPD (chronic obstructive pulmonary disease) ([facility])- (present on admission)  Assessment & Plan  Not in exacerbation  Continue RT protocol, duo nebs, Pep therapy if warranted, and incentive spirometry.       CKD (chronic kidney disease) stage 3, GFR 30-59 ml/min ([facility])- (present on admission)  Assessment & Plan  At baseline, mild elevation in creatinine.   CTM, avoid nephrotoxins and NSAIDs        Essential hypertension- (present on admission)  Assessment & Plan  Blood pressure controlled, due to poor oral intake decrease losartan and amlodipine dosage.         VTE prophylaxis: xarelto

## 2020-03-29 NOTE — PROGRESS NOTES
Assumed care of patient at 1900.  Patient is alert and oriented to person.  Patient is resting in bed and denies needs at this time.  Bed alarm remains on.  Hourly rounding continues.

## 2020-03-29 NOTE — PROGRESS NOTES
Received report from night shift. Assumed care of patient. POC discussed with patient. Patient sitting up at a 90 degree angle in bed eating breakfast. Will continue to monitor swallowing today. Call light within reach and reinforced using the call light when needing assistance. Bed in low position.

## 2020-03-29 NOTE — CARE PLAN
Problem: Safety  Goal: Will remain free from injury  Outcome: PROGRESSING AS EXPECTED  Note: Bed in low and locked position.  Side rails up X3.  Bed alarm remain on.  Hourly rounding continues.     Problem: Pain Management  Goal: Pain level will decrease to patient's comfort goal  Outcome: PROGRESSING AS EXPECTED  Note: Patient denies pain this shift.

## 2020-03-29 NOTE — CARE PLAN
Problem: Safety  Goal: Will remain free from injury  Outcome: PROGRESSING AS EXPECTED  Note: Educated patient on using the call light for assistance.     Problem: Respiratory:  Goal: Respiratory status will improve  Outcome: PROGRESSING AS EXPECTED  Intervention: Assess and monitor pulmonary status  Note: Encouraged strong coughing. Repositioned patient at a 90 degree angle during meal.

## 2020-03-29 NOTE — PROGRESS NOTES
Checked on patient during rounds. Patient was sitting at the edge of the bed requesting shorts. RN asked patient to lay back in bed, patient cooperated. Call light within reach. Patient reoriented to call for assistance. TV was out of the way so RN moved TV back so patient could watch. Patient eating magic cup. Bed alarm on. Will continue to monitor and check in on patient.

## 2020-03-30 NOTE — PROGRESS NOTES
Pt sitting comfortably in bed. No pain or n/v. No other needs at this time. Fall precautions in place.

## 2020-03-30 NOTE — THERAPY
Speech Language [place] [facility] [place].    Functional Status: Pt was seen today for Modified Barium Swallow Study. Pt was seated upright in [facility] chair for today's exam. Lateral views were obtained. Pt was administered barium thin liquids, mildly thick liquids, pudding and regular solids.     Pt with deep penetration during and after the swallow for thin liquids, as well as aspiration during and after the swallow for thin liquids with delayed response. Pt with trace penetration during the swallow for mildly thick liquids; however, no aspiration was observed. Oral dysphagia characterized by trace lingual residue after the swallow, posterior bolus escape for less than half of bolus for regular textures, and delayed swallow initiation to the valleculae (for all textures) and to the piriforms (for large cup sip of thin liquids only). Pharyngeal dysphagia characterized by reduced hyolaryngeal elevation, reduced laryngeal vestibular closure, trace-collection vallecular residue, trace-collection residue in piriforms, trace residue on posterior pharyngeal wall, penetration during and after the swallow for liquids, and aspiration during and after the swallow for thin liquids (with delayed response.) At this time, recommend strict adherence to safe swallow precautions and PO diet of Minced and moist solids (MM5) with mildly thick liquids (MT2) and meds floated in puree. If Pt demonstrates any new and/or worsening coughing, please hold PO and alert RN/SLP. Thank you for the consult.    Recommendations - Diet: Minced & Moist (5) - (Dysphagia II), Mildly Thick (2) - (Nectar Thick)                          Strategies: Monitor during meals, Assistance needed for meal tray set-up, No Straws and Head of Bed at 90 Degrees                          Medication Administration: Float Whole with Puree    Plan of Care: Will benefit from Speech Therapy 3 times per week  Post-Acute Therapy: Recommend inpatient transitional care services for  "continued speech therapy services.      See \"Rehab Therapy-Acute\" Patient Summary Report for complete documentation.   "

## 2020-03-30 NOTE — DISCHARGE PLANNING
BRENTON ORMERO called pts Memo GRIDER. Memo stated he did received the email with the physicians notes and is going to be contacting the pts bank in [place] to see if he can get access.     RN CM is attempting to get a copy pts .

## 2020-03-30 NOTE — PROGRESS NOTES
Hospital Medicine Daily Progress Note    Date of Service  3/30/2020    Chief Complaint  79 y.o. male admitted 12/27/2019 with inability to care for self.    Hospital Course    Patient presented from home when his caregiver was found dead in his home on day of admission.  Patient is not able to care for himself and needs 24 hour supervision.  He was seen by psychiatry on 1/11/2020 and deemed incapacitated for medical decisions.       Interval Problem Update  3/24 Patient remains comfortable, no complaints.   3/25 Patient feeling okay, no updates on placement.  3/26 Patient in good spirits, cooperative with care.  3/27 Patient sleeping soundly, no overnight events, will sign letter for patient's brother confirming patient lacking capacity for decision making.  3/28 Patient without significant change.  RN reports concern of coughing with thin liquids, SLP to re-eval diet texture for PO safety.  3/29 Patient without complaint, had SLP evaluation again yesterday, same diet texture recommended as patient tolerated it well.  3/30 Patient with no significant changes overnight, didn't want nail care when offered to him by wound care.    Consultants/Specialty  Psych - s/o    Code Status  DNR    Disposition  Placement pending  Brother is POA, assisting with decisions.    Review of Systems  Review of Systems   Constitutional: Negative for chills and fever.   HENT: Negative for congestion.    Eyes: Negative for blurred vision and photophobia.   Respiratory: Negative for cough and shortness of breath.    Cardiovascular: Negative for chest pain, claudication and leg swelling.   Gastrointestinal: Negative for abdominal pain, constipation, diarrhea, heartburn, nausea and vomiting.   Genitourinary: Negative for dysuria and hematuria.   Musculoskeletal: Negative for joint pain and myalgias.   Skin: Negative for itching and rash.   Neurological: Negative for dizziness, sensory change, speech change, weakness and headaches.    Psychiatric/Behavioral: Negative for depression. The patient is not nervous/anxious and does not have insomnia.         Physical Exam  Temp:  [36.4 °C (97.6 °F)-36.6 °C (97.8 °F)] 36.6 °C (97.8 °F)  Pulse:  [71-74] 72  Resp:  [17-18] 17  BP: (115-147)/(70-75) 147/70  SpO2:  [96 %-98 %] 98 %    Physical Exam  Vitals signs and nursing note reviewed.   Constitutional:       General: He is not in acute distress.     Appearance: Normal appearance.   HENT:      Head: Normocephalic and atraumatic.   Eyes:      General: No scleral icterus.     Extraocular Movements: Extraocular movements intact.   Neck:      Musculoskeletal: Normal range of motion and neck supple.   Cardiovascular:      Rate and Rhythm: Normal rate and regular rhythm.      Pulses: Normal pulses.      Heart sounds: Normal heart sounds. No murmur.   Pulmonary:      Effort: Pulmonary effort is normal. No respiratory distress.      Breath sounds: Normal breath sounds. No wheezing, rhonchi or rales.   Abdominal:      General: Abdomen is flat. Bowel sounds are normal. There is no distension.      Palpations: Abdomen is soft.      Tenderness: There is no rebound.   Musculoskeletal:         General: No swelling or tenderness.   Lymphadenopathy:      Cervical: No cervical adenopathy.   Skin:     Coloration: Skin is not jaundiced.      Findings: No erythema.   Neurological:      Mental Status: He is alert. Mental status is at baseline.      Cranial Nerves: No cranial nerve deficit.      Comments: Oriented to self     Psychiatric:         Mood and Affect: Mood normal.         Behavior: Behavior normal.         Fluids    Intake/Output Summary (Last 24 hours) at 3/30/2020 0916  Last data filed at 3/30/2020 0200  Gross per 24 hour   Intake 480 ml   Output 600 ml   Net -120 ml       Laboratory                        Imaging  DX-ESOPHAGUS - [id number]    (Results Pending)        Assessment/Plan  * Requires supervision due to deficit in self-care  Assessment &  Plan  Awaiting for guardianship   to help  Very likely patient will need placement  Patient has poor appetite, failed multiple previous pharmacological appetite stimulant.  Encourage patient to increase oral intake  Nutrition supplement ordered    Eye dryness  Assessment & Plan  Continue with artifical eye dropsr artificial tear ointment      Agitation  Assessment & Plan  controlled  Resume Seroquel at this time      Chronic atrial fibrillation (HCC)  Assessment & Plan  Rate controlled  Resume Xarelto for anticoagulation    Severe protein-calorie malnutrition (HCC)  Assessment & Plan  Nutrition following  Boost TID    Patient has poor appetite, failed multiple previous pharmacological appetite stimulant.  Encourage patient to increase oral intake  Nutrition supplement ordered    Hyperlipidemia- (present on admission)  Assessment & Plan  Resume lipitor    COPD (chronic obstructive pulmonary disease) ([facility])- (present on admission)  Assessment & Plan  Not in exacerbation  Continue RT protocol, duo nebs, Pep therapy if warranted, and incentive spirometry.       CKD (chronic kidney disease) stage 3, GFR 30-59 ml/min ([facility])- (present on admission)  Assessment & Plan  At baseline, mild elevation in creatinine.   CTM, avoid nephrotoxins and NSAIDs        Essential hypertension- (present on admission)  Assessment & Plan  Blood pressure controlled, due to poor oral intake decrease losartan and amlodipine dosage.         VTE prophylaxis: xarelto

## 2020-03-30 NOTE — THERAPY
Speech Language Therapy dysphagia treatment completed.     Functional Status:  Pt was seen today for f/u dysphagia tx session. Pt currently receiving Soft, bite-sized solids (SB6) with mildly thick liquids (MT2). Upon arrival to Pt's room, Pt was found reclined in bed with eyes closed. He awoke easily and was agreeable to therapy objectives. Pt's lunch tray consisted of: macaroni and cheese, chopped zucchini, diced pears, thickened apple juice (and applesauce, pudding and thin water provided by SLP). Pt endorsed 'lots of coughing' with meals recently.     Pt demonstrated appropriate, slow feeding rate and appropriate, small bites independently this session. However, Pt had consistent coughing after the swallow for all soft, chopped food items which is concerning for possible aspiration/penetration. Pt also was noted to take multiple swallows for each bolus, suggesting possible pharyngeal weakness. Pt endorsed globus sensation and 'near choking' that he was able to fix with coughing, per his report. Pt then consumed small bites of puree and pudding and had no overt clinical s/sx of aspiration/penetration. Pt was provided with thin liquids and demonstrated delayed coughing after the swallow for 50% of trials, which is concerning for possible aspiration/penetration. When Pt consumed mildly thick juice; however, no further coughing/choking and/or other overt clinical s/sx of aspiration/penetration was observed. At this time, recommend PO diet downgrade to Pureed solids, Mildly thick liquids and meds floated in puree (given strict adherence to safe swallow precautions). RN notified and aware. Recommend instrumental swallow evaluation (Modified Barium Swallow Study) to further characterize oropharyngeal swallow functioning and determine presence/absence of aspiration. SLP following. Thank you.    Recommendations:   1.) At this time, recommend PO diet downgrade to Pureed solids, Mildly thick liquids and meds floated in  "catracho (given strict adherence to safe swallow precautions).   2.) Recommend instrumental swallow evaluation (Modified Barium Swallow Study) to further characterize oropharyngeal swallow functioning and determine presence/absence of aspiration.     Plan of Care: Will benefit from Speech Therapy 3 times per week  Post-Acute Therapy: Recommend inpatient transitional care services for continued speech therapy services.      See \"Rehab Therapy-Acute\" Patient Summary Report for complete documentation.     "

## 2020-03-30 NOTE — WOUND TEAM
"In to see pt for nail care. Pt's toenails wre cut 2/17/2020. He refuses to have any nails cut @ this time. Explained that it could be performed now since supplies and time was available. \"Then I guess it's not going to be done.\"  Consult discontinued. If fingernails need to be trimmed Nsg can perform. Will need to order clippers and emery board.   "

## 2020-03-30 NOTE — CARE PLAN
Problem: Communication  Goal: The ability to communicate needs accurately and effectively will improve  Outcome: PROGRESSING AS EXPECTED     Problem: Safety  Goal: Will remain free from falls  Outcome: PROGRESSING AS EXPECTED     Problem: Pain Management  Goal: Pain level will decrease to patient's comfort goal  Outcome: PROGRESSING AS EXPECTED

## 2020-03-31 NOTE — PROGRESS NOTES
Received report from day shift nurse.   Assumed pt care   Pt is A&Ox4, resting comfortably in bed.   Pt on r.a.. No signs of SOB/respiratory distress.   Labs noted, VSS.   Pt c/o no pain at this moment.   Assessment completed, perineal care provided as pt has a small BM   Fall precautions in place.   Bed at lowest position. Bed alarm on  Call light and personal belongings within reach.   Continue to monitor

## 2020-03-31 NOTE — PROGRESS NOTES
Hospital Medicine Daily Progress Note    Date of Service  3/31/2020    Chief Complaint  79 y.o. male admitted 12/27/2019 with inability to care for self.    Hospital Course    Patient presented from home when his caregiver was found dead in his home on day of admission.  Patient is not able to care for himself and needs 24 hour supervision.  He was seen by psychiatry on 1/11/2020 and deemed incapacitated for medical decisions.       Interval Problem Update  The patient is compliant with therapies  He declines eye ointment as his eyes are no  Longer dry    Consultants/Specialty  Psychiatry    Code Status  DNR    Disposition  Placement pending  Brother is POA, assisting with decisions.    Review of Systems  Review of Systems   Constitutional: Negative for fever.   Eyes: Negative for blurred vision and photophobia.   Respiratory: Negative for cough and shortness of breath.    Cardiovascular: Negative for leg swelling.   Gastrointestinal: Negative for abdominal pain, constipation, heartburn and nausea.   Genitourinary: Negative for dysuria and hematuria.   Musculoskeletal: Negative for back pain, joint pain and myalgias.   Skin: Negative for itching.   Neurological: Negative for tingling, sensory change, speech change, weakness and headaches.   Psychiatric/Behavioral: Negative for depression. The patient does not have insomnia.         Physical Exam  Temp:  [36.6 °C (97.9 °F)] 36.6 °C (97.9 °F)  Pulse:  [74] 74  Resp:  [18] 18  BP: (142)/(68) 142/68  SpO2:  [97 %] 97 %    Physical Exam  Vitals signs and nursing note reviewed.   Constitutional:       General: He is not in acute distress.     Appearance: Normal appearance.   HENT:      Head: Normocephalic and atraumatic.   Eyes:      General: No scleral icterus.     Extraocular Movements: Extraocular movements intact.   Neck:      Musculoskeletal: Normal range of motion and neck supple.   Cardiovascular:      Rate and Rhythm: Normal rate and regular rhythm.      Pulses:  Normal pulses.      Heart sounds: Normal heart sounds. No murmur.   Pulmonary:      Effort: Pulmonary effort is normal. No respiratory distress.      Breath sounds: Normal breath sounds. No wheezing, rhonchi or rales.   Abdominal:      General: Abdomen is flat. Bowel sounds are normal. There is no distension.      Palpations: Abdomen is soft.      Tenderness: There is no rebound.   Musculoskeletal:         General: No swelling or tenderness.   Lymphadenopathy:      Cervical: No cervical adenopathy.   Skin:     Coloration: Skin is not jaundiced.      Findings: No erythema.   Neurological:      Mental Status: He is alert. Mental status is at baseline.      Cranial Nerves: No cranial nerve deficit.      Comments: Oriented to self     Psychiatric:         Mood and Affect: Mood normal.         Behavior: Behavior normal.         Fluids    Intake/Output Summary (Last 24 hours) at 3/31/2020 1045  Last data filed at 3/31/2020 1000  Gross per 24 hour   Intake 540 ml   Output 900 ml   Net -360 ml       Laboratory                        Imaging  DX-ESOPHAGUS - [id number]   Final Result      2.5 minutes of fluoroscopy time utilized for modified barium swallow which showed tracheal penetration           Assessment/Plan  * Requires supervision due to deficit in self-care  Assessment & Plan  Awaiting for guardianship   to help  Very likely patient will need placement  Patient has poor appetite, failed multiple previous pharmacological appetite stimulant.  Encourage patient to eat    Eye dryness  Assessment & Plan  Improved, will stop artificial tears    Agitation  Assessment & Plan  controlled  Resume Seroquel at night  Stop remeron at night as this can worsen agitation      Chronic atrial fibrillation (HCC)  Assessment & Plan  Rate controlled   Xarelto for anticoagulation is well tolerated with no evidence of bleed    Severe protein-calorie malnutrition (HCC)  Assessment & Plan  Nutrition following  Boost  TID      Hyperlipidemia- (present on admission)  Assessment & Plan  Resume lipitor    COPD (chronic obstructive pulmonary disease) ([facility])- (present on admission)  Assessment & Plan  Not in exacerbation  Continue RT protocol for maintenance      CKD (chronic kidney disease) stage 3, GFR 30-59 ml/min ([facility])- (present on admission)  Assessment & Plan  At baseline, mild elevation in creatinine, avoid nephrotoxins        Essential hypertension- (present on admission)  Assessment & Plan  Blood pressure controlled, continue losartan and amlodipine          VTE prophylaxis: xarelto

## 2020-03-31 NOTE — PROGRESS NOTES
Patient resting comfortably, A&Ox2, denies SOB/CP, bed alarm in use, oriented to calling for assistance.

## 2020-03-31 NOTE — THERAPY
"Speech Language Therapy dysphagia treatment completed.     Functional Status: Pt was seen today for f/u dysphagia tx session. Per RN, Pt tolerated thickened liquids and purees today; however, had coughing when eating chopped meat for lunch. Upon arrival to Pt's room, Pt was found upright in bed watching TV. Pt was agreeable to consume PO trials of soft/mixed solids only (fruit cocktail) and declined all other textures (mildly thick liquids, pudding, etc.)     Pt demonstrated appropriate feeding rate and bolus size; however, had delayed coughing and throat clearing intermittently (which is concerning for possible aspiration/penetration.) Pt also expectorated all pieces of pineapples, reporting that they are too hard to chew. Pt with intermittent change in vocal quality (when consuming fruit juice mixed with peaches), which is also concerning for possible aspiration/penetration; however, vocal quality returned to baseline when verbal cue to complete strong, protective throat clear. At this time, recommend downgrade to Pureed solids (PU4), mildly thick liquids (MT2) with meds floated in puree, given strict adherence to safe swallow precautions. SLP to provide swallow strengthening exercises for laryngeal and pharyngeal focus. Suspect new onset of swallow deficits to be due to general deconditioning. RN notified and aware. SLP following.    Recommendations: At this time, recommend downgrade to Pureed solids (PU4), mildly thick liquids (MT2) with meds floated in puree, given strict adherence to safe swallow precautions.     Plan of Care: Will benefit from Speech Therapy 5 times per week  Post-Acute Therapy: Recommend inpatient transitional care services for continued speech therapy services.      See \"Rehab Therapy-Acute\" Patient Summary Report for complete documentation.     "

## 2020-04-01 NOTE — PROGRESS NOTES
Hospital Medicine Daily Progress Note    Date of Service  4/1/2020    Chief Complaint  79 y.o. male admitted 12/27/2019 with inability to care for self.    Hospital Course    Patient presented from home when his caregiver was found dead in his home on day of admission.  Patient is not able to care for himself and needs 24 hour supervision.  He was seen by psychiatry on 1/11/2020 and deemed incapacitated for medical decisions.       Interval Problem Update  The patient is alert and watching television     Consultants/Specialty  Psychiatry    Code Status  DNR    Disposition  Placement pending  Brother is POA, assisting with decisions.    Review of Systems  Review of Systems   Constitutional: Negative for fever.   Eyes: Negative for discharge and redness.   Respiratory: Negative for cough, sputum production and shortness of breath.    Cardiovascular: Negative for chest pain.   Gastrointestinal: Negative for abdominal pain, constipation, diarrhea and heartburn.   Genitourinary: Negative for dysuria, frequency and hematuria.   Musculoskeletal: Negative for back pain, joint pain and myalgias.   Skin: Negative for itching.   Neurological: Negative for dizziness, weakness and headaches.   Psychiatric/Behavioral: Negative for depression. The patient does not have insomnia.         Physical Exam  Temp:  [36.6 °C (97.8 °F)-36.6 °C (97.9 °F)] 36.6 °C (97.9 °F)  Pulse:  [76-78] 76  Resp:  [18] 18  BP: (109-141)/(61-81) 141/81  SpO2:  [96 %] 96 %    Physical Exam  Vitals signs and nursing note reviewed.   Constitutional:       General: He is not in acute distress.     Appearance: Normal appearance.   HENT:      Head: Normocephalic and atraumatic.   Eyes:      General: No scleral icterus.     Extraocular Movements: Extraocular movements intact.   Neck:      Musculoskeletal: Normal range of motion and neck supple.   Cardiovascular:      Rate and Rhythm: Normal rate and regular rhythm.      Pulses: Normal pulses.      Heart sounds:  Normal heart sounds. No murmur.   Pulmonary:      Effort: Pulmonary effort is normal. No respiratory distress.      Breath sounds: Normal breath sounds. No wheezing, rhonchi or rales.   Abdominal:      General: Abdomen is flat. Bowel sounds are normal. There is no distension.      Palpations: Abdomen is soft.      Tenderness: There is no rebound.   Musculoskeletal:         General: No swelling or tenderness.   Lymphadenopathy:      Cervical: No cervical adenopathy.   Skin:     Coloration: Skin is not jaundiced.      Findings: No erythema.   Neurological:      Mental Status: He is alert. Mental status is at baseline.      Cranial Nerves: No cranial nerve deficit.      Comments: Oriented to self     Psychiatric:         Mood and Affect: Mood normal.         Behavior: Behavior normal.         Fluids    Intake/Output Summary (Last 24 hours) at 4/1/2020 1126  Last data filed at 4/1/2020 0908  Gross per 24 hour   Intake 360 ml   Output 600 ml   Net -240 ml       Laboratory                        Imaging  DX-ESOPHAGUS - [id number]   Final Result      2.5 minutes of fluoroscopy time utilized for modified barium swallow which showed tracheal penetration           Assessment/Plan  * Requires supervision due to deficit in self-care  Assessment & Plan  Awaiting for guardianship   assisting with placement      Eye dryness  Assessment & Plan  resolved    Agitation  Assessment & Plan  Resume nightly Seroquel        Chronic atrial fibrillation (HCC)  Assessment & Plan  Rate controlled   Xarelto well tolerated    Severe protein-calorie malnutrition (HCC)  Assessment & Plan  Nutrition following  Boost TID      Hyperlipidemia- (present on admission)  Assessment & Plan  Resume lipitor    COPD (chronic obstructive pulmonary disease) ([facility])- (present on admission)  Assessment & Plan  Not in exacerbation  Continue RT protocol for maintenance      CKD (chronic kidney disease) stage 3, GFR 30-59 ml/min ([facility])- (present on  admission)  Assessment & Plan  At baseline,  avoid nephrotoxins        Essential hypertension- (present on admission)  Assessment & Plan  Blood pressure controlled, continue losartan and amlodipine          VTE prophylaxis: xarelto

## 2020-04-02 NOTE — PROGRESS NOTES
Hospital Medicine Daily Progress Note    Date of Service  4/2/2020    Chief Complaint  79 y.o. male admitted 12/27/2019 with inability to care for self.    Hospital Course    Patient presented from home when his caregiver was found dead in his home on day of admission.  Patient is not able to care for himself and needs 24 hour supervision.  He was seen by psychiatry on 1/11/2020 and deemed incapacitated for medical decisions.       Interval Problem Update  The patient has variable intake of nutrition but has been taking in fluids more consistently    Consultants/Specialty  Psychiatry    Code Status  DNR    Disposition  Placement pending  Brother is POA, assisting with decisions.    Review of Systems  Review of Systems   Constitutional: Negative for diaphoresis and malaise/fatigue.   HENT: Negative for sore throat.    Eyes: Negative for discharge and redness.   Respiratory: Negative for cough, shortness of breath and stridor.    Cardiovascular: Negative for chest pain.   Gastrointestinal: Negative for abdominal pain, constipation, heartburn and nausea.   Genitourinary: Negative for frequency and urgency.   Musculoskeletal: Negative for back pain, joint pain and myalgias.   Skin: Negative for itching and rash.   Neurological: Negative for dizziness and weakness.   Psychiatric/Behavioral: Negative for depression. The patient does not have insomnia.         Physical Exam  Temp:  [36.6 °C (97.9 °F)-36.9 °C (98.4 °F)] 36.9 °C (98.4 °F)  Pulse:  [69-87] 69  Resp:  [18] 18  BP: (127-135)/(68-78) 127/68  SpO2:  [94 %-95 %] 95 %    Physical Exam  Vitals signs and nursing note reviewed.   Constitutional:       General: He is not in acute distress.     Appearance: Normal appearance.   HENT:      Head: Normocephalic and atraumatic.   Eyes:      General: No scleral icterus.     Extraocular Movements: Extraocular movements intact.   Neck:      Musculoskeletal: Normal range of motion and neck supple.   Cardiovascular:      Rate and  Rhythm: Normal rate and regular rhythm.      Pulses: Normal pulses.      Heart sounds: Normal heart sounds. No murmur.   Pulmonary:      Effort: Pulmonary effort is normal. No respiratory distress.      Breath sounds: Normal breath sounds. No wheezing, rhonchi or rales.   Abdominal:      General: Abdomen is flat. Bowel sounds are normal. There is no distension.      Palpations: Abdomen is soft.      Tenderness: There is no rebound.   Musculoskeletal:         General: No swelling or tenderness.   Lymphadenopathy:      Cervical: No cervical adenopathy.   Skin:     Coloration: Skin is not jaundiced.      Findings: No erythema.   Neurological:      Mental Status: He is alert. Mental status is at baseline.      Cranial Nerves: No cranial nerve deficit.      Comments: Oriented to self     Psychiatric:         Mood and Affect: Mood normal.         Behavior: Behavior normal.         Fluids    Intake/Output Summary (Last 24 hours) at 4/2/2020 1021  Last data filed at 4/2/2020 0500  Gross per 24 hour   Intake 460 ml   Output 600 ml   Net -140 ml       Laboratory      Recent Labs     04/02/20  0345   SODIUM 140   POTASSIUM 5.0   CHLORIDE 109   CO2 18*   GLUCOSE 82   BUN 87*   CREATININE 2.38*   CALCIUM 9.3                   Imaging  DX-ESOPHAGUS - [id number]   Final Result      2.5 minutes of fluoroscopy time utilized for modified barium swallow which showed tracheal penetration           Assessment/Plan  * Requires supervision due to deficit in self-care  Assessment & Plan  Awaiting for guardianship   assisting with placement      Eye dryness  Assessment & Plan  resolved    Agitation  Assessment & Plan  Resume nightly Seroquel        Chronic atrial fibrillation (HCC)  Assessment & Plan  Rate controlled   Xarelto well tolerated    Severe protein-calorie malnutrition (HCC)  Assessment & Plan  Nutrition following  Boost TID      Hyperlipidemia- (present on admission)  Assessment & Plan  Resume lipitor    COPD  (chronic obstructive pulmonary disease) (HCC)- (present on admission)  Assessment & Plan  Not in exacerbation  Continue RT protocol       CKD (chronic kidney disease) stage 3, GFR 30-59 ml/min (HCC)- (present on admission)  Assessment & Plan  Repeat creatinine level check done today, creatinine is stable at his baseline, will defer to outpatinet follow up        Essential hypertension- (present on admission)  Assessment & Plan   continue losartan and amlodipine, tolerated well with electrolytes in an acceptable range         VTE prophylaxis: xarelto

## 2020-04-02 NOTE — CARE PLAN
Received report from noc shift nurse.   Assumed pt care at approximately 0700.  Pt is A&Ox3, resting comfortably in bed with bed alarm on.   Pt on r.a.. No signs of SOB/respiratory distress.   Labs noted, VSS.   Pt c/o no pain at this moment.   Assessment completed, continuing with every 2 hour turns.   Fall precautions in place.   Bed at lowest position.   Call light and personal belongings within reach. Continue to monitor         Problem: Safety  Goal: Will remain free from falls  Outcome: PROGRESSING AS EXPECTED     Problem: Skin Integrity  Goal: Risk for impaired skin integrity will decrease  Outcome: PROGRESSING AS EXPECTED

## 2020-04-02 NOTE — CARE PLAN
Problem: Discharge Barriers/Planning  Goal: Patient's continuum of care needs will be met  Outcome: PROGRESSING SLOWER THAN EXPECTED   Waiting for guardianship.    Problem: Psychosocial Needs:  Goal: Level of anxiety will decrease  Outcome: PROGRESSING AS EXPECTED   Easy to distract.  Seldom anxious.

## 2020-04-02 NOTE — PROGRESS NOTES
Spiritual Care Note    Patient Information     Patient's Name: Eulogio Jeronimo   MRN: [id number]    YOB: 1940   Age and Gender: 79 y.o. male   Service Area: GEN SURGERY [facility]   Room (and Bed): 2204/02   Ethnicity or Nationality:     Primary Language: English   Pentecostalism/Spiritual preference: Non-Pentecostalism   Place of Residence: [place], [place]   Family/Friends/Others Present: no   Clinical Team Present: Nurse   Medical Diagnosis(-es)/Procedure(s):  Requires supervision due to deficit in self-care   Code Status: DNAR/DNI    Date of Admission: 12/27/2019   Length of Stay: 0 days        Spiritual Care Provider Information:  Name of Spiritual Care Provider: Jocelin Fabian  Title of Spiritual Care Provider: Associate Chen  Phone Number: [phone]  E-mail: [email or web address]  Total time : 15 minutes    Spiritual Screen Results:    Gen Nursing  Spiritual Screen  Is your spiritual health or inner well-being important to you as you cope with your medical condition?: Yes  Would you like to receive a visit from our Spiritual Care team or your own Yazdanism or spiritual leader?: Yes  Was spiritual care education provided to the patient?: Yes     Palliative Care  PC Pentecostalism/Spiritual Screening    Was spiritual care education provided to the patient?: Yes      Encounter/Request Information  Encounter/Request Type     Visited With: Patient, Health care provider    Nature of the Visit: Initial, On shift    Continue Visiting: (UPON REQUEST)    Next Follow-up Date: 02/17/20    General Visit: Yes    Referral From/ Origin of Request: Epic nursing    Religous Needs/Values       Spiritual Assessment   Spiritual Care Encounters    Observations/Symptoms: (Pt sitting EOB, naked, responded when  knocked)    Interacton/Conversation:  checked in with BS Nurse. Nurse ok'd visit with pt. Upon 's arrival to pt's room,  knocked on side of wall and called pt's name.  " heard, \"yes\", and entered room. When  looked around corner to pt's bed area,  could see pt sitting on the side of his bed with no clothing on. Pt looked up at  and said \"yes?\".  had to identify self several times loudly before pt could hear/understand . Pt declined visit at this time and said, \"No, Thank You.\" Pt denied need for nurse.  checked in with pt nurse again to inform nurse of current pt status. Nurse went to check on pt.    Assessment: (Pt declined any needs at this time)    Interventions: Conversation    Outcomes: (Pt continued in same status as upon 's arrival)    Plan: Visit Upon Request    Notes:            "

## 2020-04-02 NOTE — CARE PLAN
Problem: Safety  Goal: Will remain free from injury  Outcome: PROGRESSING AS EXPECTED  Note: Bed alarm in place.        Problem: Skin Integrity  Goal: Risk for impaired skin integrity will decrease  Outcome: PROGRESSING AS EXPECTED  Note: Waffle mattress in place.

## 2020-04-02 NOTE — THERAPY
"Speech Language Therapy dysphagia treatment completed.     Functional Status:  Pt was seen today for f/u dysphagia tx and therapeutic feeding session of lunch tray of pureed/mildly thick liquids. Per RN, Pt has been tolerating current diet with no coughing or other swallowing difficulties noted.     Pt was agreeable to therapy objectives with lunch tray of mashed potatoes with gravy, pureed beef, pureed peas, magic cup, thickened chocolate milk and pudding. Pt demonstrated appropriate feeding rate and bolus size independently. No coughing, choking and/or other overt clinical s/sx of aspiration/penetration were appreciated with any consistency today. Pt with clear vocal quality and Pt used multiple swallows as needed appropriately. Pt reported no difficulties with current diet order and stated, \"I was starting to get worried about my swallow before when I was coughing, but now it's okay.\" RN notified and aware of findings. At this time, recommend to con't to monitor with meals to ensure adherence to safe swallow precautions. Con't current diet of Pureed solids with mildly thick liquids and meds floated in puree. Thank you.    Recommendations: At this time, recommend to con't to monitor with meals to ensure adherence to safe swallow precautions. Con't current diet of Pureed solids with mildly thick liquids and meds floated in puree.     Plan of Care: Will benefit from Speech Therapy 2 times per week  Post-Acute Therapy: Recommend inpatient transitional care services for continued speech therapy services.      See \"Rehab Therapy-Acute\" Patient Summary Report for complete documentation.     "

## 2020-04-03 PROBLEM — R79.9 HIGH BLOOD UREA NITROGEN (BUN): Status: ACTIVE | Noted: 2020-01-01

## 2020-04-03 NOTE — PROGRESS NOTES
Pt tried to exit bed without assistance twice this shift. Bed alarm was on but did not go off the first time. Chair alarm strip placed under pt instead. Chair alarm went off when pt tried to exit the second time.  Pt had no c/o pain this shift. Pt had 2 BMs this shift.

## 2020-04-03 NOTE — PROGRESS NOTES
0700: Bedside report from Jeanie MACEDO RN. Pt wakes to voice. No needs at this time. Pending placement.      1215: checked on pt, doing well, watching TV. No needs at this time.

## 2020-04-03 NOTE — PROGRESS NOTES
Hospital Medicine Daily Progress Note    Date of Service  4/3/2020    Chief Complaint  79 y.o. male admitted 12/27/2019 with inability to care for self.    Hospital Course    Patient presented from home when his caregiver was found dead in his home on day of admission.  Patient is not able to care for himself and needs 24 hour supervision.  He was seen by psychiatry on 1/11/2020 and deemed incapacitated for medical decisions.       Interval Problem Update  The patient has no outward evidence of blood loss and no bruising    Consultants/Specialty  Psychiatry    Code Status  DNR    Disposition  Placement pending  Brother is POA, assisting with decisions.    Review of Systems  Review of Systems   Constitutional: Negative for fever and malaise/fatigue.   HENT: Negative for sore throat.    Eyes: Negative for discharge and redness.   Respiratory: Negative for cough, shortness of breath and stridor.    Cardiovascular: Negative for chest pain and palpitations.   Gastrointestinal: Negative for abdominal pain, constipation, diarrhea, heartburn and nausea.   Genitourinary: Negative for dysuria and frequency.   Musculoskeletal: Negative for falls, joint pain and myalgias.   Skin: Negative for itching.   Neurological: Negative for dizziness and headaches.   Psychiatric/Behavioral: Negative for depression.        Physical Exam  Temp:  [36.7 °C (98.1 °F)] 36.7 °C (98.1 °F)  Pulse:  [84] 84  Resp:  [18] 18  BP: (126)/(62) 126/62  SpO2:  [98 %] 98 %    Physical Exam  Vitals signs and nursing note reviewed.   Constitutional:       General: He is not in acute distress.     Appearance: He is not ill-appearing.   Eyes:      General: No scleral icterus.     Extraocular Movements: Extraocular movements intact.   Neck:      Musculoskeletal: Neck supple.   Cardiovascular:      Rate and Rhythm: Normal rate and regular rhythm.      Pulses: Normal pulses.      Heart sounds: Normal heart sounds. No murmur.   Pulmonary:      Effort: Pulmonary  effort is normal.      Breath sounds: Normal breath sounds. No wheezing, rhonchi or rales.   Abdominal:      General: Abdomen is flat. Bowel sounds are normal. There is no distension.      Palpations: Abdomen is soft.      Tenderness: There is no rebound.   Musculoskeletal:         General: No swelling or tenderness.   Lymphadenopathy:      Cervical: No cervical adenopathy.   Skin:     Coloration: Skin is not jaundiced or pale.      Findings: No erythema.   Neurological:      Mental Status: He is alert. Mental status is at baseline.      Cranial Nerves: No cranial nerve deficit.      Comments: Oriented to self     Psychiatric:         Mood and Affect: Mood normal.         Behavior: Behavior normal.         Fluids    Intake/Output Summary (Last 24 hours) at 4/3/2020 1154  Last data filed at 4/3/2020 0323  Gross per 24 hour   Intake --   Output 625 ml   Net -625 ml       Laboratory      Recent Labs     04/02/20  0345   SODIUM 140   POTASSIUM 5.0   CHLORIDE 109   CO2 18*   GLUCOSE 82   BUN 87*   CREATININE 2.38*   CALCIUM 9.3                   Imaging  DX-ESOPHAGUS - [id number]   Final Result      2.5 minutes of fluoroscopy time utilized for modified barium swallow which showed tracheal penetration           Assessment/Plan  * Requires supervision due to deficit in self-care  Assessment & Plan  Awaiting for guardianship   assisting with placement      High blood urea nitrogen (BUN)  Assessment & Plan  More elevated than previous with creatinine in his stable range  Will check cbc to ensure no worsening anemia to cause this    Eye dryness  Assessment & Plan  resolved    Agitation  Assessment & Plan  Resume nightly Seroquel        Chronic atrial fibrillation (HCC)  Assessment & Plan  Rate controlled with no need for rate control medication   Xarelto well tolerated    Severe protein-calorie malnutrition (HCC)  Assessment & Plan  Nutrition following  Boost TID      Hyperlipidemia- (present on  admission)  Assessment & Plan  Resume lipitor    COPD (chronic obstructive pulmonary disease) ([facility])- (present on admission)  Assessment & Plan  No evidence of exacerbation  Continue RT protocol       CKD (chronic kidney disease) stage 3, GFR 30-59 ml/min ([facility])- (present on admission)  Assessment & Plan  Repeat creatinine level check done today, creatinine is stable at his baseline, will defer to outpatinet follow up        Essential hypertension- (present on admission)  Assessment & Plan   continue losartan and amlodipine, tolerated well with electrolytes in an acceptable range         VTE prophylaxis: xarelto

## 2020-04-03 NOTE — CARE PLAN
Problem: Safety  Goal: Will remain free from injury  Outcome: PROGRESSING AS EXPECTED  Intervention: Provide assistance with mobility  Flowsheets (Taken 4/3/2020 0324 by Shobha Banks, C.N.AMonique)  Assistance: Assistance of Two or More  Ambulation Tolerance: Tolerates Well  Goal: Will remain free from falls  Outcome: PROGRESSING AS EXPECTED  Intervention: Assess risk factors for falls  Flowsheets  Taken 4/3/2020 0340  Pt Calls for Assistance: No  Fall Risk: High Risk to Fall - 2 or more points   History of fall: 2  Taken 4/2/2020 2000  Mobility Status Assessment: 2-2 Healthcare Providers Required for Assistance with Ambulation & Transfer  Intervention: Implement fall precautions  Flowsheets  Taken 4/3/2020 0340  Bed Alarm: Yes - Alarm On  Taken 4/2/2020 2000  Environmental Precautions: (Pt refused treaded socks)   Personal Belongings, Wastebasket, Call Bell etc. in Easy Reach   Transferred to Stronger Side   Report Given to Other Health Care Providers Regarding Fall Risk   Bed in Low Position   Communication Sign for Patients & Families   Mobility Assessed & Appropriate Sign Placed  Chair/Bed Strip Alarm: Yes - Alarm On     Problem: Bowel/Gastric:  Goal: Normal bowel function is maintained or improved  Outcome: PROGRESSING AS EXPECTED  Note: Pt had 2 BMs this shift     Problem: Pain Management  Goal: Pain level will decrease to patient's comfort goal  Outcome: PROGRESSING AS EXPECTED

## 2020-04-03 NOTE — PROGRESS NOTES
Pharmacy Pharmacotherapy Consult for LOS >30 days    Admit Date: 12/27/2019      Medications were reviewed for appropriateness and ongoing need.     Current Facility-Administered Medications   Medication Dose Route Frequency Provider Last Rate Last Dose   • amLODIPine (NORVASC) tablet 5 mg  5 mg Oral DAILY Adriana Sam M.D.   5 mg at 04/03/20 0518   • losartan (COZAAR) tablet 50 mg  50 mg Oral DAILY Adriana Sam M.D.   50 mg at 04/03/20 0518   • therapeutic multivitamin-minerals (THERAGRAN-M) tablet 1 Tab  1 Tab Oral DAILY Ernesto Harvey M.D.   1 Tab at 04/03/20 0518   • acetaminophen (TYLENOL) tablet 500 mg  500 mg Oral Q6HRS PRN Elizabeth De La Paz M.D.   500 mg at 03/03/20 1810   • bisacodyl (DULCOLAX) suppository 10 mg  10 mg Rectal QDAY PRN Elizabeth De La Paz M.D.       • atorvastatin (LIPITOR) tablet 10 mg  10 mg Oral Nightly Elizabeth De La Paz M.D.   10 mg at 04/02/20 2002   • magnesium hydroxide (MILK OF MAGNESIA) suspension 30 mL  30 mL Oral QDAY PRN Elizabeth De La Paz M.D.   30 mL at 03/01/20 1734   • polyethylene glycol/lytes (MIRALAX) PACKET 1 Packet  1 Packet Oral QDAY PRN Elizabeth De La Paz M.D.   1 Packet at 03/07/20 0603   • QUEtiapine (SEROQUEL) tablet 50 mg  50 mg Oral Q EVENING Elizabeth De La Paz M.D.   50 mg at 04/02/20 1744   • rivaroxaban (XARELTO) tablet 15 mg  15 mg Oral PM MEAL Elizabeth De La Paz M.D.   15 mg at 04/02/20 1744   • tamsulosin (FLOMAX) capsule 0.4 mg  0.4 mg Oral Q EVENING Elizabeth De La Paz M.D.   0.4 mg at 04/02/20 1744   • vitamin D (Ergocalciferol) (DRISDOL) capsule 50,000 Units  50,000 Units Oral Q FRIDAY Elizabeth De La Paz M.D.   50,000 Units at 04/03/20 1026       Recommendations: none / left Bowel Protocol and APAP as active prn orders    Bhavana Gardner PharmD

## 2020-04-04 PROBLEM — D62 ANEMIA DUE TO ACUTE BLOOD LOSS: Status: ACTIVE | Noted: 2020-01-01

## 2020-04-04 NOTE — PROGRESS NOTES
Bedside report received from Yoly FARRIS. Pt was awake watching tv. Evening assessment done. POC discussed. Bed in lowest position, call light within. Bed alarm is on. Rounding in place.

## 2020-04-04 NOTE — CARE PLAN
Received report from noc shift nurse.   Assumed pt care at approximately 0700.  Pt is A&Ox3, resting comfortably in bed.   Pt on r.a.. No signs of SOB/respiratory distress.   Labs noted, VSS.   Pt c/o no pain at this moment.   Assessment completed, mepilex on sacrum, bedalarm is on. Pt had large bowel movement, cleaned and partial linen change provided.   Fall precautions in place.   Bed at lowest position.   Call light and personal belongings within reach. Continue to monitor         Problem: Safety  Goal: Will remain free from injury  Outcome: PROGRESSING AS EXPECTED     Problem: Bowel/Gastric:  Goal: Normal bowel function is maintained or improved  Outcome: PROGRESSING AS EXPECTED

## 2020-04-04 NOTE — CARE PLAN
Problem: Communication  Goal: The ability to communicate needs accurately and effectively will improve  Outcome: PROGRESSING AS EXPECTED     Problem: Safety  Goal: Will remain free from injury  Outcome: PROGRESSING AS EXPECTED  Goal: Will remain free from falls  Outcome: PROGRESSING AS EXPECTED     Problem: Bowel/Gastric:  Goal: Normal bowel function is maintained or improved  Outcome: PROGRESSING AS EXPECTED     Problem: Respiratory:  Goal: Respiratory status will improve  Outcome: MET     Problem: Skin Integrity  Goal: Risk for impaired skin integrity will decrease  Outcome: PROGRESSING AS EXPECTED     Pt able to verbalize needs and wants, sometimes uses call light but not often. Bed alarm on as well as chair alarm. Pt with BM x4 today. No falls today.  Pt on RA with non- labored breathing. Mepilex on sacrum.

## 2020-04-04 NOTE — PROGRESS NOTES
Hospital Medicine Daily Progress Note    Date of Service  4/4/2020    Chief Complaint  79 y.o. male admitted 12/27/2019 with inability to care for self.    Hospital Course    Patient presented from home when his caregiver was found dead in his home on day of admission.  Patient is not able to care for himself and needs 24 hour supervision.  He was seen by psychiatry on 1/11/2020 and deemed incapacitated for medical decisions.       Interval Problem Update  The patient is eating his magic cup, he is incontinent    Consultants/Specialty  Psychiatry    Code Status  DNR    Disposition  Placement pending  Brother is POA, assisting with decisions.    Review of Systems  Review of Systems   Constitutional: Negative for chills, diaphoresis and fever.   HENT: Negative for congestion and sore throat.    Eyes: Negative for discharge and redness.   Respiratory: Positive for cough. Negative for hemoptysis, sputum production and shortness of breath.         Chronic dry cough   Cardiovascular: Negative for chest pain, claudication and leg swelling.   Gastrointestinal: Negative for abdominal pain, blood in stool, constipation, heartburn, melena and nausea.   Genitourinary: Negative for dysuria and frequency.   Musculoskeletal: Negative for falls, joint pain and myalgias.   Skin: Negative for itching.   Neurological: Negative for dizziness, tremors and headaches.   Psychiatric/Behavioral: Negative for depression. The patient is not nervous/anxious and does not have insomnia.         Physical Exam  Temp:  [36.8 °C (98.2 °F)-37.4 °C (99.3 °F)] 36.8 °C (98.2 °F)  Pulse:  [72-77] 72  Resp:  [14-16] 16  BP: (137-145)/(48-66) 145/48  SpO2:  [97 %] 97 %    Physical Exam  Vitals signs and nursing note reviewed.   Constitutional:       General: He is not in acute distress.     Appearance: He is not diaphoretic.   HENT:      Nose: No congestion.   Eyes:      General: No scleral icterus.     Extraocular Movements: Extraocular movements intact.    Neck:      Musculoskeletal: Neck supple.   Cardiovascular:      Rate and Rhythm: Normal rate and regular rhythm.      Pulses: Normal pulses.      Heart sounds: Normal heart sounds. No murmur.   Pulmonary:      Effort: Pulmonary effort is normal. No respiratory distress.      Breath sounds: Normal breath sounds. No stridor. No wheezing.   Abdominal:      General: Bowel sounds are normal. There is no distension.      Tenderness: There is no abdominal tenderness.   Musculoskeletal:         General: No swelling or tenderness.   Lymphadenopathy:      Cervical: No cervical adenopathy.   Skin:     Coloration: Skin is not jaundiced.      Findings: No bruising or erythema.   Neurological:      Mental Status: He is alert. Mental status is at baseline.      Cranial Nerves: No cranial nerve deficit.      Motor: No weakness.      Comments: Oriented to self     Psychiatric:         Mood and Affect: Mood normal.         Behavior: Behavior normal.         Fluids    Intake/Output Summary (Last 24 hours) at 4/4/2020 1217  Last data filed at 4/4/2020 1200  Gross per 24 hour   Intake 580 ml   Output 800 ml   Net -220 ml       Laboratory  Recent Labs     04/04/20  0310   WBC 8.3   RBC 2.48*   HEMOGLOBIN 7.3*   HEMATOCRIT 23.6*   MCV 95.2   MCH 29.4   MCHC 30.9*   RDW 50.5*   PLATELETCT 342   MPV 9.1     Recent Labs     04/02/20  0345   SODIUM 140   POTASSIUM 5.0   CHLORIDE 109   CO2 18*   GLUCOSE 82   BUN 87*   CREATININE 2.38*   CALCIUM 9.3                   Imaging  DX-ESOPHAGUS - [id number]   Final Result      2.5 minutes of fluoroscopy time utilized for modified barium swallow which showed tracheal penetration           Assessment/Plan  * Requires supervision due to deficit in self-care  Assessment & Plan  Awaiting for guardianship   assisting with placement      Anemia due to acute blood loss  Assessment & Plan  Hemoglobin has slowly dropped over the last several days  Will add prilosec, no gross melena, will  check occult stool  Discussed with RNYoly    High blood urea nitrogen (BUN)  Assessment & Plan  Monitor labs    Eye dryness  Assessment & Plan  resolved    Agitation  Assessment & Plan  Resume nightly Seroquel        Chronic atrial fibrillation (HCC)  Assessment & Plan  Rate controlled with no need for rate control medication   Xarelto to continue, hemoglobin is lower but no bleeding noted grossly, will check occult stool    Severe protein-calorie malnutrition (HCC)  Assessment & Plan  Nutrition following  Patient is eating magic cup supplements      Hyperlipidemia- (present on admission)  Assessment & Plan  Resume lipitor    COPD (chronic obstructive pulmonary disease) ([facility])- (present on admission)  Assessment & Plan  No evidence of exacerbation  Continue RT protocol       CKD (chronic kidney disease) stage 3, GFR 30-59 ml/min ([facility])- (present on admission)  Assessment & Plan  Repeat creatinine level as needed  BUN elevation concerning for GI bleed, will check occult stool        Essential hypertension- (present on admission)  Assessment & Plan   continue losartan and amlodipine, tolerated well with electrolytes in an acceptable range         VTE prophylaxis: xarelto

## 2020-04-04 NOTE — CARE PLAN
Problem: Safety  Goal: Will remain free from injury  Outcome: PROGRESSING AS EXPECTED     Problem: Infection  Goal: Will remain free from infection  Outcome: PROGRESSING AS EXPECTED     Problem: Skin Integrity  Goal: Risk for impaired skin integrity will decrease  Outcome: PROGRESSING AS EXPECTED     Problem: Urinary Elimination:  Goal: Ability to reestablish a normal urinary elimination pattern will improve  Outcome: PROGRESSING AS EXPECTED     Hourly rounding in place. Bed alarm on and reinforce pt to use the call light when needed.

## 2020-04-05 NOTE — PROGRESS NOTES
Bedside report received from Steve FARRIS. Pt was watching TV.POC discussed.All needs met at this time.Bed in low position.Call light within reach.Rounding in place.Bed alarm on.

## 2020-04-05 NOTE — CARE PLAN
Problem: Safety  Goal: Will remain free from injury  Outcome: PROGRESSING AS EXPECTED     Problem: Bowel/Gastric:  Goal: Normal bowel function is maintained or improved  Outcome: PROGRESSING AS EXPECTED     Problem: Infection  Goal: Will remain free from infection  Outcome: PROGRESSING AS EXPECTED

## 2020-04-05 NOTE — PROGRESS NOTES
Received report from night shift RNAnnette. Assumed care. This pt is AOx2,   no pain, Patient sitting up in bed, does not want pureed foods. Chart reviewed. Call light in place, fall precautions in place, patient educated on importance of calling for assistance. No additional needs at this time.

## 2020-04-05 NOTE — CARE PLAN
Problem: Safety  Goal: Will remain free from injury  Outcome: PROGRESSING AS EXPECTED  Goal: Will remain free from falls  Outcome: PROGRESSING AS EXPECTED  Note: Bed alarm on and verified. Reinforced falls education, bed in locked and lowest position, call light within reach.      Problem: Pain Management  Goal: Pain level will decrease to patient's comfort goal  Outcome: PROGRESSING AS EXPECTED  Note: Patient reports no pain upon assessment. Will continue to monitor       Problem: Mobility  Goal: Risk for activity intolerance will decrease  Outcome: PROGRESSING AS EXPECTED  Note: Patient has significant decreased tone, is bedfast.

## 2020-04-05 NOTE — PROGRESS NOTES
Hospital Medicine Daily Progress Note    Date of Service  4/5/2020    Chief Complaint  79 y.o. male admitted 12/27/2019 with inability to care for self.    Hospital Course    Patient presented from home when his caregiver was found dead in his home on day of admission.  Patient is not able to care for himself and needs 24 hour supervision.  He was seen by psychiatry on 1/11/2020 and deemed incapacitated for medical decisions.       Interval Problem Update  The patient has occult stool positive and hemoglobin continues to drop  He has some increased weakness and is not getting up as much due to weakness    Consultants/Specialty  Psychiatry    Code Status  DNR    Disposition  Placement pending  Brother is POA, assisting with decisions.    Review of Systems  Review of Systems   Constitutional: Negative for diaphoresis, fever and malaise/fatigue.   HENT: Negative for sore throat.    Eyes: Negative for discharge and redness.   Respiratory: Positive for cough. Negative for hemoptysis, sputum production, shortness of breath and stridor.         Chronic dry cough   Cardiovascular: Negative for chest pain, palpitations, claudication and leg swelling.   Gastrointestinal: Negative for abdominal pain, blood in stool, constipation, heartburn, melena and nausea.   Genitourinary: Negative for dysuria, frequency and hematuria.   Musculoskeletal: Negative for joint pain and myalgias.   Neurological: Positive for dizziness and weakness. Negative for tremors.   Endo/Heme/Allergies: Does not bruise/bleed easily.   Psychiatric/Behavioral: Negative for depression. The patient is not nervous/anxious and does not have insomnia.         Physical Exam  Temp:  [36.4 °C (97.5 °F)-36.9 °C (98.4 °F)] 36.6 °C (97.9 °F)  Pulse:  [69-78] 69  Resp:  [16] 16  BP: (125-155)/(58-75) 125/58  SpO2:  [93 %-100 %] 93 %    Physical Exam  Vitals signs and nursing note reviewed.   Constitutional:       General: He is not in acute distress.     Appearance: He  is not ill-appearing or diaphoretic.   HENT:      Mouth/Throat:      Pharynx: Oropharynx is clear. No oropharyngeal exudate.   Eyes:      General: No scleral icterus.     Conjunctiva/sclera: Conjunctivae normal.   Neck:      Musculoskeletal: Neck supple.   Cardiovascular:      Rate and Rhythm: Regular rhythm. Tachycardia present.      Pulses: Normal pulses.      Heart sounds: No murmur. No friction rub.      Comments: Tachycardia with exertion  Pulmonary:      Effort: Pulmonary effort is normal. No respiratory distress.      Breath sounds: Normal breath sounds. No stridor. No wheezing.   Abdominal:      General: Bowel sounds are normal. There is no distension.      Tenderness: There is no abdominal tenderness.   Musculoskeletal:         General: No swelling or tenderness.   Lymphadenopathy:      Cervical: No cervical adenopathy.   Skin:     Coloration: Skin is pale. Skin is not jaundiced.      Findings: No bruising or erythema.   Neurological:      Mental Status: He is alert. Mental status is at baseline.      Cranial Nerves: No cranial nerve deficit.      Motor: No weakness.      Coordination: Coordination normal.      Comments: Oriented to self     Psychiatric:         Mood and Affect: Mood normal.         Behavior: Behavior normal.         Fluids    Intake/Output Summary (Last 24 hours) at 4/5/2020 0829  Last data filed at 4/5/2020 0640  Gross per 24 hour   Intake --   Output 850 ml   Net -850 ml       Laboratory  Recent Labs     04/04/20  0310 04/05/20  0337   WBC 8.3 7.8   RBC 2.48* 2.44*   HEMOGLOBIN 7.3* 7.1*   HEMATOCRIT 23.6* 23.2*   MCV 95.2 95.1   MCH 29.4 29.1   MCHC 30.9* 30.6*   RDW 50.5* 51.1*   PLATELETCT 342 321   MPV 9.1 9.0     Recent Labs     04/05/20  0337   SODIUM 139   POTASSIUM 4.9   CHLORIDE 107   CO2 18*   GLUCOSE 88   BUN 84*   CREATININE 2.17*   CALCIUM 8.8                   Imaging  DX-ESOPHAGUS - [id number]   Final Result      2.5 minutes of fluoroscopy time utilized for modified  barium swallow which showed tracheal penetration           Assessment/Plan  * Requires supervision due to deficit in self-care  Assessment & Plan  Awaiting for guardianship   assisting with placement      Anemia due to acute blood loss  Assessment & Plan  Stop xarelto  Hemoglobin is 7.1 but patient has no tachycardia or lightheadedness, no need to transfuse  Follow h/h, continue prilosec, add carafate  Will consult GI  Start iv fluids  Transfuse blood    High blood urea nitrogen (BUN)  Assessment & Plan  Monitor labs    Eye dryness  Assessment & Plan  resolved    Agitation  Assessment & Plan  Resume nightly Seroquel        Chronic atrial fibrillation (HCC)  Assessment & Plan  Rate controlled with no need for rate control medication  Will stop xarelto due to acute gi bleed,     Severe protein-calorie malnutrition (HCC)  Assessment & Plan  Nutrition following  Patient is eating magic cup supplements      Hyperlipidemia- (present on admission)  Assessment & Plan  Resume lipitor    COPD (chronic obstructive pulmonary disease) ([facility])- (present on admission)  Assessment & Plan  No evidence of exacerbation  Continue RT protocol       CKD (chronic kidney disease) stage 3, GFR 30-59 ml/min ([facility])- (present on admission)  Assessment & Plan  Stable, follow labs, BUN elevated due to acute GI bleed        Essential hypertension- (present on admission)  Assessment & Plan   continue losartan and amlodipine, tolerated well so far  Will monitor blood pressure with acute bleed       VTE prophylaxis: scd's with acute bleed

## 2020-04-05 NOTE — PROGRESS NOTES
Post blood administration, pt declines chills, headache, or pain. VSS. Pt complaining of peripheral IV and declining IV fluids. Requested to have removed several times, threatening to remove himself if not removed. RN removed IV, catheter intact, IV fluids stopped, Blood admin complete.

## 2020-04-06 NOTE — CARE PLAN
Problem: Safety  Goal: Will remain free from injury  Outcome: PROGRESSING AS EXPECTED  Goal: Will remain free from falls  Outcome: PROGRESSING AS EXPECTED  Note: Bed alarm on, able to redirect patient.      Problem: Infection  Goal: Will remain free from infection  Outcome: PROGRESSING AS EXPECTED     Problem: Skin Integrity  Goal: Risk for impaired skin integrity will decrease  Outcome: PROGRESSING AS EXPECTED  Note: Turning/repositioning patient Q2 hour.      Problem: Medication  Goal: Compliance with prescribed medication will improve  Outcome: PROGRESSING AS EXPECTED  Note: Patient agreeable to medication, taking as indicated by MAR.

## 2020-04-06 NOTE — DISCHARGE PLANNING
"RN CM called pts brother (POA), to give update on status. Memo informed that pt had/has a GI bleed which resulted in pt receiving a unit of blood. Pt brother expressed his gratitude for \"how wonderful all of you are to [place].\"     Memo stated he is string to get into contact with [email or web address] but due to the Virus, he hasn't been able to get ahold of anyone at the bank.      RN CM asked brother if he would be willing to send referrals out to local SNFs for placement. Brother stated he would really like pt to go to the [facility]. RN CM informed pt that we are waiting for his  to be delivered to the hospital before we could explore that option.   "

## 2020-04-06 NOTE — PROGRESS NOTES
Received report from day shift nurse.  Assumed pt care   Pt resting comfortably in bed and in good spirits   Pt on r.a.. No signs of SOB/respiratory distress.   Labs noted, VSS.   Perineal care provided as pt had BM   Assessment completed; a small skin tear/breakdown noted to the sacrum - Mepilex applied   Fall precautions in place.   Bed alarm on   Bed locked at lowest position.   Call light and personal belongings within reach.   Continue to monitor

## 2020-04-06 NOTE — CARE PLAN
Problem: Safety  Goal: Will remain free from injury  Outcome: PROGRESSING AS EXPECTED  Note: Pt is injury-free at this moment   Fall precautions in place including: bed locked & at lowest position, bed alarm on; call light & personal belongings within reach   Will continue to monitor and hourly rounding in progress      Problem: Skin Integrity  Goal: Risk for impaired skin integrity will decrease  Outcome: PROGRESSING AS EXPECTED  Note: A small skin tear/breakdown noted to mid-sacrum; pt on waffle mattress and new Mepilex applied

## 2020-04-06 NOTE — PROGRESS NOTES
Hospital Medicine Daily Progress Note    Date of Service  4/6/2020    Chief Complaint  79 y.o. male admitted 12/27/2019 with inability to care for self.    Hospital Course    Patient presented from home when his caregiver was found dead in his home on day of admission.  Patient is not able to care for himself and needs 24 hour supervision.  He was seen by psychiatry on 1/11/2020 and deemed incapacitated for medical decisions.       Interval Problem Update  The patient has improved hemoglobin to 7.6 after blood transfusion on 4/5  GI recommends no endoscopy    Consultants/Specialty  Psychiatry  GI Dr. Velez    Code Status  DNR    Disposition  Placement pending  Brother is POA, assisting with decisions.    Review of Systems  Review of Systems   Constitutional: Negative for chills, diaphoresis and fever.   Eyes: Negative for discharge and redness.   Respiratory: Positive for cough. Negative for hemoptysis, shortness of breath and stridor.         Cough in the morning only   Cardiovascular: Negative for chest pain, claudication and leg swelling.   Gastrointestinal: Negative for abdominal pain, constipation, heartburn, melena and nausea.   Genitourinary: Negative for dysuria, frequency and hematuria.   Musculoskeletal: Negative for joint pain.   Neurological: Negative for tremors and headaches.   Endo/Heme/Allergies: Does not bruise/bleed easily.   Psychiatric/Behavioral: Negative for depression. The patient is not nervous/anxious and does not have insomnia.         Physical Exam  Temp:  [36.4 °C (97.5 °F)-37.1 °C (98.7 °F)] 36.4 °C (97.5 °F)  Pulse:  [73-78] 73  Resp:  [17-18] 17  BP: (135-164)/(61-89) 147/89  SpO2:  [99 %-100 %] 99 %    Physical Exam  Vitals signs and nursing note reviewed.   Constitutional:       General: He is not in acute distress.     Appearance: He is not ill-appearing or diaphoretic.   HENT:      Nose: No rhinorrhea.      Mouth/Throat:      Mouth: Mucous membranes are moist.      Pharynx: No  oropharyngeal exudate.   Eyes:      Conjunctiva/sclera: Conjunctivae normal.      Pupils: Pupils are equal, round, and reactive to light.   Neck:      Musculoskeletal: Neck supple.   Cardiovascular:      Rate and Rhythm: Normal rate and regular rhythm.      Pulses: Normal pulses.      Heart sounds: No murmur. No friction rub.   Pulmonary:      Effort: Pulmonary effort is normal. No respiratory distress.      Breath sounds: Normal breath sounds. No stridor. No wheezing.   Abdominal:      General: Bowel sounds are normal. There is no distension.      Tenderness: There is no abdominal tenderness.   Musculoskeletal:         General: No swelling or tenderness.   Lymphadenopathy:      Cervical: No cervical adenopathy.   Skin:     Coloration: Skin is pale.      Findings: No bruising or erythema.   Neurological:      Mental Status: He is alert. Mental status is at baseline.      Motor: No weakness.      Coordination: Coordination normal.      Comments: Oriented to self     Psychiatric:         Mood and Affect: Mood normal.         Behavior: Behavior normal.         Fluids    Intake/Output Summary (Last 24 hours) at 4/6/2020 0844  Last data filed at 4/6/2020 0800  Gross per 24 hour   Intake 944 ml   Output 1050 ml   Net -106 ml       Laboratory  Recent Labs     04/04/20  0310 04/05/20  0337 04/05/20  0859   WBC 8.3 7.8 8.1   RBC 2.48* 2.44* 2.61*   HEMOGLOBIN 7.3* 7.1* 7.6*   HEMATOCRIT 23.6* 23.2* 24.6*   MCV 95.2 95.1 94.3   MCH 29.4 29.1 29.1   MCHC 30.9* 30.6* 30.9*   RDW 50.5* 51.1* 50.8*   PLATELETCT 342 321 299   MPV 9.1 9.0 8.4*     Recent Labs     04/05/20  0337   SODIUM 139   POTASSIUM 4.9   CHLORIDE 107   CO2 18*   GLUCOSE 88   BUN 84*   CREATININE 2.17*   CALCIUM 8.8                   Imaging  DX-ESOPHAGUS - [id number]   Final Result      2.5 minutes of fluoroscopy time utilized for modified barium swallow which showed tracheal penetration           Assessment/Plan  * Requires supervision due to deficit in  self-care  Assessment & Plan  Awaiting for guardianship   assisting with placement      Anemia due to acute blood loss  Assessment & Plan  xarelto stopped  Hemoglobin is 7.6 after blood transfusion, GI recommends transfusions as needed to keep over 7  Follow h/h, continue prilosec bid  Stop iv fluids today      High blood urea nitrogen (BUN)  Assessment & Plan  Monitor labs, due to gi bleed not worsening renal function    Eye dryness  Assessment & Plan  resolved    Agitation  Assessment & Plan  Resume nightly Seroquel        Chronic atrial fibrillation (HCC)  Assessment & Plan  Rate controlled with no need for rate control medication  hold xarelto due to acute gi bleed,     Severe protein-calorie malnutrition (HCC)  Assessment & Plan  Nutrition following  Patient is eating magic cup supplements  Ok to continue diet, no endoscopy planned at this time per GI      Hyperlipidemia- (present on admission)  Assessment & Plan  Resume lipitor    COPD (chronic obstructive pulmonary disease) ([facility])- (present on admission)  Assessment & Plan  No evidence of exacerbation  Continue RT protocol       CKD (chronic kidney disease) stage 3, GFR 30-59 ml/min ([facility])- (present on admission)  Assessment & Plan  Stable, follow labs, BUN elevated due to acute GI bleed        Essential hypertension- (present on admission)  Assessment & Plan   continue losartan and amlodipine, tolerated well so far  Will monitor blood pressure and adjust medication as needed   As the patient did have a unit of blood transfusion, I would expect a better reading of his hemoglobin level, I am concerned that he continues to bleed. I will check iron studies, monitor his vital signs closely and transfuse again if hemoglobin is 7 or below    VTE prophylaxis: scd's with acute bleed

## 2020-04-06 NOTE — CONSULTS
Gastroenterology Consult Note:    Candelario Velez M.D.  Date & Time note created:    2020   11:25 PM     Patient ID:  Name:             Eulogio Jeronimo  YOB: 1940  Age:                 80 y.o.  male  MRN:               [id number]    Referring MD:  Dr. De La Paz                                                             Chief Complaint(s):      Anemia, positive occult blood    History of Present Illness:    This is a 80 y.o. male with the past medical history as listed below.    79 y.o. male who presented 2019 with the inability to care for himself. He had a full time caregiver at home who unfortunately  in his home on the day of admission. The home health RN referred him to the ED when she noted he was unable to get up on his own.     During hospitalization, he was found to have gradually elevated BUN. Hb trended down slowly, with positive occult blood. The CNA did not notice any melena or hematochezia. The patient denied h/o EGD or Colonoscopy. Otherwise the patient can't provide too much of his medical history.       Review of Systems:      Can't perform due to lack of mental capacity.     Past Medical History:   Past Medical History:   Diagnosis Date   • Abdominal aortic aneurysm (AAA) ([facility])    • Alcohol dependence (HCC)    • Artificial lens present    • Cardiac pacemaker    • Complete atrioventricular block (HCC)    • Dermatochalasis    • Erectile dysfunction    • Hard of hearing    • Hematuria    • Hyperlipidemia    • Hypertension    • Myopia    • Presbyopia    • Vitamin B12 deficiency      Active Hospital Problems    Diagnosis   • CKD (chronic kidney disease) stage 3, GFR 30-59 ml/min ([facility]) [N18.3]     Priority: Low   • COPD (chronic obstructive pulmonary disease) ([facility]) [J44.9]     Priority: Low   • Essential hypertension [I10]     Priority: Low   • Hyperlipidemia [E78.5]     Priority: Low   • Anemia due to acute blood loss [D62]   • High blood urea nitrogen (BUN) [R79.9]   •  Eye dryness [H04.129]   • Agitation [R45.1]   • Requires supervision due to deficit in self-care [Z91.89]   • Severe protein-calorie malnutrition (HCC) [E43]   • Chronic atrial fibrillation (HCC) [I48.20]       Past Surgical History:  Past Surgical History:   Procedure Laterality Date   • PB OPEN FIX INTER/SUBTROCH FX,IMPLNT  9/28/2019    Procedure: INSERTION, INTRAMEDULLARY MEGGAN, FEMUR, PROXIMAL;  Surgeon: Jaquan Evans M.D.;  Location: SURGERY [facility];  Service: Orthopedics       Hospital Medications:  Current Facility-Administered Medications   Medication Dose Frequency Provider Last Rate Last Dose   • NS infusion 1,000 mL  1,000 mL Continuous Elizabeth De La Paz M.D.   Stopped at 04/05/20 1658   • omeprazole (PRILOSEC) capsule 20 mg  20 mg BID Elizabeth De La Paz M.D.   20 mg at 04/05/20 1709   • sucralfate (CARAFATE) tablet 1 g  1 g 4X/DAY Elizabeth De La Paz M.D.   1 g at 04/05/20 2154   • amLODIPine (NORVASC) tablet 5 mg  5 mg DAILY Adriana Sam M.D.   5 mg at 04/05/20 0532   • losartan (COZAAR) tablet 50 mg  50 mg DAILY Adriana Sam M.D.   50 mg at 04/05/20 0532   • therapeutic multivitamin-minerals (THERAGRAN-M) tablet 1 Tab  1 Tab DAILY Ernesto Harvey M.D.   1 Tab at 04/05/20 0532   • acetaminophen (TYLENOL) tablet 500 mg  500 mg Q6HRS PRN Elizabeth De La Paz M.D.   500 mg at 03/03/20 1810   • bisacodyl (DULCOLAX) suppository 10 mg  10 mg QDAY PRN Elizabeth De La Paz M.D.       • atorvastatin (LIPITOR) tablet 10 mg  10 mg Nightly Elizabeth De La Paz M.D.   10 mg at 04/05/20 2154   • magnesium hydroxide (MILK OF MAGNESIA) suspension 30 mL  30 mL QDAY PRN Elizabeth De La Paz M.D.   30 mL at 03/01/20 1734   • polyethylene glycol/lytes (MIRALAX) PACKET 1 Packet  1 Packet QDAY PRN Elizabeth De La Paz M.D.   1 Packet at 03/07/20 0603   • QUEtiapine (SEROQUEL) tablet 50 mg  50 mg Q EVENING Elizabeth De La Paz M.D.   50 mg at 04/05/20 1709   • tamsulosin (FLOMAX) capsule 0.4 mg  0.4 mg Q EVENING Elizabeth De La Paz M.D.   0.4 mg at 04/05/20 1701    • vitamin D (Ergocalciferol) (DRISDOL) capsule 50,000 Units  50,000 Units Q FRIDAY Elizabeth De La Paz M.D.   50,000 Units at 04/03/20 1026   Last reviewed on 12/27/2019  5:05 PM by Maritza Hendrickson      Current Outpatient Medications:  Medications Prior to Admission   Medication Sig Dispense Refill Last Dose   • lidocaine (LIDODERM) 5 % Patch Apply 1 Patch to skin as directed every 24 hours.   UNK at UNK   • alendronate (FOSAMAX) 70 MG Tab Take 70 mg by mouth every 7 days. FRIDAY 12/6/2019 at UNK   • dronabinol (MARINOL) 5 MG Cap Take 5 mg by mouth 2 times a day.   UNK at UNK   • rivaroxaban (XARELTO) 20 MG Tab tablet Take 20 mg by mouth with dinner.   UNK at UNK   • losartan (COZAAR) 100 MG Tab Take 100 mg by mouth every day.   UNK at UNK   • atorvastatin (LIPITOR) 10 MG Tab Take 10 mg by mouth every evening.   UNK at UNK   • tamsulosin (FLOMAX) 0.4 MG capsule Take 0.4 mg by mouth every evening.   UNK at UNK   • amLODIPine (NORVASC) 5 MG Tab Take 5 mg by mouth every day.   12/20/2019 at UNK   • cloNIDine (CATAPRES) 0.1 MG Tab Take 0.1 mg by mouth every 6 hours as needed.   UNK at UNK   • oxyCODONE immediate-release (ROXICODONE) 5 MG Tab Take 5 mg by mouth every four hours as needed for Severe Pain.   UNK at UNK   • vitamin D, Ergocalciferol, (DRISDOL) 30118 units Cap capsule Take 50,000 Units by mouth every Friday.   UNK at UNK       Medication Allergy:  Allergies   Allergen Reactions   • Lisinopril Unspecified     Lethargy        Family History:  No family history on file.    Social History:  Social History     Socioeconomic History   • Marital status: Single     Spouse name: Not on file   • Number of children: Not on file   • Years of education: Not on file   • Highest education level: Not on file   Occupational History   • Not on file   Social Needs   • Financial resource strain: Not on file   • Food insecurity     Worry: Not on file     Inability: Not on file   • Transportation needs     Medical: Not on  "file     Non-medical: Not on file   Tobacco Use   • Smoking status: Current Every Day Smoker     Packs/day: 0.00     Types: Cigarettes   • Smokeless tobacco: Never Used   Substance and Sexual Activity   • Alcohol use: Yes     Frequency: 4 or more times a week     Drinks per session: 1 or 2     Comment: 2-3 cocktails of vodka/daily   • Drug use: Never   • Sexual activity: Not on file   Lifestyle   • Physical activity     Days per week: Not on file     Minutes per session: Not on file   • Stress: Not on file   Relationships   • Social connections     Talks on phone: Not on file     Gets together: Not on file     Attends [employer] service: Not on file     Active member of club or organization: Not on file     Attends meetings of clubs or organizations: Not on file     Relationship status: Not on file   • Intimate partner violence     Fear of current or ex partner: Not on file     Emotionally abused: Not on file     Physically abused: Not on file     Forced sexual activity: Not on file   Other Topics Concern   • Not on file   Social History Narrative   • Not on file       Physical Exam:  Weight/BMI: Body mass index is 14.03 kg/m².  BP (!) 164/77   Pulse 76   Temp 36.6 °C (97.8 °F) (Temporal)   Resp 18   Ht 1.676 m (5' 5.98\")   Wt 39.4 kg (86 lb 13.8 oz)   SpO2 100%   Vitals:    04/05/20 0517 04/05/20 1306 04/05/20 1321 04/05/20 1639   BP: 125/58 135/61 135/62 (!) 164/77   Pulse: 69 78 78 76   Resp: 16 18 18 18   Temp: 36.6 °C (97.9 °F) 37.1 °C (98.7 °F) 36.9 °C (98.5 °F) 36.6 °C (97.8 °F)   TempSrc: Oral Temporal Temporal Temporal   SpO2: 93% 99% 100% 100%   Weight:       Height:         Oxygen Therapy:  Pulse Oximetry: 100 %, O2 (LPM): 0, O2 Delivery Device: None - Room Air    Intake/Output Summary (Last 24 hours) at 4/5/2020 0744  Last data filed at 4/5/2020 2247  Gross per 24 hour   Intake 1064 ml   Output 1000 ml   Net 64 ml     Physical Exam     Constitutional:   Well developed, well nourished, no acute " distress  HEENT:  Normocephalic, Atraumatic  The patient declined most of the physical exam.  Skin: Warm, Dry, No erythema, No rash, no induration.    MDM (Data Review):     Records reviewed and summarized in current documentation    Lab Data Review:  Recent Results (from the past 24 hour(s))   CBC WITHOUT DIFFERENTIAL    Collection Time: 04/05/20  3:37 AM   Result Value Ref Range    WBC 7.8 4.8 - 10.8 K/uL    RBC 2.44 (L) 4.70 - 6.10 M/uL    Hemoglobin 7.1 (L) 14.0 - 18.0 g/dL    Hematocrit 23.2 (L) 42.0 - 52.0 %    MCV 95.1 81.4 - 97.8 fL    MCH 29.1 27.0 - 33.0 pg    MCHC 30.6 (L) 33.7 - 35.3 g/dL    RDW 51.1 (H) 35.9 - 50.0 fL    Platelet Count 321 164 - 446 K/uL    MPV 9.0 9.0 - 12.9 fL   Basic Metabolic Panel    Collection Time: 04/05/20  3:37 AM   Result Value Ref Range    Sodium 139 135 - 145 mmol/L    Potassium 4.9 3.6 - 5.5 mmol/L    Chloride 107 96 - 112 mmol/L    Co2 18 (L) 20 - 33 mmol/L    Glucose 88 65 - 99 mg/dL    Bun 84 (HH) 8 - 22 mg/dL    Creatinine 2.17 (H) 0.50 - 1.40 mg/dL    Calcium 8.8 8.4 - 10.2 mg/dL    Anion Gap 14.0 7.0 - 16.0   ESTIMATED GFR    Collection Time: 04/05/20  3:37 AM   Result Value Ref Range    GFR If African American 35 (A) >60 mL/min/1.73 m 2    GFR If Non  29 (A) >60 mL/min/1.73 m 2   CBC WITHOUT DIFFERENTIAL    Collection Time: 04/05/20  8:59 AM   Result Value Ref Range    WBC 8.1 4.8 - 10.8 K/uL    RBC 2.61 (L) 4.70 - 6.10 M/uL    Hemoglobin 7.6 (L) 14.0 - 18.0 g/dL    Hematocrit 24.6 (L) 42.0 - 52.0 %    MCV 94.3 81.4 - 97.8 fL    MCH 29.1 27.0 - 33.0 pg    MCHC 30.9 (L) 33.7 - 35.3 g/dL    RDW 50.8 (H) 35.9 - 50.0 fL    Platelet Count 299 164 - 446 K/uL    MPV 8.4 (L) 9.0 - 12.9 fL   COD - Adult (Type and Screen)    Collection Time: 04/05/20  8:59 AM   Result Value Ref Range    ABO Grouping Only A     Rh Grouping Only POS     Antibody Screen-Cod NEG    COMPONENT CELLULAR    Collection Time: 04/05/20  8:59 AM   Result Value Ref Range    Component R        R99                 Red Cells, LR       [id number]   issued       04/05/20   12:49      Product Type R99     Dispense Status issued     Unit Number (Barcoded) [id number]     Product Code (Barcoded) [id number]     Blood Type (Barcoded) 0600        MDM (Assessment and Plan):     Active Hospital Problems    Diagnosis   • CKD (chronic kidney disease) stage 3, GFR 30-59 ml/min ([facility]) [N18.3]     Priority: Low   • COPD (chronic obstructive pulmonary disease) ([facility]) [J44.9]     Priority: Low   • Essential hypertension [I10]     Priority: Low   • Hyperlipidemia [E78.5]     Priority: Low   • Anemia due to acute blood loss [D62]   • High blood urea nitrogen (BUN) [R79.9]   • Eye dryness [H04.129]   • Agitation [R45.1]   • Requires supervision due to deficit in self-care [Z91.89]   • Severe protein-calorie malnutrition (HCC) [E43]   • Chronic atrial fibrillation ([facility]) [I48.20]       Imaging/Procedures Review:    DX-ESOPHAGUS - [id number]   Final Result      2.5 minutes of fluoroscopy time utilized for modified barium swallow which showed tracheal penetration          Assessment  - Anemia  - Prerenal azotemia  - CKD  - PCM  - Chronic Afib  - COPD  - Hypertension  - Hyperlipidemia    Plan  - Monitor H/H and vitals  - Transfusion to keep Hb between 7-9  - PPI BIDAC  - No evidence of acute blood loss or active bleeding, justifying endoscopic evaluation at this moment  - Not a good candidate for endoscopic evaluation, based on his overall health condition   - Will sign off and stand by. Please contact us again if we can be of further assistance.     Thank you very much for allowing me to participate in the care of your patient.  Please feel free to contact me anytime at [phone].     Candelario Velez M.D.    Core Quality Measures   Reviewed items::  Labs, Medications and Radiology reports reviewed

## 2020-04-07 PROBLEM — K92.2 UGI BLEED: Status: ACTIVE | Noted: 2020-01-01

## 2020-04-07 NOTE — PROGRESS NOTES
Received report from night shift RNMeche. Assumed care. This pt is AOx3, disoriented to exact date,   no pain, Patient sitting up eating breakfast. Chart reviewed. Call light in place, fall precautions in place, patient educated on importance of calling for assistance. No additional needs at this time.

## 2020-04-07 NOTE — PROGRESS NOTES
Hospital Medicine Daily Progress Note    Date of Service  4/7/2020    Chief Complaint  79 y.o. male admitted 12/27/2019 with inability to care for self.    Hospital Course    Patient presented from home when his caregiver was found dead in his home on day of admission.  Patient is not able to care for himself and needs 24 hour supervision.  He was seen by psychiatry on 1/11/2020 and deemed incapacitated for medical decisions.  GI bleed suspected 4/4, GI consulted, recommends medical management.  Received blood transfusion 4/5.      Interval Problem Update  4/7 Patient without complaints, states he feels fine.    Consultants/Specialty  Psych - s/o    Code Status  DNR    Disposition  Placement pending  Brother is POA, assisting with decisions.    Review of Systems  Review of Systems   Constitutional: Negative for chills and fever.   HENT: Negative for congestion.    Eyes: Negative for blurred vision and photophobia.   Respiratory: Negative for cough and shortness of breath.    Cardiovascular: Negative for chest pain, claudication and leg swelling.   Gastrointestinal: Negative for abdominal pain, constipation, diarrhea, heartburn, nausea and vomiting.   Genitourinary: Negative for dysuria and hematuria.   Musculoskeletal: Negative for joint pain and myalgias.   Skin: Negative for itching and rash.   Neurological: Negative for dizziness, sensory change, speech change, weakness and headaches.   Psychiatric/Behavioral: Negative for depression. The patient is not nervous/anxious and does not have insomnia.         Physical Exam  Temp:  [36.8 °C (98.2 °F)-36.8 °C (98.3 °F)] 36.8 °C (98.3 °F)  Pulse:  [78] 78  Resp:  [16] 16  BP: (135-140)/(69-81) 140/81  SpO2:  [94 %-96 %] 96 %    Physical Exam  Vitals signs and nursing note reviewed.   Constitutional:       General: He is not in acute distress.     Appearance: Normal appearance.   HENT:      Head: Normocephalic and atraumatic.   Eyes:      General: No scleral icterus.      Extraocular Movements: Extraocular movements intact.   Neck:      Musculoskeletal: Normal range of motion and neck supple.   Cardiovascular:      Rate and Rhythm: Normal rate and regular rhythm.      Pulses: Normal pulses.      Heart sounds: Normal heart sounds. No murmur.   Pulmonary:      Effort: Pulmonary effort is normal. No respiratory distress.      Breath sounds: Normal breath sounds. No wheezing, rhonchi or rales.   Abdominal:      General: Abdomen is flat. Bowel sounds are normal. There is no distension.      Palpations: Abdomen is soft.      Tenderness: There is no rebound.   Musculoskeletal:         General: No swelling or tenderness.   Lymphadenopathy:      Cervical: No cervical adenopathy.   Skin:     Coloration: Skin is not jaundiced.      Findings: No erythema.   Neurological:      Mental Status: He is alert. Mental status is at baseline.      Cranial Nerves: No cranial nerve deficit.      Comments: Oriented to self     Psychiatric:         Mood and Affect: Mood normal.         Behavior: Behavior normal.         Fluids    Intake/Output Summary (Last 24 hours) at 4/7/2020 1013  Last data filed at 4/7/2020 0600  Gross per 24 hour   Intake 994.17 ml   Output 500 ml   Net 494.17 ml       Laboratory  Recent Labs     04/05/20  0859 04/06/20  0812 04/07/20  0318   WBC 8.1 8.9 7.6   RBC 2.61* 3.53* 3.15*   HEMOGLOBIN 7.6* 10.4* 9.2*   HEMATOCRIT 24.6* 32.4* 29.3*   MCV 94.3 91.8 93.0   MCH 29.1 29.5 29.2   MCHC 30.9* 32.1* 31.4*   RDW 50.8* 51.8* 52.0*   PLATELETCT 299 315 312   MPV 8.4* 8.6* 9.1     Recent Labs     04/05/20  0337   SODIUM 139   POTASSIUM 4.9   CHLORIDE 107   CO2 18*   GLUCOSE 88   BUN 84*   CREATININE 2.17*   CALCIUM 8.8                   Imaging  DX-ESOPHAGUS - [id number]   Final Result      2.5 minutes of fluoroscopy time utilized for modified barium swallow which showed tracheal penetration           Assessment/Plan  * Requires supervision due to deficit in self-care  Assessment &  Plan  Awaiting for guardianship   assisting with placement      UGI bleed  Assessment & Plan  Suspected  Xarelto stopped  GI consulted, no endoscopy warranted  Medical management with PPI bid.      Anemia due to acute blood loss  Assessment & Plan  xarelto stopped  Hemoglobin is 7.6 after blood transfusion, GI recommends transfusions as needed to keep over 7  Follow h/h, continue prilosec bid  Stop iv fluids today      High blood urea nitrogen (BUN)  Assessment & Plan  Monitor labs, due to gi bleed not worsening renal function    Eye dryness  Assessment & Plan  resolved    Agitation  Assessment & Plan  Resume nightly Seroquel        Chronic atrial fibrillation (HCC)  Assessment & Plan  Rate controlled with no need for rate control medication  hold xarelto due to gi bleed, risk outweighs benefit.      Severe protein-calorie malnutrition (HCC)  Assessment & Plan  Nutrition following  Patient is eating magic cup supplements  Ok to continue diet, no endoscopy planned at this time per GI      Hyperlipidemia- (present on admission)  Assessment & Plan  Resume lipitor    COPD (chronic obstructive pulmonary disease) ([facility])- (present on admission)  Assessment & Plan  No evidence of exacerbation  Continue RT protocol       CKD (chronic kidney disease) stage 3, GFR 30-59 ml/min ([facility])- (present on admission)  Assessment & Plan  Stable, follow labs, BUN elevated due to acute GI bleed        Essential hypertension- (present on admission)  Assessment & Plan   continue losartan and amlodipine, tolerated well so far  Will monitor blood pressure and adjust medication as needed       VTE prophylaxis: xarelto d/c secondary to GI bleed, SCDs

## 2020-04-07 NOTE — CARE PLAN
Problem: Communication  Goal: The ability to communicate needs accurately and effectively will improve  Outcome: PROGRESSING SLOWER THAN EXPECTED   Patient is A&Ox3 with disorientation to time only. Plan of care reviewed with the patient. Pending placement. Hourly rounding in place. Will continue to monitor at this time. Fall precautions in place.     Problem: Skin Integrity  Goal: Risk for impaired skin integrity will decrease  Outcome: PROGRESSING SLOWER THAN EXPECTED  Patient skin compromised. Coordination with CNA in place for turns.     Problem: Venous Thromboembolism (VTW)/Deep Vein Thrombosis (DVT) Prevention:  Goal: Patient will participate in Venous Thrombosis (VTE)/Deep Vein Thrombosis (DVT)Prevention Measures  Flowsheets  Taken 4/6/2020 2243 by Meche Perdomo, R.N.  Mechanical Prophylaxis: SCDs, Sequential Compression Device  SCDs, Sequential Compression Device: Refused  Taken 4/6/2020 0830 by Haily Holt R.NMonique  AV Foot Pumps: Refused

## 2020-04-07 NOTE — CARE PLAN
Problem: Communication  Goal: The ability to communicate needs accurately and effectively will improve  Outcome: PROGRESSING AS EXPECTED     Problem: Safety  Goal: Will remain free from injury  Outcome: PROGRESSING AS EXPECTED     Problem: Bowel/Gastric:  Goal: Normal bowel function is maintained or improved  Outcome: PROGRESSING AS EXPECTED     Problem: Skin Integrity  Goal: Risk for impaired skin integrity will decrease  Outcome: PROGRESSING AS EXPECTED  Note: Barrier cream

## 2020-04-07 NOTE — PROGRESS NOTES
Received report from Haily day shift RN. Assumed care of patient. Patient is currently laying in bed resting at this time. VSS and patient A&Ox3-4. No complaints of pain, NV, chest pain or SOB at this time. No IV present. Patient is high fall risk. Fall precautions in place. Plan of care reviewed in regards to pending placement. No additional needs requested at this time. Hourly rounding in place. Bed locked and in lowest position with call light within reach.

## 2020-04-07 NOTE — THERAPY
"Speech Language Therapy dysphagia treatment completed.     Functional Status: Pt was seen today for f/u dysphagia tx session. Per RN and CNA, Pt has been tolerating current diet of PU4/MT2 textures with no difficulties. Upon arrival to Pt's room, Pt was found upright in bed watching TV. Pt was agreeable to therapy objectives. Pt consumed single sips of mildly thick coffee and no coughing, choking and/or other clinical s/sx of aspiration/penetration was appreciated. Pt was then instructed re: swallow strengthening exercises (from written education/handout) and Pt endorsed understanding. Pt initially demonstrated good effort for all exercises (Effortful, Chio, Mendelsohn, CTAR); however, followed x2 trials of each he reported \"I would rather do these on my own.\" SLP provided education re: importance of completing them correctly and the goal of returning to baseline diet textures; however, Pt again refused and asked to have the hand-out left on the table for him to complete. SLP instructed Pt to complete TID after meals, and informed RN/CNA. SLP will follow-up as able/appropriate to con't dysphagia tx. Thank you.    Recommendations: Con't Puree (PU4) and Mildly thick liquid (MT2) diet     Plan of Care: Will benefit from Speech Therapy 2 times per week  Post-Acute Therapy: Recommend inpatient transitional care services for continued speech therapy services.      See \"Rehab Therapy-Acute\" Patient Summary Report for complete documentation.     "

## 2020-04-08 NOTE — PROGRESS NOTES
[facility] Daily Progress Note    Date of Service  4/8/2020    Chief Complaint  79 y.o. male admitted 12/27/2019 with inability to care for self.    Hospital Course    Patient presented from home when his caregiver was found dead in his home on day of admission.  Patient is not able to care for himself and needs 24 hour supervision.  He was seen by psychiatry on 1/11/2020 and deemed incapacitated for medical decisions.  GI bleed suspected 4/4, GI consulted, recommends medical management.  Received blood transfusion 4/5.      Interval Problem Update  4/7 Patient without complaints, states he feels fine.  4/8 Patient states he is doing well, no issues.    Consultants/Specialty  Psych - s/o    Code Status  DNR    Disposition  Placement pending  Brother is POA, assisting with decisions.    Review of Systems  Review of Systems   Constitutional: Negative for chills and fever.   HENT: Negative for congestion.    Eyes: Negative for blurred vision and photophobia.   Respiratory: Negative for cough and shortness of breath.    Cardiovascular: Negative for chest pain, claudication and leg swelling.   Gastrointestinal: Negative for abdominal pain, constipation, diarrhea, heartburn, nausea and vomiting.   Genitourinary: Negative for dysuria and hematuria.   Musculoskeletal: Negative for joint pain and myalgias.   Skin: Negative for itching and rash.   Neurological: Negative for dizziness, sensory change, speech change, weakness and headaches.   Psychiatric/Behavioral: Negative for depression. The patient is not nervous/anxious and does not have insomnia.         Physical Exam  Temp:  [36.8 °C (98.2 °F)-36.9 °C (98.4 °F)] 36.8 °C (98.2 °F)  Pulse:  [74-76] 76  Resp:  [16-18] 18  BP: (146-155)/(73-74) 155/73  SpO2:  [96 %-99 %] 96 %    Physical Exam  Vitals signs and nursing note reviewed.   Constitutional:       General: He is not in acute distress.     Appearance: Normal appearance.   HENT:      Head: Normocephalic and  atraumatic.   Eyes:      General: No scleral icterus.     Extraocular Movements: Extraocular movements intact.   Neck:      Musculoskeletal: Normal range of motion and neck supple.   Cardiovascular:      Rate and Rhythm: Normal rate and regular rhythm.      Pulses: Normal pulses.      Heart sounds: Normal heart sounds. No murmur.   Pulmonary:      Effort: Pulmonary effort is normal. No respiratory distress.      Breath sounds: Normal breath sounds. No wheezing, rhonchi or rales.   Abdominal:      General: Abdomen is flat. Bowel sounds are normal. There is no distension.      Palpations: Abdomen is soft.      Tenderness: There is no rebound.   Musculoskeletal:         General: No swelling or tenderness.   Lymphadenopathy:      Cervical: No cervical adenopathy.   Skin:     Coloration: Skin is not jaundiced.      Findings: No erythema.   Neurological:      Mental Status: He is alert. Mental status is at baseline.      Cranial Nerves: No cranial nerve deficit.      Comments: Oriented to self     Psychiatric:         Mood and Affect: Mood normal.         Behavior: Behavior normal.         Fluids    Intake/Output Summary (Last 24 hours) at 4/8/2020 0854  Last data filed at 4/8/2020 0715  Gross per 24 hour   Intake 960 ml   Output 400 ml   Net 560 ml       Laboratory  Recent Labs     04/05/20  0859 04/06/20  0812 04/07/20  0318   WBC 8.1 8.9 7.6   RBC 2.61* 3.53* 3.15*   HEMOGLOBIN 7.6* 10.4* 9.2*   HEMATOCRIT 24.6* 32.4* 29.3*   MCV 94.3 91.8 93.0   MCH 29.1 29.5 29.2   MCHC 30.9* 32.1* 31.4*   RDW 50.8* 51.8* 52.0*   PLATELETCT 299 315 312   MPV 8.4* 8.6* 9.1                       Imaging  DX-ESOPHAGUS - [id number]   Final Result      2.5 minutes of fluoroscopy time utilized for modified barium swallow which showed tracheal penetration           Assessment/Plan  * Requires supervision due to deficit in self-care  Assessment & Plan  Awaiting for guardianship   assisting with placement      UGI  bleed  Assessment & Plan  Suspected  Xarelto stopped  GI consulted, no endoscopy warranted  Medical management with PPI bid.      Anemia due to acute blood loss  Assessment & Plan  xarelto stopped  Hemoglobin is 7.6 after blood transfusion, GI recommends transfusions as needed to keep over 7  Follow h/h, continue prilosec bid  Stop iv fluids today      High blood urea nitrogen (BUN)  Assessment & Plan  Monitor labs, due to gi bleed not worsening renal function    Eye dryness  Assessment & Plan  resolved    Agitation  Assessment & Plan  Resume nightly Seroquel        Chronic atrial fibrillation (HCC)  Assessment & Plan  Rate controlled with no need for rate control medication  hold xarelto due to gi bleed, risk outweighs benefit.      Severe protein-calorie malnutrition (HCC)  Assessment & Plan  Nutrition following  Patient is eating magic cup supplements  Ok to continue diet, no endoscopy planned at this time per GI      Hyperlipidemia- (present on admission)  Assessment & Plan  Resume lipitor    COPD (chronic obstructive pulmonary disease) ([facility])- (present on admission)  Assessment & Plan  No evidence of exacerbation  Continue RT protocol       CKD (chronic kidney disease) stage 3, GFR 30-59 ml/min ([facility])- (present on admission)  Assessment & Plan  Stable, follow labs, BUN elevated due to acute GI bleed        Essential hypertension- (present on admission)  Assessment & Plan   continue losartan and amlodipine, tolerated well so far  Will monitor blood pressure and adjust medication as needed       VTE prophylaxis: xarelto d/c secondary to GI bleed, SCDs

## 2020-04-08 NOTE — PROGRESS NOTES
A&Ox4, denies pain, decreased appetite, tolerating liquids, call light within reach, bed alarm in use.  Updated on plan of care.  Refusing to sit in chair but wants to take a shower later today.

## 2020-04-08 NOTE — PROGRESS NOTES
Assumed care of patient at 1900.  Patient is alert and watching television in bed.  Patient denies pain and denies needs.  Fresh coffee given to patient.  Bed alarm remains on.

## 2020-04-08 NOTE — CARE PLAN
Problem: Communication  Goal: The ability to communicate needs accurately and effectively will improve  Outcome: PROGRESSING AS EXPECTED  Note: Patient talkative and encouraged to express feelings and concerns.     Problem: Safety  Goal: Will remain free from injury  Outcome: PROGRESSING AS EXPECTED  Note:  Bed in low and locked position.  Side rails up X2.  Call light remains in reach.  Hourly rounding continues.

## 2020-04-09 PROBLEM — R33.9 URINE RETENTION: Status: ACTIVE | Noted: 2020-01-01

## 2020-04-09 NOTE — PROGRESS NOTES
Hospital Medicine Daily Progress Note    Date of Service  4/9/2020    Chief Complaint  79 y.o. male admitted 12/27/2019 with inability to care for self.    Hospital Course    Patient presented from home when his caregiver was found dead in his home on day of admission.  Patient is not able to care for himself and needs 24 hour supervision.  He was seen by psychiatry on 1/11/2020 and deemed incapacitated for medical decisions.  GI bleed suspected 4/4, GI consulted, recommends medical management.  Received blood transfusion 4/5.      Interval Problem Update  4/7 Patient without complaints, states he feels fine.  4/8 Patient states he is doing well, no issues.  4/9 Patient states he is having trouble urinating today, started to feel different when he woke up this am.  UA pending, straight cath needed due to retention.  I added proscar to flomax he was already taking.    Consultants/Specialty  Psych - s/o    Code Status  DNR    Disposition  Placement pending  Brother is POA, assisting with decisions.    Review of Systems  Review of Systems   Constitutional: Negative for chills and fever.   HENT: Negative for congestion.    Eyes: Negative for blurred vision and photophobia.   Respiratory: Negative for cough and shortness of breath.    Cardiovascular: Negative for chest pain, claudication and leg swelling.   Gastrointestinal: Negative for abdominal pain, constipation, diarrhea, heartburn, nausea and vomiting.   Genitourinary: Negative for dysuria and hematuria.   Musculoskeletal: Negative for joint pain and myalgias.   Skin: Negative for itching and rash.   Neurological: Negative for dizziness, sensory change, speech change, weakness and headaches.   Psychiatric/Behavioral: Negative for depression. The patient is not nervous/anxious and does not have insomnia.         Physical Exam  Temp:  [36.7 °C (98 °F)-36.8 °C (98.2 °F)] 36.8 °C (98.2 °F)  Pulse:  [79-80] 79  Resp:  [18] 18  BP: (138-147)/(79-86) 138/86  SpO2:  [98  %-100 %] 100 %    Physical Exam  Vitals signs and nursing note reviewed.   Constitutional:       General: He is not in acute distress.     Appearance: Normal appearance.   HENT:      Head: Normocephalic and atraumatic.   Eyes:      General: No scleral icterus.     Extraocular Movements: Extraocular movements intact.   Neck:      Musculoskeletal: Normal range of motion and neck supple.   Cardiovascular:      Rate and Rhythm: Normal rate and regular rhythm.      Pulses: Normal pulses.      Heart sounds: Normal heart sounds. No murmur.   Pulmonary:      Effort: Pulmonary effort is normal. No respiratory distress.      Breath sounds: Normal breath sounds. No wheezing, rhonchi or rales.   Abdominal:      General: Abdomen is flat. Bowel sounds are normal. There is no distension.      Palpations: Abdomen is soft.      Tenderness: There is no rebound.   Musculoskeletal:         General: No swelling or tenderness.   Lymphadenopathy:      Cervical: No cervical adenopathy.   Skin:     Coloration: Skin is not jaundiced.      Findings: No erythema.   Neurological:      Mental Status: He is alert. Mental status is at baseline.      Cranial Nerves: No cranial nerve deficit.      Comments: Oriented to self     Psychiatric:         Mood and Affect: Mood normal.         Behavior: Behavior normal.         Fluids    Intake/Output Summary (Last 24 hours) at 4/9/2020 1038  Last data filed at 4/9/2020 0827  Gross per 24 hour   Intake 960 ml   Output 1275 ml   Net -315 ml       Laboratory  Recent Labs     04/07/20  0318   WBC 7.6   RBC 3.15*   HEMOGLOBIN 9.2*   HEMATOCRIT 29.3*   MCV 93.0   MCH 29.2   MCHC 31.4*   RDW 52.0*   PLATELETCT 312   MPV 9.1                       Imaging  DX-ESOPHAGUS - [id number]   Final Result      2.5 minutes of fluoroscopy time utilized for modified barium swallow which showed tracheal penetration           Assessment/Plan  * Requires supervision due to deficit in self-care  Assessment & Plan  Awaiting for  guardianship   assisting with placement      Urine retention  Assessment & Plan  Check UA  Continue flomax, add proscar  Straight cath x 1      UGI bleed  Assessment & Plan  Suspected  Xarelto stopped  GI consulted, no endoscopy warranted  Medical management with PPI bid.      Anemia due to acute blood loss  Assessment & Plan  xarelto stopped  Hemoglobin is 7.6 after blood transfusion, GI recommends transfusions as needed to keep over 7  Follow h/h, continue prilosec bid  Stop iv fluids today      High blood urea nitrogen (BUN)  Assessment & Plan  Monitor labs, due to gi bleed not worsening renal function    Eye dryness  Assessment & Plan  resolved    Agitation  Assessment & Plan  Resume nightly Seroquel        Chronic atrial fibrillation (HCC)  Assessment & Plan  Rate controlled with no need for rate control medication  hold xarelto due to gi bleed, risk outweighs benefit.      Severe protein-calorie malnutrition (HCC)  Assessment & Plan  Nutrition following  Patient is eating magic cup supplements  Ok to continue diet, no endoscopy planned at this time per GI      Hyperlipidemia- (present on admission)  Assessment & Plan  Resume lipitor    COPD (chronic obstructive pulmonary disease) ([facility])- (present on admission)  Assessment & Plan  No evidence of exacerbation  Continue RT protocol       CKD (chronic kidney disease) stage 3, GFR 30-59 ml/min ([facility])- (present on admission)  Assessment & Plan  Stable, follow labs, BUN elevated due to acute GI bleed        Essential hypertension- (present on admission)  Assessment & Plan   continue losartan and amlodipine, tolerated well so far  Will monitor blood pressure and adjust medication as needed       VTE prophylaxis: xarelto d/c secondary to GI bleed, SCDs

## 2020-04-09 NOTE — CARE PLAN
Problem: Infection  Goal: Will remain free from infection  Outcome: PROGRESSING AS EXPECTED  Pt remains free from s/s of infection.     Problem: Medication  Goal: Compliance with prescribed medication will improve  Outcome: PROGRESSING AS EXPECTED  Pt compliant with medication regimen and educated on new med sucralfate. Pt verbalized understanding.     Problem: Safety  Goal: Will remain free from falls  Outcome: PROGRESSING SLOWER THAN EXPECTED  Pt's last fall was 3/30. Pt has not fallen this shift.     Problem: Urinary Elimination:  Goal: Ability to reestablish a normal urinary elimination pattern will improve  Outcome: PROGRESSING SLOWER THAN EXPECTED  Pt is experiencing dysuria and burning upon urination.

## 2020-04-09 NOTE — PROGRESS NOTES
Assumed care. Pt complains of burning and pressure in the bladder with difficulty voiding and dribbling. MD aware.   Bladder scan showed 800 mL. Pt straight cathed with 400 mL out and urine sent to lab for analysis. Pt reports feeling better after straight cath.

## 2020-04-09 NOTE — PROGRESS NOTES
Pt voided 100 mL clear yellow urine in urinal. Pt reports it was a little easier to void this time but still dribbling. Bladder does not appear distended at this time and pt denies discomfort.

## 2020-04-10 NOTE — PROGRESS NOTES
0700: Bedside report from Bear MACEDO RN. Pt wakes to voice. No needs at this time. Pending placement.    1000: pt with incontinence of stool, linens changed and bedbath

## 2020-04-10 NOTE — PROGRESS NOTES
Hospital Medicine Daily Progress Note    Date of Service  4/10/2020    Chief Complaint  79 y.o. male admitted 12/27/2019 with inability to care for self.    Hospital Course    Patient presented from home when his caregiver was found dead in his home on day of admission.  Patient is not able to care for himself and needs 24 hour supervision.  He was seen by psychiatry on 1/11/2020 and deemed incapacitated for medical decisions.  GI bleed suspected 4/4, GI consulted, recommends medical management.  Received blood transfusion 4/5.      Interval Problem Update  4/7 Patient without complaints, states he feels fine.  4/8 Patient states he is doing well, no issues.  4/9 Patient states he is having trouble urinating today, started to feel different when he woke up this am.  UA pending, straight cath needed due to retention.  I added proscar to flomax he was already taking.  4/10 Patient without issue with urination anymore since initiation of proscar yesterday, will continue this.    Consultants/Specialty  Psych - s/o    Code Status  DNR    Disposition  Placement pending  Brother is POA, assisting with decisions.    Review of Systems  Review of Systems   Constitutional: Negative for chills and fever.   HENT: Negative for congestion.    Eyes: Negative for blurred vision and photophobia.   Respiratory: Negative for cough and shortness of breath.    Cardiovascular: Negative for chest pain, claudication and leg swelling.   Gastrointestinal: Negative for abdominal pain, constipation, diarrhea, heartburn, nausea and vomiting.   Genitourinary: Negative for dysuria and hematuria.   Musculoskeletal: Negative for joint pain and myalgias.   Skin: Negative for itching and rash.   Neurological: Negative for dizziness, sensory change, speech change, weakness and headaches.   Psychiatric/Behavioral: Negative for depression. The patient is not nervous/anxious and does not have insomnia.         Physical Exam  Temp:  [36.9 °C (98.5 °F)-37.2  °C (99 °F)] 36.9 °C (98.5 °F)  Pulse:  [74-77] 77  Resp:  [18] 18  BP: (144-157)/(68-82) 144/68  SpO2:  [98 %-99 %] 99 %    Physical Exam  Vitals signs and nursing note reviewed.   Constitutional:       General: He is not in acute distress.     Appearance: Normal appearance.   HENT:      Head: Normocephalic and atraumatic.   Eyes:      General: No scleral icterus.     Extraocular Movements: Extraocular movements intact.   Neck:      Musculoskeletal: Normal range of motion and neck supple.   Cardiovascular:      Rate and Rhythm: Normal rate and regular rhythm.      Pulses: Normal pulses.      Heart sounds: Normal heart sounds. No murmur.   Pulmonary:      Effort: Pulmonary effort is normal. No respiratory distress.      Breath sounds: Normal breath sounds. No wheezing, rhonchi or rales.   Abdominal:      General: Abdomen is flat. Bowel sounds are normal. There is no distension.      Palpations: Abdomen is soft.      Tenderness: There is no rebound.   Musculoskeletal:         General: No swelling or tenderness.   Lymphadenopathy:      Cervical: No cervical adenopathy.   Skin:     Coloration: Skin is not jaundiced.      Findings: No erythema.   Neurological:      Mental Status: He is alert. Mental status is at baseline.      Cranial Nerves: No cranial nerve deficit.      Comments: Oriented to self     Psychiatric:         Mood and Affect: Mood normal.         Behavior: Behavior normal.         Fluids    Intake/Output Summary (Last 24 hours) at 4/10/2020 1028  Last data filed at 4/9/2020 1612  Gross per 24 hour   Intake 437 ml   Output 500 ml   Net -63 ml       Laboratory  Recent Labs     04/10/20  0455   WBC 8.6   RBC 3.50*   HEMOGLOBIN 10.2*   HEMATOCRIT 32.7*   MCV 93.4   MCH 29.1   MCHC 31.2*   RDW 51.0*   PLATELETCT 313   MPV 9.0     Recent Labs     04/10/20  0455   SODIUM 140   POTASSIUM 4.9   CHLORIDE 107   CO2 19*   GLUCOSE 81   BUN 58*   CREATININE 2.26*   CALCIUM 9.1                   Imaging  DX-ESOPHAGUS -  [id number]   Final Result      2.5 minutes of fluoroscopy time utilized for modified barium swallow which showed tracheal penetration           Assessment/Plan  * Requires supervision due to deficit in self-care  Assessment & Plan  Awaiting for guardianship   assisting with placement      Urine retention  Assessment & Plan  UA - no evidence of infection.  Continue flomax, add proscar  Straight cath x 1      UGI bleed  Assessment & Plan  Suspected  Xarelto stopped  GI consulted, no endoscopy warranted  Medical management with PPI bid.      Anemia due to acute blood loss  Assessment & Plan  xarelto stopped  Hemoglobin is 7.6 after blood transfusion, GI recommends transfusions as needed to keep over 7  Follow h/h, continue prilosec bid  Stop iv fluids today      High blood urea nitrogen (BUN)  Assessment & Plan  Monitor labs, due to gi bleed not worsening renal function    Eye dryness  Assessment & Plan  resolved    Agitation  Assessment & Plan  Resume nightly Seroquel        Chronic atrial fibrillation (HCC)  Assessment & Plan  Rate controlled with no need for rate control medication  hold xarelto due to gi bleed, risk outweighs benefit.      Severe protein-calorie malnutrition (HCC)  Assessment & Plan  Nutrition following  Patient is eating magic cup supplements  Ok to continue diet, no endoscopy planned at this time per GI      Hyperlipidemia- (present on admission)  Assessment & Plan  Resume lipitor    COPD (chronic obstructive pulmonary disease) ([facility])- (present on admission)  Assessment & Plan  No evidence of exacerbation  Continue RT protocol       CKD (chronic kidney disease) stage 3, GFR 30-59 ml/min ([facility])- (present on admission)  Assessment & Plan  Stable, follow labs, BUN elevated due to acute GI bleed        Essential hypertension- (present on admission)  Assessment & Plan   continue losartan and amlodipine, tolerated well so far  Will monitor blood pressure and adjust medication as needed        VTE prophylaxis: xarelto d/c secondary to GI bleed, SCDs

## 2020-04-11 NOTE — PROGRESS NOTES
[facility] Medicine Daily Progress Note    Date of Service  4/11/2020    Chief Complaint  79 y.o. male admitted 12/27/2019 with inability to care for self.    Hospital Course    Patient presented from home when his caregiver was found dead in his home on day of admission.  Patient is not able to care for himself and needs 24 hour supervision.  He was seen by psychiatry on 1/11/2020 and deemed incapacitated for medical decisions.  GI bleed suspected 4/4, GI consulted, recommends medical management.  Received blood transfusion 4/5.      Interval Problem Update  4/7 Patient without complaints, states he feels fine.  4/8 Patient states he is doing well, no issues.  4/9 Patient states he is having trouble urinating today, started to feel different when he woke up this am.  UA pending, straight cath needed due to retention.  I added proscar to flomax he was already taking.  4/10 Patient without issue with urination anymore since initiation of proscar yesterday, will continue this.  4/11 patient resting comfortably during evaluation, UA did show blood on collection, suspect this was likely trauma of straight cath.  No further complaints of urinary retention.    Consultants/Specialty  Psych - s/o    Code Status  DNR    Disposition  Placement pending  Brother is POA, assisting with decisions.    Review of Systems  Review of Systems   Constitutional: Negative for chills and fever.   HENT: Negative for congestion.    Eyes: Negative for blurred vision and photophobia.   Respiratory: Negative for cough and shortness of breath.    Cardiovascular: Negative for chest pain, claudication and leg swelling.   Gastrointestinal: Negative for abdominal pain, constipation, diarrhea, heartburn, nausea and vomiting.   Genitourinary: Negative for dysuria and hematuria.   Musculoskeletal: Negative for joint pain and myalgias.   Skin: Negative for itching and rash.   Neurological: Negative for dizziness, sensory change, speech change, weakness  and headaches.   Psychiatric/Behavioral: Negative for depression. The patient is not nervous/anxious and does not have insomnia.         Physical Exam  Temp:  [36.7 °C (98.1 °F)-36.8 °C (98.2 °F)] 36.7 °C (98.1 °F)  Pulse:  [71-73] 72  Resp:  [17-18] 18  BP: (145-166)/(77-87) 166/81  SpO2:  [98 %] 98 %    Physical Exam  Vitals signs and nursing note reviewed.   Constitutional:       General: He is not in acute distress.     Appearance: Normal appearance.   HENT:      Head: Normocephalic and atraumatic.   Eyes:      General: No scleral icterus.     Extraocular Movements: Extraocular movements intact.   Neck:      Musculoskeletal: Normal range of motion and neck supple.   Cardiovascular:      Rate and Rhythm: Normal rate and regular rhythm.      Pulses: Normal pulses.      Heart sounds: Normal heart sounds. No murmur.   Pulmonary:      Effort: Pulmonary effort is normal. No respiratory distress.      Breath sounds: Normal breath sounds. No wheezing, rhonchi or rales.   Abdominal:      General: Abdomen is flat. Bowel sounds are normal. There is no distension.      Palpations: Abdomen is soft.      Tenderness: There is no rebound.   Musculoskeletal:         General: No swelling or tenderness.   Lymphadenopathy:      Cervical: No cervical adenopathy.   Skin:     Coloration: Skin is not jaundiced.      Findings: No erythema.   Neurological:      Mental Status: He is alert. Mental status is at baseline.      Cranial Nerves: No cranial nerve deficit.      Comments: Oriented to self     Psychiatric:         Mood and Affect: Mood normal.         Behavior: Behavior normal.         Fluids    Intake/Output Summary (Last 24 hours) at 4/11/2020 0950  Last data filed at 4/11/2020 0548  Gross per 24 hour   Intake --   Output 1350 ml   Net -1350 ml       Laboratory  Recent Labs     04/10/20  0455   WBC 8.6   RBC 3.50*   HEMOGLOBIN 10.2*   HEMATOCRIT 32.7*   MCV 93.4   MCH 29.1   MCHC 31.2*   RDW 51.0*   PLATELETCT 313   MPV 9.0      Recent Labs     04/10/20  0455   SODIUM 140   POTASSIUM 4.9   CHLORIDE 107   CO2 19*   GLUCOSE 81   BUN 58*   CREATININE 2.26*   CALCIUM 9.1                   Imaging  DX-ESOPHAGUS - [id number]   Final Result      2.5 minutes of fluoroscopy time utilized for modified barium swallow which showed tracheal penetration           Assessment/Plan  * Requires supervision due to deficit in self-care  Assessment & Plan  Awaiting for guardianship   assisting with placement      Urine retention  Assessment & Plan  UA - no evidence of infection.  Continue flomax, add proscar  Straight cath x 1      UGI bleed  Assessment & Plan  Suspected  Xarelto stopped  GI consulted, no endoscopy warranted  Medical management with PPI bid.      Anemia due to acute blood loss  Assessment & Plan  xarelto stopped  Hemoglobin is 7.6 after blood transfusion, GI recommends transfusions as needed to keep over 7  Follow h/h, continue prilosec bid  Stop iv fluids today      High blood urea nitrogen (BUN)  Assessment & Plan  Monitor labs, due to gi bleed not worsening renal function    Eye dryness  Assessment & Plan  resolved    Agitation  Assessment & Plan  Resume nightly Seroquel        Chronic atrial fibrillation (HCC)  Assessment & Plan  Rate controlled with no need for rate control medication  hold xarelto due to gi bleed, risk outweighs benefit.      Severe protein-calorie malnutrition (HCC)  Assessment & Plan  Nutrition following  Patient is eating magic cup supplements  Ok to continue diet, no endoscopy planned at this time per GI      Hyperlipidemia- (present on admission)  Assessment & Plan  Resume lipitor    COPD (chronic obstructive pulmonary disease) (formerly [facility])- (present on admission)  Assessment & Plan  No evidence of exacerbation  Continue RT protocol       CKD (chronic kidney disease) stage 3, GFR 30-59 ml/min (formerly [facility])- (present on admission)  Assessment & Plan  Stable, follow labs, BUN elevated due to acute GI  bleed        Essential hypertension- (present on admission)  Assessment & Plan   continue losartan and amlodipine, tolerated well so far  Will monitor blood pressure and adjust medication as needed       VTE prophylaxis: xarelto d/c secondary to GI bleed, SCDs

## 2020-04-11 NOTE — CARE PLAN
Problem: Safety  Goal: Will remain free from injury  Outcome: PROGRESSING AS EXPECTED  Intervention: Provide assistance with mobility  Flowsheets (Taken 4/11/2020 0210)  Assistance: Assistance of One  Goal: Will remain free from falls  Outcome: PROGRESSING AS EXPECTED  Intervention: Assess risk factors for falls  Flowsheets  Taken 4/11/2020 0407  Pt Calls for Assistance:   Yes   No  Fall Risk: High Risk to Fall - 2 or more points   History of fall: 2  Taken 4/10/2020 2100  Mobility Status Assessment: 2-2 Healthcare Providers Required for Assistance with Ambulation & Transfer  Intervention: Implement fall precautions  Flowsheets  Taken 4/11/2020 0407  Bed Alarm: Yes - Alarm On  Taken 4/10/2020 2100  Environmental Precautions:   Treaded Slipper Socks on Patient   Personal Belongings, Wastebasket, Call Bell etc. in Easy Reach   Transferred to Stronger Side   Report Given to Other Health Care Providers Regarding Fall Risk   Bed in Low Position   Communication Sign for Patients & Families   Mobility Assessed & Appropriate Sign Placed     Problem: Pain Management  Goal: Pain level will decrease to patient's comfort goal  Outcome: PROGRESSING AS EXPECTED

## 2020-04-11 NOTE — CARE PLAN
Received report from noc shift nurse.   Assumed pt care at approximately 0700  Pt is A&Ox4, resting comfortably in bed.   Pt on r.a.. No signs of SOB/respiratory distress.   Labs noted, VSS.   Pt c/o no pain at this moment.  Assessment completed, lying in bed without a gown, linen changed and perineal care completed after bowel movement, use of the call light reinforced.    Fall precautions in place.   Bed at lowest position.   Call light and personal belongings within reach. Continue to monitor         Problem: Communication  Goal: The ability to communicate needs accurately and effectively will improve  Outcome: PROGRESSING AS EXPECTED     Problem: Skin Integrity  Goal: Risk for impaired skin integrity will decrease  Outcome: PROGRESSING AS EXPECTED

## 2020-04-11 NOTE — PROGRESS NOTES
Pt had no c/o pain this shift. Pt refused new sacral Mepilex after it was removed due to being soiled by a BM. Pt removed condom catheter. Pt used urinal throughout rest of shift. Pt attempted to exit bed on own twice this shift. Bed alarm went off both times. Pt reoriented and repositioned back into bed both times. Pt refused sucralfate.

## 2020-04-12 NOTE — PROGRESS NOTES
Pt had no c/o pain this shift. Pt did not attempt to exit bed on own this shift. Pt used call light throughout shift. Pt refused evening meds but took am meds.

## 2020-04-12 NOTE — CARE PLAN
Problem: Safety  Goal: Will remain free from injury  Outcome: PROGRESSING AS EXPECTED  Intervention: Provide assistance with mobility  Flowsheets (Taken 4/11/2020 0210)  Assistance: Assistance of One  Goal: Will remain free from falls  Outcome: PROGRESSING AS EXPECTED  Intervention: Assess risk factors for falls  Flowsheets  Taken 4/11/2020 2100  Pt Calls for Assistance:   Yes   No  Mobility Status Assessment: 1-1 Healthcare Provider Required for Assistance with Ambulation & Transfer  Taken 4/11/2020 0407  Fall Risk: High Risk to Fall - 2 or more points   History of fall: 2  Intervention: Implement fall precautions  Flowsheets  Taken 4/11/2020 0407  Bed Alarm: Yes - Alarm On  Taken 4/11/2020 2100  Environmental Precautions:   Treaded Slipper Socks on Patient   Personal Belongings, Wastebasket, Call Bell etc. in Easy Reach   Transferred to Stronger Side   Report Given to Other Health Care Providers Regarding Fall Risk   Bed in Low Position   Communication Sign for Patients & Families   Mobility Assessed & Appropriate Sign Placed     Problem: Pain Management  Goal: Pain level will decrease to patient's comfort goal  Outcome: PROGRESSING AS EXPECTED

## 2020-04-12 NOTE — PROGRESS NOTES
[facility] Medicine Daily Progress Note    Date of Service  4/12/2020    Chief Complaint  79 y.o. male admitted 12/27/2019 with inability to care for self.    Hospital Course    Patient presented from home when his caregiver was found dead in his home on day of admission.  Patient is not able to care for himself and needs 24 hour supervision.  He was seen by psychiatry on 1/11/2020 and deemed incapacitated for medical decisions.  GI bleed suspected 4/4, GI consulted, recommends medical management.  Received blood transfusion 4/5.      Interval Problem Update  4/7 Patient without complaints, states he feels fine.  4/8 Patient states he is doing well, no issues.  4/9 Patient states he is having trouble urinating today, started to feel different when he woke up this am.  UA pending, straight cath needed due to retention.  I added proscar to flomax he was already taking.  4/10 Patient without issue with urination anymore since initiation of proscar yesterday, will continue this.  4/11 patient resting comfortably during evaluation, UA did show blood on collection, suspect this was likely trauma of straight cath.  No further complaints of urinary retention.  4/12 Patient without complaint today, voiding well.    Consultants/Specialty  Psych - s/o    Code Status  DNR    Disposition  Placement pending  Brother is POA, assisting with decisions.    Review of Systems  Review of Systems   Constitutional: Negative for chills and fever.   HENT: Negative for congestion.    Eyes: Negative for blurred vision and photophobia.   Respiratory: Negative for cough and shortness of breath.    Cardiovascular: Negative for chest pain, claudication and leg swelling.   Gastrointestinal: Negative for abdominal pain, constipation, diarrhea, heartburn, nausea and vomiting.   Genitourinary: Negative for dysuria and hematuria.   Musculoskeletal: Negative for joint pain and myalgias.   Skin: Negative for itching and rash.   Neurological: Negative for  dizziness, sensory change, speech change, weakness and headaches.   Psychiatric/Behavioral: Negative for depression. The patient is not nervous/anxious and does not have insomnia.         Physical Exam  Temp:  [36 °C (96.8 °F)-36.7 °C (98.1 °F)] 36 °C (96.8 °F)  Pulse:  [77-79] 77  Resp:  [16-18] 18  BP: (122-155)/(77-89) 155/89  SpO2:  [98 %] 98 %    Physical Exam  Vitals signs and nursing note reviewed.   Constitutional:       General: He is not in acute distress.     Appearance: Normal appearance.   HENT:      Head: Normocephalic and atraumatic.   Eyes:      General: No scleral icterus.     Extraocular Movements: Extraocular movements intact.   Neck:      Musculoskeletal: Normal range of motion and neck supple.   Cardiovascular:      Rate and Rhythm: Normal rate and regular rhythm.      Pulses: Normal pulses.      Heart sounds: Normal heart sounds. No murmur.   Pulmonary:      Effort: Pulmonary effort is normal. No respiratory distress.      Breath sounds: Normal breath sounds. No wheezing, rhonchi or rales.   Abdominal:      General: Abdomen is flat. Bowel sounds are normal. There is no distension.      Palpations: Abdomen is soft.      Tenderness: There is no rebound.   Musculoskeletal:         General: No swelling or tenderness.   Lymphadenopathy:      Cervical: No cervical adenopathy.   Skin:     Coloration: Skin is not jaundiced.      Findings: No erythema.   Neurological:      Mental Status: He is alert. Mental status is at baseline.      Cranial Nerves: No cranial nerve deficit.      Comments: Oriented to self     Psychiatric:         Mood and Affect: Mood normal.         Behavior: Behavior normal.         Fluids    Intake/Output Summary (Last 24 hours) at 4/12/2020 0973  Last data filed at 4/12/2020 0854  Gross per 24 hour   Intake 1200 ml   Output 700 ml   Net 500 ml       Laboratory  Recent Labs     04/10/20  0455   WBC 8.6   RBC 3.50*   HEMOGLOBIN 10.2*   HEMATOCRIT 32.7*   MCV 93.4   MCH 29.1   MCHC  31.2*   RDW 51.0*   PLATELETCT 313   MPV 9.0     Recent Labs     04/10/20  0455   SODIUM 140   POTASSIUM 4.9   CHLORIDE 107   CO2 19*   GLUCOSE 81   BUN 58*   CREATININE 2.26*   CALCIUM 9.1                   Imaging  DX-ESOPHAGUS - [id number]   Final Result      2.5 minutes of fluoroscopy time utilized for modified barium swallow which showed tracheal penetration           Assessment/Plan  * Requires supervision due to deficit in self-care  Assessment & Plan  Awaiting for guardianship   assisting with placement      Urine retention  Assessment & Plan  UA - no evidence of infection.  Continue flomax, add proscar  Straight cath x 1      UGI bleed  Assessment & Plan  Suspected  Xarelto stopped  GI consulted, no endoscopy warranted  Medical management with PPI bid.      Anemia due to acute blood loss  Assessment & Plan  xarelto stopped  Hemoglobin is 7.6 after blood transfusion, GI recommends transfusions as needed to keep over 7  Follow h/h, continue prilosec bid  Stop iv fluids today      High blood urea nitrogen (BUN)  Assessment & Plan  Monitor labs, due to gi bleed not worsening renal function    Eye dryness  Assessment & Plan  resolved    Agitation  Assessment & Plan  Resume nightly Seroquel        Chronic atrial fibrillation (HCC)  Assessment & Plan  Rate controlled with no need for rate control medication  hold xarelto due to gi bleed, risk outweighs benefit.      Severe protein-calorie malnutrition (HCC)  Assessment & Plan  Nutrition following  Patient is eating magic cup supplements  Ok to continue diet, no endoscopy planned at this time per GI      Hyperlipidemia- (present on admission)  Assessment & Plan  Resume lipitor    COPD (chronic obstructive pulmonary disease) ([facility])- (present on admission)  Assessment & Plan  No evidence of exacerbation  Continue RT protocol       CKD (chronic kidney disease) stage 3, GFR 30-59 ml/min ([facility])- (present on admission)  Assessment & Plan  Stable, follow  labs, BUN elevated due to acute GI bleed        Essential hypertension- (present on admission)  Assessment & Plan   continue losartan and amlodipine, tolerated well so far  Will monitor blood pressure and adjust medication as needed       VTE prophylaxis: xarelto d/c secondary to GI bleed, SCDs

## 2020-04-13 NOTE — PROGRESS NOTES
[facility] Medicine Daily Progress Note    Date of Service  4/13/2020    Chief Complaint  79 y.o. male admitted 12/27/2019 with inability to care for self.    Hospital Course    Patient presented from home when his caregiver was found dead in his home on day of admission.  Patient is not able to care for himself and needs 24 hour supervision.  He was seen by psychiatry on 1/11/2020 and deemed incapacitated for medical decisions.  GI bleed suspected 4/4, GI consulted, recommends medical management.  Received blood transfusion 4/5.      Interval Problem Update  4/7 Patient without complaints, states he feels fine.  4/8 Patient states he is doing well, no issues.  4/9 Patient states he is having trouble urinating today, started to feel different when he woke up this am.  UA pending, straight cath needed due to retention.  I added proscar to flomax he was already taking.  4/10 Patient without issue with urination anymore since initiation of proscar yesterday, will continue this.  4/11 patient resting comfortably during evaluation, UA did show blood on collection, suspect this was likely trauma of straight cath.  No further complaints of urinary retention.  4/12 Patient without complaint today, voiding well.    Consultants/Specialty  Psych - s/o    Code Status  DNR    Disposition  Placement pending  Brother is POA, assisting with decisions.    Review of Systems  Review of Systems   Constitutional: Negative for chills and fever.   HENT: Negative for congestion.    Eyes: Negative for blurred vision and photophobia.   Respiratory: Negative for cough and shortness of breath.    Cardiovascular: Negative for chest pain, claudication and leg swelling.   Gastrointestinal: Negative for abdominal pain, constipation, diarrhea, heartburn, nausea and vomiting.   Genitourinary: Negative for dysuria and hematuria.   Musculoskeletal: Negative for joint pain and myalgias.   Skin: Negative for itching and rash.   Neurological: Negative for  dizziness, sensory change, speech change, weakness and headaches.   Psychiatric/Behavioral: Negative for depression. The patient is not nervous/anxious and does not have insomnia.         Physical Exam  Temp:  [36.4 °C (97.6 °F)-36.8 °C (98.2 °F)] 36.4 °C (97.6 °F)  Pulse:  [67-78] 67  Resp:  [18-20] 20  BP: (139-173)/(82-87) 173/83  SpO2:  [95 %-97 %] 95 %    Physical Exam  Vitals signs and nursing note reviewed.   Constitutional:       General: He is not in acute distress.     Appearance: Normal appearance.   HENT:      Head: Normocephalic and atraumatic.   Eyes:      General: No scleral icterus.     Extraocular Movements: Extraocular movements intact.   Neck:      Musculoskeletal: Normal range of motion and neck supple.   Cardiovascular:      Rate and Rhythm: Normal rate and regular rhythm.      Pulses: Normal pulses.      Heart sounds: Normal heart sounds. No murmur.   Pulmonary:      Effort: Pulmonary effort is normal. No respiratory distress.      Breath sounds: Normal breath sounds. No wheezing, rhonchi or rales.   Abdominal:      General: Abdomen is flat. Bowel sounds are normal. There is no distension.      Palpations: Abdomen is soft.      Tenderness: There is no rebound.   Musculoskeletal:         General: No swelling or tenderness.   Lymphadenopathy:      Cervical: No cervical adenopathy.   Skin:     Coloration: Skin is not jaundiced.      Findings: No erythema.   Neurological:      Mental Status: He is alert. Mental status is at baseline.      Cranial Nerves: No cranial nerve deficit.      Comments: Oriented to self     Psychiatric:         Mood and Affect: Mood normal.         Behavior: Behavior normal.         Fluids    Intake/Output Summary (Last 24 hours) at 4/13/2020 0852  Last data filed at 4/12/2020 2300  Gross per 24 hour   Intake --   Output 600 ml   Net -600 ml       Laboratory                        Imaging  DX-ESOPHAGUS - [id number]   Final Result      2.5 minutes of fluoroscopy time  utilized for modified barium swallow which showed tracheal penetration           Assessment/Plan  * Requires supervision due to deficit in self-care  Assessment & Plan  Awaiting for guardianship   assisting with placement      Urine retention  Assessment & Plan  Resolved  UA - no evidence of infection.  Continue flomax, add proscar  Straight cath x 1      UGI bleed  Assessment & Plan  Suspected  Xarelto stopped  GI consulted, no endoscopy warranted  Medical management with PPI bid.      Anemia due to acute blood loss  Assessment & Plan  xarelto stopped  Hemoglobin is 7.6 after blood transfusion, GI recommends transfusions as needed to keep over 7  Follow h/h, continue prilosec bid  Stop iv fluids today      High blood urea nitrogen (BUN)  Assessment & Plan  Monitor labs, due to gi bleed not worsening renal function    Eye dryness  Assessment & Plan  resolved    Agitation  Assessment & Plan  Resume nightly Seroquel        Chronic atrial fibrillation (HCC)  Assessment & Plan  Rate controlled with no need for rate control medication  hold xarelto due to gi bleed, risk outweighs benefit.      Severe protein-calorie malnutrition (HCC)  Assessment & Plan  Nutrition following  Patient is eating magic cup supplements  Ok to continue diet, no endoscopy planned at this time per GI      Hyperlipidemia- (present on admission)  Assessment & Plan  Resume lipitor    COPD (chronic obstructive pulmonary disease) ([facility])- (present on admission)  Assessment & Plan  No evidence of exacerbation  Continue RT protocol       CKD (chronic kidney disease) stage 3, GFR 30-59 ml/min ([facility])- (present on admission)  Assessment & Plan  Stable, follow labs, BUN elevated due to acute GI bleed        Essential hypertension- (present on admission)  Assessment & Plan   continue losartan and amlodipine, tolerated well so far  Will monitor blood pressure and adjust medication as needed       VTE prophylaxis: xarelto d/c secondary to GI bleed,  SCDs

## 2020-04-13 NOTE — CARE PLAN
Problem: Safety  Goal: Will remain free from injury  Outcome: PROGRESSING AS EXPECTED  Intervention: Provide assistance with mobility  Flowsheets (Taken 4/13/2020 0406)  Assistance: Assistance of One  Goal: Will remain free from falls  Outcome: PROGRESSING AS EXPECTED  Intervention: Assess risk factors for falls  Flowsheets  Taken 4/12/2020 2043  Pt Calls for Assistance:   Yes   No  Mobility Status Assessment: 1-1 Healthcare Provider Required for Assistance with Ambulation & Transfer  Taken 4/13/2020 0406  History of fall: 2  Intervention: Implement fall precautions  Flowsheets  Taken 4/13/2020 0406  Bed Alarm: Yes - Alarm On  Taken 4/12/2020 2043  Environmental Precautions:   Treaded Slipper Socks on Patient   Personal Belongings, Wastebasket, Call Bell etc. in Easy Reach   Transferred to Stronger Side   Report Given to Other Health Care Providers Regarding Fall Risk   Bed in Low Position   Communication Sign for Patients & Families   Mobility Assessed & Appropriate Sign Placed     Problem: Pain Management  Goal: Pain level will decrease to patient's comfort goal  Outcome: PROGRESSING AS EXPECTED

## 2020-04-13 NOTE — PROGRESS NOTES
Pt had no c/o pain this shift. Pt tried to exit bed on own once this shift. Bed alarm went off. Pt reoriented and repositioned back into bed. Pt took evening and morning meds.

## 2020-04-14 NOTE — THERAPY
"Speech Language Therapy dysphagia treatment completed.     Functional Status: Pt was seen today for f/u dysphagia tx session. Per RN, Pt has not been completing prescribed swallow strengthening exercises (likely due to memory impairment/deficits.) Upon arrival to Pt's room, Pt was found semi-reclined in bed. Pt with noted wet vocal quality which cleared with reflexive throat clear. Pt was able to locate swallow exercise hand-out on tray table; however, denied knowledge of dysphagia and/or need for them. SLP provided education re: MBS results, modified diet textures, and risk for aspiration PNA. Pt then agreeable to swallow strengthening exercises. Pt completed Effortful Swallow x2, and had sluggish/delayed swallow initiation per palpation. Pt then reported 'I don't want to do this right now' but was agreeable to review/practice the other ex's: Masako, Mendelsohn, REECE. Pt with poor effort and fair accuracy; however, limited by insight and lack of motivation to participate. SLP will con't to follow-up as Pt is able/willing to participate in therapy objectives. RN notified and aware. Recommend to con't current diet order of Puree (PU4) textures with Mildly thick liquids (MT2), given strict adherence to safe swallow precautions. Thank you.    Recommendations:   1.) Recommend to con't current diet order of Puree (PU4) textures with Mildly thick liquids (MT2), given strict adherence to safe swallow precautions  2.) Please encourage Pt to complete swallow strengthening exercises TID after meals      Plan of Care: Will benefit from Speech Therapy 2 times per week  Post-Acute Therapy: Recommend inpatient transitional care services for continued speech therapy services.      See \"Rehab Therapy-Acute\" Patient Summary Report for complete documentation.     "

## 2020-04-14 NOTE — CARE PLAN
Problem: Pain Management  Goal: Pain level will decrease to patient's comfort goal  Outcome: PROGRESSING AS EXPECTED  Patient pain controlled at this time. Will continue to monitor.     Problem: Urinary Elimination:  Goal: Ability to reestablish a normal urinary elimination pattern will improve  Outcome: PROGRESSING AS EXPECTED   Patient experiencing mild incontinence. Impulsivity occurs with urination. Urinal within site. Hourly rounding and coordination with CNA in place.      Problem: Safety  Goal: Will remain free from injury  Outcome: PROGRESSING SLOWER THAN EXPECTED  Fall precautions in place and bed alarm on for safety. Reorientation given as necessary. Will continue to monitor hourly for needs.

## 2020-04-14 NOTE — PROGRESS NOTES
"Received report from BRENTON Dobbins. Assumed care of patient. Patient is currently laying in bed with no pain, N/V, chest pain or SOB at this time. VSS. Patient states that he needs to be \"cleaned up.\" coordination with CNA in place. Patient A&Ox4 at this time. Plan of care reviewed in regards to symptom management and pending placement. Hourly rounding in place. Will continue to monitor. Bed locked and in lowest position with call light within reach. Bed alarm on.   "

## 2020-04-14 NOTE — PROGRESS NOTES
[facility] Medicine Daily Progress Note    Date of Service  4/14/2020    Chief Complaint  79 y.o. male admitted 12/27/2019 with inability to care for self.    Hospital Course    Patient presented from home when his caregiver was found dead in his home on day of admission.  Patient is not able to care for himself and needs 24 hour supervision.  He was seen by psychiatry on 1/11/2020 and deemed incapacitated for medical decisions.  GI bleed suspected 4/4, GI consulted, recommends medical management.  Received blood transfusion 4/5.      Interval Problem Update  Seen and examined, sleeping soundly, no complaints from patient. No acute events overnight.   Discussed with RN and CM at length regarding placement    Consultants/Specialty  Psych - s/o    Code Status  DNR    Disposition  Placement pending  Brother is POA, assisting with decisions.    Review of Systems  Review of Systems   Constitutional: Negative for chills and fever.   HENT: Negative for congestion.    Eyes: Negative for blurred vision and photophobia.   Respiratory: Negative for cough and shortness of breath.    Cardiovascular: Negative for chest pain, claudication and leg swelling.   Gastrointestinal: Negative for abdominal pain, constipation, diarrhea, heartburn, nausea and vomiting.   Genitourinary: Negative for dysuria and hematuria.   Musculoskeletal: Negative for joint pain and myalgias.   Skin: Negative for itching and rash.   Neurological: Negative for dizziness, sensory change, speech change, weakness and headaches.   Psychiatric/Behavioral: Negative for depression. The patient is not nervous/anxious and does not have insomnia.         Physical Exam  Temp:  [36.6 °C (97.8 °F)-37 °C (98.6 °F)] 36.6 °C (97.8 °F)  Pulse:  [72-76] 72  Resp:  [18] 18  BP: (149-155)/(72-99) 155/99  SpO2:  [98 %-100 %] 98 %    Physical Exam  Vitals signs and nursing note reviewed.   Constitutional:       General: He is not in acute distress.     Appearance: Normal  appearance.   HENT:      Head: Normocephalic and atraumatic.   Eyes:      General: No scleral icterus.     Extraocular Movements: Extraocular movements intact.   Neck:      Musculoskeletal: Normal range of motion and neck supple.   Cardiovascular:      Rate and Rhythm: Normal rate and regular rhythm.      Pulses: Normal pulses.      Heart sounds: Normal heart sounds. No murmur.   Pulmonary:      Effort: Pulmonary effort is normal. No respiratory distress.      Breath sounds: Normal breath sounds. No wheezing, rhonchi or rales.   Abdominal:      General: Abdomen is flat. Bowel sounds are normal. There is no distension.      Palpations: Abdomen is soft.      Tenderness: There is no rebound.   Musculoskeletal:         General: No swelling or tenderness.   Lymphadenopathy:      Cervical: No cervical adenopathy.   Skin:     Coloration: Skin is not jaundiced.      Findings: No erythema.   Neurological:      Mental Status: He is alert. Mental status is at baseline.      Cranial Nerves: No cranial nerve deficit.      Comments: Oriented to self     Psychiatric:         Mood and Affect: Mood normal.         Behavior: Behavior normal.     Seen and examined 4/14  No change in exam c/t prior    Fluids    Intake/Output Summary (Last 24 hours) at 4/14/2020 1021  Last data filed at 4/14/2020 0800  Gross per 24 hour   Intake 1200 ml   Output 950 ml   Net 250 ml       Laboratory                        Imaging  DX-ESOPHAGUS - [id number]   Final Result      2.5 minutes of fluoroscopy time utilized for modified barium swallow which showed tracheal penetration           Assessment/Plan  * Requires supervision due to deficit in self-care  Assessment & Plan  Awaiting for guardianship   assisting with placement    Urine retention  Assessment & Plan  Resolved  UA - no evidence of infection.  Continue flomax, add proscar  Straight cath x 1      UGI bleed  Assessment & Plan  Suspected  Xarelto stopped  GI consulted, no  endoscopy warranted  Medical management with PPI bid.    Anemia due to acute blood loss  Assessment & Plan  xarelto stopped  Hemoglobin is 7.6 after blood transfusion, GI recommends transfusions as needed to keep over 7  Follow h/h, continue prilosec bid    High blood urea nitrogen (BUN)  Assessment & Plan  Monitor labs, due to gi bleed not worsening renal function    Eye dryness  Assessment & Plan  resolved    Agitation  Assessment & Plan  Resume nightly Seroquel        Chronic atrial fibrillation (HCC)  Assessment & Plan  Rate controlled with no need for rate control medication  Holding xarelto due to gi bleed, risk outweighs benefit.      Severe protein-calorie malnutrition (HCC)  Assessment & Plan  Nutrition following  Patient is eating magic cup supplements  Ok to continue diet, no endoscopy planned at this time per GI    Hyperlipidemia- (present on admission)  Assessment & Plan  Resume lipitor    COPD (chronic obstructive pulmonary disease) ([facility])- (present on admission)  Assessment & Plan  No evidence of exacerbation  Continue RT protocol       CKD (chronic kidney disease) stage 3, GFR 30-59 ml/min ([facility])- (present on admission)  Assessment & Plan  Stable, follow labs, BUN elevated due to acute GI bleed        Essential hypertension- (present on admission)  Assessment & Plan   continue losartan and amlodipine, tolerated well so far  Will monitor blood pressure and adjust medication as needed       VTE prophylaxis: xarelto d/c secondary to GI bleed, SCDs

## 2020-04-14 NOTE — CARE PLAN
Problem: Safety  Goal: Will remain free from falls  Outcome: PROGRESSING AS EXPECTED  Intervention: Implement fall precautions  Flowsheets  Taken 4/13/2020 0406 by Jeanie Lynn R.N.  Bed Alarm: Yes - Alarm On  Taken 4/14/2020 0700 by Jennifer Gilligan, R.N.  Environmental Precautions:   Treaded Slipper Socks on Patient   Personal Belongings, Wastebasket, Call Bell etc. in Easy Reach   Report Given to Other Health Care Providers Regarding Fall Risk   Bed in Low Position   Communication Sign for Patients & Families   Mobility Assessed & Appropriate Sign Placed  Taken 4/13/2020 2000 by Meche Perdomo R.N.  Bedrails: Bedrails Closest to Bathroom Down  Chair/Bed Strip Alarm: Yes - Alarm On  Note: Patient educated and understands the fall precautions in place to prevent falls, but does need reminding.  Bed alarm is on, IV pole not in use, treaded slipper socks on, and bedrails closest to bathroom down.  Patient also educated and understands the use of the call button for any assistance with mobility, however, does forget this and needs reminding.  Patient did have a fall during this admission.     Problem: Skin Integrity  Goal: Risk for impaired skin integrity will decrease  Outcome: PROGRESSING AS EXPECTED  Intervention: Implement precautions to protect skin integrity in collaboration with the interdisciplinary team  Flowsheets  Taken 4/14/2020 0700 by Jennifer Gilligan, R.N.  Skin Preventative Measures: Pillows in Use to Float Heels  Bed Types: Pressure Redistribution Mattress (Atmosair)  Friction Interventions: Draw Sheet / Pad Used for Repositioning  Patient Turns / Repositioning: Patient Turns Self from Side to Side  Moisturizers: Barrier Paste  Protocols:   Incontinence Associated Dermatitis Protocol in Place   Pressure Ulcer Prevention / Intervention Protocol in Place  Patient is Receiving Nutrition: Oral Intake Adequate  Vitamin Therapy in Use: Yes  Activity: Bed  Nutrition Consult Ordered: No, Consult  has Already been Placed  Assistance / Tolerance for Turning/Repositioning:   Standby Assist   Assistance of One   Tolerates Well  Taken 4/9/2020 2045 by Bear Cuevas R.N.  PT / OT Involved in Care: Physical Therapy Involved  Note: Patient is on a waffle mattress with a mepilex to sacrum.  Feet/heels are floated on pillows.  Attempts are made to turn patient every two hours, however, he is able to reposition himself.

## 2020-04-14 NOTE — PROGRESS NOTES
0650: Bedside report completed.  Assumed pt care. Patient in bed, eating breakfast, in no apparent distress.  Safety precautions in place. Call light & personal belongings within reach.  Plan of care discussed.  No reports of pain or needs expressed at this time.

## 2020-04-15 NOTE — PROGRESS NOTES
Received report from Kajal FARRIS. Assumed care. This pt is AOx4, denies pain, Patient and RN discussed plan of care including skin care, waiting on guardianship: questions answered. Chart reviewed. Call light in place, fall precautions in place, patient educated on importance of calling for assistance. No additional needs at this time.

## 2020-04-15 NOTE — CARE PLAN
Problem: Safety  Goal: Will remain free from injury  Note: All safety precautions in place.      Problem: Infection  Goal: Will remain free from infection  Note: Will ensure pt brushes teeth as a preventative and performs proper hygiene measures.

## 2020-04-15 NOTE — PROGRESS NOTES
Hospital Medicine Daily Progress Note    Date of Service  4/15/2020    Chief Complaint  79 y.o. male admitted 12/27/2019 with inability to care for self.    Hospital Course    Patient presented from home when his caregiver was found dead in his home on day of admission.  Patient is not able to care for himself and needs 24 hour supervision.  He was seen by psychiatry on 1/11/2020 and deemed incapacitated for medical decisions.  GI bleed suspected 4/4, GI consulted, recommends medical management.  Received blood transfusion 4/5.      Interval Problem Update  Seen and examined, sleeping soundly, no complaints from patient.   No acute events overnight, no fevers or chills.   Discussed with RN and CM at length regarding placement    Consultants/Specialty  Psych - s/o    Code Status  DNR    Disposition  Placement pending  Brother is POA, assisting with decisions.    Review of Systems  Review of Systems   Constitutional: Negative for chills and fever.   HENT: Negative for congestion.    Eyes: Negative for blurred vision and photophobia.   Respiratory: Negative for cough and shortness of breath.    Cardiovascular: Negative for chest pain, claudication and leg swelling.   Gastrointestinal: Negative for abdominal pain, constipation, diarrhea, heartburn, nausea and vomiting.   Genitourinary: Negative for dysuria and hematuria.   Musculoskeletal: Negative for joint pain and myalgias.   Skin: Negative for itching and rash.   Neurological: Negative for dizziness, sensory change, speech change, weakness and headaches.   Psychiatric/Behavioral: Negative for depression. The patient is not nervous/anxious and does not have insomnia.         Physical Exam  Temp:  [36.6 °C (97.8 °F)] 36.6 °C (97.8 °F)  Pulse:  [73] 73  Resp:  [18] 18  BP: (128)/(75) 128/75  SpO2:  [98 %] 98 %    Physical Exam  Vitals signs and nursing note reviewed.   Constitutional:       General: He is not in acute distress.     Appearance: Normal appearance.    HENT:      Head: Normocephalic and atraumatic.   Eyes:      General: No scleral icterus.     Extraocular Movements: Extraocular movements intact.   Neck:      Musculoskeletal: Normal range of motion and neck supple.   Cardiovascular:      Rate and Rhythm: Normal rate and regular rhythm.      Pulses: Normal pulses.      Heart sounds: Normal heart sounds. No murmur.   Pulmonary:      Effort: Pulmonary effort is normal. No respiratory distress.      Breath sounds: Normal breath sounds. No wheezing, rhonchi or rales.   Abdominal:      General: Abdomen is flat. Bowel sounds are normal. There is no distension.      Palpations: Abdomen is soft.      Tenderness: There is no rebound.   Musculoskeletal:         General: No swelling or tenderness.   Lymphadenopathy:      Cervical: No cervical adenopathy.   Skin:     Coloration: Skin is not jaundiced.      Findings: No erythema.   Neurological:      Mental Status: He is alert. Mental status is at baseline.      Cranial Nerves: No cranial nerve deficit.      Comments: Oriented to self     Psychiatric:         Mood and Affect: Mood normal.         Behavior: Behavior normal.     Seen and examined 4/15  No change in exam c/t prior    Fluids    Intake/Output Summary (Last 24 hours) at 4/15/2020 1150  Last data filed at 4/15/2020 0900  Gross per 24 hour   Intake 640 ml   Output 450 ml   Net 190 ml       Laboratory                        Imaging  DX-ESOPHAGUS - [id number]   Final Result      2.5 minutes of fluoroscopy time utilized for modified barium swallow which showed tracheal penetration           Assessment/Plan  * Requires supervision due to deficit in self-care  Assessment & Plan  Awaiting for guardianship   assisting with placement    Urine retention  Assessment & Plan  Resolved  UA - no evidence of infection.  Continue flomax, add proscar  Straight cath x 1      UGI bleed  Assessment & Plan  Suspected  Xarelto stopped  GI consulted, no endoscopy  warranted  Medical management with PPI bid.    Anemia due to acute blood loss  Assessment & Plan  xarelto stopped  Hemoglobin is 7.6 after blood transfusion, GI recommends transfusions as needed to keep over 7  Follow h/h, continue prilosec bid    High blood urea nitrogen (BUN)  Assessment & Plan  Monitor labs, due to gi bleed not worsening renal function    Eye dryness  Assessment & Plan  resolved    Agitation  Assessment & Plan  Resume nightly Seroquel        Chronic atrial fibrillation (HCC)  Assessment & Plan  Rate controlled with no need for rate control medication  Holding xarelto due to gi bleed, risk outweighs benefit.      Severe protein-calorie malnutrition (HCC)  Assessment & Plan  Nutrition following  Patient is eating magic cup supplements  Ok to continue diet, no endoscopy planned at this time per GI    Hyperlipidemia- (present on admission)  Assessment & Plan  Resume lipitor    COPD (chronic obstructive pulmonary disease) ([facility])- (present on admission)  Assessment & Plan  No evidence of exacerbation  Continue RT protocol       CKD (chronic kidney disease) stage 3, GFR 30-59 ml/min ([facility])- (present on admission)  Assessment & Plan  Stable, follow labs, BUN elevated due to acute GI bleed        Essential hypertension- (present on admission)  Assessment & Plan   continue losartan and amlodipine, tolerated well so far  Will monitor blood pressure and adjust medication as needed       VTE prophylaxis: xarelto d/c secondary to GI bleed, SCDs

## 2020-04-15 NOTE — CARE PLAN
Problem: Safety  Goal: Will remain free from falls  Note: High fall risk with recent falls. Bed alarm on. Call light and personal belongings within reach, bed in low locked position, treaded slipper socks in place, room free of clutter, 3 bedside rails up. Pt frequently reoriented and reminded to call prior to getting up. Needs assessed during hourly rounds.       Problem: Bowel/Gastric:  Goal: Normal bowel function is maintained or improved  Outcome: PROGRESSING AS EXPECTED  Note: Pt had a large brown soft bowel movement tonight. He is on Carafate and Omeprazole since recent GIB.

## 2020-04-15 NOTE — PROGRESS NOTES
Pt is AAO x3.  Does not recall date.  Denies pain or discomfort at this time.   No IV, MD aware.  VS WNL.  Resting comfortably.   POC discussed.  All needs met at this time.  Bed in low position.  Call light within reach.  Rounding in place.

## 2020-04-16 NOTE — CARE PLAN
Problem: Urinary Elimination:  Goal: Ability to reestablish a normal urinary elimination pattern will improve  Outcome: PROGRESSING AS EXPECTED  Flowsheets (Taken 4/16/2020 1928)  Urinary Elimination: Incontinence  Note: Attempting to use urinal at times

## 2020-04-16 NOTE — PROGRESS NOTES
[facility] Medicine Daily Progress Note    Date of Service  4/16/2020    Chief Complaint  79 y.o. male admitted 12/27/2019 with inability to care for self.    Hospital Course    Patient presented from home when his caregiver was found dead in his home on day of admission.  Patient is not able to care for himself and needs 24 hour supervision.  He was seen by psychiatry on 1/11/2020 and deemed incapacitated for medical decisions.  GI bleed suspected 4/4, GI consulted, recommends medical management.  Received blood transfusion 4/5.      Interval Problem Update  Seen and examined  No acute events overnight, no fevers or chills.   Discussed with RN and CM at length regarding placement    Consultants/Specialty  Psych - s/o    Code Status  DNR    Disposition  Placement pending  Brother is POA, assisting with decisions.    Review of Systems  Review of Systems   Constitutional: Negative for chills and fever.   HENT: Negative for congestion.    Eyes: Negative for blurred vision and photophobia.   Respiratory: Negative for cough and shortness of breath.    Cardiovascular: Negative for chest pain, claudication and leg swelling.   Gastrointestinal: Negative for abdominal pain, constipation, diarrhea, heartburn, nausea and vomiting.   Genitourinary: Negative for dysuria and hematuria.   Musculoskeletal: Negative for joint pain and myalgias.   Skin: Negative for itching and rash.   Neurological: Negative for dizziness, sensory change, speech change, weakness and headaches.   Psychiatric/Behavioral: Negative for depression. The patient is not nervous/anxious and does not have insomnia.         Physical Exam  Temp:  [36.4 °C (97.5 °F)-36.6 °C (97.9 °F)] 36.4 °C (97.5 °F)  Pulse:  [72-86] 72  Resp:  [16-18] 18  BP: (138-156)/(76-88) 138/76  SpO2:  [97 %] 97 %    Physical Exam  Vitals signs and nursing note reviewed.   Constitutional:       General: He is not in acute distress.     Appearance: Normal appearance.   HENT:      Head:  Normocephalic and atraumatic.   Eyes:      General: No scleral icterus.     Extraocular Movements: Extraocular movements intact.   Neck:      Musculoskeletal: Normal range of motion and neck supple.   Cardiovascular:      Rate and Rhythm: Normal rate and regular rhythm.      Pulses: Normal pulses.      Heart sounds: Normal heart sounds. No murmur.   Pulmonary:      Effort: Pulmonary effort is normal. No respiratory distress.      Breath sounds: Normal breath sounds. No wheezing, rhonchi or rales.   Abdominal:      General: Abdomen is flat. Bowel sounds are normal. There is no distension.      Palpations: Abdomen is soft.      Tenderness: There is no rebound.   Musculoskeletal:         General: No swelling or tenderness.   Lymphadenopathy:      Cervical: No cervical adenopathy.   Skin:     Coloration: Skin is not jaundiced.      Findings: No erythema.   Neurological:      Mental Status: He is alert. Mental status is at baseline.      Cranial Nerves: No cranial nerve deficit.      Comments: Oriented to self     Psychiatric:         Mood and Affect: Mood normal.         Behavior: Behavior normal.     Seen and examined 4/16  No change in exam c/t prior    Fluids    Intake/Output Summary (Last 24 hours) at 4/16/2020 1151  Last data filed at 4/15/2020 2000  Gross per 24 hour   Intake 120 ml   Output 200 ml   Net -80 ml       Laboratory                        Imaging  DX-ESOPHAGUS - [id number]   Final Result      2.5 minutes of fluoroscopy time utilized for modified barium swallow which showed tracheal penetration           Assessment/Plan  * Requires supervision due to deficit in self-care  Assessment & Plan  Awaiting for guardianship   assisting with placement    Urine retention  Assessment & Plan  Resolved  UA - no evidence of infection.  Continue flomax, add proscar  Straight cath x 1      UGI bleed  Assessment & Plan  Suspected  Xarelto stopped  GI consulted, no endoscopy warranted  Medical management  with PPI bid.    Anemia due to acute blood loss  Assessment & Plan  xarelto stopped  Hemoglobin is 7.6 after blood transfusion, GI recommends transfusions as needed to keep over 7  Follow h/h, continue prilosec bid    High blood urea nitrogen (BUN)  Assessment & Plan  Monitor labs, due to gi bleed not worsening renal function    Eye dryness  Assessment & Plan  resolved    Agitation  Assessment & Plan  Resume nightly Seroquel        Chronic atrial fibrillation (HCC)  Assessment & Plan  Rate controlled with no need for rate control medication  Holding xarelto due to gi bleed, risk outweighs benefit.      Severe protein-calorie malnutrition (HCC)  Assessment & Plan  Nutrition following  Patient is eating magic cup supplements  Ok to continue diet, no endoscopy planned at this time per GI    Hyperlipidemia- (present on admission)  Assessment & Plan  Resume lipitor    COPD (chronic obstructive pulmonary disease) ([facility])- (present on admission)  Assessment & Plan  No evidence of exacerbation  Continue RT protocol       CKD (chronic kidney disease) stage 3, GFR 30-59 ml/min ([facility])- (present on admission)  Assessment & Plan  Stable, follow labs, BUN elevated due to acute GI bleed        Essential hypertension- (present on admission)  Assessment & Plan   continue losartan and amlodipine, tolerated well so far  Will monitor blood pressure and adjust medication as needed       VTE prophylaxis: xarelto d/c secondary to GI bleed, SCDs

## 2020-04-16 NOTE — CARE PLAN
Problem: Safety  Goal: Will remain free from injury  Outcome: PROGRESSING AS EXPECTED  Note: Bed alarm in place       Problem: Bowel/Gastric:  Goal: Normal bowel function is maintained or improved  Outcome: PROGRESSING AS EXPECTED  Note: Pt educated to take stool softeners when needed.

## 2020-04-17 NOTE — CARE PLAN
Problem: Safety  Goal: Will remain free from injury  Outcome: PROGRESSING AS EXPECTED  Note: Bed alarm in place.      Problem: Bowel/Gastric:  Goal: Normal bowel function is maintained or improved  Outcome: PROGRESSING AS EXPECTED  Note: Pt educated to take stool softeners when needed.

## 2020-04-17 NOTE — PROGRESS NOTES
Received report from night shift. Assumed care of patient. Patient asleep in bed. Will continue to monitor patient.

## 2020-04-17 NOTE — PROGRESS NOTES
No change from morning assessment except no noted choking or cough today.  Plan of care reviewed again and questions answered as needed.

## 2020-04-17 NOTE — CARE PLAN
Problem: Safety  Goal: Will remain free from falls  Outcome: PROGRESSING AS EXPECTED  Note: Bed alarm on. Patient encouraged to use call light for assistance.     Problem: Knowledge Deficit  Goal: Knowledge of disease process/condition, treatment plan, diagnostic tests, and medications will improve  Outcome: PROGRESSING AS EXPECTED  Note: Patient educated about medications     Problem: Skin Integrity  Goal: Risk for impaired skin integrity will decrease  Outcome: PROGRESSING AS EXPECTED  Note: Providing Q2 turns.

## 2020-04-18 NOTE — PROGRESS NOTES
[facility] Medicine Daily Progress Note    Date of Service  4/18/2020    Chief Complaint  79 y.o. male admitted 12/27/2019 with inability to care for self.    Hospital Course    Patient presented from home when his caregiver was found dead in his home on day of admission.  Patient is not able to care for himself and needs 24 hour supervision.  He was seen by psychiatry on 1/11/2020 and deemed incapacitated for medical decisions.  GI bleed suspected 4/4, GI consulted, recommends medical management.  Received blood transfusion 4/5.      Interval Problem Update  Seen and examined  No acute events overnight, no fevers or chills  Discussed with RN and CM at length regarding placement    Consultants/Specialty  Psych - s/o    Code Status  DNR    Disposition  Placement pending  Brother is POA, assisting with decisions.    Review of Systems  Review of Systems   Constitutional: Positive for malaise/fatigue. Negative for chills and fever.   Respiratory: Negative for cough and shortness of breath.    Cardiovascular: Negative for chest pain.   Musculoskeletal: Positive for myalgias.      Physical Exam  Temp:  [36.4 °C (97.5 °F)] 36.4 °C (97.5 °F)  Pulse:  [76-85] 76  Resp:  [16] 16  BP: (102-151)/(73-79) 151/79  SpO2:  [94 %-96 %] 94 %    Physical Exam  Vitals signs and nursing note reviewed.   Constitutional:       Appearance: He is ill-appearing.   HENT:      Head: Normocephalic and atraumatic.   Eyes:      General: No scleral icterus.  Neck:      Musculoskeletal: Normal range of motion. No neck rigidity.   Pulmonary:      Effort: No respiratory distress.   Abdominal:      General: There is no distension.      Tenderness: There is no abdominal tenderness.   Musculoskeletal:         General: No swelling or deformity.   Skin:     General: Skin is warm and dry.      Coloration: Skin is not jaundiced.   Neurological:      Mental Status: He is alert. Mental status is at baseline.     Seen and examined 4/17  No change in exam c/t  prior    Fluids    Intake/Output Summary (Last 24 hours) at 4/18/2020 0849  Last data filed at 4/18/2020 0745  Gross per 24 hour   Intake 480 ml   Output 215 ml   Net 265 ml       Laboratory                        Imaging  DX-ESOPHAGUS - [id number]   Final Result      2.5 minutes of fluoroscopy time utilized for modified barium swallow which showed tracheal penetration           Assessment/Plan  * Requires supervision due to deficit in self-care  Assessment & Plan  Awaiting for guardianship   assisting with placement    Urine retention  Assessment & Plan  Resolved  UA - no evidence of infection.  Continue flomax, added proscar      UGI bleed  Assessment & Plan  Suspected  Xarelto stopped  GI consulted, no endoscopy warranted  Medical management with PPI bid.    Anemia due to acute blood loss  Assessment & Plan  xarelto stopped  Hemoglobin is 7.6 after blood transfusion, GI recommends transfusions as needed to keep over 7  Follow intermittent H&H    High blood urea nitrogen (BUN)  Assessment & Plan  due to gi bleed not worsening renal function    Eye dryness  Assessment & Plan  resolved    Agitation  Assessment & Plan  Resume nightly Seroquel    Chronic atrial fibrillation (HCC)  Assessment & Plan  Rate controlled with no need for rate control medication  Holding xarelto due to gi bleed, risk outweighs benefit.      Severe protein-calorie malnutrition (HCC)  Assessment & Plan  Nutrition following  Patient is eating magic cup supplements  Ok to continue diet, no endoscopy planned at this time per GI    Hyperlipidemia- (present on admission)  Assessment & Plan  Resume lipitor    COPD (chronic obstructive pulmonary disease) ([facility])- (present on admission)  Assessment & Plan  No evidence of exacerbation  Continue RT protocol       CKD (chronic kidney disease) stage 3, GFR 30-59 ml/min ([facility])- (present on admission)  Assessment & Plan  Stable, follow labs, BUN elevated due to acute GI bleed        Essential  hypertension- (present on admission)  Assessment & Plan   continue losartan and amlodipine, tolerated well so far  Will monitor blood pressure and adjust medication as needed     VTE prophylaxis: xarelto d/c secondary to GI bleed, SCDs

## 2020-04-18 NOTE — CARE PLAN
Received report from noc shift nurse.   Assumed pt care at approximately 0700   Pt is A&Ox4, resting comfortably in bed.   Pt on r.a.. No signs of SOB/respiratory distress.   Labs noted, VSS.   Pt c/o no pain at this moment.  Assessment completed  Fall precautions in place.   Bed at lowest position.   Call light and personal belongings within reach. Continue to monitor         Problem: Safety  Goal: Will remain free from falls  Outcome: PROGRESSING AS EXPECTED     Problem: Bowel/Gastric:  Goal: Normal bowel function is maintained or improved  Outcome: PROGRESSING AS EXPECTED

## 2020-04-19 NOTE — PROGRESS NOTES
0645: Bedside report completed.  Assumed pt care. Patient in bed, sleeping, in no apparent distress.  Safety precautions in place. Call light & personal belongings within reach.  Plan of care discussed.

## 2020-04-19 NOTE — CARE PLAN
Problem: Bowel/Gastric:  Goal: Normal bowel function is maintained or improved  Outcome: PROGRESSING AS EXPECTED  Note: Pt is incontinent; and had a small BM last night   Perineal care provided; new bed made and pt in new fresh gown     Problem: Discharge Barriers/Planning  Goal: Patient's continuum of care needs will be met  Outcome: PROGRESSING AS EXPECTED  Note: Awaiting placement

## 2020-04-19 NOTE — PROGRESS NOTES
Received report from day shift nurse.   Assumed pt care  Pt is resting comfortably in bed. Pt on r.a..   No signs of SOB/respiratory distress.   Pt c/o no pain at this moment.   Perineal care provided, pt had a small BM   Fall precautions in place.   Bed at lowest position. Bed alarm on  Call light and personal belongings within reach.   Continue to monitor

## 2020-04-19 NOTE — CARE PLAN
Problem: Medication  Goal: Compliance with prescribed medication will improve  Outcome: PROGRESSING AS EXPECTED  Intervention: Educate patient and significant other/support system medication rationale and regimen  Note: Patient is currently on board with taking all of his medications, he is hesitant if an IV is required, he currently does not have an IV in place.     Problem: Mobility  Goal: Risk for activity intolerance will decrease  Outcome: PROGRESSING SLOWER THAN EXPECTED  Intervention: Assess and monitor signs of activity intolerance  Note: Patient has increasing weakness and increasing weight loss.  He does not typically want to get out of bed despite encouragement, but does have moments of impulsivity and will attempt to stand on his own.

## 2020-04-19 NOTE — PROGRESS NOTES
Hospital Medicine Daily Progress Note    Date of Service  4/19/2020    Chief Complaint  79 y.o. male admitted 12/27/2019 with inability to care for self.    Hospital Course    Patient presented from home when his caregiver was found dead in his home on day of admission.  Patient is not able to care for himself and needs 24 hour supervision.  He was seen by psychiatry on 1/11/2020 and deemed incapacitated for medical decisions.  GI bleed suspected 4/4, GI consulted, recommends medical management.  Received blood transfusion 4/5.      Interval Problem Update  The patient has stable hemoglobin over 10  Blood pressure is more elevated in 150 systolic range    Consultants/Specialty  Psych - s/o    Code Status  DNR    Disposition  Placement pending  Brother is POA, assisting with decisions.    Review of Systems  Review of Systems   Constitutional: Negative for diaphoresis and fever.   Respiratory: Negative for sputum production and shortness of breath.    Gastrointestinal: Negative for nausea.   Musculoskeletal: Positive for myalgias.      Physical Exam  Temp:  [37.1 °C (98.7 °F)-37.4 °C (99.3 °F)] 37.1 °C (98.7 °F)  Pulse:  [69-78] 78  Resp:  [18-19] 19  BP: (152-154)/(74-85) 152/85  SpO2:  [93 %-99 %] 93 %    Physical Exam  Vitals signs and nursing note reviewed.   Constitutional:       General: He is not in acute distress.     Appearance: He is not diaphoretic.   HENT:      Nose: No congestion.      Mouth/Throat:      Mouth: Mucous membranes are moist.      Pharynx: Oropharynx is clear.   Eyes:      Conjunctiva/sclera: Conjunctivae normal.   Cardiovascular:      Rate and Rhythm: Normal rate and regular rhythm.      Heart sounds: No murmur.   Pulmonary:      Effort: No respiratory distress.   Abdominal:      General: There is no distension.      Tenderness: There is no abdominal tenderness.   Musculoskeletal:         General: No swelling or deformity.   Skin:     General: Skin is warm and dry.      Coloration: Skin is  not jaundiced.   Neurological:      Mental Status: He is alert. Mental status is at baseline.   Psychiatric:         Behavior: Behavior is cooperative.         Cognition and Memory: Cognition is impaired. Memory is impaired.     Seen and examined 4/17  No change in exam c/t prior    Fluids    Intake/Output Summary (Last 24 hours) at 4/19/2020 0741  Last data filed at 4/19/2020 0515  Gross per 24 hour   Intake 360 ml   Output 420 ml   Net -60 ml       Laboratory                        Imaging  DX-ESOPHAGUS - [id number]   Final Result      2.5 minutes of fluoroscopy time utilized for modified barium swallow which showed tracheal penetration           Assessment/Plan  * Requires supervision due to deficit in self-care  Assessment & Plan  Awaiting for guardianship   assisting with placement    Urine retention  Assessment & Plan  Resolved  UA - no evidence of infection.  Continue flomax and proscar      UGI bleed  Assessment & Plan  resolved  Xarelto stopped  Change ppi to once daily    Anemia due to acute blood loss  Assessment & Plan  xarelto stopped  Hemoglobin is now stable over 10    High blood urea nitrogen (BUN)  Assessment & Plan  Monitor labs, due to renal disease and recent GI bleed    Eye dryness  Assessment & Plan  resolved    Agitation  Assessment & Plan  Resume nightly Seroquel    Chronic atrial fibrillation (HCC)  Assessment & Plan  Rate controlled with no need for rate control medication  Holding xarelto due to gi bleed, risk outweighs benefit.      Severe protein-calorie malnutrition (HCC)  Assessment & Plan  Nutrition following  Patient is eating magic cup supplements but has variable intake    Hyperlipidemia- (present on admission)  Assessment & Plan  Resume lipitor    COPD (chronic obstructive pulmonary disease) ([facility])- (present on admission)  Assessment & Plan  No evidence of exacerbation  Continue RT protocol       CKD (chronic kidney disease) stage 3, GFR 30-59 ml/min ([facility])- (present  on admission)  Assessment & Plan  Stable baseline creatinine is over 2        Essential hypertension- (present on admission)  Assessment & Plan   continue losartan and amlodipine, tolerated well so far  Will monitor blood pressure and adjust medication as needed  Systolic pressure is more elevated at 150 today, will adjust medication if still elevated tomorrow as this may be consistently increasing     VTE prophylaxis:  SCDs

## 2020-04-20 NOTE — PROGRESS NOTES
Report received, assumed care. Patient in bed, in no apparent distress, safety precautions in place, call light within reach. Will continue to monitor.

## 2020-04-20 NOTE — THERAPY
Speech Language Therapy dysphagia treatment completed.     Functional Status:  Pt was seen today for f/u dysphagia tx session. Upon arrival to Pt's room, Pt was found reclined in bed. Pt was provided with snacks of rony crackers by CNA; however, swallow sign and diet order indicate Puree (PU4) solids, and Mildly thick liquids (MT2). Pt was observed to chew and swallow rony crackers with appropriate bolus size and feeding rate independently; however, required clinician cue to put HOB at 90*. Pt demonstrated no coughing, choking and/or other clinical s/sx of aspiration/penetration with PO trials.     SLP obtained Pt's swallow strengthening exercise handout (located in room); however, Pt declined to participate. Pt reported that he 'completes them all the time' yet only does 4-5 repetitions per each exercise. Pt was minimally receptive to education re: aspiration risks and modified diet restrictions. Given Pt's hx of refusal to participate with skilled SLP services (specifically swallow strengthening exercises), ongoing dysphagia tx does not appear warranted/indicated. SLP will no longer actively follow; however, will remain available for dysphagia education PRN. RN notified and aware. Pt verbalized understanding. Recommend to con't monitoring with PO intake to ensure strict adherence to safe swallow precautions, and PO diet of Pureed solid (PU4) textures with Mildly thick liquids (MT2). Thank you.    Recommendations: Recommend to con't monitoring with PO intake to ensure strict adherence to safe swallow precautions, and PO diet of Pureed solid (PU4) textures with Mildly thick liquids (MT2)     Plan of Care: Patient is currently not being actively followed for therapy services at this time, however may be seen if requested by attending provider for 1 more visit within 30 days to address any discharge needs or if the patient has a change in status.      Post-Acute Therapy: Recommend inpatient transitional care  "services for continued speech therapy services.      See \"Rehab Therapy-Acute\" Patient Summary Report for complete documentation.     "

## 2020-04-20 NOTE — CARE PLAN
Problem: Communication  Goal: The ability to communicate needs accurately and effectively will improve  Outcome: PROGRESSING AS EXPECTED     Problem: Knowledge Deficit  Goal: Knowledge of disease process/condition, treatment plan, diagnostic tests, and medications will improve  Outcome: PROGRESSING AS EXPECTED     Problem: Safety  Goal: Will remain free from injury  Outcome: PROGRESSING SLOWER THAN EXPECTED

## 2020-04-20 NOTE — PROGRESS NOTES
Hospital Medicine Daily Progress Note    Date of Service  4/20/2020    Chief Complaint  79 y.o. male admitted 12/27/2019 with inability to care for self.    Hospital Course    Patient presented from home when his caregiver was found dead in his home on day of admission.  Patient is not able to care for himself and needs 24 hour supervision.  He was seen by psychiatry on 1/11/2020 and deemed incapacitated for medical decisions.  GI bleed suspected 4/4, GI consulted, recommends medical management.  Received blood transfusion 4/5.      Interval Problem Update  The patient continues to have systolic blood pressures over 150 despite norvasc 5mg and losartan    Consultants/Specialty  Psych - s/o    Code Status  DNR    Disposition  Placement pending  Brother is POA, assisting with decisions.    Review of Systems  Review of Systems   Constitutional: Negative for chills and fever.   Respiratory: Negative for cough.    Genitourinary: Negative for dysuria and frequency.   Musculoskeletal: Positive for myalgias.      Physical Exam  Temp:  [37.2 °C (98.9 °F)-37.3 °C (99.2 °F)] 37.2 °C (98.9 °F)  Pulse:  [69-72] 72  Resp:  [18-19] 19  BP: (155-170)/(76-82) 155/77  SpO2:  [96 %-97 %] 96 %    Physical Exam  Vitals signs and nursing note reviewed.   Constitutional:       Appearance: He is cachectic. He is not diaphoretic.   HENT:      Nose: No congestion or rhinorrhea.   Eyes:      General: No scleral icterus.        Right eye: No discharge.         Left eye: No discharge.   Neck:      Musculoskeletal: Neck supple. No muscular tenderness.   Cardiovascular:      Rate and Rhythm: Normal rate. Rhythm irregular.      Heart sounds: No murmur.   Pulmonary:      Effort: No respiratory distress.   Abdominal:      General: Bowel sounds are normal. There is no distension.      Tenderness: There is no abdominal tenderness.   Musculoskeletal:         General: No swelling or deformity.   Skin:     General: Skin is warm and dry.      Coloration:  Skin is not jaundiced.   Neurological:      Mental Status: He is alert. Mental status is at baseline.      Coordination: Coordination normal.   Psychiatric:         Behavior: Behavior is cooperative.         Cognition and Memory: Cognition is impaired. Memory is impaired.     Seen and examined 4/17  No change in exam c/t prior    Fluids    Intake/Output Summary (Last 24 hours) at 4/20/2020 0751  Last data filed at 4/20/2020 0554  Gross per 24 hour   Intake 600 ml   Output 400 ml   Net 200 ml       Laboratory                        Imaging  DX-ESOPHAGUS - [id number]   Final Result      2.5 minutes of fluoroscopy time utilized for modified barium swallow which showed tracheal penetration           Assessment/Plan  * Requires supervision due to deficit in self-care  Assessment & Plan  Awaiting for guardianship   assisting with placement    Urine retention  Assessment & Plan  Resolved  UA - no evidence of infection.  Continue flomax and proscar      UGI bleed  Assessment & Plan  resolved  Xarelto stopped  Continue ppi once daily for now, may stop after a week, 4/26    Anemia due to acute blood loss  Assessment & Plan  xarelto stopped  Hemoglobin is now stable will monitor as needed    High blood urea nitrogen (BUN)  Assessment & Plan  Monitor labs, due to renal disease and recent GI bleed    Eye dryness  Assessment & Plan  resolved    Agitation  Assessment & Plan  Resume nightly Seroquel    Chronic atrial fibrillation (HCC)  Assessment & Plan  Rate controlled with no need for rate control medication  Holding xarelto due to gi bleed and risk outweighs the benefits      Severe protein-calorie malnutrition (HCC)  Assessment & Plan  Nutrition following  Patient is eating magic cup supplements but has variable intake    Hyperlipidemia- (present on admission)  Assessment & Plan  Resume lipitor    COPD (chronic obstructive pulmonary disease) (HCC)- (present on admission)  Assessment & Plan  No evidence of  exacerbation  Continue RT protocol       CKD (chronic kidney disease) stage 3, GFR 30-59 ml/min ([facility])- (present on admission)  Assessment & Plan  Stable baseline creatinine is over 2        Essential hypertension- (present on admission)  Assessment & Plan   continue losartan   Systolic pressure is consistently in 150 to 160 range now  Will increase norvasc to 10mg daily and monitor     VTE prophylaxis:  SCDs

## 2020-04-21 NOTE — CARE PLAN
Problem: Medication  Goal: Compliance with prescribed medication will improve  Outcome: PROGRESSING AS EXPECTED  Intervention: Educate patient and significant other/support system medication rationale and regimen  Note: Patient is currently on board with taking all of his medications.     Problem: Mobility  Goal: Risk for activity intolerance will decrease  Outcome: PROGRESSING SLOWER THAN EXPECTED  Intervention: Encourage patient to increase activity level in collaboration with Interdisciplinary Team  Note: Patient continues to refuse to get out of bed for ambulation.  He is becoming more weak, requires a showerchair to get to the shower, he is able to walk, but it is tiring for him at this point.  He has moments of impulsivity and he attempts to stand at the edge of the bed on his own.  The bed alarm is on.

## 2020-04-21 NOTE — PROGRESS NOTES
Hospital Medicine Daily Progress Note    Date of Service  4/21/2020    Chief Complaint  79 y.o. male admitted 12/27/2019 with inability to care for self.    Hospital Course    Patient presented from home when his caregiver was found dead in his home on day of admission.  Patient is not able to care for himself and needs 24 hour supervision.  He was seen by psychiatry on 1/11/2020 and deemed incapacitated for medical decisions.  GI bleed suspected 4/4, GI consulted, recommends medical management.  Received blood transfusion 4/5.      Interval Problem Update  4/20- The patient continues to have systolic blood pressures over 150 despite norvasc 5mg and losartan  4/21- stable over night. No events. No complaints. I discussed him with his RN. Still awaiting placement.     Consultants/Specialty  Psych - s/o    Code Status  DNR    Disposition  Placement pending  Brother is POA, assisting with decisions.    Review of Systems  Review of Systems   Constitutional: Negative for chills and fever.   Respiratory: Negative for cough.    Genitourinary: Negative for dysuria and frequency.   Musculoskeletal: Positive for myalgias.      Physical Exam  Temp:  [36.8 °C (98.2 °F)-37.1 °C (98.8 °F)] 37.1 °C (98.8 °F)  Pulse:  [72-80] 72  Resp:  [17-18] 18  BP: (142-160)/(45-80) 160/72  SpO2:  [96 %-98 %] 98 %    Physical Exam  Vitals signs and nursing note reviewed.   Constitutional:       Appearance: He is cachectic. He is not diaphoretic.   HENT:      Nose: No congestion or rhinorrhea.   Eyes:      General: No scleral icterus.        Right eye: No discharge.         Left eye: No discharge.   Neck:      Musculoskeletal: Neck supple. No muscular tenderness.   Cardiovascular:      Rate and Rhythm: Normal rate. Rhythm irregular.      Heart sounds: No murmur.   Pulmonary:      Effort: No respiratory distress.   Abdominal:      General: Bowel sounds are normal. There is no distension.      Tenderness: There is no abdominal tenderness.    Musculoskeletal:         General: No swelling or deformity.   Skin:     General: Skin is warm and dry.      Coloration: Skin is not jaundiced.   Neurological:      Mental Status: He is alert. Mental status is at baseline.      Coordination: Coordination normal.   Psychiatric:         Behavior: Behavior is cooperative.         Cognition and Memory: Cognition is impaired. Memory is impaired.     Seen and examined 4/17  No change in exam c/t prior    Fluids    Intake/Output Summary (Last 24 hours) at 4/21/2020 0808  Last data filed at 4/21/2020 0730  Gross per 24 hour   Intake 560 ml   Output 500 ml   Net 60 ml       Laboratory                        Imaging  DX-ESOPHAGUS - [id number]   Final Result      2.5 minutes of fluoroscopy time utilized for modified barium swallow which showed tracheal penetration           Assessment/Plan  * Requires supervision due to deficit in self-care  Assessment & Plan  Awaiting for guardianship   assisting with placement    Urine retention  Assessment & Plan  Resolved  UA - no evidence of infection.  Continue flomax and proscar      UGI bleed  Assessment & Plan  resolved  Xarelto stopped  Continue ppi once daily for now, may stop after a week, 4/26    Anemia due to acute blood loss  Assessment & Plan  xarelto stopped  Hemoglobin is now stable will monitor as needed    High blood urea nitrogen (BUN)  Assessment & Plan  Monitor labs, due to renal disease and recent GI bleed    Eye dryness  Assessment & Plan  resolved    Agitation  Assessment & Plan  Resume nightly Seroquel    Chronic atrial fibrillation (HCC)  Assessment & Plan  Rate controlled with no need for rate control medication  Holding xarelto due to gi bleed and risk outweighs the benefits      Severe protein-calorie malnutrition (HCC)  Assessment & Plan  Nutrition following  Patient is eating magic cup supplements but has variable intake    Hyperlipidemia- (present on admission)  Assessment & Plan  Resume  lipitor    COPD (chronic obstructive pulmonary disease) ([facility])- (present on admission)  Assessment & Plan  No evidence of exacerbation  Continue RT protocol       CKD (chronic kidney disease) stage 3, GFR 30-59 ml/min ([facility])- (present on admission)  Assessment & Plan  Stable baseline creatinine is over 2        Essential hypertension- (present on admission)  Assessment & Plan   continue losartan   Systolic pressure is consistently in 150 to 160 range now  Will increase norvasc to 10mg daily and monitor     VTE prophylaxis:  SCDs

## 2020-04-21 NOTE — CARE PLAN
Problem: Communication  Goal: The ability to communicate needs accurately and effectively will improve  Outcome: PROGRESSING SLOWER THAN EXPECTED  Note: Pt confused, frequent reorientation provided.      Problem: Safety  Goal: Will remain free from injury  Outcome: PROGRESSING AS EXPECTED  Note: Call light and personal belongings within reach, bed in low locked position, room free of clutter, bed alarm activated. Hourly rounding in place to ensure pt safety and needs are met.

## 2020-04-21 NOTE — PROGRESS NOTES
0655: Bedside report completed w/ Mariposa/RN.  Assumed pt care. Patient in bed, resting, in no apparent distress.  Safety precautions in place. Call light & personal belongings within reach.  Plan of care discussed.     0720:  Sat patient up for breakfast, took morning pills, no needs expressed at this time.

## 2020-04-22 PROBLEM — R62.7 FTT (FAILURE TO THRIVE) IN ADULT: Status: ACTIVE | Noted: 2020-01-01

## 2020-04-22 NOTE — PROGRESS NOTES
Hospital Medicine Daily Progress Note    Date of Service  4/22/2020    Chief Complaint  79 y.o. male admitted 12/27/2019 with inability to care for self.    Hospital Course    Patient presented from home when his caregiver was found dead in his home on day of admission.  Patient is not able to care for himself and needs 24 hour supervision.  He was seen by psychiatry on 1/11/2020 and deemed incapacitated for medical decisions.  GI bleed suspected 4/4, GI consulted, recommends medical management.  Received blood transfusion 4/5.      Interval Problem Update  4/20- The patient continues to have systolic blood pressures over 150 despite norvasc 5mg and losartan  4/21- stable over night. No events. No complaints. I discussed him with his RN. Still awaiting placement.   4/22- hospice care needed at this point . He continues to decline. I have placed a consultation fot them.     Consultants/Specialty  Psych - s/o    Code Status  DNR    Disposition  Placement pending  Brother is POA, assisting with decisions.  I discussed hospice with case management and have placed a consultation. This is a good option for this declining patient.     Review of Systems  Review of Systems   Constitutional: Negative for chills and fever.   Respiratory: Negative for cough.    Genitourinary: Negative for dysuria and frequency.   Musculoskeletal: Positive for myalgias.      Physical Exam  Temp:  [36.8 °C (98.3 °F)] 36.8 °C (98.3 °F)  Pulse:  [77-78] 78  Resp:  [18] 18  BP: (126-135)/(60-79) 135/79  SpO2:  [97 %] 97 %    Physical Exam  Vitals signs and nursing note reviewed.   Constitutional:       Appearance: He is cachectic. He is not diaphoretic.   HENT:      Nose: No congestion or rhinorrhea.   Eyes:      General: No scleral icterus.        Right eye: No discharge.         Left eye: No discharge.   Neck:      Musculoskeletal: Neck supple. No muscular tenderness.   Cardiovascular:      Rate and Rhythm: Normal rate. Rhythm irregular.       Heart sounds: No murmur.   Pulmonary:      Effort: No respiratory distress.   Abdominal:      General: Bowel sounds are normal. There is no distension.      Tenderness: There is no abdominal tenderness.   Musculoskeletal:         General: No swelling or deformity.   Skin:     General: Skin is warm and dry.      Coloration: Skin is not jaundiced.   Neurological:      Mental Status: He is alert. Mental status is at baseline.      Coordination: Coordination normal.   Psychiatric:         Behavior: Behavior is cooperative.         Cognition and Memory: Cognition is impaired. Memory is impaired.         Fluids    Intake/Output Summary (Last 24 hours) at 4/22/2020 0817  Last data filed at 4/22/2020 0500  Gross per 24 hour   Intake 600 ml   Output 600 ml   Net 0 ml       Laboratory                        Imaging  DX-ESOPHAGUS - [id number]   Final Result      2.5 minutes of fluoroscopy time utilized for modified barium swallow which showed tracheal penetration           Assessment/Plan  * FTT (failure to thrive) in adult  Assessment & Plan  Worsening rapidly. His functional capacity and mental capacity continues to decline despite nutrition and supportive care. I am concerned about an underlying malignancy. He is not a candidate for treatment. Thus, no workup needed. Hospice consultation. His brother is in favor of this. He would be a good candidate for [facility] care if we can arrange this.     Urine retention  Assessment & Plan  Resolved  UA - no evidence of infection.  Continue flomax and proscar      UGI bleed  Assessment & Plan  resolved  Xarelto stopped  Continue ppi once daily for now, may stop after a week, 4/26    Anemia due to acute blood loss  Assessment & Plan  xarelto stopped  Hemoglobin is now stable will monitor as needed    High blood urea nitrogen (BUN)  Assessment & Plan  Stable. Likely from previous GIB    Eye dryness  Assessment & Plan  resolved    Agitation  Assessment & Plan  Resume nightly  Seroquel    Chronic atrial fibrillation (HCC)  Assessment & Plan  Rate controlled with no need for rate control medication  Holding xarelto due to gi bleed and risk outweighs the benefits      Severe protein-calorie malnutrition (HCC)  Assessment & Plan  Nutrition following  Patient is eating magic cup supplements but has variable intake  Despite this he continues to lose weight. He has lost 60# since admission- high concern for an occult malignancy. Workup not likely needed given his functional status and FTT. Hospice consult.     Requires supervision due to deficit in self-care  Assessment & Plan  Awaiting for guardianship   assisting with placement    Hyperlipidemia- (present on admission)  Assessment & Plan  Resume lipitor    COPD (chronic obstructive pulmonary disease) ([facility])- (present on admission)  Assessment & Plan  No evidence of exacerbation  Continue RT protocol       CKD (chronic kidney disease) stage 3, GFR 30-59 ml/min ([facility])- (present on admission)  Assessment & Plan  Stable baseline creatinine is over 2        Essential hypertension- (present on admission)  Assessment & Plan   continue losartan   Systolic pressure is consistently in 150 to 160 range now  Will increase norvasc to 10mg daily and monitor     VTE prophylaxis:  SCDs

## 2020-04-22 NOTE — ASSESSMENT & PLAN NOTE
Worsening rapidly. His functional capacity and mental capacity continues to decline despite nutrition and supportive care. I am concerned about an underlying malignancy. He is not a candidate for treatment. Thus, no workup needed. Hospice consultation. His brother is in favor of this. He would be a good candidate for [facility] care if we can arrange this.

## 2020-04-22 NOTE — PROGRESS NOTES
Assumed care of patient at 1900.  Patient is alert and appropriate, resting in bed and watching television.  Fresh coffee given with thickener.  Patient denies any further needs.

## 2020-04-22 NOTE — PROGRESS NOTES
Received report from Alondra FARRIS. Assumed care. This pt is AOx4,   denies pain, Patient and RN discussed plan of care including waiting on placement, monitor patient when eating to prevent aspiration, monitor skin: questions answered. Chart reviewed. Call light in place, fall precautions in place, patient educated on importance of calling for assistance. No additional needs at this time.

## 2020-04-22 NOTE — DISCHARGE PLANNING
"SPoke to pts brother/POA, Memo. Memo is open to hospice care \"if that's what he needs\". RN NICK explained that pt has declined and lost about 60 lbs since admission in December. Brother is agreeable to hospice. RN CM will keep brother informed.     [facility].   "

## 2020-04-22 NOTE — CARE PLAN
Problem: Communication  Goal: The ability to communicate needs accurately and effectively will improve  Outcome: PROGRESSING AS EXPECTED  Note: Encouraged patient to talk about his concerns.       Problem: Safety  Goal: Will remain free from injury  Outcome: PROGRESSING AS EXPECTED  Note: Bed in low and locked position.  Side rails up X3.  Bed alarm remains on.  Hourly rounding continues.

## 2020-04-23 NOTE — CARE PLAN
Received report from day shift nurse.   Assumed pt care at approximately 1900  Pt is resting comfortably in bed.   Pt on r.a.. No signs of SOB/respiratory distress.   Labs noted, VSS.   Pt c/o no pain at this moment.   Assessment completed, pt with bowel movement pt cleaned and linen partially changed.   Fall precautions in place.   Bed at lowest position.   Call light and personal belongings within reach. Continue to monitor         Problem: Safety  Goal: Will remain free from falls  Outcome: PROGRESSING AS EXPECTED     Problem: Skin Integrity  Goal: Risk for impaired skin integrity will decrease  Outcome: PROGRESSING AS EXPECTED

## 2020-04-23 NOTE — PROGRESS NOTES
Hospital Medicine Daily Progress Note    Date of Service  4/23/2020    Chief Complaint  79 y.o. male admitted 12/27/2019 with inability to care for self.    Hospital Course    Patient presented from home when his caregiver was found dead in his home on day of admission.  Patient is not able to care for himself and needs 24 hour supervision.  He was seen by psychiatry on 1/11/2020 and deemed incapacitated for medical decisions.  GI bleed suspected 4/4, GI consulted, recommends medical management.  Received blood transfusion 4/5.      Interval Problem Update  4/20- The patient continues to have systolic blood pressures over 150 despite norvasc 5mg and losartan  4/21- stable over night. No events. No complaints. I discussed him with his RN. Still awaiting placement.   4/22- hospice care needed at this point . He continues to decline. I have placed a consultation fot them.   4/23- agitated on occasion. No other changes.     Consultants/Specialty  Psych - s/o    Code Status  DNR    Disposition  Placement pending  Brother is POA, assisting with decisions.  I discussed hospice with case management and have placed a consultation. This is a good option for this declining patient.     Review of Systems  Review of Systems   Constitutional: Negative for chills and fever.   Respiratory: Negative for cough.    Genitourinary: Negative for dysuria and frequency.   Musculoskeletal: Positive for myalgias.      Physical Exam  Temp:  [36.6 °C (97.8 °F)-37.2 °C (99 °F)] 37.2 °C (99 °F)  Pulse:  [72-73] 72  Resp:  [18] 18  BP: (142-145)/(72-80) 145/80  SpO2:  [96 %-97 %] 96 %    Physical Exam  Vitals signs and nursing note reviewed.   Constitutional:       Appearance: He is cachectic. He is not diaphoretic.   HENT:      Nose: No congestion or rhinorrhea.   Eyes:      General: No scleral icterus.        Right eye: No discharge.         Left eye: No discharge.   Neck:      Musculoskeletal: Neck supple. No muscular tenderness.    Cardiovascular:      Rate and Rhythm: Normal rate. Rhythm irregular.      Heart sounds: No murmur.   Pulmonary:      Effort: No respiratory distress.   Abdominal:      General: Bowel sounds are normal. There is no distension.      Tenderness: There is no abdominal tenderness.   Musculoskeletal:         General: No swelling or deformity.   Skin:     General: Skin is warm and dry.      Coloration: Skin is not jaundiced.   Neurological:      Mental Status: He is alert. Mental status is at baseline.      Coordination: Coordination normal.   Psychiatric:         Behavior: Behavior is cooperative.         Cognition and Memory: Cognition is impaired. Memory is impaired.         Fluids    Intake/Output Summary (Last 24 hours) at 4/23/2020 0835  Last data filed at 4/23/2020 0800  Gross per 24 hour   Intake 740 ml   Output 500 ml   Net 240 ml       Laboratory                        Imaging  DX-ESOPHAGUS - [id number]   Final Result      2.5 minutes of fluoroscopy time utilized for modified barium swallow which showed tracheal penetration           Assessment/Plan  * FTT (failure to thrive) in adult  Assessment & Plan  Worsening rapidly. His functional capacity and mental capacity continues to decline despite nutrition and supportive care. I am concerned about an underlying malignancy. He is not a candidate for treatment. Thus, no workup needed. Hospice consultation. His brother is in favor of this. He would be a good candidate for [facility] care if we can arrange this.     Urine retention  Assessment & Plan  Resolved  UA - no evidence of infection.  Continue flomax and proscar      UGI bleed  Assessment & Plan  resolved  Xarelto stopped  Continue ppi once daily for now, may stop after a week, 4/26    Anemia due to acute blood loss  Assessment & Plan  xarelto stopped  Hemoglobin is now stable will monitor as needed    High blood urea nitrogen (BUN)  Assessment & Plan  Stable. Likely from previous GIB    Eye dryness  Assessment  & Plan  resolved    Agitation  Assessment & Plan  Resume nightly Seroquel    Chronic atrial fibrillation (HCC)  Assessment & Plan  Rate controlled with no need for rate control medication  Holding xarelto due to gi bleed and risk outweighs the benefits      Severe protein-calorie malnutrition (HCC)  Assessment & Plan  Nutrition following  Patient is eating magic cup supplements but has variable intake  Despite this he continues to lose weight. He has lost 60# since admission- high concern for an occult malignancy. Workup not likely needed given his functional status and FTT. Hospice consult.     Requires supervision due to deficit in self-care  Assessment & Plan  Awaiting for guardianship   assisting with placement    Hyperlipidemia- (present on admission)  Assessment & Plan  Resume lipitor    COPD (chronic obstructive pulmonary disease) ([facility])- (present on admission)  Assessment & Plan  No evidence of exacerbation  Continue RT protocol       CKD (chronic kidney disease) stage 3, GFR 30-59 ml/min ([facility])- (present on admission)  Assessment & Plan  Stable baseline creatinine is over 2        Essential hypertension- (present on admission)  Assessment & Plan   continue losartan   Systolic pressure is consistently in 150 to 160 range now  Will increase norvasc to 10mg daily and monitor     VTE prophylaxis:  SCDs

## 2020-04-23 NOTE — PROGRESS NOTES
Received report from night nurse at 0700 and assumed care. Pt. Is resting in bed and eating breakfast. Bed in lowest and locked position.

## 2020-04-23 NOTE — CARE PLAN
Problem: Communication  Goal: The ability to communicate needs accurately and effectively will improve  Outcome: PROGRESSING AS EXPECTED  Intervention: [place] patient and significant other/support system to call light to alert staff of needs  Flowsheets (Taken 2/19/2020 1208 by Jacques Ma R.N.)  Oriented to::   Unit Routine   Call Light & Bedside Controls  Note: Pt oriented to room, date, location, situation. Pt was oriented to call when in need of assistance and when wanting to get up.     Problem: Venous Thromboembolism (VTW)/Deep Vein Thrombosis (DVT) Prevention:  Goal: Patient will participate in Venous Thrombosis (VTE)/Deep Vein Thrombosis (DVT)Prevention Measures  Outcome: PROGRESSING AS EXPECTED  Intervention: Assess and monitor for anticoagulation complications  Note: Pt assessed and monitored for complications of DVT. No signs and symptoms of DVT noted.     Problem: Pain Management  Goal: Pain level will decrease to patient's comfort goal  Outcome: PROGRESSING AS EXPECTED  Intervention: Follow pain managment plan developed in collaboration with patient and Interdisciplinary Team  Note: Pt has no complaints of pain at this time. Pt educated to communicate pain appropriately.

## 2020-04-23 NOTE — DISCHARGE PLANNING
ATTN: Case Management   RE: Referral for Hospice    [facility] is currently reviewing this referral. A nurse liaison will be in contact with the patient and family to assess for Hospice appropriateness. For immediate assistance, please call [phone] to speak to a member of our intake team.

## 2020-04-23 NOTE — DISCHARGE PLANNING
Received Choice form at 5848  Agency/Facility Name: [facility]  Referral sent per Choice form @ [phone]

## 2020-04-24 NOTE — CARE PLAN
Problem: Communication  Goal: The ability to communicate needs accurately and effectively will improve  Outcome: PROGRESSING AS EXPECTED     Problem: Safety  Goal: Will remain free from injury  Outcome: PROGRESSING AS EXPECTED     Problem: Infection  Goal: Will remain free from infection  Outcome: PROGRESSING AS EXPECTED     Problem: Venous Thromboembolism (VTW)/Deep Vein Thrombosis (DVT) Prevention:  Goal: Patient will participate in Venous Thrombosis (VTE)/Deep Vein Thrombosis (DVT)Prevention Measures  Outcome: PROGRESSING AS EXPECTED     Problem: Bowel/Gastric:  Goal: Normal bowel function is maintained or improved  Outcome: PROGRESSING AS EXPECTED  Goal: Will not experience complications related to bowel motility  Outcome: PROGRESSING AS EXPECTED     Problem: Skin Integrity  Goal: Risk for impaired skin integrity will decrease  Outcome: PROGRESSING AS EXPECTED     Problem: Urinary Elimination:  Goal: Ability to reestablish a normal urinary elimination pattern will improve  Outcome: PROGRESSING AS EXPECTED     Problem: Pain Management  Goal: Pain level will decrease to patient's comfort goal  Outcome: PROGRESSING AS EXPECTED     Problem: Psychosocial Needs:  Goal: Level of anxiety will decrease  Outcome: PROGRESSING AS EXPECTED

## 2020-04-24 NOTE — DISCHARGE PLANNING
Agency/Facility Name: [facility]  Spoke To: Lee Ann  Outcome: DON to review referral. Lee Ann to call [facility] back.

## 2020-04-24 NOTE — DISCHARGE PLANNING
Agency/Facility Name: [facility]  Spoke To: Sanjuanita  Outcome: Referral accepted. Bed available today.     BRENTON Moran notified via [facility].

## 2020-04-24 NOTE — PROGRESS NOTES
Pt to be DCd to [facility] at 1600. All belongings together and DC instructions completed.  Report called to Sarita.

## 2020-04-24 NOTE — DISCHARGE PLANNING
Received Transport Form @ 4691  Spoke to Jeannette @ [facility]  Transport is scheduled for 4/24 @ 1600 going to [facility].    Sanjuanita at [facility] notified of transport time.

## 2020-04-24 NOTE — DISCHARGE PLANNING
Patients weight progression:    12/27 - 64.86 kg  1/11 - 48.8 kg  2/16 - 40.7 kg  3/5 - 39.4 kg  3/13 - 36.0 kg  3/28 - 39.4 kg  4/22 - 41.1 kg

## 2020-04-24 NOTE — DISCHARGE PLANNING
Received Choice form at 5503  Agency/Facility Name: [facility]  Referral sent per Choice form @ [phone]

## 2020-04-24 NOTE — PROGRESS NOTES
VSS. Pt given discharge instructions, pt verbalizes understanding of discharge instructions, all questions answered. Meets all criteria for DC.       Belongings collected and  Returned to pt.

## 2020-04-24 NOTE — DISCHARGE INSTRUCTIONS
Discharge Instructions    Discharged to other by ambulance with escort. Discharged via ambulance, hospital escort: Yes.  Special equipment needed: Not Applicable    Be sure to schedule a follow-up appointment with your primary care doctor or any specialists as instructed.     Discharge Plan:   Smoking Cessation Offered: Patient Counseled  Influenza Vaccine Indication: Not indicated: Previously immunized this influenza season and > 8 years of age    I understand that a diet low in cholesterol, fat, and sodium is recommended for good health. Unless I have been given specific instructions below for another diet, I accept this instruction as my diet prescription.   Other diet: Pureed diet with thickened liquids    Special Instructions: None    · Is patient discharged on Warfarin / Coumadin?   No     Depression / Suicide Risk    As you are discharged from this [facility] facility, it is important to learn how to keep safe from harming yourself.    Recognize the warning signs:  · Abrupt changes in personality, positive or negative- including increase in energy   · Giving away possessions  · Change in eating patterns- significant weight changes-  positive or negative  · Change in sleeping patterns- unable to sleep or sleeping all the time   · Unwillingness or inability to communicate  · Depression  · Unusual sadness, discouragement and loneliness  · Talk of wanting to die  · Neglect of personal appearance   · Rebelliousness- reckless behavior  · Withdrawal from people/activities they love  · Confusion- inability to concentrate     If you or a loved one observes any of these behaviors or has concerns about self-harm, here's what you can do:  · Talk about it- your feelings and reasons for harming yourself  · Remove any means that you might use to hurt yourself (examples: pills, rope, extension cords, firearm)  · Get professional help from the community (Mental Health, Substance Abuse, psychological counseling)  · Do not  be alone:Call your Safe Contact- someone whom you trust who will be there for you.  · Call your local CRISIS HOTLINE 027-9780 or 493-579-1321  · Call your local Children's Mobile Crisis Response Team [place] (813) 472-1559 or www.Aethon  · Call the toll free National Suicide Prevention Hotlines   · National Suicide Prevention Lifeline 609-480-NYYB (2351)  · National [employer] Line Network 800-SUICIDE (193-5453)

## 2020-04-24 NOTE — DISCHARGE PLANNING
Agency/Facility Name: [facility]  Spoke To: Lee Ann  Outcome: Referral declined due to no hospice beds at this time.

## 2020-04-24 NOTE — DISCHARGE SUMMARY
Discharge Summary    CHIEF COMPLAINT ON ADMISSION  Chief Complaint   Patient presents with   • Failure to Thrive     pt had home caregiver who lived with him full-time. home health RN came and states he is unable to care for himself after he found his caregiver  in the home today.       Reason for Admission  EMS     CODE STATUS  DNAR/DNI    HPI & HOSPITAL COURSE  This is a 80 y.o. male here with the inability to care for himself. He presented from home after his caregiver was found dead in his home on day of admission.  He has chronic severe debility, is not able to care for himself and needs 24 hour supervision. The etiology of his debility is not clear. He has had a normal workup here aside from dementia and delirium. I suspect he may have a GI malignancy as he has lost weight since admission despite nutrition. He was seen by psychiatry on 2020 and deemed incapacitated for medical decisions. His hospital course was complicated by a GI bleed suspected 20. GI was consulted, and recommended medical management.  Received blood transfusion  and has remained stable on PPI therapy since then. Hospice was later consulted and [facility]. He will be transferred to a SNF for ongoing care.     Therefore, he is discharged in guarded and stable condition to skilled nursing facility.    The patient met 2-midnight criteria for an inpatient stay at the time of discharge.      FOLLOW UP ITEMS POST DISCHARGE  none    DISCHARGE DIAGNOSES  Principal Problem:    FTT (failure to thrive) in adult POA: Unknown  Active Problems:    Requires supervision due to deficit in self-care POA: Unknown    Severe protein-calorie malnutrition (HCC) POA: Unknown    Chronic atrial fibrillation (HCC) POA: Unknown    Agitation POA: Unknown    Eye dryness POA: Unknown    High blood urea nitrogen (BUN) POA: Unknown    Anemia due to acute blood loss POA: Unknown    UGI bleed POA: Unknown    Urine retention POA:  Unknown    Essential hypertension POA: Yes    CKD (chronic kidney disease) stage 3, GFR 30-59 ml/min ([facility]) POA: Yes    COPD (chronic obstructive pulmonary disease) ([facility]) POA: Yes    Hyperlipidemia POA: Yes  Resolved Problems:    * No resolved hospital problems. *      FOLLOW UP  No future appointments.  [facility]  [address]  [place] [address]  [phone]    Schedule an appointment as soon as possible for a visit  Please call to schedule a hospital follow up with your provider. Thank you!       MEDICATIONS ON DISCHARGE     Medication List      START taking these medications      Instructions   acetaminophen 500 MG Tabs  Commonly known as:  TYLENOL   Take 1 Tab by mouth every 6 hours as needed for Mild Pain.  Dose:  500 mg     finasteride 5 MG Tabs  Start taking on:  April 25, 2020  Commonly known as:  PROSCAR   Take 1 Tab by mouth every day.  Dose:  5 mg     omeprazole 20 MG delayed-release capsule  Start taking on:  April 25, 2020  Commonly known as:  PRILOSEC   Take 1 Cap by mouth every day.  Dose:  20 mg     quetiapine 50 MG tablet  Commonly known as:  SEROQUEL   Take 1 Tab by mouth every evening.  Dose:  50 mg     therapeutic multivitamin-minerals Tabs  Start taking on:  April 25, 2020   Take 1 Tab by mouth every day.  Dose:  1 Tab        CHANGE how you take these medications      Instructions   amLODIPine 10 MG Tabs  Start taking on:  April 25, 2020  What changed:    · medication strength  · how much to take  Commonly known as:  NORVASC   Take 1 Tab by mouth every day.  Dose:  10 mg        CONTINUE taking these medications      Instructions   cloNIDine 0.1 MG Tabs  Commonly known as:  CATAPRES   Take 0.1 mg by mouth every 6 hours as needed.  Dose:  0.1 mg     lidocaine 5 % Ptch  Commonly known as:  LIDODERM   Apply 1 Patch to skin as directed every 24 hours.  Dose:  1 Patch     losartan 100 MG Tabs  Commonly known as:  COZAAR   Take 100 mg by mouth every day.  Dose:   100 mg     tamsulosin 0.4 MG capsule  Commonly known as:  FLOMAX   Take 0.4 mg by mouth every evening.  Dose:  0.4 mg        STOP taking these medications    alendronate 70 MG Tabs  Commonly known as:  FOSAMAX     atorvastatin 10 MG Tabs  Commonly known as:  LIPITOR     Marinol 5 MG Caps  Generic drug:  dronabinol     oxyCODONE immediate-release 5 MG Tabs  Commonly known as:  ROXICODONE     vitamin D (Ergocalciferol) 1.25 MG (09218 UT) Caps capsule  Commonly known as:  DRISDOL     Xarelto 20 MG Tabs tablet  Generic drug:  rivaroxaban            Allergies  Allergies   Allergen Reactions   • Lisinopril Unspecified     Lethargy        DIET  Orders Placed This Encounter   Procedures   • Diet Order Regular (Set-up assist, Monitor w/meals, Meds floated )     Standing Status:   Standing     Number of Occurrences:   1     Order Specific Question:   Diet:     Answer:   Regular [1]     Comments:   Set-up assist, Monitor w/meals, Meds floated      Order Specific Question:   Texture Modifier     Answer:   Level 4 - Pureed (Dysphagia 1)     Order Specific Question:   Liquid level     Answer:   Level 2 - Mildly Thick       ACTIVITY  As tolerated.  Weight bearing as tolerated    LINES, DRAINS, AND WOUNDS  This is an automated list. Peripheral IVs will be removed prior to discharge.                     MENTAL STATUS ON TRANSFER  Level of Consciousness: Alert  Orientation : Oriented x 4  Speech: Speech Clear    CONSULTATIONS  GI  Hospice    PROCEDURES  none    LABORATORY  Lab Results   Component Value Date    SODIUM 140 04/10/2020    POTASSIUM 4.9 04/10/2020    CHLORIDE 107 04/10/2020    CO2 19 (L) 04/10/2020    GLUCOSE 81 04/10/2020    BUN 58 (H) 04/10/2020    CREATININE 2.26 (H) 04/10/2020    CREATININE 1.1 11/24/2007        Lab Results   Component Value Date    WBC 8.6 04/10/2020    HEMOGLOBIN 10.2 (L) 04/10/2020    HEMATOCRIT 32.7 (L) 04/10/2020    PLATELETCT 313 04/10/2020        Total time of the discharge process exceeds 33  minutes.

## 2020-04-24 NOTE — PROGRESS NOTES
Hospital Medicine Daily Progress Note    Date of Service  4/24/2020    Chief Complaint  79 y.o. male admitted 12/27/2019 with inability to care for self.    Hospital Course    Patient presented from home when his caregiver was found dead in his home on day of admission.  Patient is not able to care for himself and needs 24 hour supervision.  He was seen by psychiatry on 1/11/2020 and deemed incapacitated for medical decisions.  GI bleed suspected 4/4, GI consulted, recommends medical management.  Received blood transfusion 4/5.      Interval Problem Update  4/20- The patient continues to have systolic blood pressures over 150 despite norvasc 5mg and losartan  4/21- stable over night. No events. No complaints. I discussed him with his RN. Still awaiting placement.   4/22- hospice care needed at this point . He continues to decline. I have placed a consultation fot them.   4/23- agitated on occasion. No other changes.     Consultants/Specialty  Psych - s/o    Code Status  DNR    Disposition  Placement pending  Brother is POA, assisting with decisions.  I discussed hospice with case management and have placed a consultation. This is a good option for this declining patient.     Review of Systems  Review of Systems   Constitutional: Negative for chills and fever.   Respiratory: Negative for cough.    Genitourinary: Negative for dysuria and frequency.      Physical Exam  Temp:  [36.4 °C (97.5 °F)-36.8 °C (98.2 °F)] 36.8 °C (98.2 °F)  Pulse:  [69-80] 80  Resp:  [16-18] 18  BP: (120-154)/(64-74) 154/74  SpO2:  [95 %-97 %] 97 %    Physical Exam  Vitals signs and nursing note reviewed.   Constitutional:       Appearance: He is cachectic. He is not diaphoretic.   HENT:      Nose: No congestion or rhinorrhea.   Eyes:      General: No scleral icterus.        Right eye: No discharge.         Left eye: No discharge.   Neck:      Musculoskeletal: Neck supple. No muscular tenderness.   Cardiovascular:      Rate and Rhythm: Normal  rate. Rhythm irregular.      Heart sounds: No murmur.   Pulmonary:      Effort: No respiratory distress.   Abdominal:      General: Bowel sounds are normal. There is no distension.      Tenderness: There is no abdominal tenderness.   Musculoskeletal:         General: No swelling or deformity.   Skin:     General: Skin is warm and dry.      Coloration: Skin is not jaundiced.   Neurological:      Mental Status: He is alert. Mental status is at baseline.      Coordination: Coordination normal.   Psychiatric:         Behavior: Behavior is cooperative.         Cognition and Memory: Cognition is impaired. Memory is impaired.         Fluids    Intake/Output Summary (Last 24 hours) at 4/24/2020 0822  Last data filed at 4/23/2020 2100  Gross per 24 hour   Intake 520 ml   Output 200 ml   Net 320 ml       Laboratory                        Imaging  DX-ESOPHAGUS - [id number]   Final Result      2.5 minutes of fluoroscopy time utilized for modified barium swallow which showed tracheal penetration           Assessment/Plan  * FTT (failure to thrive) in adult  Assessment & Plan  Worsening rapidly. His functional capacity and mental capacity continues to decline despite nutrition and supportive care. I am concerned about an underlying malignancy. He is not a candidate for treatment. Thus, no workup needed. Hospice consultation. His brother is in favor of this. He would be a good candidate for [facility] care if we can arrange this.     Urine retention  Assessment & Plan  Resolved  UA - no evidence of infection.  Continue flomax and proscar      UGI bleed  Assessment & Plan  resolved  Xarelto stopped  Continue ppi once daily for now, may stop after a week, 4/26    Anemia due to acute blood loss  Assessment & Plan  xarelto stopped  Hemoglobin is now stable will monitor as needed    High blood urea nitrogen (BUN)  Assessment & Plan  Stable. Likely from previous GIB    Eye dryness  Assessment & Plan  resolved    Agitation  Assessment &  Plan  Resume nightly Seroquel    Chronic atrial fibrillation (HCC)  Assessment & Plan  Rate controlled with no need for rate control medication  Holding xarelto due to gi bleed and risk outweighs the benefits      Severe protein-calorie malnutrition (HCC)  Assessment & Plan  Nutrition following  Patient is eating magic cup supplements but has variable intake  Despite this he continues to lose weight. He has lost 60# since admission- high concern for an occult malignancy. Workup not likely needed given his functional status and FTT. Hospice consult.     Requires supervision due to deficit in self-care  Assessment & Plan  Awaiting for guardianship   assisting with placement    Hyperlipidemia- (present on admission)  Assessment & Plan  Resume lipitor    COPD (chronic obstructive pulmonary disease) ([facility])- (present on admission)  Assessment & Plan  No evidence of exacerbation  Continue RT protocol       CKD (chronic kidney disease) stage 3, GFR 30-59 ml/min ([facility])- (present on admission)  Assessment & Plan  Stable baseline creatinine is over 2        Essential hypertension- (present on admission)  Assessment & Plan   continue losartan   Systolic pressure is consistently in 150 to 160 range now  Will increase norvasc to 10mg daily and monitor     VTE prophylaxis:  SCDs

## 2020-04-24 NOTE — PROGRESS NOTES
0700: Bedside report from Valorie AMCEDO RN. Pt wakes to voice. No needs at this time. Pending placement and Hospice.

## 2020-04-24 NOTE — DISCHARGE PLANNING
[facility]. Amalia stated that the pt has been approved for [facility] SNF placement with hospice. [facility] with [facility].     SNF choice form with [facility] selected, faxed to [facility]. RN CM informed [facility] to inform facilities that pt will be [facility] with hospice.

## 2020-04-24 NOTE — PROGRESS NOTES
Assumed care. Pt sitting up in bed, denies pain/ nausea at this time. Pt has dry nonproductive cough. Bed locked and in lowest position. Call light within reach.

## 2020-04-24 NOTE — DISCHARGE PLANNING
Called pts POA regarding DC plan. POA is agreeable to pts DC plan and requests that [facility] keeps him informed on how his brother is doing.     Called [facility], spoke to Travis. Travis stated that pt has been accepted by [facility] and they will follow up with him at [place].

## 2022-10-13 NOTE — CARE PLAN
Problem: Communication  Goal: The ability to communicate needs accurately and effectively will improve  Outcome: PROGRESSING SLOWER THAN EXPECTED   Patient A&Ox2 and is refusing to take medications and to have VS completed at this time. Importance of medications and VS explained to patient. Patient verbalized understanding. Refusal stands. Hourly rounding in place. Respirations even and unlabored.     Problem: Safety  Goal: Will remain free from injury  Outcome: PROGRESSING AS EXPECTED  Fall precautions in place: bed alarm on, 2 side rails up, treaded socks on the patient, bedroom door open and room close to nurses station for maximum visibility, patient reoriented to surroundings when confusion occurs. Hourly rounding in place.    Complex Repair And Bilobe Flap Text: The defect edges were debeveled with a #15 scalpel blade.  The primary defect was closed partially with a complex linear closure.  Given the location of the remaining defect, shape of the defect and the proximity to free margins a bilobe flap was deemed most appropriate for complete closure of the defect.  Using a sterile surgical marker, an appropriate advancement flap was drawn incorporating the defect and placing the expected incisions within the relaxed skin tension lines where possible.    The area thus outlined was incised deep to adipose tissue with a #15 scalpel blade.  The skin margins were undermined to an appropriate distance in all directions utilizing iris scissors.

## 2023-01-20 NOTE — PROGRESS NOTES
[facility] Medicine Daily Progress Note    Date of Service  4/17/2020    Chief Complaint  79 y.o. male admitted 12/27/2019 with inability to care for self.    Hospital Course    Patient presented from home when his caregiver was found dead in his home on day of admission.  Patient is not able to care for himself and needs 24 hour supervision.  He was seen by psychiatry on 1/11/2020 and deemed incapacitated for medical decisions.  GI bleed suspected 4/4, GI consulted, recommends medical management.  Received blood transfusion 4/5.      Interval Problem Update  Seen and examined  No acute events overnight, no fevers or chills.   Discussed with RN and CM at length regarding placement    Consultants/Specialty  Psych - s/o    Code Status  DNR    Disposition  Placement pending  Brother is POA, assisting with decisions.    Review of Systems  Review of Systems   Constitutional: Negative for chills and fever.   HENT: Negative for congestion.    Eyes: Negative for blurred vision and photophobia.   Respiratory: Negative for cough and shortness of breath.    Cardiovascular: Negative for chest pain, claudication and leg swelling.   Gastrointestinal: Negative for abdominal pain, constipation, diarrhea, heartburn, nausea and vomiting.   Genitourinary: Negative for dysuria and hematuria.   Musculoskeletal: Negative for joint pain and myalgias.   Skin: Negative for itching and rash.   Neurological: Negative for dizziness, sensory change, speech change, weakness and headaches.   Psychiatric/Behavioral: Negative for depression. The patient is not nervous/anxious and does not have insomnia.         Physical Exam  Temp:  [37.1 °C (98.7 °F)] 37.1 °C (98.7 °F)  Pulse:  [67-85] 85  Resp:  [16-20] 16  BP: (102-137)/(58-73) 102/73  SpO2:  [94 %-96 %] 96 %    Physical Exam  Vitals signs and nursing note reviewed.   Constitutional:       General: He is not in acute distress.     Appearance: Normal appearance.   HENT:      Head: Normocephalic and  Sheath was removed in the right radial artery. Site closed by radial band.  atraumatic.   Eyes:      General: No scleral icterus.     Extraocular Movements: Extraocular movements intact.   Neck:      Musculoskeletal: Normal range of motion and neck supple.   Cardiovascular:      Rate and Rhythm: Normal rate and regular rhythm.      Pulses: Normal pulses.      Heart sounds: Normal heart sounds. No murmur.   Pulmonary:      Effort: Pulmonary effort is normal. No respiratory distress.      Breath sounds: Normal breath sounds. No wheezing, rhonchi or rales.   Abdominal:      General: Abdomen is flat. Bowel sounds are normal. There is no distension.      Palpations: Abdomen is soft.      Tenderness: There is no rebound.   Musculoskeletal:         General: No swelling or tenderness.   Lymphadenopathy:      Cervical: No cervical adenopathy.   Skin:     Coloration: Skin is not jaundiced.      Findings: No erythema.   Neurological:      Mental Status: He is alert. Mental status is at baseline.      Cranial Nerves: No cranial nerve deficit.      Comments: Oriented to self     Psychiatric:         Mood and Affect: Mood normal.         Behavior: Behavior normal.     Seen and examined 4/17  No change in exam c/t prior    Fluids    Intake/Output Summary (Last 24 hours) at 4/17/2020 1132  Last data filed at 4/17/2020 0800  Gross per 24 hour   Intake 240 ml   Output 150 ml   Net 90 ml       Laboratory                        Imaging  DX-ESOPHAGUS - [id number]   Final Result      2.5 minutes of fluoroscopy time utilized for modified barium swallow which showed tracheal penetration           Assessment/Plan  * Requires supervision due to deficit in self-care  Assessment & Plan  Awaiting for guardianship   assisting with placement    Urine retention  Assessment & Plan  Resolved  UA - no evidence of infection.  Continue flomax, added proscar      UGI bleed  Assessment & Plan  Suspected  Xarelto stopped  GI consulted, no endoscopy warranted  Medical management with PPI bid.    Anemia due to acute  blood loss  Assessment & Plan  xarelto stopped  Hemoglobin is 7.6 after blood transfusion, GI recommends transfusions as needed to keep over 7  Follow intermittent H&H    High blood urea nitrogen (BUN)  Assessment & Plan  due to gi bleed not worsening renal function    Eye dryness  Assessment & Plan  resolved    Agitation  Assessment & Plan  Resume nightly Seroquel    Chronic atrial fibrillation (HCC)  Assessment & Plan  Rate controlled with no need for rate control medication  Holding xarelto due to gi bleed, risk outweighs benefit.      Severe protein-calorie malnutrition (HCC)  Assessment & Plan  Nutrition following  Patient is eating magic cup supplements  Ok to continue diet, no endoscopy planned at this time per GI    Hyperlipidemia- (present on admission)  Assessment & Plan  Resume lipitor    COPD (chronic obstructive pulmonary disease) ([facility])- (present on admission)  Assessment & Plan  No evidence of exacerbation  Continue RT protocol       CKD (chronic kidney disease) stage 3, GFR 30-59 ml/min ([facility])- (present on admission)  Assessment & Plan  Stable, follow labs, BUN elevated due to acute GI bleed        Essential hypertension- (present on admission)  Assessment & Plan   continue losartan and amlodipine, tolerated well so far  Will monitor blood pressure and adjust medication as needed       VTE prophylaxis: xarelto d/c secondary to GI bleed, SCDs    Total time: 32 minutes. I spent greater than 50% of the time for patient care, counseling, and coordination on this date, including unit/floor time, and face-to-face time with the patient as per interval events and assessment and plan above. Where indicated, the assessment and plan reflect discussion of patient with consultants, other healthcare providers, family members, and additional research needed to obtain further information in formulating the plan of care for Eulogio Loyola Kandace.

## 2024-09-30 NOTE — CARE PLAN
Problem: Nutritional:  Goal: Achieve adequate nutritional intake  Description: Patient will consume >50% of meals, snacks and supplements.   Outcome: PROGRESSING SLOWER THAN EXPECTED      Received request via: Patient    Was the patient seen in the last year in this department? Yes    Does the patient have an active prescription (recently filled or refills available) for medication(s) requested? No    [facility]    Does the patient have FPC Plus and need 100-day supply? (This applies to ALL medications) Patient does not have SCP

## (undated) DEVICE — SUTURE GENERAL

## (undated) DEVICE — SUCTION INSTRUMENT YANKAUER BULBOUS TIP W/O VENT (50EA/CA)

## (undated) DEVICE — BLOCK

## (undated) DEVICE — BIT DRILL INTERTAN 4.0 LONG

## (undated) DEVICE — STAPLER SKIN DISP - (6/BX 10BX/CA) VISISTAT

## (undated) DEVICE — GLOVE BIOGEL SZ 7.5 SURGICAL PF LTX - (50PR/BX 4BX/CA)

## (undated) DEVICE — PROTECTOR ULNA NERVE - (36PR/CA)

## (undated) DEVICE — TOWELS CLOTH SURGICAL - (4/PK 20PK/CA)

## (undated) DEVICE — KIT ANESTHESIA W/CIRCUIT & 3/LT BAG W/FILTER (20EA/CA)

## (undated) DEVICE — SPONGE GAUZE STER 4X4 8-PL - (2/PK 50PK/BX 12BX/CS)

## (undated) DEVICE — NEPTUNE 4 PORT MANIFOLD - (20/PK)

## (undated) DEVICE — HEAD HOLDER JUNIOR/ADULT

## (undated) DEVICE — WIRE GUIDE R-T TRIGEN 3.2MM (1EA)

## (undated) DEVICE — SENSOR SPO2 NEO LNCS ADHESIVE (20/BX) SEE USER NOTES

## (undated) DEVICE — GLOVE BIOGEL INDICATOR SZ 8 SURGICAL PF LTX - (50/BX 4BX/CA)

## (undated) DEVICE — SUCTION INSTRUMENT YANKAUER OPEN TIP W/O VENT (50EA/CA)

## (undated) DEVICE — PENCIL ELECTSURG 10FT BTN SWH - (50/CA)

## (undated) DEVICE — GOWN WARMING STANDARD FLEX - (30/CA)

## (undated) DEVICE — LACTATED RINGERS INJ 1000 ML - (14EA/CA 60CA/PF)

## (undated) DEVICE — KIT ROOM DECONTAMINATION

## (undated) DEVICE — SET EXTENSION WITH 2 PORTS (48EA/CA) ***PART #2C8610 IS A SUBSTITUTE*****

## (undated) DEVICE — TUBE CONNECT SUCTION CLEAR 120 X 1/4" (50EA/CA)"

## (undated) DEVICE — DRESSING TRANSPARENT FILM TEGADERM 4 X 4.75" (50EA/BX)"

## (undated) DEVICE — PACK MAJOR ORTHO - (2EA/CA)

## (undated) DEVICE — DRAPE 36X28IN RAD CARM BND BG - (25/CA) O

## (undated) DEVICE — DRAPE U ORTHOPEDIC - (10/BX)

## (undated) DEVICE — SLEEVE, VASO, THIGH, MED

## (undated) DEVICE — DRAPE LARGE 3 QUARTER - (20/CA)

## (undated) DEVICE — SET LEADWIRE 5 LEAD BEDSIDE DISPOSABLE ECG (1SET OF 5/EA)

## (undated) DEVICE — MASK ANESTHESIA ADULT  - (100/CA)

## (undated) DEVICE — DRESSING XEROFORM 1X8 - (50/BX 4BX/CA)

## (undated) DEVICE — ELECTRODE 850 FOAM ADHESIVE - HYDROGEL RADIOTRNSPRNT (50/PK)

## (undated) DEVICE — CANISTER SUCTION 3000ML MECHANICAL FILTER AUTO SHUTOFF MEDI-VAC NONSTERILE LF DISP  (40EA/CA)

## (undated) DEVICE — DRAPE C-ARM LARGE 41IN X 74 IN - (10/BX 2BX/CA)

## (undated) DEVICE — TUBING CLEARLINK DUO-VENT - C-FLO (48EA/CA)